# Patient Record
Sex: MALE | Race: WHITE | NOT HISPANIC OR LATINO | Employment: FULL TIME | URBAN - NONMETROPOLITAN AREA
[De-identification: names, ages, dates, MRNs, and addresses within clinical notes are randomized per-mention and may not be internally consistent; named-entity substitution may affect disease eponyms.]

---

## 2017-02-08 ENCOUNTER — APPOINTMENT (OUTPATIENT)
Dept: GENERAL RADIOLOGY | Facility: HOSPITAL | Age: 62
End: 2017-02-08

## 2017-02-08 ENCOUNTER — HOSPITAL ENCOUNTER (INPATIENT)
Facility: HOSPITAL | Age: 62
LOS: 3 days | Discharge: HOME | End: 2017-02-11
Attending: FAMILY MEDICINE | Admitting: FAMILY MEDICINE

## 2017-02-08 PROBLEM — J18.9 PNEUMONIA: Status: ACTIVE | Noted: 2017-02-08

## 2017-02-08 PROBLEM — R00.0 TACHYCARDIA: Status: ACTIVE | Noted: 2017-02-08

## 2017-02-08 PROBLEM — I10 ESSENTIAL HYPERTENSION: Chronic | Status: ACTIVE | Noted: 2017-02-08

## 2017-02-08 LAB
ALBUMIN SERPL-MCNC: 4.5 G/DL (ref 3.5–5)
ALBUMIN/GLOB SERPL: 1.6 G/DL (ref 1.1–2.5)
ALP SERPL-CCNC: 149 U/L (ref 24–120)
ALT SERPL W P-5'-P-CCNC: 48 U/L (ref 0–54)
ANION GAP SERPL CALCULATED.3IONS-SCNC: 13 MMOL/L (ref 4–13)
AST SERPL-CCNC: 36 U/L (ref 7–45)
BILIRUB SERPL-MCNC: 1.7 MG/DL (ref 0.1–1)
BUN BLD-MCNC: 15 MG/DL (ref 5–21)
BUN/CREAT SERPL: 18.3 (ref 7–25)
CALCIUM SPEC-SCNC: 9.3 MG/DL (ref 8.4–10.4)
CHLORIDE SERPL-SCNC: 102 MMOL/L (ref 98–110)
CK MB SERPL-CCNC: 2.34 NG/ML (ref 0–5)
CK SERPL-CCNC: 60 U/L (ref 0–203)
CO2 SERPL-SCNC: 24 MMOL/L (ref 24–31)
CREAT BLD-MCNC: 0.82 MG/DL (ref 0.5–1.4)
CRP SERPL-MCNC: 2.03 MG/DL (ref 0–0.99)
D DIMER PPP FEU-MCNC: <0.22 MG/L (FEU) (ref 0–0.5)
DEPRECATED RDW RBC AUTO: 44.3 FL (ref 40–54)
ERYTHROCYTE [DISTWIDTH] IN BLOOD BY AUTOMATED COUNT: 19.7 % (ref 12–15)
GFR SERPL CREATININE-BSD FRML MDRD: 96 ML/MIN/1.73
GLOBULIN UR ELPH-MCNC: 2.9 GM/DL
GLUCOSE BLD-MCNC: 132 MG/DL (ref 70–100)
GLUCOSE BLDC GLUCOMTR-MCNC: 149 MG/DL (ref 70–130)
HBA1C MFR BLD: 8.7 %
HCT VFR BLD AUTO: 43.3 % (ref 40–52)
HGB BLD-MCNC: 12.1 G/DL (ref 14–18)
INR PPP: 0.96 (ref 0.91–1.09)
MCH RBC QN AUTO: 18.4 PG (ref 28–32)
MCHC RBC AUTO-ENTMCNC: 27.9 G/DL (ref 33–36)
MCV RBC AUTO: 66 FL (ref 82–95)
NT-PROBNP SERPL-MCNC: 307 PG/ML (ref 0–900)
PLATELET # BLD AUTO: 391 10*3/MM3 (ref 130–400)
POTASSIUM BLD-SCNC: 4 MMOL/L (ref 3.5–5.3)
PROT SERPL-MCNC: 7.4 G/DL (ref 6.3–8.7)
PROTHROMBIN TIME: 13.1 SECONDS (ref 11.9–14.6)
RBC # BLD AUTO: 6.56 10*6/MM3 (ref 4.8–5.9)
SODIUM BLD-SCNC: 139 MMOL/L (ref 135–145)
TROPONIN I SERPL-MCNC: <0.012 NG/ML (ref 0–0.03)
WBC NRBC COR # BLD: 21.89 10*3/MM3 (ref 4.8–10.8)

## 2017-02-08 PROCEDURE — 84484 ASSAY OF TROPONIN QUANT: CPT | Performed by: NURSE PRACTITIONER

## 2017-02-08 PROCEDURE — 83036 HEMOGLOBIN GLYCOSYLATED A1C: CPT | Performed by: NURSE PRACTITIONER

## 2017-02-08 PROCEDURE — 94760 N-INVAS EAR/PLS OXIMETRY 1: CPT

## 2017-02-08 PROCEDURE — 82550 ASSAY OF CK (CPK): CPT | Performed by: NURSE PRACTITIONER

## 2017-02-08 PROCEDURE — 94799 UNLISTED PULMONARY SVC/PX: CPT

## 2017-02-08 PROCEDURE — 71020 HC CHEST PA AND LATERAL: CPT

## 2017-02-08 PROCEDURE — 80053 COMPREHEN METABOLIC PANEL: CPT | Performed by: NURSE PRACTITIONER

## 2017-02-08 PROCEDURE — 85007 BL SMEAR W/DIFF WBC COUNT: CPT | Performed by: NURSE PRACTITIONER

## 2017-02-08 PROCEDURE — 86140 C-REACTIVE PROTEIN: CPT | Performed by: NURSE PRACTITIONER

## 2017-02-08 PROCEDURE — 85025 COMPLETE CBC W/AUTO DIFF WBC: CPT | Performed by: NURSE PRACTITIONER

## 2017-02-08 PROCEDURE — 85610 PROTHROMBIN TIME: CPT | Performed by: NURSE PRACTITIONER

## 2017-02-08 PROCEDURE — 94640 AIRWAY INHALATION TREATMENT: CPT

## 2017-02-08 PROCEDURE — 82553 CREATINE MB FRACTION: CPT | Performed by: NURSE PRACTITIONER

## 2017-02-08 PROCEDURE — 25010000002 ENOXAPARIN PER 10 MG: Performed by: NURSE PRACTITIONER

## 2017-02-08 PROCEDURE — 83880 ASSAY OF NATRIURETIC PEPTIDE: CPT | Performed by: NURSE PRACTITIONER

## 2017-02-08 PROCEDURE — 93005 ELECTROCARDIOGRAM TRACING: CPT | Performed by: NURSE PRACTITIONER

## 2017-02-08 PROCEDURE — 25010000002 AZITHROMYCIN: Performed by: NURSE PRACTITIONER

## 2017-02-08 PROCEDURE — 87040 BLOOD CULTURE FOR BACTERIA: CPT | Performed by: NURSE PRACTITIONER

## 2017-02-08 PROCEDURE — 85379 FIBRIN DEGRADATION QUANT: CPT | Performed by: NURSE PRACTITIONER

## 2017-02-08 PROCEDURE — 25010000002 CEFTRIAXONE: Performed by: NURSE PRACTITIONER

## 2017-02-08 PROCEDURE — 82962 GLUCOSE BLOOD TEST: CPT

## 2017-02-08 RX ORDER — SODIUM CHLORIDE 0.9 % (FLUSH) 0.9 %
1-10 SYRINGE (ML) INJECTION AS NEEDED
Status: DISCONTINUED | OUTPATIENT
Start: 2017-02-08 | End: 2017-02-11 | Stop reason: HOSPADM

## 2017-02-08 RX ORDER — CHLORTHALIDONE 25 MG/1
25 TABLET ORAL DAILY
Status: DISCONTINUED | OUTPATIENT
Start: 2017-02-08 | End: 2017-02-11 | Stop reason: HOSPADM

## 2017-02-08 RX ORDER — LORAZEPAM 0.5 MG/1
0.5 TABLET ORAL EVERY 6 HOURS PRN
Status: DISCONTINUED | OUTPATIENT
Start: 2017-02-08 | End: 2017-02-11 | Stop reason: HOSPADM

## 2017-02-08 RX ORDER — SODIUM CHLORIDE 9 MG/ML
100 INJECTION, SOLUTION INTRAVENOUS CONTINUOUS
Status: DISCONTINUED | OUTPATIENT
Start: 2017-02-08 | End: 2017-02-11 | Stop reason: HOSPADM

## 2017-02-08 RX ORDER — TRANDOLAPRIL TABLETS 4 MG/1
4 TABLET ORAL DAILY
COMMUNITY

## 2017-02-08 RX ORDER — IPRATROPIUM BROMIDE AND ALBUTEROL SULFATE 2.5; .5 MG/3ML; MG/3ML
3 SOLUTION RESPIRATORY (INHALATION)
Status: DISCONTINUED | OUTPATIENT
Start: 2017-02-08 | End: 2017-02-11 | Stop reason: HOSPADM

## 2017-02-08 RX ORDER — ONDANSETRON 2 MG/ML
4 INJECTION INTRAMUSCULAR; INTRAVENOUS EVERY 6 HOURS PRN
Status: DISCONTINUED | OUTPATIENT
Start: 2017-02-08 | End: 2017-02-11 | Stop reason: HOSPADM

## 2017-02-08 RX ORDER — LABETALOL 300 MG/1
300 TABLET, FILM COATED ORAL 2 TIMES DAILY
COMMUNITY

## 2017-02-08 RX ORDER — TRANDOLAPRIL TABLETS 2 MG/1
4 TABLET ORAL DAILY
Status: DISCONTINUED | OUTPATIENT
Start: 2017-02-08 | End: 2017-02-08 | Stop reason: CLARIF

## 2017-02-08 RX ORDER — LABETALOL 300 MG/1
300 TABLET, FILM COATED ORAL 2 TIMES DAILY
Status: DISCONTINUED | OUTPATIENT
Start: 2017-02-08 | End: 2017-02-11 | Stop reason: HOSPADM

## 2017-02-08 RX ORDER — LISINOPRIL 20 MG/1
40 TABLET ORAL
Status: DISCONTINUED | OUTPATIENT
Start: 2017-02-08 | End: 2017-02-11 | Stop reason: HOSPADM

## 2017-02-08 RX ORDER — PANTOPRAZOLE SODIUM 40 MG/10ML
40 INJECTION, POWDER, LYOPHILIZED, FOR SOLUTION INTRAVENOUS
Status: DISCONTINUED | OUTPATIENT
Start: 2017-02-09 | End: 2017-02-09 | Stop reason: CLARIF

## 2017-02-08 RX ORDER — HYDROCODONE BITARTRATE AND ACETAMINOPHEN 5; 325 MG/1; MG/1
1 TABLET ORAL EVERY 4 HOURS PRN
Status: DISCONTINUED | OUTPATIENT
Start: 2017-02-08 | End: 2017-02-11 | Stop reason: HOSPADM

## 2017-02-08 RX ORDER — CHLORTHALIDONE 25 MG/1
25 TABLET ORAL DAILY
COMMUNITY

## 2017-02-08 RX ADMIN — SODIUM CHLORIDE 1000 ML: 9 INJECTION, SOLUTION INTRAVENOUS at 21:26

## 2017-02-08 RX ADMIN — SODIUM CHLORIDE 100 ML/HR: 9 INJECTION, SOLUTION INTRAVENOUS at 21:26

## 2017-02-08 RX ADMIN — ENOXAPARIN SODIUM 40 MG: 40 INJECTION SUBCUTANEOUS at 21:26

## 2017-02-08 RX ADMIN — SODIUM CHLORIDE 100 ML/HR: 9 INJECTION, SOLUTION INTRAVENOUS at 18:22

## 2017-02-08 RX ADMIN — METFORMIN HYDROCHLORIDE 1000 MG: 500 TABLET ORAL at 19:02

## 2017-02-08 RX ADMIN — IPRATROPIUM BROMIDE AND ALBUTEROL SULFATE 3 ML: .5; 3 SOLUTION RESPIRATORY (INHALATION) at 16:58

## 2017-02-08 RX ADMIN — AZITHROMYCIN 500 MG: 500 INJECTION, POWDER, LYOPHILIZED, FOR SOLUTION INTRAVENOUS at 21:26

## 2017-02-08 RX ADMIN — CEFTRIAXONE 1 G: 1 INJECTION, POWDER, FOR SOLUTION INTRAMUSCULAR; INTRAVENOUS at 19:02

## 2017-02-08 RX ADMIN — IPRATROPIUM BROMIDE AND ALBUTEROL SULFATE 3 ML: .5; 3 SOLUTION RESPIRATORY (INHALATION) at 19:27

## 2017-02-08 RX ADMIN — IPRATROPIUM BROMIDE AND ALBUTEROL SULFATE 3 ML: .5; 3 SOLUTION RESPIRATORY (INHALATION) at 23:17

## 2017-02-08 NOTE — H&P
No care team member to display    Chief complaint URI    Subjective     HPI Comments: Patient was working as a mate on a tow boat. He began having upper respiratory symptoms 2-3 days ago. He reports his symptoms worsening significantly last night and he began having sputum production and SOB, he has a history of pneumonia.     Breathing Problem   He complains of cough, difficulty breathing, shortness of breath, sputum production and wheezing. This is a new problem. The current episode started in the past 7 days. The problem occurs constantly. The problem has been gradually worsening. The cough is productive of purulent sputum and hacking. Associated symptoms include dyspnea on exertion, ear pain, a fever, headaches, malaise/fatigue, myalgias and a sore throat. Pertinent negatives include no chest pain or trouble swallowing. His symptoms are aggravated by any activity. His symptoms are alleviated by nothing. He reports no improvement on treatment. His past medical history is significant for pneumonia.       Review of Systems   Constitutional: Positive for fever and malaise/fatigue.   HENT: Positive for congestion, ear pain and sore throat. Negative for trouble swallowing.    Respiratory: Positive for cough, sputum production, shortness of breath and wheezing. Negative for chest tightness and stridor.    Cardiovascular: Positive for dyspnea on exertion. Negative for chest pain, palpitations and leg swelling.   Gastrointestinal: Negative for abdominal distention, abdominal pain, diarrhea, nausea and vomiting.   Genitourinary: Negative for dysuria and flank pain.   Musculoskeletal: Positive for myalgias.   Skin: Positive for pallor. Negative for rash.   Neurological: Positive for dizziness and headaches. Negative for syncope and speech difficulty.   Psychiatric/Behavioral: Negative for agitation and confusion.        No past medical history on file.  No past surgical history on file.  No family history on  file.  Social History   Substance Use Topics   • Smoking status: Not on file   • Smokeless tobacco: Not on file   • Alcohol use Not on file     No prescriptions prior to admission.     Allergies:  Review of patient's allergies indicates not on file.    Objective      Vital Signs  BP: ()/()   Arterial Line BP: ()/()     Physical Exam   Constitutional: He is oriented to person, place, and time. He appears well-developed and well-nourished.   HENT:   Head: Normocephalic and atraumatic.   Eyes: Conjunctivae and EOM are normal. Pupils are equal, round, and reactive to light.   Neck: Normal range of motion. Neck supple.   Cardiovascular: Regular rhythm and normal heart sounds.  Tachycardia present.    Pulmonary/Chest: Tachypnea noted. He has decreased breath sounds in the right lower field and the left lower field. He has wheezes. He has rhonchi in the right middle field and the left middle field.   Abdominal: Soft. Bowel sounds are normal. He exhibits no distension. There is no tenderness.   Musculoskeletal: Normal range of motion.   Neurological: He is alert and oriented to person, place, and time.   Skin: Skin is warm and dry. He is not diaphoretic. There is pallor.   Psychiatric: He has a normal mood and affect.       Results Review:   Discussed with Dr. Sanchez      Assessment/Plan     Principal Problem:    Pneumonia  Active Problems:    Tachycardia    Essential hypertension      Assessment:  (Pneumonia (not confirmed)  HTN  Tachycardia  ).     Plan:   Start/continue incentive spirometry.  Chest x-ray.  Start antibiotics and give fluids.   (Will admit for evaluation of probable pneumonia, tachycardia and HTN, r/o sepsis, will place on portable telemetry, add IV fluids/antibiotics. Will monitor clinical course closely.).       I discussed the patients findings and my recommendations with patient    Rosie Sharma, KEMAR  02/08/17  3:43 PM    Time: Admit and evaluate   Chest x-ray reviewed shows right lower lobe  airspace opacities with questionable atelectasis and acute infiltrate/pneumonia considered.  We will admit and place her on broad-spectrum IV antibiotics for community-acquired pneumonia and aggressive pulmonary toilet.  I have discussed the care of Nikita Bhardwaj, including pertinent history and exam findings with the ARNP/PA.  I have seen and examined the patient and the key elements of all parts of the encounter have been performed by me. I agree with the assessment and plan as outlined by the ARNP/PA. Please refer to my separate note for complete documentation.     Electronically signed by Nikita Quick MD on 2/9/2017 at 7:14 AM

## 2017-02-08 NOTE — PROGRESS NOTES
"Pharmacy Anticoagulation Note - Vivienne Bhardwaj is a 61 y.o. male on Enoxaparin 40 mg SQ Q24H for indication of VTE prophylaxis.    [Ht: 68\" (172.7 cm); Wt: 189 lb (85.7 kg);  BMI: Body mass index is 28.74 kg/(m^2).]  Estimated Creatinine Clearance: 102 mL/min (by C-G formula based on Cr of 0.82). No results found for: INR, PROTIME No results found for: HGB No results found for: PLT  Assessment/Plan:  Pharmacy was consulted for dosing of enoxaparin (indication of VTE prophylaxis).  Based on renal function, will initiate enoxaparin 40 mg Q24H.  Pharmacy will continue to monitor renal function and hemodynamic status and adjust dose accordingly.    Joanne Cohen, [facility]  02/08/17 5:11 PM   "

## 2017-02-09 PROBLEM — E11.9 TYPE 2 DIABETES MELLITUS (HCC): Status: ACTIVE | Noted: 2017-02-09

## 2017-02-09 LAB
BASOPHILS # BLD MANUAL: 0.44 10*3/MM3 (ref 0–0.2)
BASOPHILS NFR BLD AUTO: 2 % (ref 0–2)
BILIRUB UR QL STRIP: NEGATIVE
CLARITY UR: CLEAR
COLOR UR: YELLOW
ELLIPTOCYTES BLD QL SMEAR: ABNORMAL
EOSINOPHIL # BLD MANUAL: 1.09 10*3/MM3 (ref 0–0.7)
EOSINOPHIL NFR BLD MANUAL: 5 % (ref 0–4)
FLUAV AG NPH QL: NEGATIVE
FLUBV AG NPH QL IA: NEGATIVE
GLUCOSE BLDC GLUCOMTR-MCNC: 139 MG/DL (ref 70–130)
GLUCOSE BLDC GLUCOMTR-MCNC: 221 MG/DL (ref 70–130)
GLUCOSE BLDC GLUCOMTR-MCNC: 312 MG/DL (ref 70–130)
GLUCOSE BLDC GLUCOMTR-MCNC: 328 MG/DL (ref 70–130)
GLUCOSE UR STRIP-MCNC: NEGATIVE MG/DL
HGB UR QL STRIP.AUTO: NEGATIVE
KETONES UR QL STRIP: NEGATIVE
LARGE PLATELETS: ABNORMAL
LEUKOCYTE ESTERASE UR QL STRIP.AUTO: NEGATIVE
LYMPHOCYTES # BLD MANUAL: 1.09 10*3/MM3 (ref 0.72–4.86)
LYMPHOCYTES NFR BLD MANUAL: 3 % (ref 4–12)
LYMPHOCYTES NFR BLD MANUAL: 5 % (ref 15–45)
MICROCYTES BLD QL: ABNORMAL
MONOCYTES # BLD AUTO: 0.66 10*3/MM3 (ref 0.19–1.3)
NEUTROPHILS # BLD AUTO: 18.61 10*3/MM3 (ref 1.87–8.4)
NEUTROPHILS NFR BLD MANUAL: 85 % (ref 39–78)
NITRITE UR QL STRIP: NEGATIVE
NRBC SPEC MANUAL: 1 /100 WBC (ref 0–0)
PH UR STRIP.AUTO: <=5 [PH] (ref 5–8)
PLAT MORPH BLD: NORMAL
POLYCHROMASIA BLD QL SMEAR: ABNORMAL
PROT UR QL STRIP: NEGATIVE
SCHISTOCYTES BLD QL SMEAR: ABNORMAL
SMALL PLATELETS BLD QL SMEAR: ADEQUATE
SP GR UR STRIP: 1.01 (ref 1–1.03)
UROBILINOGEN UR QL STRIP: NORMAL
WBC MORPH BLD: NORMAL

## 2017-02-09 PROCEDURE — 94760 N-INVAS EAR/PLS OXIMETRY 1: CPT

## 2017-02-09 PROCEDURE — 81003 URINALYSIS AUTO W/O SCOPE: CPT | Performed by: NURSE PRACTITIONER

## 2017-02-09 PROCEDURE — 94640 AIRWAY INHALATION TREATMENT: CPT

## 2017-02-09 PROCEDURE — 87804 INFLUENZA ASSAY W/OPTIC: CPT | Performed by: FAMILY MEDICINE

## 2017-02-09 PROCEDURE — 94799 UNLISTED PULMONARY SVC/PX: CPT

## 2017-02-09 PROCEDURE — 25010000002 METHYLPREDNISOLONE PER 40 MG: Performed by: PHYSICIAN ASSISTANT

## 2017-02-09 PROCEDURE — 82962 GLUCOSE BLOOD TEST: CPT

## 2017-02-09 PROCEDURE — 25010000002 AZITHROMYCIN: Performed by: NURSE PRACTITIONER

## 2017-02-09 PROCEDURE — 25010000002 CEFTRIAXONE: Performed by: NURSE PRACTITIONER

## 2017-02-09 PROCEDURE — 93010 ELECTROCARDIOGRAM REPORT: CPT | Performed by: INTERNAL MEDICINE

## 2017-02-09 PROCEDURE — 63710000001 INSULIN LISPRO (HUMAN) PER 5 UNITS: Performed by: PHYSICIAN ASSISTANT

## 2017-02-09 PROCEDURE — 25010000002 ENOXAPARIN PER 10 MG: Performed by: NURSE PRACTITIONER

## 2017-02-09 RX ORDER — NICOTINE POLACRILEX 4 MG
15 LOZENGE BUCCAL
Status: DISCONTINUED | OUTPATIENT
Start: 2017-02-09 | End: 2017-02-10 | Stop reason: SDUPTHER

## 2017-02-09 RX ORDER — PANTOPRAZOLE SODIUM 40 MG/1
40 TABLET, DELAYED RELEASE ORAL
Status: DISCONTINUED | OUTPATIENT
Start: 2017-02-10 | End: 2017-02-11 | Stop reason: HOSPADM

## 2017-02-09 RX ORDER — METHYLPREDNISOLONE SODIUM SUCCINATE 40 MG/ML
40 INJECTION, POWDER, LYOPHILIZED, FOR SOLUTION INTRAMUSCULAR; INTRAVENOUS EVERY 8 HOURS
Status: DISCONTINUED | OUTPATIENT
Start: 2017-02-09 | End: 2017-02-10

## 2017-02-09 RX ORDER — DEXTROSE MONOHYDRATE 25 G/50ML
25 INJECTION, SOLUTION INTRAVENOUS
Status: DISCONTINUED | OUTPATIENT
Start: 2017-02-09 | End: 2017-02-10 | Stop reason: SDUPTHER

## 2017-02-09 RX ADMIN — METHYLPREDNISOLONE SODIUM SUCCINATE 40 MG: 40 INJECTION, POWDER, FOR SOLUTION INTRAMUSCULAR; INTRAVENOUS at 22:46

## 2017-02-09 RX ADMIN — IPRATROPIUM BROMIDE AND ALBUTEROL SULFATE 3 ML: .5; 3 SOLUTION RESPIRATORY (INHALATION) at 06:46

## 2017-02-09 RX ADMIN — METHYLPREDNISOLONE SODIUM SUCCINATE 40 MG: 40 INJECTION, POWDER, FOR SOLUTION INTRAMUSCULAR; INTRAVENOUS at 15:53

## 2017-02-09 RX ADMIN — AZITHROMYCIN 500 MG: 500 INJECTION, POWDER, LYOPHILIZED, FOR SOLUTION INTRAVENOUS at 17:26

## 2017-02-09 RX ADMIN — METFORMIN HYDROCHLORIDE 1000 MG: 500 TABLET ORAL at 17:27

## 2017-02-09 RX ADMIN — INSULIN LISPRO 5 UNITS: 100 INJECTION, SOLUTION INTRAVENOUS; SUBCUTANEOUS at 17:29

## 2017-02-09 RX ADMIN — CHLORTHALIDONE 25 MG: 25 TABLET ORAL at 08:26

## 2017-02-09 RX ADMIN — CEFTRIAXONE 1 G: 1 INJECTION, POWDER, FOR SOLUTION INTRAMUSCULAR; INTRAVENOUS at 16:01

## 2017-02-09 RX ADMIN — METFORMIN HYDROCHLORIDE 1000 MG: 500 TABLET ORAL at 08:26

## 2017-02-09 RX ADMIN — IPRATROPIUM BROMIDE AND ALBUTEROL SULFATE 3 ML: .5; 3 SOLUTION RESPIRATORY (INHALATION) at 02:55

## 2017-02-09 RX ADMIN — SODIUM CHLORIDE 100 ML/HR: 9 INJECTION, SOLUTION INTRAVENOUS at 09:54

## 2017-02-09 RX ADMIN — PANTOPRAZOLE SODIUM 40 MG: 40 INJECTION, POWDER, FOR SOLUTION INTRAVENOUS at 05:53

## 2017-02-09 RX ADMIN — IPRATROPIUM BROMIDE AND ALBUTEROL SULFATE 3 ML: .5; 3 SOLUTION RESPIRATORY (INHALATION) at 23:15

## 2017-02-09 RX ADMIN — ENOXAPARIN SODIUM 40 MG: 40 INJECTION SUBCUTANEOUS at 17:27

## 2017-02-09 RX ADMIN — LABETALOL HYDROCHLORIDE 300 MG: 300 TABLET, FILM COATED ORAL at 08:26

## 2017-02-09 RX ADMIN — METHYLPREDNISOLONE SODIUM SUCCINATE 40 MG: 40 INJECTION, POWDER, FOR SOLUTION INTRAMUSCULAR; INTRAVENOUS at 08:45

## 2017-02-09 RX ADMIN — IPRATROPIUM BROMIDE AND ALBUTEROL SULFATE 3 ML: .5; 3 SOLUTION RESPIRATORY (INHALATION) at 11:11

## 2017-02-09 RX ADMIN — IPRATROPIUM BROMIDE AND ALBUTEROL SULFATE 3 ML: .5; 3 SOLUTION RESPIRATORY (INHALATION) at 19:25

## 2017-02-09 RX ADMIN — INSULIN LISPRO 3 UNITS: 100 INJECTION, SOLUTION INTRAVENOUS; SUBCUTANEOUS at 12:20

## 2017-02-09 RX ADMIN — LABETALOL HYDROCHLORIDE 300 MG: 300 TABLET, FILM COATED ORAL at 17:27

## 2017-02-09 RX ADMIN — INSULIN LISPRO 5 UNITS: 100 INJECTION, SOLUTION INTRAVENOUS; SUBCUTANEOUS at 20:54

## 2017-02-09 RX ADMIN — LISINOPRIL 40 MG: 20 TABLET ORAL at 08:26

## 2017-02-09 RX ADMIN — IPRATROPIUM BROMIDE AND ALBUTEROL SULFATE 3 ML: .5; 3 SOLUTION RESPIRATORY (INHALATION) at 14:54

## 2017-02-09 NOTE — PROGRESS NOTES
Family Medicine Progress Note    Patient:  Nikita Bhardwaj  YOB: 1955    MRN: [id number]     Acct: [id number]     Admit date: 2/8/2017  LOS: 1     Patient Seen, Chart, Consults notes, Labs, Radiology studies reviewed.    Subjective:  Doing well with no new complaints    Past Medical History:  Past Medical History   Diagnosis Date   • Diabetes mellitus    • Hypertension        Past Surgical History:  History reviewed. No pertinent past surgical history.      Allergies:  Review of patient's allergies indicates no known allergies.    Medications:  Scheduled Meds:    ceftriaxone 1 g Intravenous Q24H   And      azithromycin 500 mg Intravenous Q24H   chlorthalidone 25 mg Oral Daily   enoxaparin 40 mg Subcutaneous Q24H   ipratropium-albuterol 3 mL Nebulization Q4H - RT   labetalol 300 mg Oral BID   lisinopril 40 mg Oral Q24H   metFORMIN 1,000 mg Oral BID With Meals   pantoprazole 40 mg Intravenous Q AM     Continuous Infusions:    Pharmacy to Dose enoxaparin (LOVENOX)     sodium chloride 100 mL/hr Last Rate: 100 mL/hr (02/08/17 2126)     PRN Meds:HYDROcodone-acetaminophen  •  LORazepam  •  ondansetron  •  Pharmacy to Dose enoxaparin (LOVENOX)  •  sodium chloride    Social History:   Social History     Social History   • Marital status: Single     Spouse name: N/A   • Number of children: N/A   • Years of education: N/A     Occupational History   • Not on file.     Social History Main Topics   • Smoking status: Never Smoker   • Smokeless tobacco: Not on file   • Alcohol use No   • Drug use: Not on file   • Sexual activity: Yes     Partners: Male     Other Topics Concern   • Not on file     Social History Narrative   • No narrative on file       Family History:   History reviewed. No pertinent family history.      ROS:  10 point ROS obtained:   Gen: No fevers  HEENT: No migraine or visual change  Lung: No cough, hemopotysis or wheezing  Cv: No chest pain or pressure  Abd: No nausea or vomit, no change in bowel fct,  no gi bleeding  Ext: No inc pain or swelling  Neuro: No seizure or syncope  Skin: No new rashes  Endo: No polyuria, polyphagia or poilydypsia  Psyc: No inc anxiety or depression  MS: No increase in joint pain or swelling reported      Objective:    Lab Results (last 72 hours)     Procedure Component Value Units Date/Time    POC Glucose Fingerstick [[id number]]  (Abnormal) Collected:  02/08/17 1610    Specimen:  Blood Updated:  02/08/17 1640     Glucose 149 (H) mg/dL       : [id number] Chandu Moy ID: [id number]       C-reactive Protein [[id number]]  (Abnormal) Collected:  02/08/17 1613    Specimen:  Blood Updated:  02/08/17 1652     C-Reactive Protein 2.03 (H) mg/dL     Comprehensive Metabolic Panel [[id number]]  (Abnormal) Collected:  02/08/17 1613    Specimen:  Blood Updated:  02/08/17 1653     Glucose 132 (H) mg/dL      BUN 15 mg/dL      Creatinine 0.82 mg/dL      Sodium 139 mmol/L      Potassium 4.0 mmol/L      Chloride 102 mmol/L      CO2 24.0 mmol/L      Calcium 9.3 mg/dL      Total Protein 7.4 g/dL      Albumin 4.50 g/dL      ALT (SGPT) 48 U/L      AST (SGOT) 36 U/L      Alkaline Phosphatase 149 (H) U/L      Total Bilirubin 1.7 (H) mg/dL      eGFR Non African Amer 96 mL/min/1.73      Globulin 2.9 gm/dL      A/G Ratio 1.6 g/dL      BUN/Creatinine Ratio 18.3      Anion Gap 13.0 mmol/L     CK Total & CKMB [[id number]]  (Normal) Collected:  02/08/17 1613    Specimen:  Blood Updated:  02/08/17 1706     CKMB 2.34 ng/mL      Creatine Kinase 60 U/L     Narrative:       CKMB Index not indicated    Manual Differential [[id number]] Collected:  02/08/17 1613    Specimen:  Blood Updated:  02/08/17 1710    Slide Review, Hematology [[id number]] Collected:  02/08/17 1613    Specimen:  Blood Updated:  02/08/17 1720    CBC Auto Differential [[id number]]  (Abnormal) Collected:  02/08/17 1613    Specimen:  Blood Updated:  02/08/17 1722     WBC 21.89 (H) 10*3/mm3      RBC 6.56 (H) 10*6/mm3      Hemoglobin 12.1 (L) g/dL       Hematocrit 43.3 %      MCV 66.0 (L) fL      MCH 18.4 (L) pg      MCHC 27.9 (L) g/dL      RDW 19.7 (H) %      RDW-SD 44.3 fl      Platelets 391 10*3/mm3     Troponin [[id number]]  (Normal) Collected:  02/08/17 1613    Specimen:  Blood Updated:  02/08/17 1732     Troponin I <0.012 ng/mL     BNP [[id number]]  (Normal) Collected:  02/08/17 1613    Specimen:  Blood Updated:  02/08/17 1732     proBNP 307.0 pg/mL     Hemoglobin A1c [[id number]] Collected:  02/08/17 1613    Specimen:  Blood Updated:  02/08/17 1753     Hemoglobin A1C 8.7 %     Narrative:       Less than 6.0           Non-Diabetic Range  6.0-7.0                 ADA Therapeutic Target  Greater than 7.0        Action Suggested    Protime-INR [[id number]]  (Normal) Collected:  02/08/17 1613    Specimen:  Blood Updated:  02/08/17 1826     Protime 13.1 Seconds      INR 0.96     D-dimer, Quantitative [[id number]]  (Normal) Collected:  02/08/17 1613    Specimen:  Blood Updated:  02/08/17 1847     D-Dimer, Quantitative <0.22 mg/L (FEU)     Narrative:       Reference Range is 0-0.50 mg/L FEU. However, results <0.50 mg/L FEU tends to rule out DVT or PE. Results >0.50 mg/L FEU are not useful in predicting absence or presence of DVT or PE.    CBC & Differential [[id number]] Collected:  02/08/17 1613    Specimen:  Blood Updated:  02/08/17 1902    Narrative:       The following orders were created for panel order CBC & Differential.  Procedure                               Abnormality         Status                     ---------                               -----------         ------                     Manual Differential[[id number]]                               In process                 Scan Slide[[id number]]                                                                   CBC Auto Differential[[id number]]         Abnormal            Final result               Slide Review, Hematology[[id number]]                          In process                   Please view results for these  "tests on the individual orders.    Troponin [[id number]]  (Normal) Collected:  02/08/17 1814    Specimen:  Blood Updated:  02/08/17 1945     Troponin I <0.012 ng/mL     Troponin [[id number]]  (Normal) Collected:  02/08/17 2137    Specimen:  Blood Updated:  02/08/17 2218     Troponin I <0.012 ng/mL     Blood Culture [[id number]]  (Normal) Collected:  02/08/17 1610    Specimen:  Blood from Arm, Right Updated:  02/09/17 0501     Blood Culture No growth at less than 24 hours     Blood Culture [[id number]]  (Normal) Collected:  02/08/17 1613    Specimen:  Blood from Arm, Left Updated:  02/09/17 0501     Blood Culture No growth at less than 24 hours            Imaging Results (last 72 hours)     Procedure Component Value Units Date/Time    XR Chest 2 View [[id number]] Collected:  02/08/17 1806     Updated:  02/08/17 2051    Narrative:       TWO-VIEW CHEST:      HISTORY: Pneumonia.      Frontal and lateral projection chest radiograph obtained.     COMPARISON: 08/24/2013      FINDINGS:  There is right hemidiaphragm elevation with airspace opacities in the  right lung base that may represent atelectasis. Acute  infiltrate/pneumonia also considered. Right perihilar interstitial  prominence noted particularly in the lower lobe.     The left lung is grossly clear.     The heart is normal in size without heart failure.     The bony structures are intact.                                                                                                                      Impression:       Right hemidiaphragm elevation with right lower lobe airspace  opacities, question atelectasis. Acute infiltrates/pneumonia also  considered.        This report was finalized on 02/08/2017 20:49 by Dr. Winston Sharma MD.           Physical Exam:    Vitals:   Visit Vitals   • /81 (BP Location: Left arm, Patient Position: Lying)   • Pulse 100   • Temp 98.6 °F (37 °C) (Temporal Artery )   • Resp 18   • Ht 68\" (172.7 cm)   • Wt 189 lb (85.7 kg)   • SpO2 " 97%   • BMI 28.74 kg/m2     24 hour intake/output:    Intake/Output Summary (Last 24 hours) at 02/09/17 0609  Last data filed at 02/08/17 1915   Gross per 24 hour   Intake    240 ml   Output      0 ml   Net    240 ml     Last 3 weights:  Wt Readings from Last 3 Encounters:   02/08/17 189 lb (85.7 kg)           General Appearance:    Alert, cooperative, in no acute distress   Head:    Normocephalic, without obvious abnormality, atraumatic   Eyes:            Lids and lashes normal, conjunctivae and sclerae normal, no   icterus, no pallor, corneas clear, PERRLA   Ears:    Ears appear intact with no abnormalities noted   Throat:   No oral lesions, no thrush, oral mucosa moist   Neck:   No adenopathy, supple, trachea midline, no thyromegaly, no   carotid bruit, no JVD   Back:     No kyphosis present, no scoliosis present, no skin lesions,      erythema or scars, no tenderness to percussion or                   palpation,   range of motion normal   Lungs:     Clear to auscultation,respirations regular, even and                  unlabored    Heart:    Regular rhythm and normal rate, normal S1 and S2, no            murmur, no gallop, no rub, no click   Chest Wall:    No abnormalities observed   Abdomen:     Normal bowel sounds, no masses, no organomegaly, soft        non-tender, non-distended, no guarding, no rebound                tenderness   Rectal:     Deferred   Extremities:   Moves all extremities well, no edema, no cyanosis, no             redness   Pulses:   Pulses palpable and equal bilaterally   Skin:   No bleeding, bruising or rash   Lymph nodes:   No palpable adenopathy   Neurologic:   Cranial nerves 2 - 12 grossly intact, sensation intact, DTR       present and equal bilaterally           Assessment:    Principal Problem:    Pneumonia  Active Problems:    Tachycardia    Essential hypertension    Type 2 diabetes mellitus  Leukocytosis        Plan:  PNEUMONIA:  1. Continue IV antibiotics  2. Solumedrol  IV  3. Check flu swab  4. Pulmonary toilet (nebulized treatments, Incentive Spirometry, P&D)  5. Early Mobilization  6. Collect sputum culture and blood cultures x2  7. Supplement 02 as needed to maintain oxygen saturation >92%  8. DVT prophylaxis with Lovenox and/or SCD/Teds        Electronically signed by BIMAL Pendleton on 2/9/2017 at 6:09 AM     Better today , nebulizer seemed to help.  Reviewed her chest x-ray today.  Continue with IV antibiotics aggressive pulmonary toilet and hopefully be able to discharge home tomorrow if she continues to clinically improve.  I have discussed the care of Nikita Bhardwaj, including pertinent history and exam findings with the ARNP/PA.  I have seen and examined the patient and the key elements of all parts of the encounter have been performed by me. I agree with the assessment and plan as outlined by the ARNP/PA. Please refer to my separate note for complete documentation.     Electronically signed by Nikita Quick MD on 2/9/2017 at 7:15 AM

## 2017-02-09 NOTE — PLAN OF CARE
Problem: Patient Care Overview (Adult)  Goal: Plan of Care Review  Outcome: Ongoing (interventions implemented as appropriate)    02/08/17 4598   Coping/Psychosocial Response Interventions   Plan Of Care Reviewed With patient   Patient Care Overview   Progress unable to show any progress toward functional goals   Outcome Evaluation   Outcome Summary/Follow up Plan C/O SOA, AND PRODUCTIVE COUGH. CURRENTLY ON ROOM AIR. NO C/O PAIN         Problem: Pneumonia (Adult)  Goal: Signs and Symptoms of Listed Potential Problems Will be Absent or Manageable (Pneumonia)  Outcome: Ongoing (interventions implemented as appropriate)

## 2017-02-09 NOTE — PLAN OF CARE
Problem: Pneumonia (Adult)  Goal: Signs and Symptoms of Listed Potential Problems Will be Absent or Manageable (Pneumonia)    02/09/17 1534   Pneumonia   Problems Assessed (Pneumonia) all   Problems Present (Pneumonia) respiratory compromise

## 2017-02-09 NOTE — PROGRESS NOTES
Automatic IV to PO Conversion Pharmacy Note - General    Nikita Bhardwaj is a 61 y.o. male who meets the following criteria for IV to PO therapy conversion :     · Tolerating oral fluids or 40ml/hour of enteral nutrition and oral route not otherwise compromised  · Receiving other oral medications on a scheduled basis    Assessment/Plan  Based on this criteria,   Protonix 40 mg IV Qam has been changed to Protonix 40 mg PO Qam   per the directives and guidelines established by [facility] Pharmacy and Therapeutics Committee and [facility] Medical Executive Committee .       KATTY Suh, Pharm.D.    02/09/1710:12 AM

## 2017-02-09 NOTE — PLAN OF CARE
Problem: Patient Care Overview (Adult)  Goal: Plan of Care Review  Outcome: Ongoing (interventions implemented as appropriate)    02/09/17 0528   Coping/Psychosocial Response Interventions   Plan Of Care Reviewed With patient   Patient Care Overview   Progress improving   Outcome Evaluation   Outcome Summary/Follow up Plan no c/o soa this shift         Problem: Pneumonia (Adult)  Goal: Signs and Symptoms of Listed Potential Problems Will be Absent or Manageable (Pneumonia)  Outcome: Ongoing (interventions implemented as appropriate)    02/09/17 0528   Pneumonia   Problems Assessed (Pneumonia) all   Problems Present (Pneumonia) respiratory compromise

## 2017-02-09 NOTE — PAYOR COMM NOTE
"[facility]  SANJUANITA ABREU RN [phone]  FAX [phone]  David Walker (61 y.o. Male) [phone]    Date of Birth Social Security Number Address Home Phone MRN    1955  [address] [place] [id number] [phone] [id number]    [employer]ian Marital Status          Oriental orthodox Single       Admission Date Admission Type Admitting Provider Attending Provider Department, Room/Bed    2/8/17 Urgent David Sanchez MD [place], James Kyle, MD [facility] 4C, 477/1    Discharge Date Discharge Disposition Discharge Destination                      Attending Provider: David Sanchez MD     Allergies:  No Known Allergies    Isolation:  None   Infection:  None   Code Status:  FULL    Ht:  68\" (172.7 cm)   Wt:  189 lb (85.7 kg)    Admission Cmt:  None   Principal Problem:  Pneumonia [J18.9]                 Active Insurance as of 2/8/2017     Primary Coverage     Payor Plan Insurance Group Employer/Plan Group    [employer] [facility] [employer] [facility] PPO [id number]     Payor Plan Address Payor Plan Phone Number Effective From Effective To    [address] [phone] 1/1/2015     [place] [address]       Subscriber Name Subscriber Birth Date Member ID       DAVID WALKER 1955 [id number]                 Emergency Contacts      (Rel.) Home Phone Work Phone Mobile Phone    Ev Walker (Daughter) [phone] -- --               History & Physical      David Quick MD at 2/8/2017  3:43 PM                No care team member to display    Chief complaint URI    Subjective     HPI Comments: Patient was working as a mate on a tow boat. He began having upper respiratory symptoms 2-3 days ago. He reports his symptoms worsening significantly last night and he began having sputum production and SOB, he has a history of pneumonia.     Breathing Problem   He complains of cough, difficulty breathing, shortness of breath, sputum production and wheezing. This is a new " problem. The current episode started in the past 7 days. The problem occurs constantly. The problem has been gradually worsening. The cough is productive of purulent sputum and hacking. Associated symptoms include dyspnea on exertion, ear pain, a fever, headaches, malaise/fatigue, myalgias and a sore throat. Pertinent negatives include no chest pain or trouble swallowing. His symptoms are aggravated by any activity. His symptoms are alleviated by nothing. He reports no improvement on treatment. His past medical history is significant for pneumonia.       Review of Systems   Constitutional: Positive for fever and malaise/fatigue.   HENT: Positive for congestion, ear pain and sore throat. Negative for trouble swallowing.    Respiratory: Positive for cough, sputum production, shortness of breath and wheezing. Negative for chest tightness and stridor.    Cardiovascular: Positive for dyspnea on exertion. Negative for chest pain, palpitations and leg swelling.   Gastrointestinal: Negative for abdominal distention, abdominal pain, diarrhea, nausea and vomiting.   Genitourinary: Negative for dysuria and flank pain.   Musculoskeletal: Positive for myalgias.   Skin: Positive for pallor. Negative for rash.   Neurological: Positive for dizziness and headaches. Negative for syncope and speech difficulty.   Psychiatric/Behavioral: Negative for agitation and confusion.        No past medical history on file.  No past surgical history on file.  No family history on file.  Social History   Substance Use Topics   • Smoking status: Not on file   • Smokeless tobacco: Not on file   • Alcohol use Not on file     No prescriptions prior to admission.     Allergies:  Review of patient's allergies indicates not on file.    Objective      Vital Signs  BP: ()/()   Arterial Line BP: ()/()     Physical Exam   Constitutional: He is oriented to person, place, and time. He appears well-developed and well-nourished.   HENT:   Head: Normocephalic and  atraumatic.   Eyes: Conjunctivae and EOM are normal. Pupils are equal, round, and reactive to light.   Neck: Normal range of motion. Neck supple.   Cardiovascular: Regular rhythm and normal heart sounds.  Tachycardia present.    Pulmonary/Chest: Tachypnea noted. He has decreased breath sounds in the right lower field and the left lower field. He has wheezes. He has rhonchi in the right middle field and the left middle field.   Abdominal: Soft. Bowel sounds are normal. He exhibits no distension. There is no tenderness.   Musculoskeletal: Normal range of motion.   Neurological: He is alert and oriented to person, place, and time.   Skin: Skin is warm and dry. He is not diaphoretic. There is pallor.   Psychiatric: He has a normal mood and affect.       Results Review:   Discussed with Dr. Sanchez      Assessment/Plan     Principal Problem:    Pneumonia  Active Problems:    Tachycardia    Essential hypertension      Assessment:  (Pneumonia (not confirmed)  HTN  Tachycardia  ).     Plan:   Start/continue incentive spirometry.  Chest x-ray.  Start antibiotics and give fluids.   (Will admit for evaluation of probable pneumonia, tachycardia and HTN, r/o sepsis, will place on portable telemetry, add IV fluids/antibiotics. Will monitor clinical course closely.).       I discussed the patients findings and my recommendations with patient    Rosie Sharma, APRN  02/08/17  3:43 PM    Time: Admit and evaluate   Chest x-ray reviewed shows right lower lobe airspace opacities with questionable atelectasis and acute infiltrate/pneumonia considered.  We will admit and place her on broad-spectrum IV antibiotics for community-acquired pneumonia and aggressive pulmonary toilet.  I have discussed the care of Nikita Bhardwaj, including pertinent history and exam findings with the ARNP/PA.  I have seen and examined the patient and the key elements of all parts of the encounter have been performed by me. I agree with the assessment and plan  as outlined by the ARNP/PA. Please refer to my separate note for complete documentation.     Electronically signed by Nikita Quick MD on 2/9/2017 at 7:14 AM         Electronically signed by Nikita Quick MD at 2/9/2017  7:14 AM        Emergency Department Notes     No notes of this type exist for this encounter.        Intake & Output (last day)       02/08 0701 - 02/09 0700 02/09 0701 - 02/10 0700    P.O. 240     Total Intake(mL/kg) 240 (2.8)     Net +240            Unmeasured Urine Occurrence 6 x           Lab Results (last 24 hours)     Procedure Component Value Units Date/Time    POC Glucose Fingerstick [[id number]]  (Abnormal) Collected:  02/08/17 1610    Specimen:  Blood Updated:  02/08/17 1640     Glucose 149 (H) mg/dL       : [id number] Chandu HuntleyaMeter ID: [id number]       C-reactive Protein [[id number]]  (Abnormal) Collected:  02/08/17 1613    Specimen:  Blood Updated:  02/08/17 1652     C-Reactive Protein 2.03 (H) mg/dL     Comprehensive Metabolic Panel [[id number]]  (Abnormal) Collected:  02/08/17 1613    Specimen:  Blood Updated:  02/08/17 1653     Glucose 132 (H) mg/dL      BUN 15 mg/dL      Creatinine 0.82 mg/dL      Sodium 139 mmol/L      Potassium 4.0 mmol/L      Chloride 102 mmol/L      CO2 24.0 mmol/L      Calcium 9.3 mg/dL      Total Protein 7.4 g/dL      Albumin 4.50 g/dL      ALT (SGPT) 48 U/L      AST (SGOT) 36 U/L      Alkaline Phosphatase 149 (H) U/L      Total Bilirubin 1.7 (H) mg/dL      eGFR Non African Amer 96 mL/min/1.73      Globulin 2.9 gm/dL      A/G Ratio 1.6 g/dL      BUN/Creatinine Ratio 18.3      Anion Gap 13.0 mmol/L     CK Total & CKMB [[id number]]  (Normal) Collected:  02/08/17 1613    Specimen:  Blood Updated:  02/08/17 1706     CKMB 2.34 ng/mL      Creatine Kinase 60 U/L     Narrative:       CKMB Index not indicated    CBC Auto Differential [[id number]]  (Abnormal) Collected:  02/08/17 1613    Specimen:  Blood Updated:  02/08/17 1722     WBC 21.89 (H)  10*3/mm3      RBC 6.56 (H) 10*6/mm3      Hemoglobin 12.1 (L) g/dL      Hematocrit 43.3 %      MCV 66.0 (L) fL      MCH 18.4 (L) pg      MCHC 27.9 (L) g/dL      RDW 19.7 (H) %      RDW-SD 44.3 fl      Platelets 391 10*3/mm3     Troponin [[id number]]  (Normal) Collected:  02/08/17 1613    Specimen:  Blood Updated:  02/08/17 1732     Troponin I <0.012 ng/mL     BNP [[id number]]  (Normal) Collected:  02/08/17 1613    Specimen:  Blood Updated:  02/08/17 1732     proBNP 307.0 pg/mL     Hemoglobin A1c [[id number]] Collected:  02/08/17 1613    Specimen:  Blood Updated:  02/08/17 1753     Hemoglobin A1C 8.7 %     Narrative:       Less than 6.0           Non-Diabetic Range  6.0-7.0                 ADA Therapeutic Target  Greater than 7.0        Action Suggested    Protime-INR [[id number]]  (Normal) Collected:  02/08/17 1613    Specimen:  Blood Updated:  02/08/17 1826     Protime 13.1 Seconds      INR 0.96     D-dimer, Quantitative [[id number]]  (Normal) Collected:  02/08/17 1613    Specimen:  Blood Updated:  02/08/17 1847     D-Dimer, Quantitative <0.22 mg/L (FEU)     Narrative:       Reference Range is 0-0.50 mg/L FEU. However, results <0.50 mg/L FEU tends to rule out DVT or PE. Results >0.50 mg/L FEU are not useful in predicting absence or presence of DVT or PE.    Troponin [[id number]]  (Normal) Collected:  02/08/17 1814    Specimen:  Blood Updated:  02/08/17 1945     Troponin I <0.012 ng/mL     Troponin [[id number]]  (Normal) Collected:  02/08/17 2137    Specimen:  Blood Updated:  02/08/17 2218     Troponin I <0.012 ng/mL     Blood Culture [[id number]]  (Normal) Collected:  02/08/17 1610    Specimen:  Blood from Arm, Right Updated:  02/09/17 0501     Blood Culture No growth at less than 24 hours     Blood Culture [[id number]]  (Normal) Collected:  02/08/17 1613    Specimen:  Blood from Arm, Left Updated:  02/09/17 0501     Blood Culture No growth at less than 24 hours     CBC & Differential [[id number]] Collected:  02/08/17 1613    Specimen:   Blood Updated:  02/09/17 0735    Narrative:       The following orders were created for panel order CBC & Differential.  Procedure                               Abnormality         Status                     ---------                               -----------         ------                     Manual Differential[[id number]]           Abnormal            Final result               Scan Slide[[id number]]                                                                   CBC Auto Differential[[id number]]         Abnormal            Final result               Slide Review, Hematology[[id number]]                                                       Please view results for these tests on the individual orders.    Manual Differential [[id number]]  (Abnormal) Collected:  02/08/17 1613    Specimen:  Blood Updated:  02/09/17 0735     Neutrophil % 85.0 (H) %      Lymphocyte % 5.0 (L) %      Monocyte % 3.0 (L) %      Eosinophil % 5.0 (H) %      Basophil % 2.0 %      Neutrophils Absolute 18.61 (H) 10*3/mm3      Lymphocytes Absolute 1.09 10*3/mm3      Monocytes Absolute 0.66 10*3/mm3      Eosinophils Absolute 1.09 (H) 10*3/mm3      Basophils Absolute 0.44 (H) 10*3/mm3      nRBC 1.0 (H) /100 WBC      Elliptocytes Slight/1+      Microcytes Mod/2+      Polychromasia Slight/1+      Schistocytes Slight/1+      WBC Morphology Normal      Platelet Morphology Normal      Platelet Estimate Adequate      Large Platelets Slight/1+         Imaging Results (last 24 hours)     Procedure Component Value Units Date/Time    XR Chest 2 View [[id number]] Collected:  02/08/17 1806     Updated:  02/08/17 2051    Narrative:       TWO-VIEW CHEST:      HISTORY: Pneumonia.      Frontal and lateral projection chest radiograph obtained.     COMPARISON: 08/24/2013      FINDINGS:  There is right hemidiaphragm elevation with airspace opacities in the  right lung base that may represent atelectasis. Acute  infiltrate/pneumonia also considered. Right perihilar  interstitial  prominence noted particularly in the lower lobe.     The left lung is grossly clear.     The heart is normal in size without heart failure.     The bony structures are intact.                                                                                                                      Impression:       Right hemidiaphragm elevation with right lower lobe airspace  opacities, question atelectasis. Acute infiltrates/pneumonia also  considered.        This report was finalized on 02/08/2017 20:49 by Dr. Winston Sharma MD.          Orders (last 24 hrs)     Start     Ordered    02/10/17 0600  CBC & Differential  Morning Draw      02/09/17 0601    02/10/17 0600  Basic Metabolic Panel  Morning Draw      02/09/17 0601    02/09/17 1200  POC Glucose Fingerstick  Every 6 Hours      02/09/17 0615    02/09/17 0731  Influenza Antigen  Once      02/09/17 0731    02/09/17 0730  insulin lispro (humaLOG) injection 2-7 Units  4 Times Daily Before Meals & Nightly      02/09/17 0615    02/09/17 0645  methylPREDNISolone sodium succinate (SOLU-Medrol) injection 40 mg  Every 8 Hours      02/09/17 0609    02/09/17 0620  Influenza A & B, RT-PCR Rfx H1N1  Once      02/09/17 0619    02/09/17 0615  Do NOT Hold Basal Insulin When Patient is NPO, Hold Bolus Dose if NPO  Continuous      02/09/17 0615    02/09/17 0615  Follow [facility] Hypoglycemia Protocol For Blood Glucose Less Than 70 mg/dL  Until Discontinued      02/09/17 0615    02/09/17 0615  Hypoglycemia Treatment - Alert Patient That is Not NPO and Can Safely Swallow  Until Discontinued     Comments:  Administer 4 oz Fruit Juice OR 4 oz Regular Soda OR 8 oz Milk OR 15-30 grams (1 tube) of Glucose Gel  Recheck Blood Glucose 15 Minutes After Ingestion, Repeat Treatment & Continue to Recheck Blood Sugar Every 15 Minutes Until Blood Glucose is 70 or Higher  Once Blood Glucose is 70 or Higher, Give Snack (Peanut Butter & Crackers) if Next Meal Will Be Given in More Than 60 Minutes,  Provide Meal Tray As Soon As Possible    02/09/17 0615    02/09/17 0615  Hypoglycemia Treatment - Patient Has IV Access - Unresponsive, NPO or Unable To Safely Swallow  Until Discontinued     Comments:  Administer 25g D50W IV Push (1 Whole Vial)  Recheck Blood Glucose 15 Minutes After Administration, if Blood Glucose Remains Less Than 70, Repeat Treatment   Recheck Blood Glucose 15 Minutes After 2nd Administration, if Blood Glucose Remains Less Than 70 After 2nd Dose of D50W Contact Provider for Further Treatment Orders & Consider Adding IVF With D5 for Maintenance    02/09/17 0615    02/09/17 0615  Hypoglycemia Treatment - Patient Without IV Access - Unresponsive, NPO or Unable To Safely Swallow  Until Discontinued     Comments:  Administer 1mg Glucagon SQ & Establish IV Access  Turn Patient on Side - Nausea / Vomiting May Occur  Recheck Blood Glucose 15 Minutes After Administration  If IV Access Has Not Been Established & Blood Glucose Remains Less Than 70, Repeat Treatment With Glucagon  If IV Access Established, Administer 25g D50W IV Push (1 Whole Vial)  Recheck Blood Glucose 15 Minutes After Administration of 2nd Medication, if Blood Glucose Remains Less Than 70, Contact Provider for Further Treatment Orders & Consider Adding IVF With D5 for Maintenance    02/09/17 0615    02/09/17 0614  dextrose (GLUTOSE) oral gel 15 g  Every 15 Minutes PRN      02/09/17 0615    02/09/17 0614  dextrose (D50W) solution 25 g  Every 15 Minutes PRN      02/09/17 0615    02/09/17 0614  glucagon (human recombinant) (GLUCAGEN DIAGNOSTIC) injection 1 mg  Every 15 Minutes PRN      02/09/17 0615    02/09/17 0612  Inpatient Admission  Once      02/09/17 0612    02/09/17 0606  ECG 12 Lead  Once,   Status:  Canceled      02/09/17 0605    02/09/17 0602  Troponin  STAT,   Status:  Canceled      02/09/17 0601    02/09/17 0600  pantoprazole (PROTONIX) injection 40 mg  Every Early Morning      02/08/17 1559    02/08/17 1800  Incentive  Spirometry  Every 4 Hours While Awake      02/08/17 1559    02/08/17 1800  AZITHROMYCIN 500 MG/250 ML 0.9% NS IVPB (vial-mate)  Every 24 Hours      02/08/17 1559    02/08/17 1800  metFORMIN (GLUCOPHAGE) tablet 1,000 mg  2 Times Daily With Meals      02/08/17 1652    02/08/17 1800  labetalol (NORMODYNE) tablet 300 mg  2 Times Daily      02/08/17 1652    02/08/17 1800  chlorthalidone (HYGROTON) tablet 25 mg  Daily      02/08/17 1652    02/08/17 1800  enoxaparin (LOVENOX) syringe 40 mg  Every 24 Hours      02/08/17 1705    02/08/17 1800  lisinopril (PRINIVIL,ZESTRIL) tablet 40 mg  Every 24 Hours Scheduled      02/08/17 1707    02/08/17 1730  trandolapril (MAVIK) tablet 4 mg  Daily,   Status:  Discontinued      02/08/17 1652    02/08/17 1721  Slide Review, Hematology  Once,   Status:  Canceled      02/08/17 1720    02/08/17 1711  Manual Differential  Once      02/08/17 1710    02/08/17 1700  cefTRIAXone (ROCEPHIN) 1 g/100 mL 0.9% NS (MBP)  Every 24 Hours      02/08/17 1559    02/08/17 1700  POC Glucose Fingerstick  4 Times Daily Before Meals & at Bedtime      02/08/17 1600    02/08/17 1653  LORazepam (ATIVAN) tablet 0.5 mg  Every 6 Hours PRN      02/08/17 1653    02/08/17 1641  POC Glucose Fingerstick  Once      02/08/17 1640    02/08/17 1633  Scan Slide  Once,   Status:  Canceled      02/08/17 1632    02/08/17 1630  ipratropium-albuterol (DUO-NEB) nebulizer solution 3 mL  Every 4 Hours - RT      02/08/17 1559    02/08/17 1630  sodium chloride 0.9 % bolus 1,000 mL  Once      02/08/17 1559    02/08/17 1630  sodium chloride 0.9 % infusion  Continuous      02/08/17 1559    02/08/17 1627  Hemoglobin A1c  STAT      02/08/17 1559    02/08/17 1618  D-dimer, Quantitative  Once      02/08/17 1617    02/08/17 1600  Vital Signs  Every 4 Hours      02/08/17 1559    02/08/17 1600  Strict Intake and Output  Every Hour      02/08/17 1559    02/08/17 1559  Troponin  Now Then Every 3 Hours      02/08/17 1559    02/08/17 1559  BNP  Once       02/08/17 1559    02/08/17 1559  CK Total & CKMB  Once      02/08/17 1559    02/08/17 1559  C-reactive Protein  Once      02/08/17 1559    02/08/17 1558  ondansetron (ZOFRAN) injection 4 mg  Every 6 Hours PRN      02/08/17 1559    02/08/17 1558  ECG 12 Lead  Once      02/08/17 1559    02/08/17 1557  HYDROcodone-acetaminophen (NORCO) 5-325 MG per tablet 1 tablet  Every 4 Hours PRN      02/08/17 1559    02/08/17 1557  Full Code  Continuous      02/08/17 1559    02/08/17 1557  VTE Risk Assessment - Moderate Risk  Once      02/08/17 1559    02/08/17 1557  Place Sequential Compression Device  Once      02/08/17 1559    02/08/17 1557  Maintain Sequential Compression Device  Continuous      02/08/17 1559    02/08/17 1557  Pulse Oximetry,  Spot  Once      02/08/17 1559    02/08/17 1557  Notify Physician (with Parameters)  Until Discontinued      02/08/17 1559    02/08/17 1557  Diet Regular; Cardiac  Diet Effective Now      02/08/17 1559    02/08/17 1557  Comprehensive Metabolic Panel  STAT      02/08/17 1559    02/08/17 1557  Protime-INR  STAT      02/08/17 1559    02/08/17 1557  Urinalysis With / Microscopic If Indicated  STAT      02/08/17 1559    02/08/17 1556  Pharmacy to Dose enoxaparin (LOVENOX)  Continuous PRN      02/08/17 1559    02/08/17 1556  Weigh patient  Once      02/08/17 1559    02/08/17 1556  Insert Peripheral IV  Once      02/08/17 1559    02/08/17 1556  Saline Lock & Maintain IV Access  Continuous      02/08/17 1559    02/08/17 1556  Inpatient Admission  Once      02/08/17 1559    02/08/17 1555  sodium chloride 0.9 % flush 1-10 mL  As Needed      02/08/17 1559    02/08/17 1554  CBC & Differential  Once      02/08/17 1559    02/08/17 1554  XR Chest 2 View  1 Time Imaging      02/08/17 1559    02/08/17 1554  CBC Auto Differential  PROCEDURE ONCE      02/08/17 1559    02/08/17 1553  Pulse Oximetry on Admit  Once      02/08/17 1559    02/08/17 1553  Tobacco Cessation Education  Once     Comments:  As  needed    02/08/17 1559    02/08/17 1553  Pneumonia Education  Once     Comments:  As needed    02/08/17 1559    02/08/17 1553  Notify Physician  Until Discontinued      02/08/17 1559    02/08/17 1553  Respiratory Culture  Once      02/08/17 1559    02/08/17 1553  Blood Culture  Once      02/08/17 1559    02/08/17 1553  Blood Culture  Once     Comments:  Minimum 30 minutes after 1st, or from different site    02/08/17 1559    02/08/17 1553  Oxygen Therapy-  Continuous      02/08/17 1559    Unscheduled  Blood Gas, Arterial  As Needed     Comments:  Respiratory Distress    02/08/17 1559    --  labetalol (NORMODYNE) 300 MG tablet  2 Times Daily      02/08/17 1644    --  chlorthalidone (HYGROTON) 25 MG tablet  Daily      02/08/17 1644    --  trandolapril (MAVIK) 4 MG tablet  Daily      02/08/17 1644    --  metFORMIN (GLUCOPHAGE) 1000 MG tablet  2 Times Daily With Meals      02/08/17 1644             Physician Progress Notes (last 24 hours) (Notes from 2/8/2017  7:53 AM through 2/9/2017  7:53 AM)      Nikita Quick MD at 2/9/2017  5:59 AM  Version 3 of 3          Family Medicine Progress Note    Patient:  Nikita Bhardwaj  YOB: 1955    MRN: [id number]     Acct: [id number]     Admit date: 2/8/2017  LOS: 1     Patient Seen, Chart, Consults notes, Labs, Radiology studies reviewed.    Subjective:  Doing well with no new complaints    Past Medical History:  Past Medical History   Diagnosis Date   • Diabetes mellitus    • Hypertension        Past Surgical History:  History reviewed. No pertinent past surgical history.      Allergies:  Review of patient's allergies indicates no known allergies.    Medications:  Scheduled Meds:    ceftriaxone 1 g Intravenous Q24H   And      azithromycin 500 mg Intravenous Q24H   chlorthalidone 25 mg Oral Daily   enoxaparin 40 mg Subcutaneous Q24H   ipratropium-albuterol 3 mL Nebulization Q4H - RT   labetalol 300 mg Oral BID   lisinopril 40 mg Oral Q24H   metFORMIN 1,000 mg Oral  BID With Meals   pantoprazole 40 mg Intravenous Q AM     Continuous Infusions:    Pharmacy to Dose enoxaparin (LOVENOX)     sodium chloride 100 mL/hr Last Rate: 100 mL/hr (02/08/17 2126)     PRN Meds:HYDROcodone-acetaminophen  •  LORazepam  •  ondansetron  •  Pharmacy to Dose enoxaparin (LOVENOX)  •  sodium chloride    Social History:   Social History     Social History   • Marital status: Single     Spouse name: N/A   • Number of children: N/A   • Years of education: N/A     Occupational History   • Not on file.     Social History Main Topics   • Smoking status: Never Smoker   • Smokeless tobacco: Not on file   • Alcohol use No   • Drug use: Not on file   • Sexual activity: Yes     Partners: Male     Other Topics Concern   • Not on file     Social History Narrative   • No narrative on file       Family History:   History reviewed. No pertinent family history.      ROS:  10 point ROS obtained:   Gen: No fevers  HEENT: No migraine or visual change  Lung: No cough, hemopotysis or wheezing  Cv: No chest pain or pressure  Abd: No nausea or vomit, no change in bowel fct, no gi bleeding  Ext: No inc pain or swelling  Neuro: No seizure or syncope  Skin: No new rashes  Endo: No polyuria, polyphagia or poilydypsia  Psyc: No inc anxiety or depression  MS: No increase in joint pain or swelling reported      Objective:    Lab Results (last 72 hours)     Procedure Component Value Units Date/Time    POC Glucose Fingerstick [[id number]]  (Abnormal) Collected:  02/08/17 1610    Specimen:  Blood Updated:  02/08/17 1640     Glucose 149 (H) mg/dL       : [id number] Chandu Moy ID: [id number]       C-reactive Protein [[id number]]  (Abnormal) Collected:  02/08/17 1613    Specimen:  Blood Updated:  02/08/17 1652     C-Reactive Protein 2.03 (H) mg/dL     Comprehensive Metabolic Panel [[id number]]  (Abnormal) Collected:  02/08/17 1613    Specimen:  Blood Updated:  02/08/17 1653     Glucose 132 (H) mg/dL      BUN 15 mg/dL       Creatinine 0.82 mg/dL      Sodium 139 mmol/L      Potassium 4.0 mmol/L      Chloride 102 mmol/L      CO2 24.0 mmol/L      Calcium 9.3 mg/dL      Total Protein 7.4 g/dL      Albumin 4.50 g/dL      ALT (SGPT) 48 U/L      AST (SGOT) 36 U/L      Alkaline Phosphatase 149 (H) U/L      Total Bilirubin 1.7 (H) mg/dL      eGFR Non African Amer 96 mL/min/1.73      Globulin 2.9 gm/dL      A/G Ratio 1.6 g/dL      BUN/Creatinine Ratio 18.3      Anion Gap 13.0 mmol/L     CK Total & CKMB [[id number]]  (Normal) Collected:  02/08/17 1613    Specimen:  Blood Updated:  02/08/17 1706     CKMB 2.34 ng/mL      Creatine Kinase 60 U/L     Narrative:       CKMB Index not indicated    Manual Differential [[id number]] Collected:  02/08/17 1613    Specimen:  Blood Updated:  02/08/17 1710    Slide Review, Hematology [[id number]] Collected:  02/08/17 1613    Specimen:  Blood Updated:  02/08/17 1720    CBC Auto Differential [[id number]]  (Abnormal) Collected:  02/08/17 1613    Specimen:  Blood Updated:  02/08/17 1722     WBC 21.89 (H) 10*3/mm3      RBC 6.56 (H) 10*6/mm3      Hemoglobin 12.1 (L) g/dL      Hematocrit 43.3 %      MCV 66.0 (L) fL      MCH 18.4 (L) pg      MCHC 27.9 (L) g/dL      RDW 19.7 (H) %      RDW-SD 44.3 fl      Platelets 391 10*3/mm3     Troponin [[id number]]  (Normal) Collected:  02/08/17 1613    Specimen:  Blood Updated:  02/08/17 1732     Troponin I <0.012 ng/mL     BNP [[id number]]  (Normal) Collected:  02/08/17 1613    Specimen:  Blood Updated:  02/08/17 1732     proBNP 307.0 pg/mL     Hemoglobin A1c [[id number]] Collected:  02/08/17 1613    Specimen:  Blood Updated:  02/08/17 1753     Hemoglobin A1C 8.7 %     Narrative:       Less than 6.0           Non-Diabetic Range  6.0-7.0                 ADA Therapeutic Target  Greater than 7.0        Action Suggested    Protime-INR [[id number]]  (Normal) Collected:  02/08/17 1613    Specimen:  Blood Updated:  02/08/17 1826     Protime 13.1 Seconds      INR 0.96     D-dimer, Quantitative  [[id number]]  (Normal) Collected:  02/08/17 1613    Specimen:  Blood Updated:  02/08/17 1847     D-Dimer, Quantitative <0.22 mg/L (FEU)     Narrative:       Reference Range is 0-0.50 mg/L FEU. However, results <0.50 mg/L FEU tends to rule out DVT or PE. Results >0.50 mg/L FEU are not useful in predicting absence or presence of DVT or PE.    CBC & Differential [[id number]] Collected:  02/08/17 1613    Specimen:  Blood Updated:  02/08/17 1902    Narrative:       The following orders were created for panel order CBC & Differential.  Procedure                               Abnormality         Status                     ---------                               -----------         ------                     Manual Differential[[id number]]                               In process                 Scan Slide[[id number]]                                                                   CBC Auto Differential[[id number]]         Abnormal            Final result               Slide Review, Hematology[[id number]]                          In process                   Please view results for these tests on the individual orders.    Troponin [[id number]]  (Normal) Collected:  02/08/17 1814    Specimen:  Blood Updated:  02/08/17 1945     Troponin I <0.012 ng/mL     Troponin [[id number]]  (Normal) Collected:  02/08/17 2137    Specimen:  Blood Updated:  02/08/17 2218     Troponin I <0.012 ng/mL     Blood Culture [[id number]]  (Normal) Collected:  02/08/17 1610    Specimen:  Blood from Arm, Right Updated:  02/09/17 0501     Blood Culture No growth at less than 24 hours     Blood Culture [[id number]]  (Normal) Collected:  02/08/17 1613    Specimen:  Blood from Arm, Left Updated:  02/09/17 0501     Blood Culture No growth at less than 24 hours            Imaging Results (last 72 hours)     Procedure Component Value Units Date/Time    XR Chest 2 View [[id number]] Collected:  02/08/17 1806     Updated:  02/08/17 2051    Narrative:       TWO-VIEW CHEST:     "  HISTORY: Pneumonia.      Frontal and lateral projection chest radiograph obtained.     COMPARISON: 08/24/2013      FINDINGS:  There is right hemidiaphragm elevation with airspace opacities in the  right lung base that may represent atelectasis. Acute  infiltrate/pneumonia also considered. Right perihilar interstitial  prominence noted particularly in the lower lobe.     The left lung is grossly clear.     The heart is normal in size without heart failure.     The bony structures are intact.                                                                                                                      Impression:       Right hemidiaphragm elevation with right lower lobe airspace  opacities, question atelectasis. Acute infiltrates/pneumonia also  considered.        This report was finalized on 02/08/2017 20:49 by Dr. Winston Sharma MD.           Physical Exam:    Vitals:   Visit Vitals   • /81 (BP Location: Left arm, Patient Position: Lying)   • Pulse 100   • Temp 98.6 °F (37 °C) (Temporal Artery )   • Resp 18   • Ht 68\" (172.7 cm)   • Wt 189 lb (85.7 kg)   • SpO2 97%   • BMI 28.74 kg/m2     24 hour intake/output:    Intake/Output Summary (Last 24 hours) at 02/09/17 0609  Last data filed at 02/08/17 1915   Gross per 24 hour   Intake    240 ml   Output      0 ml   Net    240 ml     Last 3 weights:  Wt Readings from Last 3 Encounters:   02/08/17 189 lb (85.7 kg)           General Appearance:    Alert, cooperative, in no acute distress   Head:    Normocephalic, without obvious abnormality, atraumatic   Eyes:            Lids and lashes normal, conjunctivae and sclerae normal, no   icterus, no pallor, corneas clear, PERRLA   Ears:    Ears appear intact with no abnormalities noted   Throat:   No oral lesions, no thrush, oral mucosa moist   Neck:   No adenopathy, supple, trachea midline, no thyromegaly, no   carotid bruit, no JVD   Back:     No kyphosis present, no scoliosis present, no skin lesions,      " erythema or scars, no tenderness to percussion or                   palpation,   range of motion normal   Lungs:     Clear to auscultation,respirations regular, even and                  unlabored    Heart:    Regular rhythm and normal rate, normal S1 and S2, no            murmur, no gallop, no rub, no click   Chest Wall:    No abnormalities observed   Abdomen:     Normal bowel sounds, no masses, no organomegaly, soft        non-tender, non-distended, no guarding, no rebound                tenderness   Rectal:     Deferred   Extremities:   Moves all extremities well, no edema, no cyanosis, no             redness   Pulses:   Pulses palpable and equal bilaterally   Skin:   No bleeding, bruising or rash   Lymph nodes:   No palpable adenopathy   Neurologic:   Cranial nerves 2 - 12 grossly intact, sensation intact, DTR       present and equal bilaterally           Assessment:    Principal Problem:    Pneumonia  Active Problems:    Tachycardia    Essential hypertension    Type 2 diabetes mellitus  Leukocytosis        Plan:  PNEUMONIA:  1. Continue IV antibiotics  2. Solumedrol IV  3. Check flu swab  4. Pulmonary toilet (nebulized treatments, Incentive Spirometry, P&D)  5. Early Mobilization  6. Collect sputum culture and blood cultures x2  7. Supplement 02 as needed to maintain oxygen saturation >92%  8. DVT prophylaxis with Lovenox and/or SCD/Teds        Electronically signed by BIMAL Pendleton on 2/9/2017 at 6:09 AM     Better today , nebulizer seemed to help.  Reviewed her chest x-ray today.  Continue with IV antibiotics aggressive pulmonary toilet and hopefully be able to discharge home tomorrow if she continues to clinically improve.  I have discussed the care of Nikita Bhardwaj, including pertinent history and exam findings with the ARNP/PA.  I have seen and examined the patient and the key elements of all parts of the encounter have been performed by me. I agree with the assessment and plan as outlined by the  ALAN/PA. Please refer to my separate note for complete documentation.     Electronically signed by Nikita Quick MD on 2/9/2017 at 7:15 AM               Electronically signed by Nikita Quick MD at 2/9/2017  7:15 AM      BIMAL Pendleton at 2/9/2017  5:59 AM  Version 2 of 3          Family Medicine Progress Note    Patient:  Nikita Bhardwaj  YOB: 1955    MRN: [id number]     Acct: [id number]     Admit date: 2/8/2017  LOS: 1     Patient Seen, Chart, Consults notes, Labs, Radiology studies reviewed.    Subjective:  Doing well with no new complaints    Past Medical History:  Past Medical History   Diagnosis Date   • Diabetes mellitus    • Hypertension        Past Surgical History:  History reviewed. No pertinent past surgical history.      Allergies:  Review of patient's allergies indicates no known allergies.    Medications:  Scheduled Meds:    ceftriaxone 1 g Intravenous Q24H   And      azithromycin 500 mg Intravenous Q24H   chlorthalidone 25 mg Oral Daily   enoxaparin 40 mg Subcutaneous Q24H   ipratropium-albuterol 3 mL Nebulization Q4H - RT   labetalol 300 mg Oral BID   lisinopril 40 mg Oral Q24H   metFORMIN 1,000 mg Oral BID With Meals   pantoprazole 40 mg Intravenous Q AM     Continuous Infusions:    Pharmacy to Dose enoxaparin (LOVENOX)     sodium chloride 100 mL/hr Last Rate: 100 mL/hr (02/08/17 2126)     PRN Meds:HYDROcodone-acetaminophen  •  LORazepam  •  ondansetron  •  Pharmacy to Dose enoxaparin (LOVENOX)  •  sodium chloride    Social History:   Social History     Social History   • Marital status: Single     Spouse name: N/A   • Number of children: N/A   • Years of education: N/A     Occupational History   • Not on file.     Social History Main Topics   • Smoking status: Never Smoker   • Smokeless tobacco: Not on file   • Alcohol use No   • Drug use: Not on file   • Sexual activity: Yes     Partners: Male     Other Topics Concern   • Not on file     Social History Narrative   •  No narrative on file       Family History:   History reviewed. No pertinent family history.      ROS:  10 point ROS obtained:   Gen: No fevers  HEENT: No migraine or visual change  Lung: No cough, hemopotysis or wheezing  Cv: No chest pain or pressure  Abd: No nausea or vomit, no change in bowel fct, no gi bleeding  Ext: No inc pain or swelling  Neuro: No seizure or syncope  Skin: No new rashes  Endo: No polyuria, polyphagia or poilydypsia  Psyc: No inc anxiety or depression  MS: No increase in joint pain or swelling reported      Objective:    Lab Results (last 72 hours)     Procedure Component Value Units Date/Time    POC Glucose Fingerstick [[id number]]  (Abnormal) Collected:  02/08/17 1610    Specimen:  Blood Updated:  02/08/17 1640     Glucose 149 (H) mg/dL       : [id number] Moy ID: [id number]       C-reactive Protein [[id number]]  (Abnormal) Collected:  02/08/17 1613    Specimen:  Blood Updated:  02/08/17 1652     C-Reactive Protein 2.03 (H) mg/dL     Comprehensive Metabolic Panel [[id number]]  (Abnormal) Collected:  02/08/17 1613    Specimen:  Blood Updated:  02/08/17 1653     Glucose 132 (H) mg/dL      BUN 15 mg/dL      Creatinine 0.82 mg/dL      Sodium 139 mmol/L      Potassium 4.0 mmol/L      Chloride 102 mmol/L      CO2 24.0 mmol/L      Calcium 9.3 mg/dL      Total Protein 7.4 g/dL      Albumin 4.50 g/dL      ALT (SGPT) 48 U/L      AST (SGOT) 36 U/L      Alkaline Phosphatase 149 (H) U/L      Total Bilirubin 1.7 (H) mg/dL      eGFR Non African Amer 96 mL/min/1.73      Globulin 2.9 gm/dL      A/G Ratio 1.6 g/dL      BUN/Creatinine Ratio 18.3      Anion Gap 13.0 mmol/L     CK Total & CKMB [[id number]]  (Normal) Collected:  02/08/17 1613    Specimen:  Blood Updated:  02/08/17 1706     CKMB 2.34 ng/mL      Creatine Kinase 60 U/L     Narrative:       CKMB Index not indicated    Manual Differential [[id number]] Collected:  02/08/17 1613    Specimen:  Blood Updated:  02/08/17 1710    Slide  Review, Hematology [[id number]] Collected:  02/08/17 1613    Specimen:  Blood Updated:  02/08/17 1720    CBC Auto Differential [[id number]]  (Abnormal) Collected:  02/08/17 1613    Specimen:  Blood Updated:  02/08/17 1722     WBC 21.89 (H) 10*3/mm3      RBC 6.56 (H) 10*6/mm3      Hemoglobin 12.1 (L) g/dL      Hematocrit 43.3 %      MCV 66.0 (L) fL      MCH 18.4 (L) pg      MCHC 27.9 (L) g/dL      RDW 19.7 (H) %      RDW-SD 44.3 fl      Platelets 391 10*3/mm3     Troponin [[id number]]  (Normal) Collected:  02/08/17 1613    Specimen:  Blood Updated:  02/08/17 1732     Troponin I <0.012 ng/mL     BNP [[id number]]  (Normal) Collected:  02/08/17 1613    Specimen:  Blood Updated:  02/08/17 1732     proBNP 307.0 pg/mL     Hemoglobin A1c [[id number]] Collected:  02/08/17 1613    Specimen:  Blood Updated:  02/08/17 1753     Hemoglobin A1C 8.7 %     Narrative:       Less than 6.0           Non-Diabetic Range  6.0-7.0                 ADA Therapeutic Target  Greater than 7.0        Action Suggested    Protime-INR [[id number]]  (Normal) Collected:  02/08/17 1613    Specimen:  Blood Updated:  02/08/17 1826     Protime 13.1 Seconds      INR 0.96     D-dimer, Quantitative [[id number]]  (Normal) Collected:  02/08/17 1613    Specimen:  Blood Updated:  02/08/17 1847     D-Dimer, Quantitative <0.22 mg/L (FEU)     Narrative:       Reference Range is 0-0.50 mg/L FEU. However, results <0.50 mg/L FEU tends to rule out DVT or PE. Results >0.50 mg/L FEU are not useful in predicting absence or presence of DVT or PE.    CBC & Differential [[id number]] Collected:  02/08/17 1613    Specimen:  Blood Updated:  02/08/17 1902    Narrative:       The following orders were created for panel order CBC & Differential.  Procedure                               Abnormality         Status                     ---------                               -----------         ------                     Manual Differential[[id number]]                               In process                  Scan Slide[[id number]]                                                                   CBC Auto Differential[[id number]]         Abnormal            Final result               Slide Review, Hematology[[id number]]                          In process                   Please view results for these tests on the individual orders.    Troponin [[id number]]  (Normal) Collected:  02/08/17 1814    Specimen:  Blood Updated:  02/08/17 1945     Troponin I <0.012 ng/mL     Troponin [[id number]]  (Normal) Collected:  02/08/17 2137    Specimen:  Blood Updated:  02/08/17 2218     Troponin I <0.012 ng/mL     Blood Culture [[id number]]  (Normal) Collected:  02/08/17 1610    Specimen:  Blood from Arm, Right Updated:  02/09/17 0501     Blood Culture No growth at less than 24 hours     Blood Culture [[id number]]  (Normal) Collected:  02/08/17 1613    Specimen:  Blood from Arm, Left Updated:  02/09/17 0501     Blood Culture No growth at less than 24 hours            Imaging Results (last 72 hours)     Procedure Component Value Units Date/Time    XR Chest 2 View [[id number]] Collected:  02/08/17 1806     Updated:  02/08/17 2051    Narrative:       TWO-VIEW CHEST:      HISTORY: Pneumonia.      Frontal and lateral projection chest radiograph obtained.     COMPARISON: 08/24/2013      FINDINGS:  There is right hemidiaphragm elevation with airspace opacities in the  right lung base that may represent atelectasis. Acute  infiltrate/pneumonia also considered. Right perihilar interstitial  prominence noted particularly in the lower lobe.     The left lung is grossly clear.     The heart is normal in size without heart failure.     The bony structures are intact.                                                                                                                      Impression:       Right hemidiaphragm elevation with right lower lobe airspace  opacities, question atelectasis. Acute infiltrates/pneumonia also  considered.        This report  "was finalized on 02/08/2017 20:49 by Dr. Winston Sharma MD.           Physical Exam:    Vitals:   Visit Vitals   • /81 (BP Location: Left arm, Patient Position: Lying)   • Pulse 100   • Temp 98.6 °F (37 °C) (Temporal Artery )   • Resp 18   • Ht 68\" (172.7 cm)   • Wt 189 lb (85.7 kg)   • SpO2 97%   • BMI 28.74 kg/m2     24 hour intake/output:    Intake/Output Summary (Last 24 hours) at 02/09/17 0609  Last data filed at 02/08/17 1915   Gross per 24 hour   Intake    240 ml   Output      0 ml   Net    240 ml     Last 3 weights:  Wt Readings from Last 3 Encounters:   02/08/17 189 lb (85.7 kg)           General Appearance:    Alert, cooperative, in no acute distress   Head:    Normocephalic, without obvious abnormality, atraumatic   Eyes:            Lids and lashes normal, conjunctivae and sclerae normal, no   icterus, no pallor, corneas clear, PERRLA   Ears:    Ears appear intact with no abnormalities noted   Throat:   No oral lesions, no thrush, oral mucosa moist   Neck:   No adenopathy, supple, trachea midline, no thyromegaly, no   carotid bruit, no JVD   Back:     No kyphosis present, no scoliosis present, no skin lesions,      erythema or scars, no tenderness to percussion or                   palpation,   range of motion normal   Lungs:     Clear to auscultation,respirations regular, even and                  unlabored    Heart:    Regular rhythm and normal rate, normal S1 and S2, no            murmur, no gallop, no rub, no click   Chest Wall:    No abnormalities observed   Abdomen:     Normal bowel sounds, no masses, no organomegaly, soft        non-tender, non-distended, no guarding, no rebound                tenderness   Rectal:     Deferred   Extremities:   Moves all extremities well, no edema, no cyanosis, no             redness   Pulses:   Pulses palpable and equal bilaterally   Skin:   No bleeding, bruising or rash   Lymph nodes:   No palpable adenopathy   Neurologic:   Cranial nerves 2 - 12 grossly " intact, sensation intact, DTR       present and equal bilaterally           Assessment:    Principal Problem:    Pneumonia  Active Problems:    Tachycardia    Essential hypertension    Type 2 diabetes mellitus  Leukocytosis        Plan:  PNEUMONIA:  9. Continue IV antibiotics  10. Solumedrol IV  11. Check flu swab  12. Pulmonary toilet (nebulized treatments, Incentive Spirometry, P&D)  13. Early Mobilization  14. Collect sputum culture and blood cultures x2  15. Supplement 02 as needed to maintain oxygen saturation >92%  16. DVT prophylaxis with Lovenox and/or SCD/Teds        Electronically signed by BIMAL Pendleton on 2/9/2017 at 6:09 AM                 Electronically signed by BIMAL Pendleton at 2/9/2017  6:20 AM      BIMAL Pendleton at 2/9/2017  5:59 AM  Version 1 of 3          Family Medicine Progress Note    Patient:  Nikita Bhardwaj  YOB: 1955    MRN: [id number]     Acct: [id number]     Admit date: 2/8/2017  LOS: 1     Patient Seen, Chart, Consults notes, Labs, Radiology studies reviewed.    Subjective:  Doing well with no new complaints    Past Medical History:  Past Medical History   Diagnosis Date   • Diabetes mellitus    • Hypertension        Past Surgical History:  History reviewed. No pertinent past surgical history.      Allergies:  Review of patient's allergies indicates no known allergies.    Medications:  Scheduled Meds:    ceftriaxone 1 g Intravenous Q24H   And      azithromycin 500 mg Intravenous Q24H   chlorthalidone 25 mg Oral Daily   enoxaparin 40 mg Subcutaneous Q24H   ipratropium-albuterol 3 mL Nebulization Q4H - RT   labetalol 300 mg Oral BID   lisinopril 40 mg Oral Q24H   metFORMIN 1,000 mg Oral BID With Meals   pantoprazole 40 mg Intravenous Q AM     Continuous Infusions:    Pharmacy to Dose enoxaparin (LOVENOX)     sodium chloride 100 mL/hr Last Rate: 100 mL/hr (02/08/17 2126)     PRN Meds:HYDROcodone-acetaminophen  •  LORazepam  •  ondansetron  •  Pharmacy to  Dose enoxaparin (LOVENOX)  •  sodium chloride    Social History:   Social History     Social History   • Marital status: Single     Spouse name: N/A   • Number of children: N/A   • Years of education: N/A     Occupational History   • Not on file.     Social History Main Topics   • Smoking status: Never Smoker   • Smokeless tobacco: Not on file   • Alcohol use No   • Drug use: Not on file   • Sexual activity: Yes     Partners: Male     Other Topics Concern   • Not on file     Social History Narrative   • No narrative on file       Family History:   History reviewed. No pertinent family history.      ROS:  10 point ROS obtained:   Gen: No fevers  HEENT: No migraine or visual change  Lung: No cough, hemopotysis or wheezing  Cv: No chest pain or pressure  Abd: No nausea or vomit, no change in bowel fct, no gi bleeding  Ext: No inc pain or swelling  Neuro: No seizure or syncope  Skin: No new rashes  Endo: No polyuria, polyphagia or poilydypsia  Psyc: No inc anxiety or depression  MS: No increase in joint pain or swelling reported      Objective:    Lab Results (last 72 hours)     Procedure Component Value Units Date/Time    POC Glucose Fingerstick [[id number]]  (Abnormal) Collected:  02/08/17 1610    Specimen:  Blood Updated:  02/08/17 1640     Glucose 149 (H) mg/dL       : [id number] Chandu Moy ID: [id number]       C-reactive Protein [[id number]]  (Abnormal) Collected:  02/08/17 1613    Specimen:  Blood Updated:  02/08/17 1652     C-Reactive Protein 2.03 (H) mg/dL     Comprehensive Metabolic Panel [[id number]]  (Abnormal) Collected:  02/08/17 1613    Specimen:  Blood Updated:  02/08/17 1653     Glucose 132 (H) mg/dL      BUN 15 mg/dL      Creatinine 0.82 mg/dL      Sodium 139 mmol/L      Potassium 4.0 mmol/L      Chloride 102 mmol/L      CO2 24.0 mmol/L      Calcium 9.3 mg/dL      Total Protein 7.4 g/dL      Albumin 4.50 g/dL      ALT (SGPT) 48 U/L      AST (SGOT) 36 U/L      Alkaline Phosphatase 149  (H) U/L      Total Bilirubin 1.7 (H) mg/dL      eGFR Non African Amer 96 mL/min/1.73      Globulin 2.9 gm/dL      A/G Ratio 1.6 g/dL      BUN/Creatinine Ratio 18.3      Anion Gap 13.0 mmol/L     CK Total & CKMB [[id number]]  (Normal) Collected:  02/08/17 1613    Specimen:  Blood Updated:  02/08/17 1706     CKMB 2.34 ng/mL      Creatine Kinase 60 U/L     Narrative:       CKMB Index not indicated    Manual Differential [[id number]] Collected:  02/08/17 1613    Specimen:  Blood Updated:  02/08/17 1710    Slide Review, Hematology [[id number]] Collected:  02/08/17 1613    Specimen:  Blood Updated:  02/08/17 1720    CBC Auto Differential [[id number]]  (Abnormal) Collected:  02/08/17 1613    Specimen:  Blood Updated:  02/08/17 1722     WBC 21.89 (H) 10*3/mm3      RBC 6.56 (H) 10*6/mm3      Hemoglobin 12.1 (L) g/dL      Hematocrit 43.3 %      MCV 66.0 (L) fL      MCH 18.4 (L) pg      MCHC 27.9 (L) g/dL      RDW 19.7 (H) %      RDW-SD 44.3 fl      Platelets 391 10*3/mm3     Troponin [[id number]]  (Normal) Collected:  02/08/17 1613    Specimen:  Blood Updated:  02/08/17 1732     Troponin I <0.012 ng/mL     BNP [[id number]]  (Normal) Collected:  02/08/17 1613    Specimen:  Blood Updated:  02/08/17 1732     proBNP 307.0 pg/mL     Hemoglobin A1c [[id number]] Collected:  02/08/17 1613    Specimen:  Blood Updated:  02/08/17 1753     Hemoglobin A1C 8.7 %     Narrative:       Less than 6.0           Non-Diabetic Range  6.0-7.0                 ADA Therapeutic Target  Greater than 7.0        Action Suggested    Protime-INR [[id number]]  (Normal) Collected:  02/08/17 1613    Specimen:  Blood Updated:  02/08/17 1826     Protime 13.1 Seconds      INR 0.96     D-dimer, Quantitative [[id number]]  (Normal) Collected:  02/08/17 1613    Specimen:  Blood Updated:  02/08/17 1847     D-Dimer, Quantitative <0.22 mg/L (FEU)     Narrative:       Reference Range is 0-0.50 mg/L FEU. However, results <0.50 mg/L FEU tends to rule out DVT or PE. Results >0.50 mg/L FEU  are not useful in predicting absence or presence of DVT or PE.    CBC & Differential [[id number]] Collected:  02/08/17 1613    Specimen:  Blood Updated:  02/08/17 1902    Narrative:       The following orders were created for panel order CBC & Differential.  Procedure                               Abnormality         Status                     ---------                               -----------         ------                     Manual Differential[[id number]]                               In process                 Scan Slide[[id number]]                                                                   CBC Auto Differential[[id number]]         Abnormal            Final result               Slide Review, Hematology[[id number]]                          In process                   Please view results for these tests on the individual orders.    Troponin [[id number]]  (Normal) Collected:  02/08/17 1814    Specimen:  Blood Updated:  02/08/17 1945     Troponin I <0.012 ng/mL     Troponin [[id number]]  (Normal) Collected:  02/08/17 2137    Specimen:  Blood Updated:  02/08/17 2218     Troponin I <0.012 ng/mL     Blood Culture [[id number]]  (Normal) Collected:  02/08/17 1610    Specimen:  Blood from Arm, Right Updated:  02/09/17 0501     Blood Culture No growth at less than 24 hours     Blood Culture [[id number]]  (Normal) Collected:  02/08/17 1613    Specimen:  Blood from Arm, Left Updated:  02/09/17 0501     Blood Culture No growth at less than 24 hours            Imaging Results (last 72 hours)     Procedure Component Value Units Date/Time    XR Chest 2 View [[id number]] Collected:  02/08/17 1806     Updated:  02/08/17 2051    Narrative:       TWO-VIEW CHEST:      HISTORY: Pneumonia.      Frontal and lateral projection chest radiograph obtained.     COMPARISON: 08/24/2013      FINDINGS:  There is right hemidiaphragm elevation with airspace opacities in the  right lung base that may represent atelectasis. Acute  infiltrate/pneumonia also  "considered. Right perihilar interstitial  prominence noted particularly in the lower lobe.     The left lung is grossly clear.     The heart is normal in size without heart failure.     The bony structures are intact.                                                                                                                      Impression:       Right hemidiaphragm elevation with right lower lobe airspace  opacities, question atelectasis. Acute infiltrates/pneumonia also  considered.        This report was finalized on 02/08/2017 20:49 by Dr. Winston Sharma MD.           Physical Exam:    Vitals:   Visit Vitals   • /81 (BP Location: Left arm, Patient Position: Lying)   • Pulse 100   • Temp 98.6 °F (37 °C) (Temporal Artery )   • Resp 18   • Ht 68\" (172.7 cm)   • Wt 189 lb (85.7 kg)   • SpO2 97%   • BMI 28.74 kg/m2     24 hour intake/output:    Intake/Output Summary (Last 24 hours) at 02/09/17 0609  Last data filed at 02/08/17 1915   Gross per 24 hour   Intake    240 ml   Output      0 ml   Net    240 ml     Last 3 weights:  Wt Readings from Last 3 Encounters:   02/08/17 189 lb (85.7 kg)           General Appearance:    Alert, cooperative, in no acute distress   Head:    Normocephalic, without obvious abnormality, atraumatic   Eyes:            Lids and lashes normal, conjunctivae and sclerae normal, no   icterus, no pallor, corneas clear, PERRLA   Ears:    Ears appear intact with no abnormalities noted   Throat:   No oral lesions, no thrush, oral mucosa moist   Neck:   No adenopathy, supple, trachea midline, no thyromegaly, no   carotid bruit, no JVD   Back:     No kyphosis present, no scoliosis present, no skin lesions,      erythema or scars, no tenderness to percussion or                   palpation,   range of motion normal   Lungs:     Clear to auscultation,respirations regular, even and                  unlabored    Heart:    Regular rhythm and normal rate, normal S1 and S2, no            murmur, no " gallop, no rub, no click   Chest Wall:    No abnormalities observed   Abdomen:     Normal bowel sounds, no masses, no organomegaly, soft        non-tender, non-distended, no guarding, no rebound                tenderness   Rectal:     Deferred   Extremities:   Moves all extremities well, no edema, no cyanosis, no             redness   Pulses:   Pulses palpable and equal bilaterally   Skin:   No bleeding, bruising or rash   Lymph nodes:   No palpable adenopathy   Neurologic:   Cranial nerves 2 - 12 grossly intact, sensation intact, DTR       present and equal bilaterally           Assessment:    Principal Problem:    Pneumonia  Active Problems:    Tachycardia    Essential hypertension    Type 2 diabetes mellitus  Leukocytosis        Plan:  PNEUMONIA:  17. Continue IV antibiotics  18. Solumedrol IV  19. Pulmonary toilet (nebulized treatments, Incentive Spirometry, P&D)  20. Early Mobilization  21. Collect sputum culture and blood cultures x2  22. Supplement 02 as needed to maintain oxygen saturation >92%  23. DVT prophylaxis with Lovenox and/or SCD/Teds        Electronically signed by BIMAL Pendleton on 2/9/2017 at 6:09 AM                 Electronically signed by BIMAL Pendleton at 2/9/2017  6:10 AM

## 2017-02-10 ENCOUNTER — APPOINTMENT (OUTPATIENT)
Dept: CT IMAGING | Facility: HOSPITAL | Age: 62
End: 2017-02-10

## 2017-02-10 ENCOUNTER — APPOINTMENT (OUTPATIENT)
Dept: CARDIOLOGY | Facility: HOSPITAL | Age: 62
End: 2017-02-10

## 2017-02-10 LAB
ANION GAP SERPL CALCULATED.3IONS-SCNC: 11 MMOL/L (ref 4–13)
ANISOCYTOSIS BLD QL: NORMAL
BASOPHILS # BLD AUTO: 0.15 10*3/MM3 (ref 0–0.2)
BASOPHILS NFR BLD AUTO: 0.5 % (ref 0–2)
BH CV ECHO MEAS - AO MAX PG (FULL): 7.2 MMHG
BH CV ECHO MEAS - AO MAX PG: 14.4 MMHG
BH CV ECHO MEAS - AO MEAN PG (FULL): 5 MMHG
BH CV ECHO MEAS - AO MEAN PG: 8 MMHG
BH CV ECHO MEAS - AO ROOT AREA (BSA CORRECTED): 1.5
BH CV ECHO MEAS - AO ROOT AREA: 6.6 CM^2
BH CV ECHO MEAS - AO ROOT DIAM: 2.9 CM
BH CV ECHO MEAS - AO V2 MAX: 190 CM/SEC
BH CV ECHO MEAS - AO V2 MEAN: 128 CM/SEC
BH CV ECHO MEAS - AO V2 VTI: 41.1 CM
BH CV ECHO MEAS - AVA(I,A): 2.4 CM^2
BH CV ECHO MEAS - AVA(I,D): 2.4 CM^2
BH CV ECHO MEAS - AVA(V,A): 2.7 CM^2
BH CV ECHO MEAS - AVA(V,D): 2.7 CM^2
BH CV ECHO MEAS - BSA(HAYCOCK): 2 M^2
BH CV ECHO MEAS - BSA: 2 M^2
BH CV ECHO MEAS - BZI_BMI: 28.7 KILOGRAMS/M^2
BH CV ECHO MEAS - BZI_METRIC_HEIGHT: 172.7 CM
BH CV ECHO MEAS - BZI_METRIC_WEIGHT: 85.7 KG
BH CV ECHO MEAS - CONTRAST EF 4CH: 54.7 ML/M^2
BH CV ECHO MEAS - EDV(CUBED): 109.9 ML
BH CV ECHO MEAS - EDV(MOD-SP4): 123 ML
BH CV ECHO MEAS - EDV(TEICH): 107 ML
BH CV ECHO MEAS - EF(CUBED): 82.1 %
BH CV ECHO MEAS - EF(MOD-SP4): 54.7 %
BH CV ECHO MEAS - EF(TEICH): 74.8 %
BH CV ECHO MEAS - ESV(CUBED): 19.7 ML
BH CV ECHO MEAS - ESV(MOD-SP4): 55.7 ML
BH CV ECHO MEAS - ESV(TEICH): 27 ML
BH CV ECHO MEAS - FS: 43.6 %
BH CV ECHO MEAS - IVS/LVPW: 0.98
BH CV ECHO MEAS - IVSD: 0.97 CM
BH CV ECHO MEAS - LA DIMENSION: 3.7 CM
BH CV ECHO MEAS - LA/AO: 1.3
BH CV ECHO MEAS - LAT PEAK E' VEL: 12.5 CM/SEC
BH CV ECHO MEAS - LV DIASTOLIC VOL/BSA (35-75): 61.6 ML/M^2
BH CV ECHO MEAS - LV MASS(C)D: 164.2 GRAMS
BH CV ECHO MEAS - LV MASS(C)DI: 82.3 GRAMS/M^2
BH CV ECHO MEAS - LV MAX PG: 7.3 MMHG
BH CV ECHO MEAS - LV MEAN PG: 3 MMHG
BH CV ECHO MEAS - LV SYSTOLIC VOL/BSA (12-30): 27.9 ML/M^2
BH CV ECHO MEAS - LV V1 MAX: 135 CM/SEC
BH CV ECHO MEAS - LV V1 MEAN: 80.2 CM/SEC
BH CV ECHO MEAS - LV V1 VTI: 25.7 CM
BH CV ECHO MEAS - LVIDD: 4.8 CM
BH CV ECHO MEAS - LVIDS: 2.7 CM
BH CV ECHO MEAS - LVLD AP4: 8.2 CM
BH CV ECHO MEAS - LVLS AP4: 7.1 CM
BH CV ECHO MEAS - LVOT AREA (M): 3.8 CM^2
BH CV ECHO MEAS - LVOT AREA: 3.8 CM^2
BH CV ECHO MEAS - LVOT DIAM: 2.2 CM
BH CV ECHO MEAS - LVPWD: 0.99 CM
BH CV ECHO MEAS - MED PEAK E' VEL: 10.7 CM/SEC
BH CV ECHO MEAS - MV A MAX VEL: 103 CM/SEC
BH CV ECHO MEAS - MV DEC TIME: 0.2 SEC
BH CV ECHO MEAS - MV E MAX VEL: 106 CM/SEC
BH CV ECHO MEAS - MV E/A: 1
BH CV ECHO MEAS - RAP SYSTOLE: 5 MMHG
BH CV ECHO MEAS - RVSP: 38.2 MMHG
BH CV ECHO MEAS - SI(AO): 136.1 ML/M^2
BH CV ECHO MEAS - SI(CUBED): 45.2 ML/M^2
BH CV ECHO MEAS - SI(LVOT): 49 ML/M^2
BH CV ECHO MEAS - SI(MOD-SP4): 33.7 ML/M^2
BH CV ECHO MEAS - SI(TEICH): 40.1 ML/M^2
BH CV ECHO MEAS - SV(AO): 271.5 ML
BH CV ECHO MEAS - SV(CUBED): 90.2 ML
BH CV ECHO MEAS - SV(LVOT): 97.7 ML
BH CV ECHO MEAS - SV(MOD-SP4): 67.3 ML
BH CV ECHO MEAS - SV(TEICH): 80 ML
BH CV ECHO MEAS - TR MAX VEL: 288 CM/SEC
BUN BLD-MCNC: 19 MG/DL (ref 5–21)
BUN/CREAT SERPL: 24.1 (ref 7–25)
C3 FRG RBC-MCNC: NORMAL
CALCIUM SPEC-SCNC: 8.7 MG/DL (ref 8.4–10.4)
CHLORIDE SERPL-SCNC: 103 MMOL/L (ref 98–110)
CO2 SERPL-SCNC: 24 MMOL/L (ref 24–31)
CREAT BLD-MCNC: 0.79 MG/DL (ref 0.5–1.4)
DEPRECATED RDW RBC AUTO: 44.3 FL (ref 40–54)
E/E' RATIO: 9.9
EOSINOPHIL # BLD AUTO: 0.02 10*3/MM3 (ref 0–0.7)
EOSINOPHIL NFR BLD AUTO: 0.1 % (ref 0–4)
ERYTHROCYTE [DISTWIDTH] IN BLOOD BY AUTOMATED COUNT: 19.3 % (ref 12–15)
GFR SERPL CREATININE-BSD FRML MDRD: 100 ML/MIN/1.73
GLUCOSE BLD-MCNC: 244 MG/DL (ref 70–100)
GLUCOSE BLDC GLUCOMTR-MCNC: 263 MG/DL (ref 70–130)
GLUCOSE BLDC GLUCOMTR-MCNC: 304 MG/DL (ref 70–130)
GLUCOSE BLDC GLUCOMTR-MCNC: 331 MG/DL (ref 70–130)
GLUCOSE BLDC GLUCOMTR-MCNC: 358 MG/DL (ref 70–130)
HCT VFR BLD AUTO: 36.8 % (ref 40–52)
HGB BLD-MCNC: 10.6 G/DL (ref 14–18)
IMM GRANULOCYTES # BLD: 0.38 10*3/MM3 (ref 0–0.03)
IMM GRANULOCYTES NFR BLD: 1.2 % (ref 0–5)
LEFT ATRIUM VOLUME INDEX: 41.3 ML/M2
LEFT ATRIUM VOLUME: 82.6 CM3
LV EF 2D ECHO EST: 55 %
LYMPHOCYTES # BLD AUTO: 0.77 10*3/MM3 (ref 0.72–4.86)
LYMPHOCYTES NFR BLD AUTO: 2.4 % (ref 15–45)
MCH RBC QN AUTO: 18.9 PG (ref 28–32)
MCHC RBC AUTO-ENTMCNC: 28.8 G/DL (ref 33–36)
MCV RBC AUTO: 65.7 FL (ref 82–95)
MICROCYTES BLD QL: NORMAL
MONOCYTES # BLD AUTO: 0.41 10*3/MM3 (ref 0.19–1.3)
MONOCYTES NFR BLD AUTO: 1.3 % (ref 4–12)
NEUTROPHILS # BLD AUTO: 30.46 10*3/MM3 (ref 1.87–8.4)
NEUTROPHILS NFR BLD AUTO: 94.5 % (ref 39–78)
NRBC BLD MANUAL-RTO: 0 /100 WBC (ref 0–0)
OVALOCYTES BLD QL SMEAR: NORMAL
PLAT MORPH BLD: NORMAL
PLATELET # BLD AUTO: 403 10*3/MM3 (ref 130–400)
POIKILOCYTOSIS BLD QL SMEAR: NORMAL
POTASSIUM BLD-SCNC: 4.1 MMOL/L (ref 3.5–5.3)
RBC # BLD AUTO: 5.6 10*6/MM3 (ref 4.8–5.9)
SODIUM BLD-SCNC: 138 MMOL/L (ref 135–145)
WBC MORPH BLD: NORMAL
WBC NRBC COR # BLD: 32.19 10*3/MM3 (ref 4.8–10.8)

## 2017-02-10 PROCEDURE — 63710000001 PREDNISONE PER 5 MG: Performed by: FAMILY MEDICINE

## 2017-02-10 PROCEDURE — 0 IOPAMIDOL PER 1 ML: Performed by: FAMILY MEDICINE

## 2017-02-10 PROCEDURE — 25010000002 METHYLPREDNISOLONE PER 40 MG: Performed by: PHYSICIAN ASSISTANT

## 2017-02-10 PROCEDURE — 94640 AIRWAY INHALATION TREATMENT: CPT

## 2017-02-10 PROCEDURE — 71260 CT THORAX DX C+: CPT

## 2017-02-10 PROCEDURE — 63710000001 INSULIN LISPRO (HUMAN) PER 5 UNITS: Performed by: FAMILY MEDICINE

## 2017-02-10 PROCEDURE — 93306 TTE W/DOPPLER COMPLETE: CPT

## 2017-02-10 PROCEDURE — 85025 COMPLETE CBC W/AUTO DIFF WBC: CPT | Performed by: PHYSICIAN ASSISTANT

## 2017-02-10 PROCEDURE — 82962 GLUCOSE BLOOD TEST: CPT

## 2017-02-10 PROCEDURE — 25010000002 AZITHROMYCIN: Performed by: NURSE PRACTITIONER

## 2017-02-10 PROCEDURE — 63710000001 INSULIN DETEMIR PER 5 UNITS: Performed by: NURSE PRACTITIONER

## 2017-02-10 PROCEDURE — 80048 BASIC METABOLIC PNL TOTAL CA: CPT | Performed by: PHYSICIAN ASSISTANT

## 2017-02-10 PROCEDURE — 93306 TTE W/DOPPLER COMPLETE: CPT | Performed by: INTERNAL MEDICINE

## 2017-02-10 PROCEDURE — 25010000002 ENOXAPARIN PER 10 MG: Performed by: NURSE PRACTITIONER

## 2017-02-10 PROCEDURE — 85007 BL SMEAR W/DIFF WBC COUNT: CPT | Performed by: PHYSICIAN ASSISTANT

## 2017-02-10 PROCEDURE — 25010000002 CEFTRIAXONE: Performed by: NURSE PRACTITIONER

## 2017-02-10 PROCEDURE — 63710000001 INSULIN LISPRO (HUMAN) PER 5 UNITS: Performed by: PHYSICIAN ASSISTANT

## 2017-02-10 RX ORDER — PREDNISONE 10 MG/1
10 TABLET ORAL 2 TIMES DAILY WITH MEALS
Status: DISCONTINUED | OUTPATIENT
Start: 2017-02-10 | End: 2017-02-11 | Stop reason: HOSPADM

## 2017-02-10 RX ORDER — NICOTINE POLACRILEX 4 MG
15 LOZENGE BUCCAL
Status: DISCONTINUED | OUTPATIENT
Start: 2017-02-10 | End: 2017-02-11 | Stop reason: HOSPADM

## 2017-02-10 RX ORDER — DEXTROSE MONOHYDRATE 25 G/50ML
25 INJECTION, SOLUTION INTRAVENOUS
Status: DISCONTINUED | OUTPATIENT
Start: 2017-02-10 | End: 2017-02-11 | Stop reason: HOSPADM

## 2017-02-10 RX ORDER — METHYLPREDNISOLONE SODIUM SUCCINATE 40 MG/ML
20 INJECTION, POWDER, LYOPHILIZED, FOR SOLUTION INTRAMUSCULAR; INTRAVENOUS EVERY 12 HOURS
Status: DISCONTINUED | OUTPATIENT
Start: 2017-02-10 | End: 2017-02-10

## 2017-02-10 RX ADMIN — CEFTRIAXONE 1 G: 1 INJECTION, POWDER, FOR SOLUTION INTRAMUSCULAR; INTRAVENOUS at 17:21

## 2017-02-10 RX ADMIN — IOPAMIDOL 100 ML: 755 INJECTION, SOLUTION INTRAVENOUS at 08:45

## 2017-02-10 RX ADMIN — IPRATROPIUM BROMIDE AND ALBUTEROL SULFATE 3 ML: .5; 3 SOLUTION RESPIRATORY (INHALATION) at 07:07

## 2017-02-10 RX ADMIN — INSULIN LISPRO 7 UNITS: 100 INJECTION, SOLUTION INTRAVENOUS; SUBCUTANEOUS at 12:12

## 2017-02-10 RX ADMIN — INSULIN LISPRO 6 UNITS: 100 INJECTION, SOLUTION INTRAVENOUS; SUBCUTANEOUS at 08:28

## 2017-02-10 RX ADMIN — INSULIN LISPRO 12 UNITS: 100 INJECTION, SOLUTION INTRAVENOUS; SUBCUTANEOUS at 21:40

## 2017-02-10 RX ADMIN — LABETALOL HYDROCHLORIDE 300 MG: 300 TABLET, FILM COATED ORAL at 17:47

## 2017-02-10 RX ADMIN — CHLORTHALIDONE 25 MG: 25 TABLET ORAL at 08:33

## 2017-02-10 RX ADMIN — LISINOPRIL 40 MG: 20 TABLET ORAL at 10:08

## 2017-02-10 RX ADMIN — METHYLPREDNISOLONE SODIUM SUCCINATE 40 MG: 40 INJECTION, POWDER, FOR SOLUTION INTRAMUSCULAR; INTRAVENOUS at 05:54

## 2017-02-10 RX ADMIN — SODIUM CHLORIDE 100 ML/HR: 9 INJECTION, SOLUTION INTRAVENOUS at 10:46

## 2017-02-10 RX ADMIN — INSULIN LISPRO 10 UNITS: 100 INJECTION, SOLUTION INTRAVENOUS; SUBCUTANEOUS at 17:49

## 2017-02-10 RX ADMIN — PREDNISONE 10 MG: 10 TABLET ORAL at 17:46

## 2017-02-10 RX ADMIN — AZITHROMYCIN 500 MG: 500 INJECTION, POWDER, LYOPHILIZED, FOR SOLUTION INTRAVENOUS at 18:52

## 2017-02-10 RX ADMIN — IPRATROPIUM BROMIDE AND ALBUTEROL SULFATE 3 ML: .5; 3 SOLUTION RESPIRATORY (INHALATION) at 03:14

## 2017-02-10 RX ADMIN — SODIUM CHLORIDE 100 ML/HR: 9 INJECTION, SOLUTION INTRAVENOUS at 23:48

## 2017-02-10 RX ADMIN — ENOXAPARIN SODIUM 40 MG: 40 INJECTION SUBCUTANEOUS at 17:46

## 2017-02-10 RX ADMIN — IPRATROPIUM BROMIDE AND ALBUTEROL SULFATE 3 ML: .5; 3 SOLUTION RESPIRATORY (INHALATION) at 14:38

## 2017-02-10 RX ADMIN — IPRATROPIUM BROMIDE AND ALBUTEROL SULFATE 3 ML: .5; 3 SOLUTION RESPIRATORY (INHALATION) at 18:57

## 2017-02-10 RX ADMIN — IPRATROPIUM BROMIDE AND ALBUTEROL SULFATE 3 ML: .5; 3 SOLUTION RESPIRATORY (INHALATION) at 10:29

## 2017-02-10 RX ADMIN — INSULIN DETEMIR 20 UNITS: 100 INJECTION, SOLUTION SUBCUTANEOUS at 21:41

## 2017-02-10 RX ADMIN — PANTOPRAZOLE SODIUM 40 MG: 40 TABLET, DELAYED RELEASE ORAL at 05:54

## 2017-02-10 RX ADMIN — PREDNISONE 10 MG: 10 TABLET ORAL at 08:42

## 2017-02-10 RX ADMIN — LABETALOL HYDROCHLORIDE 300 MG: 300 TABLET, FILM COATED ORAL at 08:34

## 2017-02-10 RX ADMIN — IPRATROPIUM BROMIDE AND ALBUTEROL SULFATE 3 ML: .5; 3 SOLUTION RESPIRATORY (INHALATION) at 22:58

## 2017-02-10 NOTE — PLAN OF CARE
Problem: Patient Care Overview (Adult)  Goal: Plan of Care Review  Outcome: Ongoing (interventions implemented as appropriate)    02/10/17 1542   Coping/Psychosocial Response Interventions   Plan Of Care Reviewed With patient   Patient Care Overview   Progress no change   Outcome Evaluation   Outcome Summary/Follow up Plan no c/o pain, no c/o soa. WBC elevated (32); vs: elevated BP and HR. Overall, patient states he feels better. Pt. activity has increased.          Problem: Pneumonia (Adult)  Goal: Signs and Symptoms of Listed Potential Problems Will be Absent or Manageable (Pneumonia)  Outcome: Ongoing (interventions implemented as appropriate)

## 2017-02-10 NOTE — PLAN OF CARE
Problem: Patient Care Overview (Adult)  Goal: Plan of Care Review  Outcome: Ongoing (interventions implemented as appropriate)    02/09/17 1534 02/10/17 0428   Coping/Psychosocial Response Interventions   Plan Of Care Reviewed With patient --    Patient Care Overview   Progress improving --    Outcome Evaluation   Outcome Summary/Follow up Plan --  vss. no c/o pain. pt resting comfortably.         Problem: Pneumonia (Adult)  Goal: Signs and Symptoms of Listed Potential Problems Will be Absent or Manageable (Pneumonia)  Outcome: Ongoing (interventions implemented as appropriate)

## 2017-02-10 NOTE — CONSULTS
HEMATOLOGY CONSULTATION    Pt Name: Nikita Bhardwaj  MRN: [id number]  [id number]  YOB: 1955  Date of evaluation: 2/10/2017    Reason for Consultation:  Continuity of care - leukocytosis  Requesting Physician:  Dr. Sanchez    History Obtained From:  PATIENT and CHART    HISTORY OF PRESENT ILLNESS:    Nikita is a very pleasant 62 y/o  male with LENI 2 positive P vera. He is on Hydrea 1 g on Monday, Wednesday, and Friday with 500 mg all other days. He was admitted to the hospital with right-sided pneumonia.  Clinically he has been improving.  CBC on admission on 2/8/2017 revealed a WBC of 21.89, Hgb 12.1, PLT of 391,000.  However, his CBC today revealed that his WBC has increased to 32.19.  Consultation was requested.    HEMATOLOGICAL HISTORY: LENI-2 positive polycythemia vera diagnosed 07/2008  Mr. Bhardwaj was seen by Dr. Shelton on 07/25/08 where he was found to have an elevated hemoglobin and hematocrit of 20.2 and 58.5 respectively. I did have a conference with Dr. Shelton prior to Mr. Bhardwaj’s initial visit. Mr. Bhardwaj had had two phlebotomies prior to his initial visit on 08/26/08. Workup included an erythropoietin level, which was normal a LENI-2 which was positive consistent with a myeloproliferative disorder and a relatively unremarkable chemistry profile. A bone marrow biopsy and aspirate was done on 10/07/08 that showed increased megakaryocytes in the bone marrow but with otherwise normal flow cytometric studies and absent stainable iron. This absent iron is most likely a consequence of the repeated phlebotomies for his erythrocytosis associated with his P-vera. He also has leukocytosis, a component of his myeloproliferative disorder. Periodic phlebotomies were done from July 2008 until March 2012. At that time transition was made to Hydrea.   He still required periodic phlebotomies despite the Hydrea.  CT of the abdomen and pelvis with contrast performed at [facility] on 2/23/2016 did reveal splenomegaly at 16  jesus.    TREATMENT SUMMARY:  1. Phlebotomies only initiated July 2008 until March 2012.   2. Hydrea initiated 03/27/12.  3. Periodic phlebotomies as indicated despite Hydrea.    Past Medical History:    Past Medical History   Diagnosis Date   • Diabetes mellitus    • Hypertension        Past Surgical History:    History reviewed. No pertinent past surgical history.    Immunizations:      There is no immunization history on file for this patient.    Current Hospital Medications:      Current Facility-Administered Medications:   •  cefTRIAXone (ROCEPHIN) 1 g/100 mL 0.9% NS (MBP), 1 g, Intravenous, Q24H, 1 g at 02/09/17 1601 **AND** AZITHROMYCIN 500 MG/250 ML 0.9% NS IVPB (vial-mate), 500 mg, Intravenous, Q24H, Rosie Sharma APRN, 500 mg at 02/09/17 1726  •  chlorthalidone (HYGROTON) tablet 25 mg, 25 mg, Oral, Daily, KEMAR Murillo, 25 mg at 02/10/17 0833  •  dextrose (D50W) solution 25 g, 25 g, Intravenous, Q15 Min PRN, BIMAL Pendleton  •  dextrose (GLUTOSE) oral gel 15 g, 15 g, Oral, Q15 Min PRN, BIMAL Pendleton  •  enoxaparin (LOVENOX) syringe 40 mg, 40 mg, Subcutaneous, Q24H, Rosie Sharma APRN, 40 mg at 02/09/17 1727  •  glucagon (human recombinant) (GLUCAGEN DIAGNOSTIC) injection 1 mg, 1 mg, Subcutaneous, Q15 Min PRN, BIMAL Pendleton  •  HYDROcodone-acetaminophen (NORCO) 5-325 MG per tablet 1 tablet, 1 tablet, Oral, Q4H PRN, Rosie Sharma APRN  •  insulin lispro (humaLOG) injection 2-9 Units, 2-9 Units, Subcutaneous, 4x Daily AC & at Bedtime, BIMAL Pendleton, 6 Units at 02/10/17 0828  •  ipratropium-albuterol (DUO-NEB) nebulizer solution 3 mL, 3 mL, Nebulization, Q4H - RT, KEMAR Murillo, 3 mL at 02/10/17 0707  •  labetalol (NORMODYNE) tablet 300 mg, 300 mg, Oral, BID, KEMAR Murillo, 300 mg at 02/10/17 0834  •  lisinopril (PRINIVIL,ZESTRIL) tablet 40 mg, 40 mg, Oral, Q24H, KEMAR Murillo, 40 mg at 02/09/17 0826  •  LORazepam (ATIVAN)  tablet 0.5 mg, 0.5 mg, Oral, Q6H PRN, KEMAR Murillo  •  metFORMIN (GLUCOPHAGE) tablet 1,000 mg, 1,000 mg, Oral, BID With Meals, KEMAR Murillo, 1,000 mg at 02/09/17 1727  •  ondansetron (ZOFRAN) injection 4 mg, 4 mg, Intravenous, Q6H PRN, KEMAR Murillo  •  pantoprazole (PROTONIX) EC tablet 40 mg, 40 mg, Oral, Q AM, Nikita Sanchez MD, 40 mg at 02/10/17 0554  •  Pharmacy to Dose enoxaparin (LOVENOX), , Does not apply, Continuous PRN, KEMAR Murillo  •  predniSONE (DELTASONE) tablet 10 mg, 10 mg, Oral, BID With Meals, Nikita Quick MD, 10 mg at 02/10/17 0842  •  sodium chloride 0.9 % flush 1-10 mL, 1-10 mL, Intravenous, PRN, KEMAR Murillo  •  sodium chloride 0.9 % infusion, 100 mL/hr, Intravenous, Continuous, KEMAR Murillo, Last Rate: 100 mL/hr at 02/09/17 0954, 100 mL/hr at 02/09/17 0954    Allergies: No Known Allergies    Social History:    Social History     Social History   • Marital status: Single     Spouse name: N/A   • Number of children: N/A   • Years of education: N/A     Occupational History   • Not on file.     Social History Main Topics   • Smoking status: Never Smoker   • Smokeless tobacco: Not on file   • Alcohol use No   • Drug use: Not on file   • Sexual activity: Yes     Partners: Male     Other Topics Concern   • Not on file     Social History Narrative   • No narrative on file       Family History:   History reviewed. No pertinent family history.    REVIEW OF SYSTEMS:    Constitutional: positive for fatigue  HEENT: no blurring of vision, no double vision     Lungs: positive for right lower lobe pneumonia  CVS: no palpitation, no chest pain  GI: no abdominal pain, no nausea , no vomiting, no constipation  LUCAS: no dysuria, frequency and urgency, no hematuria  Musculoskeletal: no joint pain, swelling , stiffness  Endocrine: no polyuria, polydypsia, no cold or heat intolerence  Hematology: positive for P vera  Dermatology: no  "skin rash, no eczema, no pruritis  Psychiatry: no depression, no anxiety,no panic attacks, no suicide ideation  Neurology: no seizures    Vitals:    Visit Vitals   • /82 (BP Location: Right arm, Patient Position: Sitting)  Comment: Blanca MELTON notified    • Pulse 102   • Temp 97.3 °F (36.3 °C) (Temporal Artery )   • Resp 20   • Ht 68\" (172.7 cm)   • Wt 189 lb (85.7 kg)   • SpO2 96%   • BMI 28.74 kg/m2       PHYSICAL EXAM:    CONSTITUTIONAL: Alert, appropriate, no acute distress  EYES: Non icteric, EOM intact, pupils equal round   ENT: Mucus membranes moist, no oral pharyngeal lesions   NECK: Supple, no masses   CHEST/LUNGS: crackles right lower lobe otherwise lungs are clear  CARDIOVASCULAR: RRR, no murmurs  ABDOMEN: soft non-tender, active bowel sounds, no HSM  EXTREMITIES: warm, moves all fours  SKIN: warm, dry with no rashes or lesions  LYMPH: No cervical, clavicular, axillary, or inguinal lymphadenopathy  NEUROLOGIC: follows commands, non focal   PSYCH: mood and affect appropriate    CBC    Results from last 7 days  Lab Units 02/10/17  0508 02/08/17  1613   WBC 10*3/mm3 32.19* 21.89*   HEMOGLOBIN g/dL 10.6* 12.1*   HEMATOCRIT % 36.8* 43.3   PLATELETS 10*3/mm3 403* 391         Lab Results   Component Value Date     02/10/2017    K 4.1 02/10/2017     02/10/2017    CO2 24.0 02/10/2017    BUN 19 02/10/2017    CREATININE 0.79 02/10/2017    GLUCOSE 244 (H) 02/10/2017    CALCIUM 8.7 02/10/2017    BILITOT 1.7 (H) 02/08/2017    ALKPHOS 149 (H) 02/08/2017    AST 36 02/08/2017    ALT 48 02/08/2017    GLOB 2.9 02/08/2017       Lab Results   Component Value Date    INR 0.96 02/08/2017    PROTIME 13.1 02/08/2017       Cultures:    Lab Results   Component Value Date    BLOODCX No growth at 24 hours 02/08/2017     No components found for: URINCX    ASSESSMENT/PLAN:  Nikita is a very pleasant 60 y/o  male with LENI 2 positive P vera. He is on Hydrea 1 g on Monday, Wednesday, and Friday with 500 mg all " other days. He was admitted to the hospital with right-sided pneumonia.  Clinically he has been improving.  CBC on admission on 2/8/2017 revealed a WBC of 21.89, Hgb 12.1, PLT of 391,000.  However, his CBC today revealed that his WBC has increased to 32.19.  Consultation was requested.    1.  Polycythemia vera.  His WBC is noted.  Hgb has dropped slightly to 10.6 and PLT is 403,000.  His counts are stable for him and we will see him in follow-up as arranged on 3/10 at 9:30.  It is okay from our standpoint for him to be discharged.    2.  RLL pneumonia.  He has improved clinically on Rocephin and azithromycin.      BIMAL Nolan    02/10/17  8:53 AM    Nikita was seen and examined.  He is well known to me since his presentation in July 2008.  He is symptomatically improved with a treatment rendered thus far.  Expectation with an acute inflammatory process is that his WBCs will increase in somewhat of an exaggerated and reactive way more than an average person.  As outlined above, when he has received up to [facility] [place], it is okay with me as well for him to be discharged.  Outpatient follow-up is re: been made.    Thank you very much Dr. Sanchez for alerting us to Mr. Bhardwaj's hospitalization.    I personally saw and examined this patient and agree with the assessment and plan outlined in this note.   The treatment plan was discussed and developed with Devon Andre PA-C.    Questions were encouraged, asked and answered to their understanding and satisfaction.    Samson Queen MD  2/10/2017 11:51 AM

## 2017-02-10 NOTE — PROGRESS NOTES
Family Medicine Progress Note    Patient:  Nikita Bhardwaj  YOB: 1955    MRN: [id number]     Acct: [id number]     Admit date: 2/8/2017  LOS: 2     Patient Seen, Chart, Consults notes, Labs, Radiology studies reviewed.    Subjective:  Doing well with no new complaints.  Clinically he looks & feels better. WBC significantly increased from last date.    Past Medical History:  Past Medical History   Diagnosis Date   • Diabetes mellitus    • Hypertension        Past Surgical History:  History reviewed. No pertinent past surgical history.      Allergies:  Review of patient's allergies indicates no known allergies.    Medications:  Scheduled Meds:    ceftriaxone 1 g Intravenous Q24H   And      azithromycin 500 mg Intravenous Q24H   chlorthalidone 25 mg Oral Daily   enoxaparin 40 mg Subcutaneous Q24H   insulin lispro 2-7 Units Subcutaneous 4x Daily AC & at Bedtime   ipratropium-albuterol 3 mL Nebulization Q4H - RT   labetalol 300 mg Oral BID   lisinopril 40 mg Oral Q24H   metFORMIN 1,000 mg Oral BID With Meals   methylPREDNISolone sodium succinate 40 mg Intravenous Q8H   pantoprazole 40 mg Oral Q AM     Continuous Infusions:    Pharmacy to Dose enoxaparin (LOVENOX)     sodium chloride 100 mL/hr Last Rate: 100 mL/hr (02/09/17 0954)     PRN Meds:dextrose  •  dextrose  •  glucagon (human recombinant)  •  HYDROcodone-acetaminophen  •  LORazepam  •  ondansetron  •  Pharmacy to Dose enoxaparin (LOVENOX)  •  sodium chloride    Social History:   Social History     Social History   • Marital status: Single     Spouse name: N/A   • Number of children: N/A   • Years of education: N/A     Occupational History   • Not on file.     Social History Main Topics   • Smoking status: Never Smoker   • Smokeless tobacco: Not on file   • Alcohol use No   • Drug use: Not on file   • Sexual activity: Yes     Partners: Male     Other Topics Concern   • Not on file     Social History Narrative   • No narrative on file       Family History:    History reviewed. No pertinent family history.      ROS:  10 point ROS obtained:   Gen: No fevers  HEENT: No migraine or visual change  Lung: No cough, hemopotysis or wheezing  Cv: No chest pain or pressure  Abd: No nausea or vomit, no change in bowel fct, no gi bleeding  Ext: No inc pain or swelling  Neuro: No seizure or syncope  Skin: No new rashes  Endo: No polyuria, polyphagia or poilydypsia  Psyc: No inc anxiety or depression  MS: No increase in joint pain or swelling reported      Objective:    Lab Results (last 72 hours)     Procedure Component Value Units Date/Time    POC Glucose Fingerstick [[id number]]  (Abnormal) Collected:  02/08/17 1610    Specimen:  Blood Updated:  02/08/17 1640     Glucose 149 (H) mg/dL       : [id number] Moy ID: [id number]       C-reactive Protein [[id number]]  (Abnormal) Collected:  02/08/17 1613    Specimen:  Blood Updated:  02/08/17 1652     C-Reactive Protein 2.03 (H) mg/dL     Comprehensive Metabolic Panel [[id number]]  (Abnormal) Collected:  02/08/17 1613    Specimen:  Blood Updated:  02/08/17 1653     Glucose 132 (H) mg/dL      BUN 15 mg/dL      Creatinine 0.82 mg/dL      Sodium 139 mmol/L      Potassium 4.0 mmol/L      Chloride 102 mmol/L      CO2 24.0 mmol/L      Calcium 9.3 mg/dL      Total Protein 7.4 g/dL      Albumin 4.50 g/dL      ALT (SGPT) 48 U/L      AST (SGOT) 36 U/L      Alkaline Phosphatase 149 (H) U/L      Total Bilirubin 1.7 (H) mg/dL      eGFR Non African Amer 96 mL/min/1.73      Globulin 2.9 gm/dL      A/G Ratio 1.6 g/dL      BUN/Creatinine Ratio 18.3      Anion Gap 13.0 mmol/L     CK Total & CKMB [[id number]]  (Normal) Collected:  02/08/17 1613    Specimen:  Blood Updated:  02/08/17 1706     CKMB 2.34 ng/mL      Creatine Kinase 60 U/L     Narrative:       CKMB Index not indicated    Manual Differential [[id number]] Collected:  02/08/17 1613    Specimen:  Blood Updated:  02/08/17 1710    Slide Review, Hematology [[id number]] Collected:   02/08/17 1613    Specimen:  Blood Updated:  02/08/17 1720    CBC Auto Differential [[id number]]  (Abnormal) Collected:  02/08/17 1613    Specimen:  Blood Updated:  02/08/17 1722     WBC 21.89 (H) 10*3/mm3      RBC 6.56 (H) 10*6/mm3      Hemoglobin 12.1 (L) g/dL      Hematocrit 43.3 %      MCV 66.0 (L) fL      MCH 18.4 (L) pg      MCHC 27.9 (L) g/dL      RDW 19.7 (H) %      RDW-SD 44.3 fl      Platelets 391 10*3/mm3     Troponin [[id number]]  (Normal) Collected:  02/08/17 1613    Specimen:  Blood Updated:  02/08/17 1732     Troponin I <0.012 ng/mL     BNP [[id number]]  (Normal) Collected:  02/08/17 1613    Specimen:  Blood Updated:  02/08/17 1732     proBNP 307.0 pg/mL     Hemoglobin A1c [[id number]] Collected:  02/08/17 1613    Specimen:  Blood Updated:  02/08/17 1753     Hemoglobin A1C 8.7 %     Narrative:       Less than 6.0           Non-Diabetic Range  6.0-7.0                 ADA Therapeutic Target  Greater than 7.0        Action Suggested    Protime-INR [[id number]]  (Normal) Collected:  02/08/17 1613    Specimen:  Blood Updated:  02/08/17 1826     Protime 13.1 Seconds      INR 0.96     D-dimer, Quantitative [[id number]]  (Normal) Collected:  02/08/17 1613    Specimen:  Blood Updated:  02/08/17 1847     D-Dimer, Quantitative <0.22 mg/L (FEU)     Narrative:       Reference Range is 0-0.50 mg/L FEU. However, results <0.50 mg/L FEU tends to rule out DVT or PE. Results >0.50 mg/L FEU are not useful in predicting absence or presence of DVT or PE.    CBC & Differential [[id number]] Collected:  02/08/17 1613    Specimen:  Blood Updated:  02/08/17 1902    Narrative:       The following orders were created for panel order CBC & Differential.  Procedure                               Abnormality         Status                     ---------                               -----------         ------                     Manual Differential[[id number]]                               In process                 Scan Slide[[id number]]                                                                    CBC Auto Differential[[id number]]         Abnormal            Final result               Slide Review, Hematology[[id number]]                          In process                   Please view results for these tests on the individual orders.    Troponin [[id number]]  (Normal) Collected:  02/08/17 1814    Specimen:  Blood Updated:  02/08/17 1945     Troponin I <0.012 ng/mL     Troponin [[id number]]  (Normal) Collected:  02/08/17 2137    Specimen:  Blood Updated:  02/08/17 2218     Troponin I <0.012 ng/mL     Blood Culture [[id number]]  (Normal) Collected:  02/08/17 1610    Specimen:  Blood from Arm, Right Updated:  02/09/17 0501     Blood Culture No growth at less than 24 hours     Blood Culture [[id number]]  (Normal) Collected:  02/08/17 1613    Specimen:  Blood from Arm, Left Updated:  02/09/17 0501     Blood Culture No growth at less than 24 hours            Imaging Results (last 72 hours)     Procedure Component Value Units Date/Time    XR Chest 2 View [[id number]] Collected:  02/08/17 1806     Updated:  02/08/17 2051    Narrative:       TWO-VIEW CHEST:      HISTORY: Pneumonia.      Frontal and lateral projection chest radiograph obtained.     COMPARISON: 08/24/2013      FINDINGS:  There is right hemidiaphragm elevation with airspace opacities in the  right lung base that may represent atelectasis. Acute  infiltrate/pneumonia also considered. Right perihilar interstitial  prominence noted particularly in the lower lobe.     The left lung is grossly clear.     The heart is normal in size without heart failure.     The bony structures are intact.                                                                                                                      Impression:       Right hemidiaphragm elevation with right lower lobe airspace  opacities, question atelectasis. Acute infiltrates/pneumonia also  considered.        This report was finalized on 02/08/2017 20:49 by   "Winston Sharma MD.           Physical Exam:    Vitals:   Visit Vitals   • /74 (BP Location: Right arm, Patient Position: Lying)   • Pulse 100   • Temp 97.5 °F (36.4 °C) (Temporal Artery )   • Resp 20   • Ht 68\" (172.7 cm)   • Wt 189 lb (85.7 kg)   • SpO2 94%   • BMI 28.74 kg/m2     24 hour intake/output:    Intake/Output Summary (Last 24 hours) at 02/10/17 0645  Last data filed at 02/09/17 1726   Gross per 24 hour   Intake   3505 ml   Output    300 ml   Net   3205 ml     Last 3 weights:  Wt Readings from Last 3 Encounters:   02/08/17 189 lb (85.7 kg)           General Appearance:    Alert, cooperative, in no acute distress   Head:    Normocephalic, without obvious abnormality, atraumatic   Eyes:            Lids and lashes normal, conjunctivae and sclerae normal, no   icterus, no pallor, corneas clear, PERRLA   Ears:    Ears appear intact with no abnormalities noted   Throat:   No oral lesions, no thrush, oral mucosa moist   Neck:   No adenopathy, supple, trachea midline, no thyromegaly, no   carotid bruit, no JVD   Back:     No kyphosis present, no scoliosis present, no skin lesions,      erythema or scars, no tenderness to percussion or                   palpation,   range of motion normal   Lungs:     Clear to auscultation,respirations regular, even and                  unlabored    Heart:    Regular rhythm and normal rate, normal S1 and S2, 2/6           murmur, no gallop, no rub, no click   Chest Wall:    No abnormalities observed   Abdomen:     Normal bowel sounds, no masses, no organomegaly, soft        non-tender, non-distended, no guarding, no rebound                tenderness   Rectal:     Deferred   Extremities:   Moves all extremities well, no edema, no cyanosis, no             redness   Pulses:   Pulses palpable and equal bilaterally   Skin:   No bleeding, bruising or rash   Lymph nodes:   No palpable adenopathy   Neurologic:   Cranial nerves 2 - 12 grossly intact, sensation intact, DTR       " present and equal bilaterally           Assessment:    Principal Problem:    Pneumonia  Active Problems:    Tachycardia    Essential hypertension    Type 2 diabetes mellitus  Leukocytosis         Plan:  PNEUMONIA:  1. Continue IV antibiotics  2. Solumedrol IV--> SSI( go to high dose coverage) For Gluc, will decrease dose & freq today  3. Pulmonary toilet (nebulized treatments, Incentive Spirometry, P&D)  4. Early Mobilization  5. Collect sputum culture and blood cultures x2  6. Supplement 02 as needed to maintain oxygen saturation >92%  7. DVT prophylaxis with Lovenox and/or SCD/Teds  8. Check 2 D echo for SOA/Murmur  9. Will CT Chest for further definition of pneumonia due to significant WBC elevation  10. Ask Hematology (He already see's Dr. Queen for polycythemia)  to see for significant WBC elevation-clinically he feels better.         Electronically signed by BIMAL Pendleton on 2/10/2017 at 6:45 AM    Change iv to po steriods  ?elevated wbc all related to infxn and steriods?   Get hem/onc opinion  Overall looks better  Check ct of chest  I have discussed the care of Nikita Bhardwaj, including pertinent history and exam findings with the ARNP/PA.  I have seen and examined the patient and the key elements of all parts of the encounter have been performed by me. I agree with the assessment and plan as outlined by the ARNP/PA. Please refer to my separate note for complete documentation.     Electronically signed by Nikita Quick MD on 2/10/2017 at 7:22 AM

## 2017-02-11 VITALS
DIASTOLIC BLOOD PRESSURE: 96 MMHG | BODY MASS INDEX: 29.55 KG/M2 | HEART RATE: 95 BPM | HEIGHT: 68 IN | SYSTOLIC BLOOD PRESSURE: 189 MMHG | RESPIRATION RATE: 18 BRPM | OXYGEN SATURATION: 96 % | WEIGHT: 195 LBS | TEMPERATURE: 97.7 F

## 2017-02-11 LAB — GLUCOSE BLDC GLUCOMTR-MCNC: 237 MG/DL (ref 70–130)

## 2017-02-11 PROCEDURE — 94640 AIRWAY INHALATION TREATMENT: CPT

## 2017-02-11 PROCEDURE — 63710000001 PREDNISONE PER 5 MG: Performed by: FAMILY MEDICINE

## 2017-02-11 PROCEDURE — 82962 GLUCOSE BLOOD TEST: CPT

## 2017-02-11 RX ORDER — CEFDINIR 300 MG/1
300 CAPSULE ORAL EVERY 12 HOURS SCHEDULED
Status: DISCONTINUED | OUTPATIENT
Start: 2017-02-11 | End: 2017-02-11 | Stop reason: HOSPADM

## 2017-02-11 RX ORDER — PREDNISONE 10 MG/1
10 TABLET ORAL DAILY
Qty: 3 TABLET | Refills: 0 | Status: SHIPPED | OUTPATIENT
Start: 2017-02-11 | End: 2017-02-14

## 2017-02-11 RX ORDER — CEFDINIR 300 MG/1
300 CAPSULE ORAL 2 TIMES DAILY
Qty: 14 CAPSULE | Refills: 0 | Status: SHIPPED | OUTPATIENT
Start: 2017-02-11 | End: 2017-02-18

## 2017-02-11 RX ADMIN — PREDNISONE 10 MG: 10 TABLET ORAL at 08:02

## 2017-02-11 RX ADMIN — PANTOPRAZOLE SODIUM 40 MG: 40 TABLET, DELAYED RELEASE ORAL at 05:51

## 2017-02-11 RX ADMIN — IPRATROPIUM BROMIDE AND ALBUTEROL SULFATE 3 ML: .5; 3 SOLUTION RESPIRATORY (INHALATION) at 07:10

## 2017-02-11 RX ADMIN — CHLORTHALIDONE 25 MG: 25 TABLET ORAL at 08:02

## 2017-02-11 RX ADMIN — LISINOPRIL 40 MG: 20 TABLET ORAL at 08:02

## 2017-02-11 RX ADMIN — LABETALOL HYDROCHLORIDE 300 MG: 300 TABLET, FILM COATED ORAL at 08:02

## 2017-02-11 NOTE — DISCHARGE SUMMARY
[id number]  Patient admitted from [facility].  Discharge home today  Follow-up next week with Dr. Sanchez for return to work  F/U with Dr. Queen at his next scheduled appointment  Follow up with his primary care provider Dr. Shelton already scheduled

## 2017-02-11 NOTE — PLAN OF CARE
Problem: Patient Care Overview (Adult)  Goal: Plan of Care Review  Outcome: Ongoing (interventions implemented as appropriate)    02/11/17 0515   Coping/Psychosocial Response Interventions   Plan Of Care Reviewed With patient   Patient Care Overview   Progress no change   Outcome Evaluation   Outcome Summary/Follow up Plan no c/o pain and no c/o of soa this shift.          Problem: Pneumonia (Adult)  Goal: Signs and Symptoms of Listed Potential Problems Will be Absent or Manageable (Pneumonia)  Outcome: Ongoing (interventions implemented as appropriate)    02/11/17 0515   Pneumonia   Problems Assessed (Pneumonia) fluid/electrolyte imbalance   Problems Present (Pneumonia) progression of infection

## 2017-02-11 NOTE — DISCHARGE SUMMARY
DATE OF ADMISSION: 02/08/2017  DATE OF DISCHARGE: 02/11/2017    PRIMARY CARE PHYSICIAN: Anibal Shelton MD     DISCHARGE DIAGNOSES:  1. Community-acquired pneumonia.   2. Leukocytosis.   2. 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    3. Chronic obstructive pulmonary disease/emphysema.   4. Type 2 diabetes.   5. Gastroesophageal reflux.   6. Hypertension.     HISTORY OF PRESENT ILLNESS: The patient is a pleasant 61-year-old gentleman who was working on a towboat. He developed cough, cold, congestion. He presented to [facility] where he was diagnosed with pneumonia. He was admitted for IV antibiotics, nebulizer treatment and aggressive pulmonary toilet. There were no other precipitating or relieving factors.     HOSPITAL COURSE: The patient was admitted. He was started on appropriate antimicrobials of Rocephin and Zithromax. He was given some IV steroids. His initial white count was 21,000; it jumped to 32,000 with a considerable left shift. Clinically he was improved, but with the elevated white count and his history of the polycythemia vera, Dr. Samson Queen was consulted to ensure that there was nothing else going on with his hematology issue. We went ahead and got a CT scan of his chest which did show that the pneumonia was clearing as well. Dr. Queen's consult was reviewed, and he thought this was just an acute phase reactant from the infection going on and would follow it up on an outpatient basis. The following morning, his IV was switched to oral. He was subsequently discharged home.     FOLLOWUP:   1. 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   2. He will follow up with Dr. Queen at his previously decided appointment.   3. His primary care doctor is Dr. Shelton who he will return to see as well.     DISCHARGE MEDICATIONS: Per pharmacy sheets.     CONSULTATION: Samson Queen MD, Hematology/Oncology     COMPLICATIONS: None.      PROCEDURES: None.     cc:  JOLIE ANAYA M.D.  [id number]  D:  02/11/2017 09:01:14(Eastern Time)  T:  02/11/2017 09:11:08(Eastern Time)  Voice ID:  [id number]/Document ID:  [id number]

## 2017-02-13 LAB
BACTERIA SPEC AEROBE CULT: NORMAL
BACTERIA SPEC AEROBE CULT: NORMAL

## 2017-02-13 NOTE — PAYOR COMM NOTE
"DC HOME 2-11-17  [id number]    David Bhardwaj (61 y.o. Male)     Date of Birth Social Security Number Address Home Phone MRN    1955  [address] [place] [id number] [phone] [id number]    Gnosticism Marital Status          Nondenominational Single       Admission Date Admission Type Admitting Provider Attending Provider Department, Room/Bed    2/8/17 Urgent TurnDavid mcdowell MD  [facility] 4C, 477/1    Discharge Date Discharge Disposition Discharge Destination        2/11/2017 Home or Self Care Home            Attending Provider: (none)    Allergies:  No Known Allergies    Isolation:  None   Infection:  None   Code Status:  Prior    Ht:  68\" (172.7 cm)   Wt:  195 lb (88.5 kg)    Admission Cmt:  None   Principal Problem:  Pneumonia [J18.9]                 Active Insurance as of 2/8/2017     Primary Coverage     Payor Plan Insurance Group Employer/Plan Group    [employer] PPO [id number]     Payor Plan Address Payor Plan Phone Number Effective From Effective To    [id number] [phone] 1/1/2015     [place] [address]       Subscriber Name Subscriber Birth Date Member ID       DAVID BHARDWAJ 1955 [id number]                 Emergency Contacts      (Rel.) Home Phone Work Phone Mobile Phone    Ev Bhardwaj (Daughter) [phone] -- --            Vital Signs (last 72 hrs)       02/10 0700  -  02/11 0659 02/11 0700  -  02/12 0659 02/12 0700  -  02/13 0659 02/13 0700  -  02/13 0936   Most Recent    Temp (°F) 97 -  97.6      97.7         97.7 (36.5)    Heart Rate 93 -  107      95         95    Resp 18 -  20      18         18    /69 -  163/74      (!) 189/96         (!) 189/96  Comment: reported to [facility]    SpO2 (%) 96 -  97      96         96          Intake & Output (last 3 days)       02/10 0701 - 02/11 0700 02/11 0701 - 02/12 0700 02/12 0701 - 02/13 0700 02/13 0701 - 02/14 0700    P.O. 1560       I.V. (mL/kg) 3368 (38.1)       IV " "Piggyback 350       Total Intake(mL/kg) 5278 (59.7)       Urine (mL/kg/hr)        Stool        Total Output            Net +5278                  Unmeasured Urine Occurrence 3 x           Hospital Medications (all)       Dose Frequency Start End    insulin lispro (humaLOG) injection 3-14 Units 3-14 Units Once 2/10/2017 2/10/2017    Sig - Route: Inject 3-14 Units under the skin 1 (One) Time. - Subcutaneous    iopamidol (ISOVUE-370) 76 % injection 100 mL 100 mL Once in Imaging 2/10/2017 2/10/2017    Sig - Route: Infuse 100 mL into a venous catheter Once. - Intravenous    Cosign for Ordering: Required by Nikita Sanchez MD    sodium chloride 0.9 % bolus 1,000 mL 1,000 mL Once 2/8/2017 2/8/2017    Sig - Route: Infuse 1,000 mL into a venous catheter 1 (One) Time. - Intravenous    AZITHROMYCIN 500 MG/250 ML 0.9% NS IVPB (vial-mate) (Discontinued) 500 mg Every 24 Hours 2/8/2017 2/11/2017    Sig - Route: Infuse 500 mg into a venous catheter Daily. - Intravenous    Linked Group 1:  \"And\" Linked Group Details        cefdinir (OMNICEF) capsule 300 mg (Discontinued) 300 mg Every 12 Hours Scheduled 2/11/2017 2/11/2017    Sig - Route: Take 1 capsule by mouth Every 12 (Twelve) Hours. - Oral    Reason for Discontinue: Patient Discharge    cefTRIAXone (ROCEPHIN) 1 g/100 mL 0.9% NS (MBP) (Discontinued) 1 g Every 24 Hours 2/8/2017 2/11/2017    Sig - Route: Infuse 100 mL into a venous catheter Daily. - Intravenous    Linked Group 1:  \"And\" Linked Group Details        chlorthalidone (HYGROTON) tablet 25 mg (Discontinued) 25 mg Daily 2/8/2017 2/11/2017    Sig - Route: Take 1 tablet by mouth Daily. - Oral    Reason for Discontinue: Patient Discharge    dextrose (D50W) solution 25 g (Discontinued) 25 g Every 15 Minutes PRN 2/9/2017 2/10/2017    Sig - Route: Infuse 50 mL into a venous catheter Every 15 (Fifteen) Minutes As Needed for low blood sugar (Blood Sugar Less Than 70, Patient Has IV Access - Unresponsive, NPO or Unable To Safely " Swallow). - Intravenous    Reason for Discontinue: Duplicate order    Cosign for Ordering: Accepted by Nikita Quick MD on 2/9/2017  7:12 AM    dextrose (D50W) solution 25 g (Discontinued) 25 g Every 15 Minutes PRN 2/10/2017 2/11/2017    Sig - Route: Infuse 50 mL into a venous catheter Every 15 (Fifteen) Minutes As Needed for low blood sugar (Blood Sugar Less Than 70, Patient Has IV Access - Unresponsive, NPO or Unable To Safely Swallow). - Intravenous    Reason for Discontinue: Patient Discharge    Cosign for Ordering: Accepted by Nikita Quick MD on 2/10/2017  1:15 PM    dextrose (GLUTOSE) oral gel 15 g (Discontinued) 15 g Every 15 Minutes PRN 2/9/2017 2/10/2017    Sig - Route: Take 15 g by mouth Every 15 (Fifteen) Minutes As Needed for low blood sugar (Blood Sugar Less Than 70, Patient Alert, Is Not NPO & Can Safely Swallow). - Oral    Reason for Discontinue: Duplicate order    Cosign for Ordering: Accepted by Nikita Quick MD on 2/9/2017  7:12 AM    dextrose (GLUTOSE) oral gel 15 g (Discontinued) 15 g Every 15 Minutes PRN 2/10/2017 2/11/2017    Sig - Route: Take 15 g by mouth Every 15 (Fifteen) Minutes As Needed for low blood sugar (Blood Sugar Less Than 70, Patient Alert, Is Not NPO & Can Safely Swallow). - Oral    Reason for Discontinue: Patient Discharge    Cosign for Ordering: Accepted by Nikita Quick MD on 2/10/2017  1:15 PM    enoxaparin (LOVENOX) syringe 40 mg (Discontinued) 40 mg Every 24 Hours 2/8/2017 2/11/2017    Sig - Route: Inject 0.4 mL under the skin Daily. - Subcutaneous    Reason for Discontinue: Patient Discharge    glucagon (human recombinant) (GLUCAGEN DIAGNOSTIC) injection 1 mg (Discontinued) 1 mg Every 15 Minutes PRN 2/9/2017 2/10/2017    Sig - Route: Inject 1 mg under the skin Every 15 (Fifteen) Minutes As Needed (Blood Glucose Less Than 70 - Patient Without IV Access - Unresponsive, NPO or Unable To Safely Swallow). - Subcutaneous    Reason for Discontinue:  Duplicate order    Cosign for Ordering: Accepted by Nikita Quick MD on 2/9/2017  7:12 AM    glucagon (human recombinant) (GLUCAGEN DIAGNOSTIC) injection 1 mg (Discontinued) 1 mg Every 15 Minutes PRN 2/10/2017 2/11/2017    Sig - Route: Inject 1 mg under the skin Every 15 (Fifteen) Minutes As Needed (Blood Glucose Less Than 70 - Patient Without IV Access - Unresponsive, NPO or Unable To Safely Swallow). - Subcutaneous    Reason for Discontinue: Patient Discharge    Cosign for Ordering: Accepted by Nikita Quick MD on 2/10/2017  1:15 PM    HYDROcodone-acetaminophen (NORCO) 5-325 MG per tablet 1 tablet (Discontinued) 1 tablet Every 4 Hours PRN 2/8/2017 2/11/2017    Sig - Route: Take 1 tablet by mouth Every 4 (Four) Hours As Needed for moderate pain (4-6). - Oral    Reason for Discontinue: Patient Discharge    insulin detemir (LEVEMIR) injection 20 Units (Discontinued) 20 Units Nightly 2/10/2017 2/11/2017    Sig - Route: Inject 20 Units under the skin Every Night. - Subcutaneous    insulin lispro (humaLOG) injection 12-15 Units (Discontinued) 12-15 Units 4 Times Daily Before Meals & Nightly 2/10/2017 2/10/2017    Sig - Route: Inject 12-15 Units under the skin 4 (Four) Times a Day Before Meals & at Bedtime. - Subcutaneous    Reason for Discontinue: Duplicate order    insulin lispro (humaLOG) injection 2-7 Units (Discontinued) 2-7 Units 4 Times Daily Before Meals & Nightly 2/9/2017 2/10/2017    Sig - Route: Inject 2-7 Units under the skin 4 (Four) Times a Day Before Meals & at Bedtime. - Subcutaneous    Reason for Discontinue: Duplicate order    Cosign for Ordering: Accepted by Nikita Quick MD on 2/9/2017  7:12 AM    insulin lispro (humaLOG) injection 2-9 Units (Discontinued) 2-9 Units 4 Times Daily Before Meals & Nightly 2/10/2017 2/10/2017    Sig - Route: Inject 2-9 Units under the skin 4 (Four) Times a Day Before Meals & at Bedtime. - Subcutaneous    Cosign for Ordering: Accepted by Nikita Peralta  MD Ynes on 2/10/2017  1:15 PM    insulin lispro (humaLOG) injection 3-14 Units (Discontinued) 3-14 Units 4 Times Daily Before Meals & Nightly 2/10/2017 2/11/2017    Sig - Route: Inject 3-14 Units under the skin 4 (Four) Times a Day Before Meals & at Bedtime. - Subcutaneous    ipratropium-albuterol (DUO-NEB) nebulizer solution 3 mL (Discontinued) 3 mL Every 4 Hours - RT 2/8/2017 2/11/2017    Sig - Route: Take 3 mL by nebulization Every 4 (Four) Hours. - Nebulization    Reason for Discontinue: Patient Discharge    labetalol (NORMODYNE) tablet 300 mg (Discontinued) 300 mg 2 Times Daily 2/8/2017 2/11/2017    Sig - Route: Take 1 tablet by mouth 2 (Two) Times a Day. - Oral    Reason for Discontinue: Patient Discharge    lisinopril (PRINIVIL,ZESTRIL) tablet 40 mg (Discontinued) 40 mg Every 24 Hours Scheduled 2/8/2017 2/11/2017    Sig - Route: Take 2 tablets by mouth Daily. - Oral    Reason for Discontinue: Patient Discharge    LORazepam (ATIVAN) tablet 0.5 mg (Discontinued) 0.5 mg Every 6 Hours PRN 2/8/2017 2/11/2017    Sig - Route: Take 1 tablet by mouth Every 6 (Six) Hours As Needed for anxiety. - Oral    Reason for Discontinue: Patient Discharge    metFORMIN (GLUCOPHAGE) tablet 1,000 mg (Discontinued) 1,000 mg 2 Times Daily With Meals 2/8/2017 2/10/2017    Sig - Route: Take 2 tablets by mouth 2 (Two) Times a Day With Meals. - Oral    Reason for Discontinue: Reorder    metFORMIN (GLUCOPHAGE) tablet 1,000 mg (Discontinued) 1,000 mg 2 Times Daily With Meals 2/12/2017 2/11/2017    Sig - Route: Take 2 tablets by mouth 2 (Two) Times a Day With Meals. - Oral    Reason for Discontinue: Patient Discharge    methylPREDNISolone sodium succinate (SOLU-Medrol) injection 20 mg (Discontinued) 20 mg Every 12 Hours 2/10/2017 2/10/2017    Sig - Route: Infuse 0.5 mL into a venous catheter Every 12 (Twelve) Hours. - Intravenous    Cosign for Ordering: Accepted by Nikita Quick MD on 2/10/2017  1:15 PM    methylPREDNISolone  sodium succinate (SOLU-Medrol) injection 40 mg (Discontinued) 40 mg Every 8 Hours 2/9/2017 2/10/2017    Sig - Route: Infuse 1 mL into a venous catheter Every 8 (Eight) Hours. - Intravenous    Cosign for Ordering: Accepted by Nikita Quick MD on 2/9/2017  7:12 AM    ondansetron (ZOFRAN) injection 4 mg (Discontinued) 4 mg Every 6 Hours PRN 2/8/2017 2/11/2017    Sig - Route: Infuse 2 mL into a venous catheter Every 6 (Six) Hours As Needed for nausea or vomiting. - Intravenous    Reason for Discontinue: Patient Discharge    pantoprazole (PROTONIX) EC tablet 40 mg (Discontinued) 40 mg Every Early Morning 2/10/2017 2/11/2017    Sig - Route: Take 1 tablet by mouth Every Morning. - Oral    Reason for Discontinue: Patient Discharge    pantoprazole (PROTONIX) injection 40 mg (Discontinued) 40 mg Every Early Morning 2/9/2017 2/9/2017    Sig - Route: Infuse 10 mL into a venous catheter Every Morning. - Intravenous    Reason for Discontinue: Formulary change    Pharmacy to Dose enoxaparin (LOVENOX) (Discontinued)  Continuous PRN 2/8/2017 2/11/2017    Sig - Route: Continuous As Needed for consult. - Does not apply    Reason for Discontinue: Patient Discharge    predniSONE (DELTASONE) tablet 10 mg (Discontinued) 10 mg 2 Times Daily With Meals 2/10/2017 2/11/2017    Sig - Route: Take 1 tablet by mouth 2 (Two) Times a Day With Meals. - Oral    Reason for Discontinue: Patient Discharge    sodium chloride 0.9 % flush 1-10 mL (Discontinued) 1-10 mL As Needed 2/8/2017 2/11/2017    Sig - Route: Infuse 1-10 mL into a venous catheter As Needed for line care. - Intravenous    Reason for Discontinue: Patient Discharge    sodium chloride 0.9 % infusion (Discontinued) 100 mL/hr Continuous 2/8/2017 2/11/2017    Sig - Route: Infuse 100 mL/hr into a venous catheter Continuous. - Intravenous    Reason for Discontinue: Patient Discharge    trandolapril (MAVIK) tablet 4 mg (Discontinued) 4 mg Daily 2/8/2017 2/8/2017    Sig - Route: Take 2  tablets by mouth Daily. - Oral    Reason for Discontinue: Formulary change          Lab Results (last 72 hours)     Procedure Component Value Units Date/Time    POC Glucose Fingerstick [[id number]]  (Abnormal) Collected:  02/08/17 1610    Specimen:  Blood Updated:  02/08/17 1640     Glucose 149 (H) mg/dL       : [id number] Chandu Crockettter ID: [id number]       C-reactive Protein [[id number]]  (Abnormal) Collected:  02/08/17 1613    Specimen:  Blood Updated:  02/08/17 1652     C-Reactive Protein 2.03 (H) mg/dL     Comprehensive Metabolic Panel [[id number]]  (Abnormal) Collected:  02/08/17 1613    Specimen:  Blood Updated:  02/08/17 1653     Glucose 132 (H) mg/dL      BUN 15 mg/dL      Creatinine 0.82 mg/dL      Sodium 139 mmol/L      Potassium 4.0 mmol/L      Chloride 102 mmol/L      CO2 24.0 mmol/L      Calcium 9.3 mg/dL      Total Protein 7.4 g/dL      Albumin 4.50 g/dL      ALT (SGPT) 48 U/L      AST (SGOT) 36 U/L      Alkaline Phosphatase 149 (H) U/L      Total Bilirubin 1.7 (H) mg/dL      eGFR Non African Amer 96 mL/min/1.73      Globulin 2.9 gm/dL      A/G Ratio 1.6 g/dL      BUN/Creatinine Ratio 18.3      Anion Gap 13.0 mmol/L     CK Total & CKMB [[id number]]  (Normal) Collected:  02/08/17 1613    Specimen:  Blood Updated:  02/08/17 1706     CKMB 2.34 ng/mL      Creatine Kinase 60 U/L     Narrative:       CKMB Index not indicated    CBC Auto Differential [[id number]]  (Abnormal) Collected:  02/08/17 1613    Specimen:  Blood Updated:  02/08/17 1722     WBC 21.89 (H) 10*3/mm3      RBC 6.56 (H) 10*6/mm3      Hemoglobin 12.1 (L) g/dL      Hematocrit 43.3 %      MCV 66.0 (L) fL      MCH 18.4 (L) pg      MCHC 27.9 (L) g/dL      RDW 19.7 (H) %      RDW-SD 44.3 fl      Platelets 391 10*3/mm3     Troponin [[id number]]  (Normal) Collected:  02/08/17 1613    Specimen:  Blood Updated:  02/08/17 1732     Troponin I <0.012 ng/mL     BNP [[id number]]  (Normal) Collected:  02/08/17 1613    Specimen:  Blood Updated:   02/08/17 1732     proBNP 307.0 pg/mL     Hemoglobin A1c [[id number]] Collected:  02/08/17 1613    Specimen:  Blood Updated:  02/08/17 1753     Hemoglobin A1C 8.7 %     Narrative:       Less than 6.0           Non-Diabetic Range  6.0-7.0                 ADA Therapeutic Target  Greater than 7.0        Action Suggested    Protime-INR [[id number]]  (Normal) Collected:  02/08/17 1613    Specimen:  Blood Updated:  02/08/17 1826     Protime 13.1 Seconds      INR 0.96     D-dimer, Quantitative [[id number]]  (Normal) Collected:  02/08/17 1613    Specimen:  Blood Updated:  02/08/17 1847     D-Dimer, Quantitative <0.22 mg/L (FEU)     Narrative:       Reference Range is 0-0.50 mg/L FEU. However, results <0.50 mg/L FEU tends to rule out DVT or PE. Results >0.50 mg/L FEU are not useful in predicting absence or presence of DVT or PE.    Troponin [[id number]]  (Normal) Collected:  02/08/17 1814    Specimen:  Blood Updated:  02/08/17 1945     Troponin I <0.012 ng/mL     Troponin [[id number]]  (Normal) Collected:  02/08/17 2137    Specimen:  Blood Updated:  02/08/17 2218     Troponin I <0.012 ng/mL     CBC & Differential [[id number]] Collected:  02/08/17 1613    Specimen:  Blood Updated:  02/09/17 0772    Narrative:       The following orders were created for panel order CBC & Differential.  Procedure                               Abnormality         Status                     ---------                               -----------         ------                     Manual Differential[[id number]]           Abnormal            Final result               Scan Slide[[id number]]                                                                   CBC Auto Differential[[id number]]         Abnormal            Final result               Slide Review, Hematology[[id number]]                                                       Please view results for these tests on the individual orders.    Manual Differential [[id number]]  (Abnormal) Collected:  02/08/17 1613     Specimen:  Blood Updated:  02/09/17 0735     Neutrophil % 85.0 (H) %      Lymphocyte % 5.0 (L) %      Monocyte % 3.0 (L) %      Eosinophil % 5.0 (H) %      Basophil % 2.0 %      Neutrophils Absolute 18.61 (H) 10*3/mm3      Lymphocytes Absolute 1.09 10*3/mm3      Monocytes Absolute 0.66 10*3/mm3      Eosinophils Absolute 1.09 (H) 10*3/mm3      Basophils Absolute 0.44 (H) 10*3/mm3      nRBC 1.0 (H) /100 WBC      Elliptocytes Slight/1+      Microcytes Mod/2+      Polychromasia Slight/1+      Schistocytes Slight/1+      WBC Morphology Normal      Platelet Morphology Normal      Platelet Estimate Adequate      Large Platelets Slight/1+     POC Glucose Fingerstick [[id number]]  (Abnormal) Collected:  02/09/17 0758    Specimen:  Blood Updated:  02/09/17 0810     Glucose 139 (H) mg/dL       : [id number] Robert [employer] ID: [id number]       Urinalysis With / Microscopic If Indicated [[id number]]  (Normal) Collected:  02/09/17 0839    Specimen:  Urine from Urine, Clean Catch Updated:  02/09/17 0850     Color, UA Yellow      Appearance, UA Clear      pH, UA <=5.0      Specific Gravity, UA 1.013      Glucose, UA Negative      Ketones, UA Negative      Bilirubin, UA Negative      Blood, UA Negative      Protein, UA Negative      Leuk Esterase, UA Negative      Nitrite, UA Negative      Urobilinogen, UA 0.2 E.U./dL     Narrative:       Urine microscopic not indicated.    Influenza Antigen [[id number]]  (Normal) Collected:  02/09/17 0831    Specimen:  Swab from Nasopharynx Updated:  02/09/17 0908     Influenza A Ag, EIA Negative      Influenza B Ag, EIA Negative     Narrative:         Recommend confirmation of negative results by viral culture or molecular assay.    POC Glucose Fingerstick [[id number]]  (Abnormal) Collected:  02/09/17 1146    Specimen:  Blood Updated:  02/09/17 1157     Glucose 221 (H) mg/dL       : [id number] Robert Pluckblaire ID: [id number]       POC Glucose Fingerstick [[id number]]  (Abnormal) Collected:   02/09/17 1605    Specimen:  Blood Updated:  02/09/17 1617     Glucose 312 (H) mg/dL       : [id number] Robert Winchester ID: [id number]       POC Glucose Fingerstick [[id number]]  (Abnormal) Collected:  02/09/17 2053    Specimen:  Blood Updated:  02/09/17 2114     Glucose 328 (H) mg/dL       : [id number] Vel GonsalesMeter ID: [id number]       Basic Metabolic Panel [[id number]]  (Abnormal) Collected:  02/10/17 0508    Specimen:  Blood Updated:  02/10/17 0604     Glucose 244 (H) mg/dL      BUN 19 mg/dL      Creatinine 0.79 mg/dL      Sodium 138 mmol/L      Potassium 4.1 mmol/L      Chloride 103 mmol/L      CO2 24.0 mmol/L      Calcium 8.7 mg/dL      eGFR Non African Amer 100 mL/min/1.73      BUN/Creatinine Ratio 24.1      Anion Gap 11.0 mmol/L     Narrative:       GFR Normal >60  Chronic Kidney Disease <60  Kidney Failure <15    CBC & Differential [[id number]] Collected:  02/10/17 0508    Specimen:  Blood Updated:  02/10/17 0626    Narrative:       The following orders were created for panel order CBC & Differential.  Procedure                               Abnormality         Status                     ---------                               -----------         ------                     Scan Slide[[id number]]                                        Final result               CBC Auto Differential[[id number]]         Abnormal            Final result                 Please view results for these tests on the individual orders.    CBC Auto Differential [[id number]]  (Abnormal) Collected:  02/10/17 0508    Specimen:  Blood Updated:  02/10/17 0626     WBC 32.19 (C) 10*3/mm3      RBC 5.60 10*6/mm3      Hemoglobin 10.6 (L) g/dL      Hematocrit 36.8 (L) %      MCV 65.7 (L) fL      MCH 18.9 (L) pg      MCHC 28.8 (L) g/dL      RDW 19.3 (H) %      RDW-SD 44.3 fl      Platelets 403 (H) 10*3/mm3      Neutrophil % 94.5 (H) %      Lymphocyte % 2.4 (L) %      Monocyte % 1.3 (L) %      Eosinophil % 0.1 %      Basophil % 0.5 %       Immature Grans % 1.2 %      Neutrophils, Absolute 30.46 (H) 10*3/mm3      Lymphocytes, Absolute 0.77 10*3/mm3      Monocytes, Absolute 0.41 10*3/mm3      Eosinophils, Absolute 0.02 10*3/mm3      Basophils, Absolute 0.15 10*3/mm3      Immature Grans, Absolute 0.38 (H) 10*3/mm3      nRBC 0.0 /100 WBC     Narrative:       Appended report.  These results have been appended to a previously verified report.    Scan Slide [[id number]] Collected:  02/10/17 0508    Specimen:  Blood Updated:  02/10/17 0626     Anisocytosis Mod/2+      Microcytes Mod/2+      Ovalocytes Slight/1+      Poikilocytes Slight/1+      RBC Fragments Slight/1+      WBC Morphology Normal      Platelet Morphology Normal     POC Glucose Fingerstick [[id number]]  (Abnormal) Collected:  02/10/17 0659    Specimen:  Blood Updated:  02/10/17 0713     Glucose 263 (H) mg/dL       : [id number] Vel GonsalesMeter ID: [id number]       POC Glucose Fingerstick [[id number]]  (Abnormal) Collected:  02/10/17 1123    Specimen:  Blood Updated:  02/10/17 1135     Glucose 331 (H) mg/dL       : [id number] Robert Winchester ID: [id number]       POC Glucose Fingerstick [[id number]]  (Abnormal) Collected:  02/10/17 1631    Specimen:  Blood Updated:  02/10/17 1644     Glucose 304 (H) mg/dL       : [id number] Robert OchoayleeMeter ID: [id number]       POC Glucose Fingerstick [[id number]]  (Abnormal) Collected:  02/10/17 2139    Specimen:  Blood Updated:  02/10/17 2150     Glucose 358 (H) mg/dL       : [id number] Damien VacaaMeblaire ID: [id number]       POC Glucose Fingerstick [[id number]]  (Abnormal) Collected:  02/11/17 0732    Specimen:  Blood Updated:  02/11/17 0813     Glucose 237 (H) mg/dL       : [id number] Nic PradoelaMeter ID: [id number]             Imaging Results (last 72 hours)     Procedure Component Value Units Date/Time    XR Chest 2 View [[id number]] Collected:  02/08/17 1806     Updated:  02/08/17 2051    Narrative:       TWO-VIEW CHEST:      HISTORY:  Pneumonia.      Frontal and lateral projection chest radiograph obtained.     COMPARISON: 08/24/2013      FINDINGS:  There is right hemidiaphragm elevation with airspace opacities in the  right lung base that may represent atelectasis. Acute  infiltrate/pneumonia also considered. Right perihilar interstitial  prominence noted particularly in the lower lobe.     The left lung is grossly clear.     The heart is normal in size without heart failure.     The bony structures are intact.                                                                                                                      Impression:       Right hemidiaphragm elevation with right lower lobe airspace  opacities, question atelectasis. Acute infiltrates/pneumonia also  considered.        This report was finalized on 02/08/2017 20:49 by Dr. Winston Sharma MD.    CT Chest With Contrast [[id number]] Collected:  02/10/17 0826     Updated:  02/10/17 0832    Narrative:       CT CHEST W CONTRAST- 2/10/2017 8:11 CST     HISTORY: SOA R/O PE R/O Worsening Pneumonia      COMPARISON: None      DLP: 446 mGy cm. Automated exposure control was utilized to diminish  patient radiation dose.     TECHNIQUE: Serial helical tomographic images of the chest were acquired  following the infusion of Isovue contrast intravenously. Bone and soft  tissue algorithms were provided.       FINDINGS:   Neck base: The imaged portion of the neck and thyroid gland is  unremarkable.      Lungs: There is elevation of the right hemidiaphragm with right basilar  compressive atelectasis. Lungs are otherwise clear.. No pleural effusion  is present. The trachea and bronchial tree are patent.      Heart: Normal in size and appearance. There is no pericardial effusion.      Vasculature: Aorta is normal in caliber and appearance without  significant atherosclerosis, stenosis, or aneurysm. No coronary  arteriosclerosis identified. The great vessels are normal in appearance.  The pulmonary  arteries are normal in appearance.     Lymph nodes: No enlarged axillary, hilar, or mediastinal lymph nodes.      Bones and soft tissues: No acute osseous or soft tissue abnormality is  seen. There are no worrisome osseous lesions.      Upper abdomen: There is diffuse steatosis of the liver. Marked  splenomegaly is present but not imaged in its entirety..        Impression:       1. Marked splenomegaly. The spleen is not imaged in its entirety. There  is diffuse steatosis of the liver.  2. Elevation right hemidiaphragm with right basilar compressive  atelectasis. Lungs are otherwise clear..        This report was finalized on 02/10/2017 08:30 by Dr. Cceilio Rueda MD.          Orders (last 72 hrs)     Start     Ordered    02/12/17 1800  metFORMIN (GLUCOPHAGE) tablet 1,000 mg  2 Times Daily With Meals,   Status:  Discontinued      02/10/17 1535    02/11/17 0900  cefdinir (OMNICEF) capsule 300 mg  Every 12 Hours Scheduled,   Status:  Discontinued      02/11/17 0752    02/11/17 0814  POC Glucose Fingerstick  Once      02/11/17 0813    02/11/17 0755  Discharge patient  Once      02/11/17 0755    02/11/17 0000  cefdinir (OMNICEF) 300 MG capsule  2 Times Daily      02/11/17 0755    02/11/17 0000  predniSONE (DELTASONE) 10 MG tablet  Daily      02/11/17 0755    02/10/17 2151  POC Glucose Fingerstick  Once      02/10/17 2150    02/10/17 2100  insulin detemir (LEVEMIR) injection 20 Units  Nightly,   Status:  Discontinued      02/10/17 1343    02/10/17 2100  insulin lispro (humaLOG) injection 3-14 Units  4 Times Daily Before Meals & Nightly,   Status:  Discontinued      02/10/17 1736    02/10/17 1830  insulin lispro (humaLOG) injection 3-14 Units  Once      02/10/17 1748    02/10/17 1754  methylPREDNISolone sodium succinate (SOLU-Medrol) injection 20 mg  Every 12 Hours,   Status:  Discontinued      02/10/17 0652    02/10/17 1730  insulin lispro (humaLOG) injection 12-15 Units  4 Times Daily Before Meals & Nightly,    Status:  Discontinued      02/10/17 1345    02/10/17 1645  POC Glucose Fingerstick  Once      02/10/17 1644    02/10/17 1135  POC Glucose Fingerstick  Once      02/10/17 1135    02/10/17 0800  predniSONE (DELTASONE) tablet 10 mg  2 Times Daily With Meals,   Status:  Discontinued      02/10/17 0721    02/10/17 0730  insulin lispro (humaLOG) injection 2-9 Units  4 Times Daily Before Meals & Nightly,   Status:  Discontinued      02/10/17 0656    02/10/17 0700  POC Glucose Fingerstick  4 Times Daily Before Meals & at Bedtime,   Status:  Canceled      02/10/17 0656    02/10/17 0655  dextrose (GLUTOSE) oral gel 15 g  Every 15 Minutes PRN,   Status:  Discontinued      02/10/17 0656    02/10/17 0655  dextrose (D50W) solution 25 g  Every 15 Minutes PRN,   Status:  Discontinued      02/10/17 0656    02/10/17 0655  glucagon (human recombinant) (GLUCAGEN DIAGNOSTIC) injection 1 mg  Every 15 Minutes PRN,   Status:  Discontinued      02/10/17 0656    02/10/17 0600  pantoprazole (PROTONIX) EC tablet 40 mg  Every Early Morning,   Status:  Discontinued      02/09/17 1011    02/09/17 1200  POC Glucose Fingerstick  Every 6 Hours,   Status:  Canceled      02/09/17 0615    02/08/17 1800  AZITHROMYCIN 500 MG/250 ML 0.9% NS IVPB (vial-mate)  Every 24 Hours,   Status:  Discontinued      02/08/17 1559    02/08/17 1800  metFORMIN (GLUCOPHAGE) tablet 1,000 mg  2 Times Daily With Meals,   Status:  Discontinued      02/08/17 1652    02/08/17 1800  labetalol (NORMODYNE) tablet 300 mg  2 Times Daily,   Status:  Discontinued      02/08/17 1652    02/08/17 1800  chlorthalidone (HYGROTON) tablet 25 mg  Daily,   Status:  Discontinued      02/08/17 1652    02/08/17 1800  enoxaparin (LOVENOX) syringe 40 mg  Every 24 Hours,   Status:  Discontinued      02/08/17 1705    02/08/17 1800  lisinopril (PRINIVIL,ZESTRIL) tablet 40 mg  Every 24 Hours Scheduled,   Status:  Discontinued      02/08/17 1707    02/08/17 1700  cefTRIAXone (ROCEPHIN) 1 g/100 mL 0.9% NS  (MBP)  Every 24 Hours,   Status:  Discontinued      02/08/17 1559    02/08/17 1700  POC Glucose Fingerstick  4 Times Daily Before Meals & at Bedtime,   Status:  Canceled      02/08/17 1600    02/08/17 1653  LORazepam (ATIVAN) tablet 0.5 mg  Every 6 Hours PRN,   Status:  Discontinued      02/08/17 1653    02/08/17 1630  ipratropium-albuterol (DUO-NEB) nebulizer solution 3 mL  Every 4 Hours - RT,   Status:  Discontinued      02/08/17 1559    02/08/17 1630  sodium chloride 0.9 % infusion  Continuous,   Status:  Discontinued      02/08/17 1559    02/08/17 1558  ondansetron (ZOFRAN) injection 4 mg  Every 6 Hours PRN,   Status:  Discontinued      02/08/17 1559    02/08/17 1557  HYDROcodone-acetaminophen (NORCO) 5-325 MG per tablet 1 tablet  Every 4 Hours PRN,   Status:  Discontinued      02/08/17 1559    02/08/17 1556  Pharmacy to Dose enoxaparin (LOVENOX)  Continuous PRN,   Status:  Discontinued      02/08/17 1559    02/08/17 1555  sodium chloride 0.9 % flush 1-10 mL  As Needed,   Status:  Discontinued      02/08/17 1559    02/08/17 1552  Blood Gas, Arterial  As Needed,   Status:  Canceled     Comments:  Respiratory Distress    02/08/17 1559    --  labetalol (NORMODYNE) 300 MG tablet  2 Times Daily      02/08/17 1644    --  chlorthalidone (HYGROTON) 25 MG tablet  Daily      02/08/17 1644    --  trandolapril (MAVIK) 4 MG tablet  Daily      02/08/17 1644    --  metFORMIN (GLUCOPHAGE) 1000 MG tablet  2 Times Daily With Meals      02/08/17 1644    --  SCANNED - TELEMETRY        02/08/17 0000    --  SCANNED - TELEMETRY        02/08/17 0000    --  SCANNED - TELEMETRY        02/08/17 0000    --  SCANNED EKG      02/08/17 0000          Physician Progress Notes (last 72 hours) (Notes from 2/10/2017  9:36 AM through 2/13/2017  9:36 AM)     No notes of this type exist for this encounter.        Consult Notes (last 72 hours) (Notes from 2/10/2017  9:36 AM through 2/13/2017  9:36 AM)     No notes of this type exist for this  encounter.           Discharge Summary      Discharge Summaries signed by Nikita Quick MD at 2/12/2017  6:24 AM            DATE OF ADMISSION: 02/08/2017  DATE OF DISCHARGE: 02/11/2017    PRIMARY CARE PHYSICIAN: Anibal Shelton MD     DISCHARGE DIAGNOSES:  1. Community-acquired pneumonia.   2. Leukocytosis.   2. 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    3. Chronic obstructive pulmonary disease/emphysema.   4. Type 2 diabetes.   5. Gastroesophageal reflux.   6. Hypertension.     HISTORY OF PRESENT ILLNESS: The patient is a pleasant 61-year-old gentleman who was working on a towboat. He developed cough, cold, congestion. He presented to [facility] where he was diagnosed with pneumonia. He was admitted for IV antibiotics, nebulizer treatment and aggressive pulmonary toilet. There were no other precipitating or relieving factors.     HOSPITAL COURSE: The patient was admitted. He was started on appropriate antimicrobials of Rocephin and Zithromax. He was given some IV steroids. His initial white count was 21,000; it jumped to 32,000 with a considerable left shift. Clinically he was improved, but with the elevated white count and his history of the polycythemia vera, Dr. Samson Queen was consulted to ensure that there was nothing else going on with his hematology issue. We went ahead and got a CT scan of his chest which did show that the pneumonia was clearing as well. Dr. Queen's consult was reviewed, and he thought this was just an acute phase reactant from the infection going on and would follow it up on an outpatient basis. The following morning, his IV was switched to oral. He was subsequently discharged home.     FOLLOWUP:   1. 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   2. He will follow up with Dr. Queen at his previously decided appointment.   3. His primary care doctor is Dr. Shelton who he will return to see as well.      DISCHARGE MEDICATIONS: Per pharmacy sheets.     CONSULTATION: Samson Queen MD, Hematology/Oncology     COMPLICATIONS: None.     PROCEDURES: None.     cc:  LISSY HERNANDEZ M.D.    DAVID QUICK M.D.  TONIE/[id number]  D:  02/11/2017 09:01:14(Eastern Time)  T:  02/11/2017 09:11:08(Eastern Time)  Voice ID:  [id number]/Document ID:  [id number]     Electronically signed by David Quick MD at 2/12/2017  6:24 AM      David Quick MD at 2/11/2017  7:55 AM          [id number]  [facility]  Discharge home today  Follow-up next week with Dr. Sanchez for return to work  F/U with Dr. Queen at his next scheduled appointment  Follow up with his primary care provider Dr. Hernandez already scheduled       Electronically signed by David Quick MD at 2/11/2017  8:02 AM

## 2017-02-17 ENCOUNTER — HOSPITAL ENCOUNTER (OUTPATIENT)
Dept: GENERAL RADIOLOGY | Facility: HOSPITAL | Age: 62
Discharge: HOME | End: 2017-02-17
Admitting: PHYSICIAN ASSISTANT

## 2017-02-17 ENCOUNTER — DOCUMENTATION (OUTPATIENT)
Dept: INTERNAL MEDICINE | Facility: HOSPITAL | Age: 62
End: 2017-02-17

## 2017-02-17 ENCOUNTER — TELEPHONE (OUTPATIENT)
Dept: INTERNAL MEDICINE | Facility: HOSPITAL | Age: 62
End: 2017-02-17

## 2017-02-17 ENCOUNTER — LAB (OUTPATIENT)
Dept: LAB | Facility: HOSPITAL | Age: 62
End: 2017-02-17

## 2017-02-17 ENCOUNTER — TRANSCRIBE ORDERS (OUTPATIENT)
Dept: ADMINISTRATIVE | Facility: HOSPITAL | Age: 62
End: 2017-02-17

## 2017-02-17 DIAGNOSIS — J18.9 UNRESOLVED PNEUMONIA: ICD-10-CM

## 2017-02-17 DIAGNOSIS — J18.9 UNRESOLVED PNEUMONIA: Primary | ICD-10-CM

## 2017-02-17 LAB
ALBUMIN SERPL-MCNC: 4.2 G/DL (ref 3.5–5)
ALBUMIN/GLOB SERPL: 1.8 G/DL (ref 1.1–2.5)
ALP SERPL-CCNC: 132 U/L (ref 24–120)
ALT SERPL W P-5'-P-CCNC: 31 U/L (ref 0–54)
ANION GAP SERPL CALCULATED.3IONS-SCNC: 13 MMOL/L (ref 4–13)
AST SERPL-CCNC: 25 U/L (ref 7–45)
BASOPHILS # BLD AUTO: 0.35 10*3/MM3 (ref 0–0.2)
BASOPHILS NFR BLD AUTO: 1.6 % (ref 0–2)
BILIRUB SERPL-MCNC: 1.6 MG/DL (ref 0.1–1)
BUN BLD-MCNC: 25 MG/DL (ref 5–21)
BUN/CREAT SERPL: 22.9 (ref 7–25)
CALCIUM SPEC-SCNC: 9.2 MG/DL (ref 8.4–10.4)
CHLORIDE SERPL-SCNC: 93 MMOL/L (ref 98–110)
CO2 SERPL-SCNC: 26 MMOL/L (ref 24–31)
CREAT BLD-MCNC: 1.09 MG/DL (ref 0.5–1.4)
DEPRECATED RDW RBC AUTO: 42.5 FL (ref 40–54)
EOSINOPHIL # BLD AUTO: 0.87 10*3/MM3 (ref 0–0.7)
EOSINOPHIL NFR BLD AUTO: 4 % (ref 0–4)
ERYTHROCYTE [DISTWIDTH] IN BLOOD BY AUTOMATED COUNT: 19.6 % (ref 12–15)
GFR SERPL CREATININE-BSD FRML MDRD: 69 ML/MIN/1.73
GLOBULIN UR ELPH-MCNC: 2.3 GM/DL
GLUCOSE BLD-MCNC: 404 MG/DL (ref 70–100)
HCT VFR BLD AUTO: 45 % (ref 40–52)
HGB BLD-MCNC: 12.7 G/DL (ref 14–18)
HYPOCHROMIA BLD QL: NORMAL
IMM GRANULOCYTES # BLD: 0.16 10*3/MM3 (ref 0–0.03)
IMM GRANULOCYTES NFR BLD: 0.7 % (ref 0–5)
LYMPHOCYTES # BLD AUTO: 1.46 10*3/MM3 (ref 0.72–4.86)
LYMPHOCYTES NFR BLD AUTO: 6.7 % (ref 15–45)
MCH RBC QN AUTO: 18.2 PG (ref 28–32)
MCHC RBC AUTO-ENTMCNC: 28.2 G/DL (ref 33–36)
MCV RBC AUTO: 64.6 FL (ref 82–95)
MICROCYTES BLD QL: NORMAL
MONOCYTES # BLD AUTO: 0.57 10*3/MM3 (ref 0.19–1.3)
MONOCYTES NFR BLD AUTO: 2.6 % (ref 4–12)
NEUTROPHILS # BLD AUTO: 18.46 10*3/MM3 (ref 1.87–8.4)
NEUTROPHILS NFR BLD AUTO: 84.4 % (ref 39–78)
PLAT MORPH BLD: NORMAL
PLATELET # BLD AUTO: 510 10*3/MM3 (ref 130–400)
POIKILOCYTOSIS BLD QL SMEAR: NORMAL
POTASSIUM BLD-SCNC: 4.7 MMOL/L (ref 3.5–5.3)
PROT SERPL-MCNC: 6.5 G/DL (ref 6.3–8.7)
RBC # BLD AUTO: 6.97 10*6/MM3 (ref 4.8–5.9)
SODIUM BLD-SCNC: 132 MMOL/L (ref 135–145)
WBC MORPH BLD: NORMAL
WBC NRBC COR # BLD: 21.87 10*3/MM3 (ref 4.8–10.8)

## 2017-02-17 PROCEDURE — 36415 COLL VENOUS BLD VENIPUNCTURE: CPT

## 2017-02-17 PROCEDURE — 85025 COMPLETE CBC W/AUTO DIFF WBC: CPT | Performed by: PHYSICIAN ASSISTANT

## 2017-02-17 PROCEDURE — 71020 HC CHEST PA AND LATERAL: CPT

## 2017-02-17 PROCEDURE — 85007 BL SMEAR W/DIFF WBC COUNT: CPT | Performed by: PHYSICIAN ASSISTANT

## 2017-02-17 PROCEDURE — 80053 COMPREHEN METABOLIC PANEL: CPT | Performed by: PHYSICIAN ASSISTANT

## 2017-06-30 ENCOUNTER — TELEPHONE (OUTPATIENT)
Dept: NEUROSURGERY | Age: 62
End: 2017-06-30

## 2017-07-03 ENCOUNTER — OFFICE VISIT (OUTPATIENT)
Dept: NEUROSURGERY | Age: 62
End: 2017-07-03
Payer: COMMERCIAL

## 2017-07-03 ENCOUNTER — HOSPITAL ENCOUNTER (OUTPATIENT)
Dept: GENERAL RADIOLOGY | Age: 62
Discharge: HOME | End: 2017-07-03
Payer: COMMERCIAL

## 2017-07-03 VITALS
OXYGEN SATURATION: 97 % | WEIGHT: 200 LBS | HEIGHT: 70 IN | DIASTOLIC BLOOD PRESSURE: 84 MMHG | HEART RATE: 102 BPM | SYSTOLIC BLOOD PRESSURE: 136 MMHG | BODY MASS INDEX: 28.63 KG/M2

## 2017-07-03 DIAGNOSIS — R29.818 FINE MOTOR IMPAIRMENT: ICD-10-CM

## 2017-07-03 DIAGNOSIS — G95.9 CERVICAL MYELOPATHY WITH CERVICAL RADICULOPATHY (HCC): ICD-10-CM

## 2017-07-03 DIAGNOSIS — M54.12 CERVICAL MYELOPATHY WITH CERVICAL RADICULOPATHY (HCC): ICD-10-CM

## 2017-07-03 DIAGNOSIS — M79.601 RIGHT ARM PAIN: ICD-10-CM

## 2017-07-03 DIAGNOSIS — R29.898 FINE MOTOR IMPAIRMENT: ICD-10-CM

## 2017-07-03 DIAGNOSIS — G95.9 CERVICAL MYELOPATHY WITH CERVICAL RADICULOPATHY (HCC): Primary | ICD-10-CM

## 2017-07-03 DIAGNOSIS — M54.2 NECK PAIN: ICD-10-CM

## 2017-07-03 DIAGNOSIS — R20.0 NUMBNESS OF RIGHT HAND: ICD-10-CM

## 2017-07-03 DIAGNOSIS — M54.12 CERVICAL MYELOPATHY WITH CERVICAL RADICULOPATHY (HCC): Primary | ICD-10-CM

## 2017-07-03 PROCEDURE — 99204 OFFICE O/P NEW MOD 45 MIN: CPT | Performed by: NURSE PRACTITIONER

## 2017-07-03 PROCEDURE — 72040 X-RAY EXAM NECK SPINE 2-3 VW: CPT

## 2017-07-03 RX ORDER — ALLOPURINOL 300 MG/1
TABLET ORAL
Refills: 5 | COMMUNITY
Start: 2017-04-27

## 2017-07-03 RX ORDER — ASPIRIN 325 MG
325 TABLET ORAL DAILY
COMMUNITY
End: 2017-09-14

## 2017-07-03 RX ORDER — TRANDOLAPRIL TABLETS 4 MG/1
4 TABLET ORAL DAILY
COMMUNITY
Start: 2017-07-01 | End: 2019-08-20

## 2017-07-03 RX ORDER — HYDROCODONE BITARTRATE AND ACETAMINOPHEN 10; 325 MG/1; MG/1
TABLET ORAL
Refills: 0 | Status: ON HOLD | COMMUNITY
Start: 2017-06-09 | End: 2017-09-22

## 2017-07-03 RX ORDER — LABETALOL 200 MG/1
200 TABLET, FILM COATED ORAL DAILY
COMMUNITY
Start: 2017-07-01 | End: 2020-09-02 | Stop reason: ALTCHOICE

## 2017-07-03 RX ORDER — TRAMADOL HYDROCHLORIDE 50 MG/1
TABLET ORAL
COMMUNITY
Start: 2017-07-01 | End: 2017-09-14

## 2017-07-03 RX ORDER — CHLORTHALIDONE 25 MG/1
25 TABLET ORAL DAILY
COMMUNITY
Start: 2017-07-01 | End: 2020-09-02 | Stop reason: ALTCHOICE

## 2017-07-03 ASSESSMENT — ENCOUNTER SYMPTOMS
RESPIRATORY NEGATIVE: 1
SORE THROAT: 0
EYES NEGATIVE: 1
GASTROINTESTINAL NEGATIVE: 1
BACK PAIN: 1
STRIDOR: 0

## 2017-07-05 ENCOUNTER — TELEPHONE (OUTPATIENT)
Dept: NEUROLOGY | Age: 62
End: 2017-07-05

## 2017-07-10 ENCOUNTER — HOSPITAL ENCOUNTER (OUTPATIENT)
Dept: MRI IMAGING | Age: 62
Discharge: HOME | End: 2017-07-10
Payer: COMMERCIAL

## 2017-07-10 DIAGNOSIS — R29.818 FINE MOTOR IMPAIRMENT: ICD-10-CM

## 2017-07-10 DIAGNOSIS — M54.2 NECK PAIN: ICD-10-CM

## 2017-07-10 DIAGNOSIS — R20.0 NUMBNESS OF RIGHT HAND: ICD-10-CM

## 2017-07-10 DIAGNOSIS — M54.12 CERVICAL MYELOPATHY WITH CERVICAL RADICULOPATHY (HCC): ICD-10-CM

## 2017-07-10 DIAGNOSIS — G95.9 CERVICAL MYELOPATHY WITH CERVICAL RADICULOPATHY (HCC): ICD-10-CM

## 2017-07-10 DIAGNOSIS — R29.898 FINE MOTOR IMPAIRMENT: ICD-10-CM

## 2017-07-10 DIAGNOSIS — M79.601 RIGHT ARM PAIN: ICD-10-CM

## 2017-07-10 PROCEDURE — 72141 MRI NECK SPINE W/O DYE: CPT

## 2017-07-18 ENCOUNTER — OFFICE VISIT (OUTPATIENT)
Dept: NEUROSURGERY | Age: 62
End: 2017-07-18
Payer: COMMERCIAL

## 2017-07-18 VITALS
HEIGHT: 70 IN | WEIGHT: 195 LBS | OXYGEN SATURATION: 95 % | BODY MASS INDEX: 27.92 KG/M2 | HEART RATE: 95 BPM | DIASTOLIC BLOOD PRESSURE: 62 MMHG | SYSTOLIC BLOOD PRESSURE: 96 MMHG

## 2017-07-18 DIAGNOSIS — R29.898 WEAKNESS OF RIGHT UPPER EXTREMITY: ICD-10-CM

## 2017-07-18 DIAGNOSIS — M54.2 NECK PAIN: ICD-10-CM

## 2017-07-18 DIAGNOSIS — M48.02 FORAMINAL STENOSIS OF CERVICAL REGION: Primary | ICD-10-CM

## 2017-07-18 DIAGNOSIS — M48.02 CERVICAL STENOSIS OF SPINAL CANAL: ICD-10-CM

## 2017-07-18 PROCEDURE — 99214 OFFICE O/P EST MOD 30 MIN: CPT | Performed by: NEUROLOGICAL SURGERY

## 2017-08-07 ENCOUNTER — TELEPHONE (OUTPATIENT)
Dept: NEUROSURGERY | Age: 62
End: 2017-08-07

## 2017-08-15 ENCOUNTER — PREP FOR PROCEDURE (OUTPATIENT)
Dept: NEUROSURGERY | Age: 62
End: 2017-08-15

## 2017-08-15 ENCOUNTER — OFFICE VISIT (OUTPATIENT)
Dept: NEUROSURGERY | Age: 62
End: 2017-08-15
Payer: COMMERCIAL

## 2017-08-15 VITALS
HEART RATE: 110 BPM | BODY MASS INDEX: 27.63 KG/M2 | WEIGHT: 193 LBS | DIASTOLIC BLOOD PRESSURE: 92 MMHG | SYSTOLIC BLOOD PRESSURE: 151 MMHG | HEIGHT: 70 IN | OXYGEN SATURATION: 98 %

## 2017-08-15 DIAGNOSIS — R29.818 FINE MOTOR IMPAIRMENT: ICD-10-CM

## 2017-08-15 DIAGNOSIS — M54.12 CERVICAL MYELOPATHY WITH CERVICAL RADICULOPATHY (HCC): ICD-10-CM

## 2017-08-15 DIAGNOSIS — M54.2 NECK PAIN: ICD-10-CM

## 2017-08-15 DIAGNOSIS — R29.898 FINE MOTOR IMPAIRMENT: ICD-10-CM

## 2017-08-15 DIAGNOSIS — G95.9 CERVICAL MYELOPATHY WITH CERVICAL RADICULOPATHY (HCC): ICD-10-CM

## 2017-08-15 DIAGNOSIS — M48.02 CERVICAL STENOSIS OF SPINAL CANAL: ICD-10-CM

## 2017-08-15 DIAGNOSIS — M48.02 FORAMINAL STENOSIS OF CERVICAL REGION: Primary | ICD-10-CM

## 2017-08-15 DIAGNOSIS — R29.898 WEAKNESS OF RIGHT UPPER EXTREMITY: ICD-10-CM

## 2017-08-15 PROCEDURE — 99214 OFFICE O/P EST MOD 30 MIN: CPT | Performed by: NURSE PRACTITIONER

## 2017-08-15 RX ORDER — SODIUM CHLORIDE 0.9 % (FLUSH) 0.9 %
10 SYRINGE (ML) INJECTION PRN
Status: CANCELLED | OUTPATIENT
Start: 2017-08-15

## 2017-08-15 RX ORDER — ZOLPIDEM TARTRATE 10 MG/1
TABLET ORAL
Refills: 2 | COMMUNITY
Start: 2017-07-21 | End: 2019-08-20

## 2017-08-15 RX ORDER — SODIUM CHLORIDE 0.9 % (FLUSH) 0.9 %
10 SYRINGE (ML) INJECTION EVERY 12 HOURS SCHEDULED
Status: CANCELLED | OUTPATIENT
Start: 2017-08-15

## 2017-09-11 ENCOUNTER — TELEPHONE (OUTPATIENT)
Dept: NEUROSURGERY | Age: 62
End: 2017-09-11

## 2017-09-13 ENCOUNTER — TELEPHONE (OUTPATIENT)
Dept: NEUROSURGERY | Age: 62
End: 2017-09-13

## 2017-09-14 ENCOUNTER — TELEPHONE (OUTPATIENT)
Dept: NEUROSURGERY | Age: 62
End: 2017-09-14

## 2017-09-14 ENCOUNTER — HOSPITAL ENCOUNTER (OUTPATIENT)
Dept: GENERAL RADIOLOGY | Age: 62
Discharge: HOME | End: 2017-09-14
Payer: COMMERCIAL

## 2017-09-14 ENCOUNTER — HOSPITAL ENCOUNTER (OUTPATIENT)
Dept: PREADMISSION TESTING | Age: 62
Discharge: HOME | End: 2017-09-14
Payer: COMMERCIAL

## 2017-09-14 VITALS — HEIGHT: 70 IN | WEIGHT: 186 LBS | BODY MASS INDEX: 26.63 KG/M2

## 2017-09-14 DIAGNOSIS — M48.02 CERVICAL STENOSIS OF SPINAL CANAL: ICD-10-CM

## 2017-09-14 DIAGNOSIS — M54.12 CERVICAL MYELOPATHY WITH CERVICAL RADICULOPATHY (HCC): ICD-10-CM

## 2017-09-14 DIAGNOSIS — G95.9 CERVICAL MYELOPATHY WITH CERVICAL RADICULOPATHY (HCC): ICD-10-CM

## 2017-09-14 DIAGNOSIS — M48.02 FORAMINAL STENOSIS OF CERVICAL REGION: ICD-10-CM

## 2017-09-14 LAB
ALBUMIN SERPL-MCNC: 4.2 G/DL (ref 3.5–5.2)
ALP BLD-CCNC: 174 U/L (ref 40–130)
ALT SERPL-CCNC: 21 U/L (ref 5–41)
ANION GAP SERPL CALCULATED.3IONS-SCNC: 17 MMOL/L (ref 7–19)
APTT: 35.1 SEC (ref 26–36.2)
AST SERPL-CCNC: 16 U/L (ref 5–40)
BILIRUB SERPL-MCNC: 1.3 MG/DL (ref 0.2–1.2)
BILIRUBIN URINE: NEGATIVE
BLOOD, URINE: NEGATIVE
BUN BLDV-MCNC: 19 MG/DL (ref 8–23)
CALCIUM SERPL-MCNC: 9.2 MG/DL (ref 8.8–10.2)
CHLORIDE BLD-SCNC: 95 MMOL/L (ref 98–111)
CLARITY: ABNORMAL
CO2: 23 MMOL/L (ref 22–29)
COLOR: YELLOW
CREAT SERPL-MCNC: 0.8 MG/DL (ref 0.5–1.2)
EKG P AXIS: 68 DEGREES
EKG P-R INTERVAL: 124 MS
EKG Q-T INTERVAL: 364 MS
EKG QRS DURATION: 104 MS
EKG QTC CALCULATION (BAZETT): 413 MS
EKG T AXIS: 47 DEGREES
GFR NON-AFRICAN AMERICAN: >60
GLUCOSE BLD-MCNC: 348 MG/DL (ref 74–109)
GLUCOSE URINE: >=1000 MG/DL
HBA1C MFR BLD: 11.9 %
HCT VFR BLD CALC: 45 % (ref 42–52)
HEMOGLOBIN: 12.7 G/DL (ref 14–18)
INR BLD: 1.09 (ref 0.88–1.18)
KETONES, URINE: NEGATIVE MG/DL
LEUKOCYTE ESTERASE, URINE: NEGATIVE
MCH RBC QN AUTO: 19.3 PG (ref 27–31)
MCHC RBC AUTO-ENTMCNC: 28.2 G/DL (ref 33–37)
MCV RBC AUTO: 68.4 FL (ref 80–94)
NITRITE, URINE: NEGATIVE
PDW BLD-RTO: 20.5 % (ref 11.5–14.5)
PH UA: 6
PLATELET # BLD: 489 K/UL (ref 130–400)
PMV BLD AUTO: 11.3 FL (ref 9.4–12.4)
POTASSIUM SERPL-SCNC: 4.4 MMOL/L (ref 3.5–5)
PROTEIN UA: ABNORMAL MG/DL
PROTHROMBIN TIME: 14 SEC (ref 12–14.6)
RBC # BLD: 6.58 M/UL (ref 4.7–6.1)
SODIUM BLD-SCNC: 135 MMOL/L (ref 136–145)
SPECIFIC GRAVITY UA: 1.02
TOTAL PROTEIN: 6.7 G/DL (ref 6.6–8.7)
UROBILINOGEN, URINE: 0.2 E.U./DL
WBC # BLD: 25.2 K/UL (ref 4.8–10.8)

## 2017-09-14 PROCEDURE — 81003 URINALYSIS AUTO W/O SCOPE: CPT

## 2017-09-14 PROCEDURE — 85610 PROTHROMBIN TIME: CPT

## 2017-09-14 PROCEDURE — 80053 COMPREHEN METABOLIC PANEL: CPT

## 2017-09-14 PROCEDURE — 83036 HEMOGLOBIN GLYCOSYLATED A1C: CPT

## 2017-09-14 PROCEDURE — 85027 COMPLETE CBC AUTOMATED: CPT

## 2017-09-14 PROCEDURE — 93005 ELECTROCARDIOGRAM TRACING: CPT

## 2017-09-14 PROCEDURE — 71020 XR CHEST STANDARD TWO VW: CPT

## 2017-09-14 PROCEDURE — 85730 THROMBOPLASTIN TIME PARTIAL: CPT

## 2017-09-20 ENCOUNTER — ANESTHESIA EVENT (OUTPATIENT)
Dept: OPERATING ROOM | Age: 62
DRG: 473 | End: 2017-09-20
Payer: COMMERCIAL

## 2017-09-20 ENCOUNTER — HOSPITAL ENCOUNTER (INPATIENT)
Age: 62
LOS: 2 days | Discharge: HOME | DRG: 473 | End: 2017-09-22
Attending: NEUROLOGICAL SURGERY | Admitting: NEUROLOGICAL SURGERY
Payer: COMMERCIAL

## 2017-09-20 ENCOUNTER — APPOINTMENT (OUTPATIENT)
Dept: GENERAL RADIOLOGY | Age: 62
DRG: 473 | End: 2017-09-20
Attending: NEUROLOGICAL SURGERY
Payer: COMMERCIAL

## 2017-09-20 ENCOUNTER — ANESTHESIA (OUTPATIENT)
Dept: OPERATING ROOM | Age: 62
DRG: 473 | End: 2017-09-20
Payer: COMMERCIAL

## 2017-09-20 VITALS
RESPIRATION RATE: 2 BRPM | DIASTOLIC BLOOD PRESSURE: 126 MMHG | OXYGEN SATURATION: 97 % | TEMPERATURE: 94.3 F | SYSTOLIC BLOOD PRESSURE: 153 MMHG

## 2017-09-20 PROBLEM — N47.1 PHIMOSIS: Status: ACTIVE | Noted: 2017-09-20

## 2017-09-20 PROBLEM — I10 ESSENTIAL HYPERTENSION: Status: ACTIVE | Noted: 2017-09-20

## 2017-09-20 PROBLEM — N48.1 BALANITIS: Status: ACTIVE | Noted: 2017-09-20

## 2017-09-20 LAB
ABO/RH: NORMAL
ANION GAP SERPL CALCULATED.3IONS-SCNC: 17 MMOL/L (ref 7–19)
ANTIBODY SCREEN: NORMAL
BILIRUBIN URINE: NEGATIVE
BLOOD, URINE: NEGATIVE
BUN BLDV-MCNC: 21 MG/DL (ref 8–23)
CALCIUM SERPL-MCNC: 8.3 MG/DL (ref 8.8–10.2)
CHLORIDE BLD-SCNC: 99 MMOL/L (ref 98–111)
CLARITY: CLEAR
CO2: 20 MMOL/L (ref 22–29)
COLOR: YELLOW
CREAT SERPL-MCNC: 1 MG/DL (ref 0.5–1.2)
GFR NON-AFRICAN AMERICAN: >60
GLUCOSE BLD-MCNC: 177 MG/DL (ref 70–99)
GLUCOSE BLD-MCNC: 197 MG/DL (ref 70–99)
GLUCOSE BLD-MCNC: 204 MG/DL (ref 70–99)
GLUCOSE BLD-MCNC: 206 MG/DL (ref 70–99)
GLUCOSE BLD-MCNC: 220 MG/DL (ref 70–99)
GLUCOSE BLD-MCNC: 232 MG/DL (ref 70–99)
GLUCOSE BLD-MCNC: 247 MG/DL (ref 70–99)
GLUCOSE BLD-MCNC: 255 MG/DL (ref 70–99)
GLUCOSE BLD-MCNC: 268 MG/DL (ref 74–109)
GLUCOSE BLD-MCNC: 297 MG/DL (ref 70–99)
GLUCOSE BLD-MCNC: 299 MG/DL (ref 70–99)
GLUCOSE BLD-MCNC: 310 MG/DL (ref 70–99)
GLUCOSE BLD-MCNC: 321 MG/DL (ref 70–99)
GLUCOSE URINE: >=1000 MG/DL
HBA1C MFR BLD: 12.1 %
HCT VFR BLD CALC: 40.9 % (ref 42–52)
HEMATOLOGY PATH CONSULT: NO
HEMOGLOBIN: 11.4 G/DL (ref 14–18)
KETONES, URINE: NEGATIVE MG/DL
LEUKOCYTE ESTERASE, URINE: NEGATIVE
MCH RBC QN AUTO: 18.9 PG (ref 27–31)
MCHC RBC AUTO-ENTMCNC: 27.9 G/DL (ref 33–37)
MCV RBC AUTO: 67.9 FL (ref 80–94)
NITRITE, URINE: NEGATIVE
PDW BLD-RTO: 19.8 % (ref 11.5–14.5)
PERFORMED ON: ABNORMAL
PH UA: 5.5
PLATELET # BLD: 567 K/UL (ref 130–400)
PLATELET SLIDE REVIEW: ABNORMAL
PMV BLD AUTO: 10.8 FL (ref 9.4–12.4)
POTASSIUM SERPL-SCNC: 4 MMOL/L (ref 3.5–5)
PROTEIN UA: NEGATIVE MG/DL
RBC # BLD: 6.02 M/UL (ref 4.7–6.1)
SODIUM BLD-SCNC: 136 MMOL/L (ref 136–145)
SPECIFIC GRAVITY UA: 1.02
UROBILINOGEN, URINE: 0.2 E.U./DL
WBC # BLD: 31.2 K/UL (ref 4.8–10.8)

## 2017-09-20 PROCEDURE — C1713 ANCHOR/SCREW BN/BN,TIS/BN: HCPCS | Performed by: NEUROLOGICAL SURGERY

## 2017-09-20 PROCEDURE — 2700000000 HC OXYGEN THERAPY PER DAY

## 2017-09-20 PROCEDURE — 6370000000 HC RX 637 (ALT 250 FOR IP): Performed by: NURSE ANESTHETIST, CERTIFIED REGISTERED

## 2017-09-20 PROCEDURE — 0RG20A0 FUSION OF 2 OR MORE CERVICAL VERTEBRAL JOINTS WITH INTERBODY FUSION DEVICE, ANTERIOR APPROACH, ANTERIOR COLUMN, OPEN APPROACH: ICD-10-PCS | Performed by: NEUROLOGICAL SURGERY

## 2017-09-20 PROCEDURE — 2580000003 HC RX 258: Performed by: NEUROLOGICAL SURGERY

## 2017-09-20 PROCEDURE — 22551 ARTHRD ANT NTRBDY CERVICAL: CPT | Performed by: NEUROLOGICAL SURGERY

## 2017-09-20 PROCEDURE — 2720000001 HC MISC SURG SUPPLY STERILE $51-500: Performed by: NEUROLOGICAL SURGERY

## 2017-09-20 PROCEDURE — 22846 INSERT SPINE FIXATION DEVICE: CPT | Performed by: NEUROLOGICAL SURGERY

## 2017-09-20 PROCEDURE — 3700000000 HC ANESTHESIA ATTENDED CARE: Performed by: NEUROLOGICAL SURGERY

## 2017-09-20 PROCEDURE — 6370000000 HC RX 637 (ALT 250 FOR IP): Performed by: NEUROLOGICAL SURGERY

## 2017-09-20 PROCEDURE — 2720000010 HC SURG SUPPLY STERILE: Performed by: NEUROLOGICAL SURGERY

## 2017-09-20 PROCEDURE — 3700000001 HC ADD 15 MINUTES (ANESTHESIA): Performed by: NEUROLOGICAL SURGERY

## 2017-09-20 PROCEDURE — 86901 BLOOD TYPING SEROLOGIC RH(D): CPT

## 2017-09-20 PROCEDURE — 72040 X-RAY EXAM NECK SPINE 2-3 VW: CPT

## 2017-09-20 PROCEDURE — 99254 IP/OBS CNSLTJ NEW/EST MOD 60: CPT | Performed by: FAMILY MEDICINE

## 2017-09-20 PROCEDURE — C1729 CATH, DRAINAGE: HCPCS | Performed by: NEUROLOGICAL SURGERY

## 2017-09-20 PROCEDURE — 86850 RBC ANTIBODY SCREEN: CPT

## 2017-09-20 PROCEDURE — 01N10ZZ RELEASE CERVICAL NERVE, OPEN APPROACH: ICD-10-PCS | Performed by: NEUROLOGICAL SURGERY

## 2017-09-20 PROCEDURE — 86900 BLOOD TYPING SEROLOGIC ABO: CPT

## 2017-09-20 PROCEDURE — 6370000000 HC RX 637 (ALT 250 FOR IP): Performed by: ANESTHESIOLOGY

## 2017-09-20 PROCEDURE — 82948 REAGENT STRIP/BLOOD GLUCOSE: CPT

## 2017-09-20 PROCEDURE — 22552 ARTHRD ANT NTRBD CERVICAL EA: CPT | Performed by: NEUROLOGICAL SURGERY

## 2017-09-20 PROCEDURE — 6360000002 HC RX W HCPCS: Performed by: NURSE ANESTHETIST, CERTIFIED REGISTERED

## 2017-09-20 PROCEDURE — 2000000000 HC ICU R&B

## 2017-09-20 PROCEDURE — 51702 INSERT TEMP BLADDER CATH: CPT | Performed by: UROLOGY

## 2017-09-20 PROCEDURE — 83036 HEMOGLOBIN GLYCOSYLATED A1C: CPT

## 2017-09-20 PROCEDURE — 94664 DEMO&/EVAL PT USE INHALER: CPT

## 2017-09-20 PROCEDURE — 2580000003 HC RX 258: Performed by: NURSE ANESTHETIST, CERTIFIED REGISTERED

## 2017-09-20 PROCEDURE — 6370000000 HC RX 637 (ALT 250 FOR IP): Performed by: FAMILY MEDICINE

## 2017-09-20 PROCEDURE — 2500000003 HC RX 250 WO HCPCS: Performed by: NURSE ANESTHETIST, CERTIFIED REGISTERED

## 2017-09-20 PROCEDURE — 0T9B70Z DRAINAGE OF BLADDER WITH DRAINAGE DEVICE, VIA NATURAL OR ARTIFICIAL OPENING: ICD-10-PCS | Performed by: UROLOGY

## 2017-09-20 PROCEDURE — 99253 IP/OBS CNSLTJ NEW/EST LOW 45: CPT | Performed by: UROLOGY

## 2017-09-20 PROCEDURE — 3600000005 HC SURGERY LEVEL 5 BASE: Performed by: NEUROLOGICAL SURGERY

## 2017-09-20 PROCEDURE — 6360000002 HC RX W HCPCS: Performed by: NURSE PRACTITIONER

## 2017-09-20 PROCEDURE — 2580000003 HC RX 258: Performed by: FAMILY MEDICINE

## 2017-09-20 PROCEDURE — 3600000015 HC SURGERY LEVEL 5 ADDTL 15MIN: Performed by: NEUROLOGICAL SURGERY

## 2017-09-20 PROCEDURE — 2500000003 HC RX 250 WO HCPCS: Performed by: NEUROLOGICAL SURGERY

## 2017-09-20 PROCEDURE — 81003 URINALYSIS AUTO W/O SCOPE: CPT

## 2017-09-20 PROCEDURE — 6360000002 HC RX W HCPCS: Performed by: NEUROLOGICAL SURGERY

## 2017-09-20 PROCEDURE — 3209999900 FLUORO FOR SURGICAL PROCEDURES

## 2017-09-20 PROCEDURE — 85027 COMPLETE CBC AUTOMATED: CPT

## 2017-09-20 PROCEDURE — 0RB30ZZ EXCISION OF CERVICAL VERTEBRAL DISC, OPEN APPROACH: ICD-10-PCS | Performed by: NEUROLOGICAL SURGERY

## 2017-09-20 PROCEDURE — 36415 COLL VENOUS BLD VENIPUNCTURE: CPT

## 2017-09-20 PROCEDURE — C1776 JOINT DEVICE (IMPLANTABLE): HCPCS | Performed by: NEUROLOGICAL SURGERY

## 2017-09-20 PROCEDURE — 22853 INSJ BIOMECHANICAL DEVICE: CPT | Performed by: NEUROLOGICAL SURGERY

## 2017-09-20 PROCEDURE — 6370000000 HC RX 637 (ALT 250 FOR IP): Performed by: UROLOGY

## 2017-09-20 PROCEDURE — 80048 BASIC METABOLIC PNL TOTAL CA: CPT

## 2017-09-20 PROCEDURE — P9045 ALBUMIN (HUMAN), 5%, 250 ML: HCPCS | Performed by: NURSE ANESTHETIST, CERTIFIED REGISTERED

## 2017-09-20 DEVICE — SCREW 3120517 4.0 X 17 SELF DRILL VAR
Type: IMPLANTABLE DEVICE | Site: SPINE CERVICAL | Status: FUNCTIONAL
Brand: ATLANTIS® ANTERIOR CERVICAL PLATE SYSTEM

## 2017-09-20 DEVICE — BONE GRAFT KIT 7510050 INFUSE XX SMALL
Type: IMPLANTABLE DEVICE | Site: SPINE CERVICAL | Status: FUNCTIONAL
Brand: INFUSE® BONE GRAFT

## 2017-09-20 DEVICE — CAGE 5030664 ANATOMIC PTC 16X14X6MM
Type: IMPLANTABLE DEVICE | Site: SPINE CERVICAL | Status: FUNCTIONAL
Brand: ANATOMIC PEEK PTC CERVICAL FUSION SYSTEM

## 2017-09-20 RX ORDER — KETAMINE HYDROCHLORIDE 100 MG/ML
INJECTION, SOLUTION INTRAMUSCULAR; INTRAVENOUS PRN
Status: DISCONTINUED | OUTPATIENT
Start: 2017-09-20 | End: 2017-09-20 | Stop reason: SDUPTHER

## 2017-09-20 RX ORDER — LABETALOL HYDROCHLORIDE 5 MG/ML
5 INJECTION, SOLUTION INTRAVENOUS EVERY 10 MIN PRN
Status: DISCONTINUED | OUTPATIENT
Start: 2017-09-20 | End: 2017-09-20

## 2017-09-20 RX ORDER — NICOTINE POLACRILEX 4 MG
15 LOZENGE BUCCAL PRN
Status: DISCONTINUED | OUTPATIENT
Start: 2017-09-20 | End: 2017-09-20

## 2017-09-20 RX ORDER — MORPHINE SULFATE 4 MG/ML
2 INJECTION, SOLUTION INTRAMUSCULAR; INTRAVENOUS
Status: DISCONTINUED | OUTPATIENT
Start: 2017-09-20 | End: 2017-09-20 | Stop reason: SDUPTHER

## 2017-09-20 RX ORDER — PROMETHAZINE HYDROCHLORIDE 25 MG/ML
6.25 INJECTION, SOLUTION INTRAMUSCULAR; INTRAVENOUS
Status: DISCONTINUED | OUTPATIENT
Start: 2017-09-20 | End: 2017-09-20

## 2017-09-20 RX ORDER — MORPHINE SULFATE 4 MG/ML
2 INJECTION, SOLUTION INTRAMUSCULAR; INTRAVENOUS
Status: DISCONTINUED | OUTPATIENT
Start: 2017-09-20 | End: 2017-09-22 | Stop reason: HOSPADM

## 2017-09-20 RX ORDER — LIDOCAINE HYDROCHLORIDE 10 MG/ML
INJECTION, SOLUTION INFILTRATION; PERINEURAL PRN
Status: DISCONTINUED | OUTPATIENT
Start: 2017-09-20 | End: 2017-09-20

## 2017-09-20 RX ORDER — MEPERIDINE HYDROCHLORIDE 50 MG/ML
12.5 INJECTION INTRAMUSCULAR; INTRAVENOUS; SUBCUTANEOUS EVERY 5 MIN PRN
Status: DISCONTINUED | OUTPATIENT
Start: 2017-09-20 | End: 2017-09-20

## 2017-09-20 RX ORDER — METOCLOPRAMIDE HYDROCHLORIDE 5 MG/ML
10 INJECTION INTRAMUSCULAR; INTRAVENOUS
Status: DISCONTINUED | OUTPATIENT
Start: 2017-09-20 | End: 2017-09-20

## 2017-09-20 RX ORDER — INSULIN GLARGINE 100 [IU]/ML
20 INJECTION, SOLUTION SUBCUTANEOUS 2 TIMES DAILY
Status: DISCONTINUED | OUTPATIENT
Start: 2017-09-20 | End: 2017-09-20

## 2017-09-20 RX ORDER — SODIUM CHLORIDE 0.9 % (FLUSH) 0.9 %
10 SYRINGE (ML) INJECTION EVERY 12 HOURS SCHEDULED
Status: DISCONTINUED | OUTPATIENT
Start: 2017-09-20 | End: 2017-09-22 | Stop reason: HOSPADM

## 2017-09-20 RX ORDER — DEXTROSE MONOHYDRATE 25 G/50ML
12.5 INJECTION, SOLUTION INTRAVENOUS PRN
Status: DISCONTINUED | OUTPATIENT
Start: 2017-09-20 | End: 2017-09-22 | Stop reason: HOSPADM

## 2017-09-20 RX ORDER — LABETALOL 200 MG/1
200 TABLET, FILM COATED ORAL DAILY
Status: DISCONTINUED | OUTPATIENT
Start: 2017-09-20 | End: 2017-09-22 | Stop reason: HOSPADM

## 2017-09-20 RX ORDER — NICOTINE POLACRILEX 4 MG
15 LOZENGE BUCCAL PRN
Status: DISCONTINUED | OUTPATIENT
Start: 2017-09-20 | End: 2017-09-20 | Stop reason: SDUPTHER

## 2017-09-20 RX ORDER — SUFENTANIL CITRATE 50 UG/ML
INJECTION EPIDURAL; INTRAVENOUS PRN
Status: DISCONTINUED | OUTPATIENT
Start: 2017-09-20 | End: 2017-09-20 | Stop reason: SDUPTHER

## 2017-09-20 RX ORDER — SODIUM CHLORIDE 0.9 % (FLUSH) 0.9 %
10 SYRINGE (ML) INJECTION PRN
Status: DISCONTINUED | OUTPATIENT
Start: 2017-09-20 | End: 2017-09-20

## 2017-09-20 RX ORDER — INSULIN GLARGINE 100 [IU]/ML
20 INJECTION, SOLUTION SUBCUTANEOUS NIGHTLY
Status: DISCONTINUED | OUTPATIENT
Start: 2017-09-20 | End: 2017-09-20

## 2017-09-20 RX ORDER — ZOLPIDEM TARTRATE 5 MG/1
5 TABLET ORAL NIGHTLY PRN
Status: DISCONTINUED | OUTPATIENT
Start: 2017-09-20 | End: 2017-09-22 | Stop reason: HOSPADM

## 2017-09-20 RX ORDER — DEXTROSE MONOHYDRATE 50 MG/ML
100 INJECTION, SOLUTION INTRAVENOUS PRN
Status: DISCONTINUED | OUTPATIENT
Start: 2017-09-20 | End: 2017-09-22 | Stop reason: HOSPADM

## 2017-09-20 RX ORDER — LIDOCAINE HYDROCHLORIDE 10 MG/ML
1 INJECTION, SOLUTION EPIDURAL; INFILTRATION; INTRACAUDAL; PERINEURAL
Status: DISCONTINUED | OUTPATIENT
Start: 2017-09-20 | End: 2017-09-20

## 2017-09-20 RX ORDER — PROPOFOL 10 MG/ML
INJECTION, EMULSION INTRAVENOUS PRN
Status: DISCONTINUED | OUTPATIENT
Start: 2017-09-20 | End: 2017-09-20 | Stop reason: SDUPTHER

## 2017-09-20 RX ORDER — ALBUMIN, HUMAN INJ 5% 5 %
SOLUTION INTRAVENOUS PRN
Status: DISCONTINUED | OUTPATIENT
Start: 2017-09-20 | End: 2017-09-20 | Stop reason: SDUPTHER

## 2017-09-20 RX ORDER — SODIUM CHLORIDE 0.9 % (FLUSH) 0.9 %
10 SYRINGE (ML) INJECTION EVERY 12 HOURS SCHEDULED
Status: DISCONTINUED | OUTPATIENT
Start: 2017-09-20 | End: 2017-09-20

## 2017-09-20 RX ORDER — MIDAZOLAM HYDROCHLORIDE 1 MG/ML
2 INJECTION INTRAMUSCULAR; INTRAVENOUS
Status: DISCONTINUED | OUTPATIENT
Start: 2017-09-20 | End: 2017-09-20

## 2017-09-20 RX ORDER — LISINOPRIL 20 MG/1
20 TABLET ORAL DAILY
Status: DISCONTINUED | OUTPATIENT
Start: 2017-09-20 | End: 2017-09-22 | Stop reason: HOSPADM

## 2017-09-20 RX ORDER — EPHEDRINE SULFATE 50 MG/ML
INJECTION, SOLUTION INTRAVENOUS PRN
Status: DISCONTINUED | OUTPATIENT
Start: 2017-09-20 | End: 2017-09-20 | Stop reason: SDUPTHER

## 2017-09-20 RX ORDER — DIPHENHYDRAMINE HYDROCHLORIDE 50 MG/ML
12.5 INJECTION INTRAMUSCULAR; INTRAVENOUS
Status: DISCONTINUED | OUTPATIENT
Start: 2017-09-20 | End: 2017-09-20

## 2017-09-20 RX ORDER — DEXTROSE MONOHYDRATE 25 G/50ML
12.5 INJECTION, SOLUTION INTRAVENOUS PRN
Status: DISCONTINUED | OUTPATIENT
Start: 2017-09-20 | End: 2017-09-20

## 2017-09-20 RX ORDER — FENTANYL CITRATE 50 UG/ML
25 INJECTION, SOLUTION INTRAMUSCULAR; INTRAVENOUS ONCE
Status: DISCONTINUED | OUTPATIENT
Start: 2017-09-20 | End: 2017-09-20

## 2017-09-20 RX ORDER — NICOTINE POLACRILEX 4 MG
15 LOZENGE BUCCAL PRN
Status: DISCONTINUED | OUTPATIENT
Start: 2017-09-20 | End: 2017-09-22 | Stop reason: HOSPADM

## 2017-09-20 RX ORDER — ONDANSETRON 2 MG/ML
4 INJECTION INTRAMUSCULAR; INTRAVENOUS EVERY 6 HOURS PRN
Status: DISCONTINUED | OUTPATIENT
Start: 2017-09-20 | End: 2017-09-22 | Stop reason: HOSPADM

## 2017-09-20 RX ORDER — ENALAPRILAT 2.5 MG/2ML
1.25 INJECTION INTRAVENOUS
Status: DISCONTINUED | OUTPATIENT
Start: 2017-09-20 | End: 2017-09-20

## 2017-09-20 RX ORDER — SODIUM CHLORIDE, SODIUM LACTATE, POTASSIUM CHLORIDE, CALCIUM CHLORIDE 600; 310; 30; 20 MG/100ML; MG/100ML; MG/100ML; MG/100ML
INJECTION, SOLUTION INTRAVENOUS CONTINUOUS
Status: DISCONTINUED | OUTPATIENT
Start: 2017-09-20 | End: 2017-09-20

## 2017-09-20 RX ORDER — INSULIN GLARGINE 100 [IU]/ML
30 INJECTION, SOLUTION SUBCUTANEOUS 2 TIMES DAILY
Status: DISCONTINUED | OUTPATIENT
Start: 2017-09-20 | End: 2017-09-22 | Stop reason: HOSPADM

## 2017-09-20 RX ORDER — NYSTATIN AND TRIAMCINOLONE ACETONIDE 100000; 1 [USP'U]/G; MG/G
OINTMENT TOPICAL 3 TIMES DAILY
Status: DISCONTINUED | OUTPATIENT
Start: 2017-09-20 | End: 2017-09-22 | Stop reason: HOSPADM

## 2017-09-20 RX ORDER — SODIUM CHLORIDE 0.9 % (FLUSH) 0.9 %
10 SYRINGE (ML) INJECTION PRN
Status: DISCONTINUED | OUTPATIENT
Start: 2017-09-20 | End: 2017-09-22 | Stop reason: HOSPADM

## 2017-09-20 RX ORDER — FENTANYL CITRATE 50 UG/ML
50 INJECTION, SOLUTION INTRAMUSCULAR; INTRAVENOUS ONCE
Status: DISCONTINUED | OUTPATIENT
Start: 2017-09-20 | End: 2017-09-20

## 2017-09-20 RX ORDER — DEXAMETHASONE SODIUM PHOSPHATE 10 MG/ML
INJECTION INTRAMUSCULAR; INTRAVENOUS PRN
Status: DISCONTINUED | OUTPATIENT
Start: 2017-09-20 | End: 2017-09-20 | Stop reason: SDUPTHER

## 2017-09-20 RX ORDER — DEXTROSE MONOHYDRATE 25 G/50ML
12.5 INJECTION, SOLUTION INTRAVENOUS PRN
Status: DISCONTINUED | OUTPATIENT
Start: 2017-09-20 | End: 2017-09-20 | Stop reason: SDUPTHER

## 2017-09-20 RX ORDER — DOCUSATE SODIUM 100 MG/1
100 CAPSULE, LIQUID FILLED ORAL 2 TIMES DAILY
Status: DISCONTINUED | OUTPATIENT
Start: 2017-09-20 | End: 2017-09-22 | Stop reason: HOSPADM

## 2017-09-20 RX ORDER — ROCURONIUM BROMIDE 10 MG/ML
INJECTION, SOLUTION INTRAVENOUS PRN
Status: DISCONTINUED | OUTPATIENT
Start: 2017-09-20 | End: 2017-09-20 | Stop reason: SDUPTHER

## 2017-09-20 RX ORDER — DEXTROSE MONOHYDRATE 50 MG/ML
100 INJECTION, SOLUTION INTRAVENOUS PRN
Status: DISCONTINUED | OUTPATIENT
Start: 2017-09-20 | End: 2017-09-20

## 2017-09-20 RX ORDER — CHLORTHALIDONE 25 MG/1
25 TABLET ORAL DAILY
Status: DISCONTINUED | OUTPATIENT
Start: 2017-09-20 | End: 2017-09-22 | Stop reason: HOSPADM

## 2017-09-20 RX ORDER — PROPOFOL 10 MG/ML
INJECTION, EMULSION INTRAVENOUS CONTINUOUS PRN
Status: DISCONTINUED | OUTPATIENT
Start: 2017-09-20 | End: 2017-09-20 | Stop reason: SDUPTHER

## 2017-09-20 RX ORDER — SODIUM CHLORIDE 9 MG/ML
INJECTION, SOLUTION INTRAVENOUS CONTINUOUS
Status: DISCONTINUED | OUTPATIENT
Start: 2017-09-20 | End: 2017-09-21

## 2017-09-20 RX ORDER — ALLOPURINOL 300 MG/1
300 TABLET ORAL DAILY
Status: DISCONTINUED | OUTPATIENT
Start: 2017-09-20 | End: 2017-09-22 | Stop reason: HOSPADM

## 2017-09-20 RX ORDER — LIDOCAINE HYDROCHLORIDE 10 MG/ML
INJECTION, SOLUTION INFILTRATION; PERINEURAL PRN
Status: DISCONTINUED | OUTPATIENT
Start: 2017-09-20 | End: 2017-09-20 | Stop reason: SDUPTHER

## 2017-09-20 RX ORDER — 0.9 % SODIUM CHLORIDE 0.9 %
1000 INTRAVENOUS SOLUTION INTRAVENOUS ONCE
Status: COMPLETED | OUTPATIENT
Start: 2017-09-20 | End: 2017-09-20

## 2017-09-20 RX ORDER — ACETAMINOPHEN 325 MG/1
650 TABLET ORAL EVERY 4 HOURS PRN
Status: DISCONTINUED | OUTPATIENT
Start: 2017-09-20 | End: 2017-09-22 | Stop reason: HOSPADM

## 2017-09-20 RX ORDER — SUCCINYLCHOLINE CHLORIDE 20 MG/ML
INJECTION INTRAMUSCULAR; INTRAVENOUS PRN
Status: DISCONTINUED | OUTPATIENT
Start: 2017-09-20 | End: 2017-09-20 | Stop reason: SDUPTHER

## 2017-09-20 RX ORDER — HYDRALAZINE HYDROCHLORIDE 20 MG/ML
5 INJECTION INTRAMUSCULAR; INTRAVENOUS EVERY 10 MIN PRN
Status: DISCONTINUED | OUTPATIENT
Start: 2017-09-20 | End: 2017-09-20

## 2017-09-20 RX ORDER — MORPHINE SULFATE 4 MG/ML
4 INJECTION, SOLUTION INTRAMUSCULAR; INTRAVENOUS
Status: DISCONTINUED | OUTPATIENT
Start: 2017-09-20 | End: 2017-09-20 | Stop reason: SDUPTHER

## 2017-09-20 RX ORDER — DEXTROSE MONOHYDRATE 50 MG/ML
100 INJECTION, SOLUTION INTRAVENOUS PRN
Status: DISCONTINUED | OUTPATIENT
Start: 2017-09-20 | End: 2017-09-20 | Stop reason: SDUPTHER

## 2017-09-20 RX ORDER — MORPHINE SULFATE 4 MG/ML
4 INJECTION, SOLUTION INTRAMUSCULAR; INTRAVENOUS
Status: DISCONTINUED | OUTPATIENT
Start: 2017-09-20 | End: 2017-09-22 | Stop reason: HOSPADM

## 2017-09-20 RX ORDER — ONDANSETRON 2 MG/ML
INJECTION INTRAMUSCULAR; INTRAVENOUS PRN
Status: DISCONTINUED | OUTPATIENT
Start: 2017-09-20 | End: 2017-09-20 | Stop reason: SDUPTHER

## 2017-09-20 RX ORDER — SODIUM CHLORIDE 9 MG/ML
INJECTION, SOLUTION INTRAVENOUS CONTINUOUS
Status: DISCONTINUED | OUTPATIENT
Start: 2017-09-20 | End: 2017-09-20

## 2017-09-20 RX ORDER — HYDROCODONE BITARTRATE AND ACETAMINOPHEN 10; 325 MG/1; MG/1
2 TABLET ORAL EVERY 6 HOURS PRN
Status: DISCONTINUED | OUTPATIENT
Start: 2017-09-20 | End: 2017-09-22 | Stop reason: HOSPADM

## 2017-09-20 RX ADMIN — EPHEDRINE SULFATE 20 MG: 50 INJECTION, SOLUTION INTRAMUSCULAR; INTRAVENOUS; SUBCUTANEOUS at 08:13

## 2017-09-20 RX ADMIN — NYSTATIN AND TRIAMCINOLONE ACETONIDE: 100000; 1 OINTMENT TOPICAL at 20:07

## 2017-09-20 RX ADMIN — SODIUM CHLORIDE, SODIUM LACTATE, POTASSIUM CHLORIDE, AND CALCIUM CHLORIDE: 600; 310; 30; 20 INJECTION, SOLUTION INTRAVENOUS at 09:35

## 2017-09-20 RX ADMIN — Medication 1 G: at 11:35

## 2017-09-20 RX ADMIN — MORPHINE SULFATE 4 MG: 4 INJECTION, SOLUTION INTRAMUSCULAR; INTRAVENOUS at 23:30

## 2017-09-20 RX ADMIN — SUCCINYLCHOLINE CHLORIDE 120 MG: 20 INJECTION, SOLUTION INTRAMUSCULAR; INTRAVENOUS; PARENTERAL at 07:36

## 2017-09-20 RX ADMIN — INSULIN LISPRO 10 UNITS: 100 INJECTION, SOLUTION INTRAVENOUS; SUBCUTANEOUS at 20:56

## 2017-09-20 RX ADMIN — EPHEDRINE SULFATE 10 MG: 50 INJECTION, SOLUTION INTRAMUSCULAR; INTRAVENOUS; SUBCUTANEOUS at 08:05

## 2017-09-20 RX ADMIN — SODIUM CHLORIDE, SODIUM LACTATE, POTASSIUM CHLORIDE, AND CALCIUM CHLORIDE: 600; 310; 30; 20 INJECTION, SOLUTION INTRAVENOUS at 09:05

## 2017-09-20 RX ADMIN — PHENYLEPHRINE HYDROCHLORIDE 200 MCG: 10 INJECTION INTRAVENOUS at 08:02

## 2017-09-20 RX ADMIN — DOCUSATE SODIUM 100 MG: 100 CAPSULE, LIQUID FILLED ORAL at 20:08

## 2017-09-20 RX ADMIN — SODIUM CHLORIDE: 9 INJECTION, SOLUTION INTRAVENOUS at 14:57

## 2017-09-20 RX ADMIN — Medication 10 ML: at 19:19

## 2017-09-20 RX ADMIN — SODIUM CHLORIDE 7.1 UNITS/HR: 9 INJECTION, SOLUTION INTRAVENOUS at 13:53

## 2017-09-20 RX ADMIN — INSULIN LISPRO 6 UNITS: 100 INJECTION, SOLUTION INTRAVENOUS; SUBCUTANEOUS at 23:34

## 2017-09-20 RX ADMIN — PROPOFOL 180 MCG/KG/MIN: 10 INJECTION, EMULSION INTRAVENOUS at 07:39

## 2017-09-20 RX ADMIN — INSULIN LISPRO 2 UNITS: 100 INJECTION, SOLUTION INTRAVENOUS; SUBCUTANEOUS at 20:57

## 2017-09-20 RX ADMIN — DEXAMETHASONE SODIUM PHOSPHATE 10 MG: 10 INJECTION INTRAMUSCULAR; INTRAVENOUS at 07:39

## 2017-09-20 RX ADMIN — HYDROMORPHONE HYDROCHLORIDE 0.2 MG: 1 INJECTION, SOLUTION INTRAMUSCULAR; INTRAVENOUS; SUBCUTANEOUS at 11:55

## 2017-09-20 RX ADMIN — ONDANSETRON HYDROCHLORIDE 4 MG: 2 INJECTION, SOLUTION INTRAVENOUS at 07:31

## 2017-09-20 RX ADMIN — ALBUMIN (HUMAN) 250 ML: 2.5 SOLUTION INTRAVENOUS at 11:40

## 2017-09-20 RX ADMIN — SUFENTANIL CITRATE 20 MCG: 50 INJECTION EPIDURAL; INTRAVENOUS at 07:31

## 2017-09-20 RX ADMIN — ALBUMIN (HUMAN) 250 ML: 2.5 SOLUTION INTRAVENOUS at 08:55

## 2017-09-20 RX ADMIN — SODIUM CHLORIDE, SODIUM LACTATE, POTASSIUM CHLORIDE, AND CALCIUM CHLORIDE: 600; 310; 30; 20 INJECTION, SOLUTION INTRAVENOUS at 06:06

## 2017-09-20 RX ADMIN — SODIUM CHLORIDE 5 UNITS/HR: 9 INJECTION, SOLUTION INTRAVENOUS at 12:27

## 2017-09-20 RX ADMIN — PHENYLEPHRINE HYDROCHLORIDE 200 MCG: 10 INJECTION INTRAVENOUS at 07:35

## 2017-09-20 RX ADMIN — Medication 30 MG: at 07:42

## 2017-09-20 RX ADMIN — LABETALOL HCL 200 MG: 200 TABLET, FILM COATED ORAL at 14:58

## 2017-09-20 RX ADMIN — INSULIN HUMAN 10 UNITS: 100 INJECTION, SOLUTION PARENTERAL at 07:12

## 2017-09-20 RX ADMIN — Medication 20 MG: at 07:30

## 2017-09-20 RX ADMIN — INSULIN HUMAN 10 UNITS: 100 INJECTION, SOLUTION PARENTERAL at 11:35

## 2017-09-20 RX ADMIN — PHENYLEPHRINE HYDROCHLORIDE 200 MCG: 10 INJECTION INTRAVENOUS at 07:54

## 2017-09-20 RX ADMIN — PROPOFOL 300 MG: 10 INJECTION, EMULSION INTRAVENOUS at 08:51

## 2017-09-20 RX ADMIN — SODIUM CHLORIDE, SODIUM LACTATE, POTASSIUM CHLORIDE, AND CALCIUM CHLORIDE: 600; 310; 30; 20 INJECTION, SOLUTION INTRAVENOUS at 07:27

## 2017-09-20 RX ADMIN — LISINOPRIL 20 MG: 20 TABLET ORAL at 14:58

## 2017-09-20 RX ADMIN — SODIUM CHLORIDE, SODIUM LACTATE, POTASSIUM CHLORIDE, AND CALCIUM CHLORIDE: 600; 310; 30; 20 INJECTION, SOLUTION INTRAVENOUS at 09:25

## 2017-09-20 RX ADMIN — ALLOPURINOL 300 MG: 300 TABLET ORAL at 14:45

## 2017-09-20 RX ADMIN — DOCUSATE SODIUM 100 MG: 100 CAPSULE, LIQUID FILLED ORAL at 14:45

## 2017-09-20 RX ADMIN — EPHEDRINE SULFATE 20 MG: 50 INJECTION, SOLUTION INTRAMUSCULAR; INTRAVENOUS; SUBCUTANEOUS at 08:17

## 2017-09-20 RX ADMIN — PHENYLEPHRINE HYDROCHLORIDE 200 MCG: 10 INJECTION INTRAVENOUS at 07:51

## 2017-09-20 RX ADMIN — INSULIN HUMAN 10 UNITS: 100 INJECTION, SOLUTION PARENTERAL at 07:11

## 2017-09-20 RX ADMIN — PHENYLEPHRINE HYDROCHLORIDE 200 MCG: 10 INJECTION INTRAVENOUS at 07:59

## 2017-09-20 RX ADMIN — CHLORTHALIDONE 25 MG: 25 TABLET ORAL at 14:57

## 2017-09-20 RX ADMIN — HYDROMORPHONE HYDROCHLORIDE 0.2 MG: 1 INJECTION, SOLUTION INTRAMUSCULAR; INTRAVENOUS; SUBCUTANEOUS at 11:43

## 2017-09-20 RX ADMIN — PHENYLEPHRINE HYDROCHLORIDE 200 MCG: 10 INJECTION INTRAVENOUS at 07:57

## 2017-09-20 RX ADMIN — PHENYLEPHRINE HYDROCHLORIDE 100 MCG/MIN: 10 INJECTION INTRAVENOUS at 08:25

## 2017-09-20 RX ADMIN — INSULIN GLARGINE 30 UNITS: 100 INJECTION, SOLUTION SUBCUTANEOUS at 20:55

## 2017-09-20 RX ADMIN — SUFENTANIL CITRATE 20 MCG: 50 INJECTION EPIDURAL; INTRAVENOUS at 08:50

## 2017-09-20 RX ADMIN — ROCURONIUM BROMIDE 5 MG: 10 INJECTION INTRAVENOUS at 07:35

## 2017-09-20 RX ADMIN — NYSTATIN AND TRIAMCINOLONE ACETONIDE: 100000; 1 OINTMENT TOPICAL at 14:20

## 2017-09-20 RX ADMIN — PROPOFOL 150 MG: 10 INJECTION, EMULSION INTRAVENOUS at 07:35

## 2017-09-20 RX ADMIN — MORPHINE SULFATE 4 MG: 4 INJECTION, SOLUTION INTRAMUSCULAR; INTRAVENOUS at 19:18

## 2017-09-20 RX ADMIN — Medication 10 ML: at 23:31

## 2017-09-20 RX ADMIN — EPHEDRINE SULFATE 10 MG: 50 INJECTION, SOLUTION INTRAMUSCULAR; INTRAVENOUS; SUBCUTANEOUS at 08:09

## 2017-09-20 RX ADMIN — MORPHINE SULFATE 4 MG: 4 INJECTION, SOLUTION INTRAMUSCULAR; INTRAVENOUS at 14:45

## 2017-09-20 RX ADMIN — HYDROMORPHONE HYDROCHLORIDE 0.4 MG: 1 INJECTION, SOLUTION INTRAMUSCULAR; INTRAVENOUS; SUBCUTANEOUS at 11:07

## 2017-09-20 RX ADMIN — Medication 2 G: at 07:27

## 2017-09-20 RX ADMIN — HYDROCODONE BITARTRATE AND ACETAMINOPHEN 2 TABLET: 10; 325 TABLET ORAL at 16:23

## 2017-09-20 RX ADMIN — SUFENTANIL CITRATE 10 MCG: 50 INJECTION EPIDURAL; INTRAVENOUS at 08:54

## 2017-09-20 RX ADMIN — HYDROMORPHONE HYDROCHLORIDE 0.2 MG: 1 INJECTION, SOLUTION INTRAMUSCULAR; INTRAVENOUS; SUBCUTANEOUS at 12:25

## 2017-09-20 RX ADMIN — SODIUM CHLORIDE 1000 ML: 9 INJECTION, SOLUTION INTRAVENOUS at 20:24

## 2017-09-20 RX ADMIN — LIDOCAINE HYDROCHLORIDE 50 MG: 10 INJECTION, SOLUTION INFILTRATION; PERINEURAL at 07:33

## 2017-09-20 ASSESSMENT — PAIN SCALES - GENERAL
PAINLEVEL_OUTOF10: 10
PAINLEVEL_OUTOF10: 8
PAINLEVEL_OUTOF10: 10
PAINLEVEL_OUTOF10: 8
PAINLEVEL_OUTOF10: 0
PAINLEVEL_OUTOF10: 4
PAINLEVEL_OUTOF10: 0

## 2017-09-21 PROBLEM — M47.22 CERVICAL SPONDYLOSIS WITH MYELOPATHY AND RADICULOPATHY: Status: ACTIVE | Noted: 2017-09-21

## 2017-09-21 PROBLEM — M47.12 CERVICAL SPONDYLOSIS WITH MYELOPATHY AND RADICULOPATHY: Status: ACTIVE | Noted: 2017-09-21

## 2017-09-21 PROBLEM — D72.829 LEUKOCYTOSIS: Status: ACTIVE | Noted: 2017-09-20

## 2017-09-21 LAB
ALBUMIN SERPL-MCNC: 3.6 G/DL (ref 3.5–5.2)
ALP BLD-CCNC: 118 U/L (ref 40–130)
ALT SERPL-CCNC: 15 U/L (ref 5–41)
ANION GAP SERPL CALCULATED.3IONS-SCNC: 16 MMOL/L (ref 7–19)
AST SERPL-CCNC: 14 U/L (ref 5–40)
BILIRUB SERPL-MCNC: 1.3 MG/DL (ref 0.2–1.2)
BUN BLDV-MCNC: 18 MG/DL (ref 8–23)
CALCIUM SERPL-MCNC: 8.4 MG/DL (ref 8.8–10.2)
CHLORIDE BLD-SCNC: 103 MMOL/L (ref 98–111)
CO2: 21 MMOL/L (ref 22–29)
CREAT SERPL-MCNC: 0.8 MG/DL (ref 0.5–1.2)
GFR NON-AFRICAN AMERICAN: >60
GLUCOSE BLD-MCNC: 124 MG/DL (ref 70–99)
GLUCOSE BLD-MCNC: 147 MG/DL (ref 70–99)
GLUCOSE BLD-MCNC: 166 MG/DL (ref 70–99)
GLUCOSE BLD-MCNC: 172 MG/DL (ref 74–109)
GLUCOSE BLD-MCNC: 174 MG/DL (ref 70–99)
HCT VFR BLD CALC: 37.6 % (ref 42–52)
HEMOGLOBIN: 10.6 G/DL (ref 14–18)
MCH RBC QN AUTO: 19.3 PG (ref 27–31)
MCHC RBC AUTO-ENTMCNC: 28.2 G/DL (ref 33–37)
MCV RBC AUTO: 68.5 FL (ref 80–94)
PDW BLD-RTO: 19.6 % (ref 11.5–14.5)
PERFORMED ON: ABNORMAL
PLATELET # BLD: 548 K/UL (ref 130–400)
PMV BLD AUTO: 10.5 FL (ref 9.4–12.4)
POTASSIUM SERPL-SCNC: 4.2 MMOL/L (ref 3.5–5)
RBC # BLD: 5.49 M/UL (ref 4.7–6.1)
SODIUM BLD-SCNC: 140 MMOL/L (ref 136–145)
TOTAL PROTEIN: 5.6 G/DL (ref 6.6–8.7)
WBC # BLD: 34.4 K/UL (ref 4.8–10.8)

## 2017-09-21 PROCEDURE — 80053 COMPREHEN METABOLIC PANEL: CPT

## 2017-09-21 PROCEDURE — 2580000003 HC RX 258: Performed by: NEUROLOGICAL SURGERY

## 2017-09-21 PROCEDURE — G8979 MOBILITY GOAL STATUS: HCPCS

## 2017-09-21 PROCEDURE — 2700000000 HC OXYGEN THERAPY PER DAY

## 2017-09-21 PROCEDURE — 6360000002 HC RX W HCPCS: Performed by: NEUROLOGICAL SURGERY

## 2017-09-21 PROCEDURE — G8978 MOBILITY CURRENT STATUS: HCPCS

## 2017-09-21 PROCEDURE — 36415 COLL VENOUS BLD VENIPUNCTURE: CPT

## 2017-09-21 PROCEDURE — 94762 N-INVAS EAR/PLS OXIMTRY CONT: CPT

## 2017-09-21 PROCEDURE — 6370000000 HC RX 637 (ALT 250 FOR IP): Performed by: NEUROLOGICAL SURGERY

## 2017-09-21 PROCEDURE — 6370000000 HC RX 637 (ALT 250 FOR IP): Performed by: FAMILY MEDICINE

## 2017-09-21 PROCEDURE — 85027 COMPLETE CBC AUTOMATED: CPT

## 2017-09-21 PROCEDURE — 97161 PT EVAL LOW COMPLEX 20 MIN: CPT

## 2017-09-21 PROCEDURE — 99232 SBSQ HOSP IP/OBS MODERATE 35: CPT | Performed by: FAMILY MEDICINE

## 2017-09-21 PROCEDURE — 82948 REAGENT STRIP/BLOOD GLUCOSE: CPT

## 2017-09-21 PROCEDURE — 99024 POSTOP FOLLOW-UP VISIT: CPT | Performed by: NURSE PRACTITIONER

## 2017-09-21 PROCEDURE — 1210000000 HC MED SURG R&B

## 2017-09-21 RX ADMIN — INSULIN LISPRO 2 UNITS: 100 INJECTION, SOLUTION INTRAVENOUS; SUBCUTANEOUS at 22:37

## 2017-09-21 RX ADMIN — INSULIN LISPRO 3 UNITS: 100 INJECTION, SOLUTION INTRAVENOUS; SUBCUTANEOUS at 11:44

## 2017-09-21 RX ADMIN — LISINOPRIL 20 MG: 20 TABLET ORAL at 08:39

## 2017-09-21 RX ADMIN — NYSTATIN AND TRIAMCINOLONE ACETONIDE: 100000; 1 OINTMENT TOPICAL at 13:26

## 2017-09-21 RX ADMIN — NYSTATIN AND TRIAMCINOLONE ACETONIDE: 100000; 1 OINTMENT TOPICAL at 08:39

## 2017-09-21 RX ADMIN — LABETALOL HCL 200 MG: 200 TABLET, FILM COATED ORAL at 08:39

## 2017-09-21 RX ADMIN — INSULIN GLARGINE 30 UNITS: 100 INJECTION, SOLUTION SUBCUTANEOUS at 22:34

## 2017-09-21 RX ADMIN — CHLORTHALIDONE 25 MG: 25 TABLET ORAL at 08:39

## 2017-09-21 RX ADMIN — INSULIN GLARGINE 30 UNITS: 100 INJECTION, SOLUTION SUBCUTANEOUS at 08:42

## 2017-09-21 RX ADMIN — MORPHINE SULFATE 4 MG: 4 INJECTION, SOLUTION INTRAMUSCULAR; INTRAVENOUS at 22:42

## 2017-09-21 RX ADMIN — HYDROCODONE BITARTRATE AND ACETAMINOPHEN 2 TABLET: 10; 325 TABLET ORAL at 05:52

## 2017-09-21 RX ADMIN — Medication 10 ML: at 08:41

## 2017-09-21 RX ADMIN — DOCUSATE SODIUM 100 MG: 100 CAPSULE, LIQUID FILLED ORAL at 08:39

## 2017-09-21 RX ADMIN — HYDROCODONE BITARTRATE AND ACETAMINOPHEN 2 TABLET: 10; 325 TABLET ORAL at 11:44

## 2017-09-21 RX ADMIN — Medication 10 ML: at 22:35

## 2017-09-21 RX ADMIN — NYSTATIN AND TRIAMCINOLONE ACETONIDE: 100000; 1 OINTMENT TOPICAL at 22:34

## 2017-09-21 RX ADMIN — ALLOPURINOL 300 MG: 300 TABLET ORAL at 08:39

## 2017-09-21 ASSESSMENT — PAIN SCALES - GENERAL
PAINLEVEL_OUTOF10: 8
PAINLEVEL_OUTOF10: 0
PAINLEVEL_OUTOF10: 10
PAINLEVEL_OUTOF10: 7
PAINLEVEL_OUTOF10: 7
PAINLEVEL_OUTOF10: 0
PAINLEVEL_OUTOF10: 0
PAINLEVEL_OUTOF10: 2
PAINLEVEL_OUTOF10: 2

## 2017-09-21 ASSESSMENT — PAIN SCALES - WONG BAKER: WONGBAKER_NUMERICALRESPONSE: 0

## 2017-09-22 VITALS
SYSTOLIC BLOOD PRESSURE: 103 MMHG | TEMPERATURE: 97.3 F | BODY MASS INDEX: 26.63 KG/M2 | WEIGHT: 186 LBS | OXYGEN SATURATION: 94 % | RESPIRATION RATE: 16 BRPM | DIASTOLIC BLOOD PRESSURE: 63 MMHG | HEIGHT: 70 IN | HEART RATE: 84 BPM

## 2017-09-22 LAB
GLUCOSE BLD-MCNC: 107 MG/DL (ref 70–99)
PERFORMED ON: ABNORMAL

## 2017-09-22 PROCEDURE — 94762 N-INVAS EAR/PLS OXIMTRY CONT: CPT

## 2017-09-22 PROCEDURE — G8988 SELF CARE GOAL STATUS: HCPCS

## 2017-09-22 PROCEDURE — 99231 SBSQ HOSP IP/OBS SF/LOW 25: CPT | Performed by: UROLOGY

## 2017-09-22 PROCEDURE — 97535 SELF CARE MNGMENT TRAINING: CPT

## 2017-09-22 PROCEDURE — 97116 GAIT TRAINING THERAPY: CPT

## 2017-09-22 PROCEDURE — G8989 SELF CARE D/C STATUS: HCPCS

## 2017-09-22 PROCEDURE — 2580000003 HC RX 258: Performed by: NEUROLOGICAL SURGERY

## 2017-09-22 PROCEDURE — 82948 REAGENT STRIP/BLOOD GLUCOSE: CPT

## 2017-09-22 PROCEDURE — G8987 SELF CARE CURRENT STATUS: HCPCS

## 2017-09-22 PROCEDURE — 6370000000 HC RX 637 (ALT 250 FOR IP): Performed by: NEUROLOGICAL SURGERY

## 2017-09-22 RX ORDER — ONDANSETRON 4 MG/1
4 TABLET, FILM COATED ORAL EVERY 8 HOURS PRN
Qty: 30 TABLET | Refills: 1 | Status: SHIPPED | OUTPATIENT
Start: 2017-09-22 | End: 2019-08-20

## 2017-09-22 RX ORDER — HYDROCODONE BITARTRATE AND ACETAMINOPHEN 10; 325 MG/1; MG/1
TABLET ORAL
Qty: 90 TABLET | Refills: 0 | Status: SHIPPED | OUTPATIENT
Start: 2017-09-22 | End: 2019-08-20

## 2017-09-22 RX ORDER — PSEUDOEPHEDRINE HCL 30 MG
100 TABLET ORAL 2 TIMES DAILY
Qty: 60 CAPSULE | Refills: 1 | Status: SHIPPED | OUTPATIENT
Start: 2017-09-22 | End: 2019-08-20

## 2017-09-22 RX ADMIN — ALLOPURINOL 300 MG: 300 TABLET ORAL at 08:57

## 2017-09-22 RX ADMIN — NYSTATIN AND TRIAMCINOLONE ACETONIDE: 100000; 1 OINTMENT TOPICAL at 08:59

## 2017-09-22 RX ADMIN — DOCUSATE SODIUM 100 MG: 100 CAPSULE, LIQUID FILLED ORAL at 08:57

## 2017-09-22 RX ADMIN — HYDROCODONE BITARTRATE AND ACETAMINOPHEN 2 TABLET: 10; 325 TABLET ORAL at 06:29

## 2017-09-22 RX ADMIN — Medication 10 ML: at 08:59

## 2017-09-22 RX ADMIN — LABETALOL HCL 200 MG: 200 TABLET, FILM COATED ORAL at 08:57

## 2017-09-22 RX ADMIN — LISINOPRIL 20 MG: 20 TABLET ORAL at 08:57

## 2017-09-22 RX ADMIN — CHLORTHALIDONE 25 MG: 25 TABLET ORAL at 08:57

## 2017-09-22 ASSESSMENT — PAIN DESCRIPTION - LOCATION: LOCATION: NECK

## 2017-09-22 ASSESSMENT — PAIN SCALES - GENERAL
PAINLEVEL_OUTOF10: 3
PAINLEVEL_OUTOF10: 8

## 2017-09-22 ASSESSMENT — PAIN DESCRIPTION - PAIN TYPE: TYPE: SURGICAL PAIN

## 2017-09-27 ENCOUNTER — TELEPHONE (OUTPATIENT)
Dept: NEUROSURGERY | Age: 62
End: 2017-09-27

## 2017-09-27 ENCOUNTER — OFFICE VISIT (OUTPATIENT)
Dept: NEUROSURGERY | Age: 62
End: 2017-09-27

## 2017-09-27 ENCOUNTER — HOSPITAL ENCOUNTER (OUTPATIENT)
Dept: GENERAL RADIOLOGY | Age: 62
Discharge: HOME | End: 2017-09-27

## 2017-09-27 VITALS
WEIGHT: 174 LBS | SYSTOLIC BLOOD PRESSURE: 85 MMHG | OXYGEN SATURATION: 96 % | BODY MASS INDEX: 25.33 KG/M2 | DIASTOLIC BLOOD PRESSURE: 55 MMHG | HEART RATE: 88 BPM | TEMPERATURE: 98.1 F

## 2017-09-27 DIAGNOSIS — Z98.1 STATUS POST CERVICAL SPINAL FUSION: Primary | ICD-10-CM

## 2017-09-27 DIAGNOSIS — E86.1 HYPOVOLEMIA DEHYDRATION: ICD-10-CM

## 2017-09-27 DIAGNOSIS — R13.10 DYSPHAGIA, UNSPECIFIED TYPE: ICD-10-CM

## 2017-09-27 DIAGNOSIS — R13.10 PROBLEMS WITH SWALLOWING AND MASTICATION: ICD-10-CM

## 2017-09-27 DIAGNOSIS — R63.4 UNINTENDED WEIGHT LOSS: ICD-10-CM

## 2017-09-27 PROCEDURE — 74220 X-RAY XM ESOPHAGUS 1CNTRST: CPT

## 2017-09-27 PROCEDURE — 99024 POSTOP FOLLOW-UP VISIT: CPT | Performed by: NURSE PRACTITIONER

## 2017-09-27 RX ORDER — HYDROXYUREA 500 MG/1
CAPSULE ORAL
Refills: 0 | COMMUNITY
Start: 2017-09-14 | End: 2019-08-26 | Stop reason: SDUPTHER

## 2017-09-27 RX ORDER — DEXAMETHASONE 2 MG/1
TABLET ORAL
Qty: 30 TABLET | Refills: 0 | Status: SHIPPED | OUTPATIENT
Start: 2017-09-27 | End: 2019-08-20

## 2017-09-27 ASSESSMENT — ENCOUNTER SYMPTOMS
EYES NEGATIVE: 1
SORE THROAT: 1
CONSTIPATION: 1
COUGH: 1
SPUTUM PRODUCTION: 1

## 2017-09-29 ENCOUNTER — TELEPHONE (OUTPATIENT)
Dept: NEUROSURGERY | Age: 62
End: 2017-09-29

## 2017-10-04 ENCOUNTER — OFFICE VISIT (OUTPATIENT)
Dept: NEUROSURGERY | Age: 62
End: 2017-10-04

## 2017-10-04 VITALS
DIASTOLIC BLOOD PRESSURE: 65 MMHG | HEIGHT: 69 IN | BODY MASS INDEX: 25.77 KG/M2 | OXYGEN SATURATION: 96 % | SYSTOLIC BLOOD PRESSURE: 96 MMHG | HEART RATE: 91 BPM | WEIGHT: 174 LBS

## 2017-10-04 DIAGNOSIS — Z98.1 STATUS POST CERVICAL SPINAL FUSION: Primary | ICD-10-CM

## 2017-10-04 DIAGNOSIS — R13.10 DYSPHAGIA, UNSPECIFIED TYPE: ICD-10-CM

## 2017-10-04 PROCEDURE — 99024 POSTOP FOLLOW-UP VISIT: CPT | Performed by: NURSE PRACTITIONER

## 2017-10-04 NOTE — PROGRESS NOTES
[facility] Office Visit    Patient:   Quang Aguero  MR#:    [id number]      YOB: 1955  Date of Evaluation:  10/4/2017  Time of Note:                          4:13 PM  Primary/Referring Physician:  Gaston Motley MD   Note Author:   Tori Contreras CNP        Chief Complaint   Patient presents with    Post-Op Check     9/20/2017 ACDF C3-4, C4-5, C5-6, C6-7       HISTORY OF PRESENT ILLNESS:      Quang Aguero is a 65 y.o. male who underwent an ACDF of C3-4, C4-5, C5-6, C6-7 for cervical spondylosis with myeopathy and radiculopathy on 9/20/2017 and now he is 2 weeks out from his surgery. Prior to surgery he complained of right arm pain that radiated into the trapezius, tricep, skipped the elbow, and through the forearm. He complained of numbness in the right arm, thumb and first 2 digits. He could no longer reach into the refrigerator to  large items. Today he states that he really has no pain down into his arms and hands as he did prior to surgery. He also reports that his swallowing has improved. He is drinking and eating soft foods. He states that he feels much better.          Past Medical History:   Diagnosis Date    Gout     Hypertension     PV (polycythemia vera) ([facility] [place] [address]     Type 2 diabetes mellitus without complication ([facility] [place] [address]        Past Surgical History:   Procedure Laterality Date    CERVICAL FUSION N/A 9/20/2017    ACDF C3-4, C4-5, C5-6, C6-7  performed by Kory Ashley DO at [address] N/A 9/20/2017    URINARY CATHETER INSERTION performed by Daisy Nieves MD at [facility]          Medications    Current Outpatient Prescriptions:     hydroxyurea (HYDREA) 500 MG chemo capsule, TAKE 2 CAPSULES ON MONDAY AND THURSDAY, 1 CAPSULE ON TUES, WED,FRI,SAT AND SUN, Disp: , Rfl: 0    dexamethasone (DECADRON) 2 MG tablet, Take 2 tabs every 6 hours x 2 days Take 1 tab every 6 hours x 2 days Take 1 tab twice daily x 2 days Take 1 tab daily x 2 days then stop, Disp: 30 tablet, Rfl: 0    HYDROcodone-acetaminophen (NORCO)  MG per tablet, TAKE 1 TABLET BY MOUTH EVERY 4 HOURS AS NEEDED FOR PAIN., Disp: 90 tablet, Rfl: 0    docusate sodium (COLACE, DULCOLAX) 100 MG CAPS, Take 100 mg by mouth 2 times daily, Disp: 60 capsule, Rfl: 1    ondansetron (ZOFRAN) 4 MG tablet, Take 1 tablet by mouth every 8 hours as needed for Nausea or Vomiting, Disp: 30 tablet, Rfl: 1    zolpidem (AMBIEN) 10 MG tablet, TAKE 1 TABLET EVERY DAY AT BEDTIME FOR SLEEP, Disp: , Rfl: 2    trandolapril (MAVIK) 4 MG tablet, Take 4 mg by mouth daily , Disp: , Rfl:     chlorthalidone (HYGROTON) 25 MG tablet, Take 25 mg by mouth daily , Disp: , Rfl:     labetalol (NORMODYNE) 200 MG tablet, Take 200 mg by mouth daily , Disp: , Rfl:     allopurinol (ZYLOPRIM) 300 MG tablet, TAKE 1 TABLET BY MOUTH EVERY DAY, Disp: , Rfl: 5    metFORMIN (GLUCOPHAGE) 500 MG tablet, TAKE 2 TABLETS BY MOUTH TWICE A DAY, Disp: , Rfl: 5    insulin detemir (LEVEMIR FLEXPEN) 100 UNIT/ML injection pen, Inject 20 Units into the skin nightly, Disp: , Rfl:   Review of patient's allergies indicates no known allergies. Social History  History   Smoking Status    Never Smoker   Smokeless Tobacco    Not on file     History   Alcohol Use    Yes     Comment: rarely          Family History   Problem Relation Age of Onset    Heart Disease Mother     Diabetes Father        Review of Systems   Constitutional: Positive for weight loss. Sweats   HENT: Positive for sore throat. Trouble swallowing   Eyes: Negative. Respiratory: Positive for cough and sputum production. Cardiovascular: Negative. Gastrointestinal: Positive for constipation. Musculoskeletal: Positive for joint pain and neck pain. Neurological: Positive for dizziness. Psychiatric/Behavioral: Positive for depression.        PHYSICAL EXAM:  Vitals:    10/04/17 1308   BP: 96/65   Pulse: 91   SpO2: 96%

## 2017-10-18 ENCOUNTER — HOSPITAL ENCOUNTER (OUTPATIENT)
Dept: GENERAL RADIOLOGY | Age: 62
Discharge: HOME | End: 2017-10-18

## 2017-10-18 ENCOUNTER — OFFICE VISIT (OUTPATIENT)
Dept: NEUROSURGERY | Age: 62
End: 2017-10-18

## 2017-10-18 VITALS
BODY MASS INDEX: 26.66 KG/M2 | OXYGEN SATURATION: 97 % | DIASTOLIC BLOOD PRESSURE: 63 MMHG | HEART RATE: 95 BPM | WEIGHT: 180 LBS | HEIGHT: 69 IN | SYSTOLIC BLOOD PRESSURE: 99 MMHG

## 2017-10-18 DIAGNOSIS — Z98.1 STATUS POST CERVICAL SPINAL FUSION: Primary | ICD-10-CM

## 2017-10-18 DIAGNOSIS — R13.10 DYSPHAGIA, UNSPECIFIED TYPE: ICD-10-CM

## 2017-10-18 DIAGNOSIS — Z98.1 STATUS POST CERVICAL SPINAL FUSION: ICD-10-CM

## 2017-10-18 PROCEDURE — 72040 X-RAY EXAM NECK SPINE 2-3 VW: CPT

## 2017-10-18 PROCEDURE — 99024 POSTOP FOLLOW-UP VISIT: CPT | Performed by: NURSE PRACTITIONER

## 2017-10-18 NOTE — PROGRESS NOTES
[facility] Office Visit    Patient:   Trevor Aguero  MR#:    [id number]      YOB: 1955  Date of Evaluation:  10/18/2017  Time of Note:                          12:57 PM  Primary/Referring Physician:  Erma Armendariz MD   Note Author:   Jia Sanchez CNP        Chief Complaint   Patient presents with    1 Month Follow-Up     Review XRAY     Post-Op Check     9/20/2017 ACDF C3-4, C4-5, C5-6, C6-7       HISTORY OF PRESENT ILLNESS:      Trevor Aguero is a 65 y.o. male who underwent an ACDF of C3-4, C4-5, C5-6, C6-7 for cervical spondylosis with myeopathy and radiculopathy on 9/20/2017 and now he is 1 month out from his surgery. Prior to surgery he complained of right arm pain that radiated into the trapezius, tricep, skipped the elbow, and through the forearm. He complained of numbness in the right arm, thumb and first 2 digits. He could no longer reach into the refrigerator to  large items. Today he states that he really has no pain down into his arms and hands as he did prior to surgery. He also reports that his swallowing has improved and continues to get better daily. He states that he has more control over his computer typing and while using his mouse. He states he notices small things each day that he is improving on. He states that he is walking about 1 mile a day. He is glad he had surgery.          Past Medical History:   Diagnosis Date    Gout     Hypertension     PV (polycythemia vera) ([facility] [address]     Type 2 diabetes mellitus without complication ([facility] [address]        Past Surgical History:   Procedure Laterality Date    CERVICAL FUSION N/A 9/20/2017    ACDF C3-4, C4-5, C5-6, C6-7  performed by Julian Lawler DO at [address] N/A 9/20/2017    URINARY CATHETER INSERTION performed by Kary Call MD at [address] [place] [address]          Medications    Current Outpatient Prescriptions:     hydroxyurea (HYDREA) 500 MG chemo capsule, TAKE Musculoskeletal: Positive for joint pain and neck pain. Neurological: Positive for dizziness. Psychiatric/Behavioral: Positive for depression. PHYSICAL EXAM:  Vitals:    10/18/17 1037   BP: 99/63   Pulse: 95   SpO2: 97%     Constitutional: The patient appears well-developed and well-nourished. Eyes - conjunctiva normal.  Conjugate gaze  Ear, nose, throat -No scars, masses, or lesions over external nose or ears, no atrophy of tongue  Neck-symmetric, no masses noted, no jugular vein distension  Respiration- chest wall appears symmetric, good expansion, normal effort without use of accessory muscles  Musculoskeletal - no significant wasting of muscles noted, no bony deformities, gait no gross ataxia  Extremities-no clubbing, cyanosis or edema  Skin - warm, dry, and intact. No rash, erythema, or pallor. Psychiatric - mood, affect, and behavior appear normal.     Neurologic Examinaiton  Awake, Alert and oriented x 3  Normal speech pattern, following commands  ABRAHAM well  No deficits to light touch or pinprick sensation  Left Goss's     Normal Gait pattern      Wound: Healing well. DATA and IMAGING:    Lab Results   Component Value Date    WBC 34.4 (H) 09/21/2017    HGB 10.6 (L) 09/21/2017    HCT 37.6 (L) 09/21/2017    MCV 68.5 (L) 09/21/2017     (H) 09/21/2017     Lab Results   Component Value Date     09/21/2017    K 4.2 09/21/2017     09/21/2017    CO2 21 (L) 09/21/2017    BUN 18 09/21/2017    CREATININE 0.8 09/21/2017    GLUCOSE 172 (H) 09/21/2017    CALCIUM 8.4 (L) 09/21/2017    PROT 5.6 (L) 09/21/2017    LABALBU 3.6 09/21/2017    BILITOT 1.3 (H) 09/21/2017    ALKPHOS 118 09/21/2017    AST 14 09/21/2017    ALT 15 09/21/2017    LABGLOM >60 09/21/2017     Lab Results   Component Value Date    INR 1.09 09/14/2017    PROTIME 14.0 09/14/2017    No results found.     Narrative   History: Status post cervical fusion, dysphasia   Cervical spine: Frontal, lateral, and odontoid views of cervical spine   are obtained. There is anterior cervical fusion of C3-C7 with interposition grafts   in place. Alignment appears appropriate. Minimal prominence of the   prevertebral soft tissues at the C6 and 7 level may be postoperative. There is mild endplate spurring of the anterior inferior C2 vertebra. There is no radiographic evidence of hardware loosening. There is mild   facet osteoarthropathy.       Impression   1. Anterior cervical fusion of C3-C7 with interposition grafts in   place. Alignment appears appropriate. Minimal prominence of the   prevertebral soft tissues at the C6 and C7 level may be postoperative   (surgery performed 9/20/2017). Mild C2 endplate spurring. No   convincing acute radiographic abnormality. Signed by Dr Joni Hawk on 10/18/2017 10:28 AM     My Interpretation:  Hardware stable     ASSESSMENT:   Jina Aguero is a 65 y.o. male who underwent an ACDF of C3-4, C4-5, C5-6, C6-7 for cervical spondylosis with myeopathy and radiculopathy on 9/20/2017 and now he is 1 month out from his surgery. He is recovering well.       PLAN:  -Can now lift up to 15lbs  -Can drive  -Follow up in 2 months with XR of cervical spine           ICD-10-CM ICD-9-CM    1. Status post cervical spinal fusion Z98.1 V45.4         Shakira Copeland, CNP

## 2017-12-18 ENCOUNTER — OFFICE VISIT (OUTPATIENT)
Dept: NEUROSURGERY | Age: 62
End: 2017-12-18

## 2017-12-18 ENCOUNTER — HOSPITAL ENCOUNTER (OUTPATIENT)
Dept: GENERAL RADIOLOGY | Age: 62
Discharge: HOME | End: 2017-12-18

## 2017-12-18 VITALS
HEART RATE: 68 BPM | SYSTOLIC BLOOD PRESSURE: 142 MMHG | DIASTOLIC BLOOD PRESSURE: 88 MMHG | WEIGHT: 188 LBS | HEIGHT: 70 IN | OXYGEN SATURATION: 98 % | BODY MASS INDEX: 26.92 KG/M2

## 2017-12-18 DIAGNOSIS — Z98.1 STATUS POST CERVICAL SPINAL FUSION: Primary | ICD-10-CM

## 2017-12-18 DIAGNOSIS — Z98.1 STATUS POST CERVICAL SPINAL FUSION: ICD-10-CM

## 2017-12-18 PROCEDURE — 72040 X-RAY EXAM NECK SPINE 2-3 VW: CPT

## 2017-12-18 PROCEDURE — 99024 POSTOP FOLLOW-UP VISIT: CPT | Performed by: NURSE PRACTITIONER

## 2017-12-18 NOTE — PROGRESS NOTES
[facility] Office Visit    Patient:   Cuca Aguero  MR#:    [id number]      YOB: 1955  Date of Evaluation:  12/18/2017  Time of Note:                          11:36 AM  Primary/Referring Physician:  Zari Sanders MD   Note Author:   Sheldon Ken CNP        Chief Complaint   Patient presents with    Follow-up     9/20/17 - ACDF C3-4, C4-5, C5-6, C6-7    Arm Pain     occasional right arm pain as it was before surgery       HISTORY OF PRESENT ILLNESS:      Cuca Aguero is a 65 y.o. male who underwent an ACDF of C3-4, C4-5, C5-6, C6-7 for cervical spondylosis with myeopathy and radiculopathy on 9/20/2017 and now he is 3 months out from his surgery. Prior to surgery he complained of right arm pain that radiated into the trapezius, tricep, skipped the elbow, and through the forearm. He complained of numbness in the right arm, thumb and first 2 digits. He could no longer reach into the refrigerator to  large items. Today he states that all of his arm pain is gone. He does report that he had one episode of right arm pain that lasted for about 3 days and then went away. He states that he can now wrap his arms around his waist to put his belt on in which he could not do prior to surgery. He reports that he feels better and has been walking about 1 mile a day again. He is very thankful he had surgery.        Past Medical History:   Diagnosis Date    Gout     Hypertension     PV (polycythemia vera) ([facility] [address]     Type 2 diabetes mellitus without complication ([facility] [address]        Past Surgical History:   Procedure Laterality Date    CERVICAL FUSION N/A 9/20/2017    ACDF C3-4, C4-5, C5-6, C6-7  performed by Eduarda Avendano DO at [address] N/A 9/20/2017    URINARY CATHETER INSERTION performed by Edvin Parsons MD at [address] [place] [address]          Medications    Current Outpatient Prescriptions:     hydroxyurea (HYDREA) 500 MG chemo capsule, TAKE 2 Musculoskeletal: Positive for joint pain and neck pain. Neurological: Positive for dizziness. Psychiatric/Behavioral: Positive for depression. PHYSICAL EXAM:  Vitals:    12/18/17 1121   BP: (!) 142/88   Pulse: 68   SpO2:      Constitutional: The patient appears well-developed and well-nourished. Eyes - conjunctiva normal.  Conjugate gaze  Ear, nose, throat -No scars, masses, or lesions over external nose or ears, no atrophy of tongue  Neck-symmetric, no masses noted, no jugular vein distension  Respiration- chest wall appears symmetric, good expansion, normal effort without use of accessory muscles  Musculoskeletal - no significant wasting of muscles noted, no bony deformities, gait no gross ataxia  Extremities-no clubbing, cyanosis or edema  Skin - warm, dry, and intact. No rash, erythema, or pallor. Psychiatric - mood, affect, and behavior appear normal.     Neurologic Examinaiton  Awake, Alert and oriented x 3  Normal speech pattern, following commands  ABRAHAM well  No deficits to light touch or pinprick sensation  No holloway's present at today's visit     Normal Gait pattern      Wound: Healing well. DATA and IMAGING:    Lab Results   Component Value Date    WBC 34.4 (H) 09/21/2017    HGB 10.6 (L) 09/21/2017    HCT 37.6 (L) 09/21/2017    MCV 68.5 (L) 09/21/2017     (H) 09/21/2017     Lab Results   Component Value Date     09/21/2017    K 4.2 09/21/2017     09/21/2017    CO2 21 (L) 09/21/2017    BUN 18 09/21/2017    CREATININE 0.8 09/21/2017    GLUCOSE 172 (H) 09/21/2017    CALCIUM 8.4 (L) 09/21/2017    PROT 5.6 (L) 09/21/2017    LABALBU 3.6 09/21/2017    BILITOT 1.3 (H) 09/21/2017    ALKPHOS 118 09/21/2017    AST 14 09/21/2017    ALT 15 09/21/2017    LABGLOM >60 09/21/2017     Lab Results   Component Value Date    INR 1.09 09/14/2017    PROTIME 14.0 09/14/2017    No results found. Narrative   Examination. XR CERVICAL SPINE LIMITED   History: Cervical spinal fusion.    The frontal and lateral cervical spine are obtained. The comparison is   made with the previous study dated 10/18/2017. There is a mild reversal of cervical lordosis. The alignment is   normal. The vertebral body heights are normal.   There is evidence of anterior spinal fusion from C3 through C7 with   metallic plate and screws. No hardware complication. The disc spacers   are seen in place. Chronic degenerative changes are seen in the form of osteophytes and   narrowing of the disc space at C2-3 which is stable since the previous   study. The prevertebral soft tissues swelling has improved since the   previous study.       Impression   The hardware anterior spinal fusion. No hardware   complication. Moderate cervical spondylosis. A mild reversal of cervical lordosis. Signed by Dr Sarah Alvarenga on 12/18/2017 11:10 AM     My Interpretation:  Hardware stable     ASSESSMENT:   Nikolas Aguero is a 65 y.o. male who underwent an ACDF of C3-4, C4-5, C5-6, C6-7 for cervical spondylosis with myeopathy and radiculopathy on 9/20/2017 and now he is 3 months out from his surgery. He is recovering well. PLAN:  -Mr. Aguero is doing very well.    -Follow up in 3 months with repeat XR cervical spine            ICD-10-CM ICD-9-CM    1. Status post cervical spinal fusion Z98.1 V45.4         [employer], CNP

## 2018-03-16 ENCOUNTER — TELEPHONE (OUTPATIENT)
Dept: NEUROSURGERY | Age: 63
End: 2018-03-16

## 2019-07-22 VITALS
DIASTOLIC BLOOD PRESSURE: 72 MMHG | HEIGHT: 69 IN | WEIGHT: 181 LBS | BODY MASS INDEX: 26.81 KG/M2 | SYSTOLIC BLOOD PRESSURE: 110 MMHG | HEART RATE: 92 BPM | OXYGEN SATURATION: 97 %

## 2019-08-16 NOTE — PROGRESS NOTES
phlebotomies despite the Hydrea. CT of the abdomen and pelvis with contrast performed at [facility] on 2016 did reveal splenomegaly at 16 cm.       ECO        Past medical history:  Past Medical History:   Diagnosis Date    Gout     Hypertension     PV (polycythemia vera) ([address]     Type 2 diabetes mellitus without complication (HCC)         Past surgical history:  Past Surgical History:   Procedure Laterality Date    CERVICAL FUSION N/A 2017    ACDF C3-4, C4-5, C5-6, C6-7  performed by Kelby Ramos DO at [address] N/A 2017    URINARY CATHETER INSERTION performed by Daniel Robison MD at [address] LITHOTRIPSY      SPINE SURGERY      C Spine        Social history:  Social History     Socioeconomic History    Marital status:      Spouse name: Not on file    Number of children: Not on file    Years of education: Not on file    Highest education level: Not on file   Occupational History    Not on file   Social Needs    Financial resource strain: Not on file    Food insecurity:     Worry: Not on file     Inability: Not on file    Transportation needs:     Medical: Not on file     Non-medical: Not on file   Tobacco Use    Smoking status: Never Smoker    Smokeless tobacco: Never Used   Substance and Sexual Activity    Alcohol use: Yes     Comment: rarely     Drug use: No    Sexual activity: Not Currently     Partners: Female   Lifestyle    Physical activity:     Days per week: Not on file     Minutes per session: Not on file    Stress: Not on file   Relationships    Social connections:     Talks on phone: Not on file     Gets together: Not on file     Attends Buddhism service: Not on file     Active member of club or organization: Not on file     Attends meetings of clubs or organizations: Not on file     Relationship status: Not on file    Intimate partner violence:     Fear of current or ex partner: Not on file Emotionally abused: Not on file     Physically abused: Not on file     Forced sexual activity: Not on file   Other Topics Concern    Not on file   Social History Narrative    Not on file        Family history:   Family History   Problem Relation Age of Onset    Heart Disease Mother     Diabetes Father     High Blood Pressure Sister     Diabetes Brother     No Known Problems Child     No Known Problems Brother         Current Outpatient Medications   Medication Sig Dispense Refill    hydroxyurea (HYDREA) 500 MG chemo capsule TAKE 2 CAPSULES ON MONDAY AND THURSDAY, 1 CAPSULE ON TUES, WED,FRI,SAT AND SUN  0    chlorthalidone (HYGROTON) 25 MG tablet Take 25 mg by mouth daily       labetalol (NORMODYNE) 200 MG tablet Take 200 mg by mouth daily       allopurinol (ZYLOPRIM) 300 MG tablet TAKE 1 TABLET BY MOUTH EVERY DAY  5    metFORMIN (GLUCOPHAGE) 500 MG tablet TAKE 2 TABLETS BY MOUTH TWICE A DAY  5     No current facility-administered medications for this visit. REVIEW OF SYSTEMS:    Constitutional: no fever, no night sweats, fatigue;   HEENT: no blurring of vision, no double vision, no hearing difficulty, no tinnitus,no ulceration, no dental caries, no dysphagia  Lungs: no cough, no shortness of breath, no wheeze;   CVS: no palpitation, no chest pain, no shortness of breath;  GI: no abdominal pain, no nausea , no vomiting, no constipation;   VERONA: no dysuria, frequency and urgency, no hematuria, no kidney stones;   Musculoskeletal: no joint pain, swelling , stiffness; back pain  Endocrine: no polyuria, polydypsia, no cold or heat intolerence; Hematology/lymphatic: no easy brusing or bleeding, no hx of clotting disorder; no peripheral adenopathy. Dermatology: no skin rash, no eczema, no pruritis;   Psychiatry: no depression, no anxiety,no panic attacks, no suicide ideation;    Neurology: no syncope, no seizures, no numbness or tingling of hands, no numbness or tingling of feet, no paresis; appointment given for 2 months   4. Continue Aspirin 81 mg daily  I have seen, examined and reviewed this patient medication list, appropriate labs and imaging studies. I reviewed relevant medical records and others physicians notes. I discussed the plans of care with the patient. I answered all the questions to the patients satisfaction. Follow Up:     Return in about 2 months (around 10/20/2019) for an aldo with Dr. Kayce Feliz. PHLEBOTOMY ON FRI WITH IV FLUIDS AT [facility]      IoRsa am scribing for Kyler Aguilar MD. Electronically signed by Roas Almanzar on 8/20/2019 at 5:59 PM.     I, Dr Go Nurse, personally performed the services described in this documentation as scribed by Roas Almanzar MA in my presence and is both accurate and complete. Over 50% of the total visit time of 30 minutes was spent on counseling and/or coordination of care of his diagnosis of polycythemia vera/therapeutic recommendations.

## 2019-08-20 ENCOUNTER — OFFICE VISIT (OUTPATIENT)
Dept: HEMATOLOGY | Age: 64
End: 2019-08-20
Payer: MEDICARE

## 2019-08-20 VITALS
OXYGEN SATURATION: 97 % | DIASTOLIC BLOOD PRESSURE: 76 MMHG | BODY MASS INDEX: 26.66 KG/M2 | HEIGHT: 69 IN | HEART RATE: 88 BPM | SYSTOLIC BLOOD PRESSURE: 122 MMHG | WEIGHT: 180 LBS

## 2019-08-20 DIAGNOSIS — D45 POLYCYTHEMIA VERA (HCC): Primary | ICD-10-CM

## 2019-08-20 PROCEDURE — 99214 OFFICE O/P EST MOD 30 MIN: CPT | Performed by: INTERNAL MEDICINE

## 2019-08-26 RX ORDER — HYDROXYUREA 500 MG/1
CAPSULE ORAL
Qty: 108 CAPSULE | Refills: 2 | Status: SHIPPED | OUTPATIENT
Start: 2019-08-26 | End: 2020-01-17 | Stop reason: SDUPTHER

## 2019-10-21 VITALS
HEART RATE: 92 BPM | BODY MASS INDEX: 26.81 KG/M2 | DIASTOLIC BLOOD PRESSURE: 72 MMHG | HEIGHT: 69 IN | WEIGHT: 181 LBS | SYSTOLIC BLOOD PRESSURE: 110 MMHG

## 2019-10-22 ENCOUNTER — OFFICE VISIT (OUTPATIENT)
Dept: HEMATOLOGY | Age: 64
End: 2019-10-22
Payer: COMMERCIAL

## 2019-10-22 VITALS
HEART RATE: 94 BPM | RESPIRATION RATE: 16 BRPM | DIASTOLIC BLOOD PRESSURE: 70 MMHG | WEIGHT: 185 LBS | BODY MASS INDEX: 26.48 KG/M2 | SYSTOLIC BLOOD PRESSURE: 142 MMHG | HEIGHT: 70 IN

## 2019-10-22 DIAGNOSIS — D45 POLYCYTHEMIA VERA (HCC): Primary | ICD-10-CM

## 2019-10-22 DIAGNOSIS — D69.6 THROMBOCYTOPENIA (HCC): ICD-10-CM

## 2019-10-22 PROCEDURE — 99213 OFFICE O/P EST LOW 20 MIN: CPT | Performed by: NURSE PRACTITIONER

## 2019-11-15 ENCOUNTER — HOSPITAL ENCOUNTER (OUTPATIENT)
Dept: INFUSION THERAPY | Age: 64
Discharge: HOME | End: 2019-11-15
Payer: MEDICARE

## 2019-11-15 DIAGNOSIS — D45 POLYCYTHEMIA VERA (HCC): ICD-10-CM

## 2019-11-15 PROCEDURE — 85025 COMPLETE CBC W/AUTO DIFF WBC: CPT

## 2019-12-03 PROBLEM — D69.6 THROMBOCYTOPENIA (HCC): Status: ACTIVE | Noted: 2019-12-03

## 2019-12-03 ASSESSMENT — ENCOUNTER SYMPTOMS
DIARRHEA: 0
COUGH: 0
VOMITING: 0
GASTROINTESTINAL NEGATIVE: 1
EYE PAIN: 0
ABDOMINAL PAIN: 0
WHEEZING: 0
EYES NEGATIVE: 1
RESPIRATORY NEGATIVE: 1
SHORTNESS OF BREATH: 0
SORE THROAT: 0
EYE DISCHARGE: 0
BLOOD IN STOOL: 0
EYE REDNESS: 0
BACK PAIN: 0
CONSTIPATION: 0
NAUSEA: 0

## 2019-12-04 ENCOUNTER — OFFICE VISIT (OUTPATIENT)
Dept: HEMATOLOGY | Age: 64
End: 2019-12-04
Payer: COMMERCIAL

## 2019-12-04 ENCOUNTER — HOSPITAL ENCOUNTER (OUTPATIENT)
Dept: INFUSION THERAPY | Age: 64
Discharge: HOME | End: 2019-12-04
Payer: COMMERCIAL

## 2019-12-04 VITALS
HEIGHT: 70 IN | OXYGEN SATURATION: 97 % | HEART RATE: 90 BPM | SYSTOLIC BLOOD PRESSURE: 146 MMHG | DIASTOLIC BLOOD PRESSURE: 72 MMHG | WEIGHT: 187.6 LBS | BODY MASS INDEX: 26.86 KG/M2

## 2019-12-04 DIAGNOSIS — D69.6 THROMBOCYTOPENIA (HCC): ICD-10-CM

## 2019-12-04 DIAGNOSIS — D45 POLYCYTHEMIA VERA (HCC): Primary | ICD-10-CM

## 2019-12-04 PROCEDURE — 99213 OFFICE O/P EST LOW 20 MIN: CPT | Performed by: NURSE PRACTITIONER

## 2019-12-04 PROCEDURE — 85025 COMPLETE CBC W/AUTO DIFF WBC: CPT

## 2019-12-04 RX ORDER — 0.9 % SODIUM CHLORIDE 0.9 %
250 INTRAVENOUS SOLUTION INTRAVENOUS ONCE
Status: CANCELLED | OUTPATIENT
Start: 2019-12-04

## 2019-12-04 RX ORDER — ROPINIROLE 0.25 MG/1
TABLET, FILM COATED ORAL
Refills: 5 | COMMUNITY
Start: 2019-11-22 | End: 2020-09-02 | Stop reason: ALTCHOICE

## 2019-12-04 RX ORDER — 0.9 % SODIUM CHLORIDE 0.9 %
500 INTRAVENOUS SOLUTION INTRAVENOUS ONCE
Status: CANCELLED | OUTPATIENT
Start: 2019-12-04

## 2019-12-04 ASSESSMENT — ENCOUNTER SYMPTOMS
EYES NEGATIVE: 1
CONSTIPATION: 0
ABDOMINAL PAIN: 0
EYE PAIN: 0
COUGH: 0
EYE DISCHARGE: 0
DIARRHEA: 0
SHORTNESS OF BREATH: 0
NAUSEA: 0
GASTROINTESTINAL NEGATIVE: 1
RESPIRATORY NEGATIVE: 1
SORE THROAT: 0
EYE REDNESS: 0
VOMITING: 0
BACK PAIN: 0
BLOOD IN STOOL: 0
WHEEZING: 0

## 2019-12-04 NOTE — PROGRESS NOTES
Phlebotomy as ordered for Hct 42.4 per Destiny Quintero. 500ml removed. Patient and wife verbalize understanding of s/s to report. Understands to hydrate, good nourishment and avoid any strenuous activity today. Continue Hydrea and ASA as prescribed, will return x 6 weeks as ordered.

## 2020-01-15 ENCOUNTER — HOSPITAL ENCOUNTER (OUTPATIENT)
Dept: ULTRASOUND IMAGING | Age: 65
Discharge: HOME | End: 2020-01-15
Payer: MEDICARE

## 2020-01-15 ENCOUNTER — OFFICE VISIT (OUTPATIENT)
Dept: HEMATOLOGY | Age: 65
End: 2020-01-15
Payer: MEDICARE

## 2020-01-15 ENCOUNTER — HOSPITAL ENCOUNTER (OUTPATIENT)
Dept: INFUSION THERAPY | Age: 65
Discharge: HOME | End: 2020-01-15
Payer: MEDICARE

## 2020-01-15 VITALS
OXYGEN SATURATION: 97 % | HEIGHT: 70 IN | HEART RATE: 51 BPM | DIASTOLIC BLOOD PRESSURE: 74 MMHG | SYSTOLIC BLOOD PRESSURE: 120 MMHG | WEIGHT: 190.5 LBS | BODY MASS INDEX: 27.27 KG/M2

## 2020-01-15 DIAGNOSIS — D45 POLYCYTHEMIA VERA (HCC): ICD-10-CM

## 2020-01-15 PROCEDURE — 99211 OFF/OP EST MAY X REQ PHY/QHP: CPT

## 2020-01-15 PROCEDURE — 99213 OFFICE O/P EST LOW 20 MIN: CPT | Performed by: NURSE PRACTITIONER

## 2020-01-15 PROCEDURE — 85025 COMPLETE CBC W/AUTO DIFF WBC: CPT

## 2020-01-15 PROCEDURE — 76705 ECHO EXAM OF ABDOMEN: CPT

## 2020-01-15 ASSESSMENT — ENCOUNTER SYMPTOMS
VOMITING: 0
SORE THROAT: 0
DIARRHEA: 0
COUGH: 0
EYE PAIN: 0
SHORTNESS OF BREATH: 0
EYE DISCHARGE: 0
EYES NEGATIVE: 1
RESPIRATORY NEGATIVE: 1
GASTROINTESTINAL NEGATIVE: 1
CONSTIPATION: 0
EYE REDNESS: 0
NAUSEA: 0
BLOOD IN STOOL: 0
ABDOMINAL PAIN: 0
WHEEZING: 0
BACK PAIN: 0

## 2020-01-15 NOTE — PROGRESS NOTES
Progress Note      Pt Name: Creta Lefort: 1955  MRN: [id number]    Date of evaluation: 1/15/2020  History Obtained From:  patient, electronic medical record    CHIEF COMPLAINT:    Chief Complaint   Patient presents with    Other     Polycythemia vera ([address]    Treatment     HISTORY OF PRESENT ILLNESS:    Veronica Aguero is a 62 y.o.  male with significant PMH of polycythemia vera, JAK2 positive. Jean Claude Baeza is continues to be treated with Hydrea 500 mg twice a day and aspirin daily. He last required a phlebotomy on 12/4/2019 with the goal of maintaining a hematocrit of <42. Jean Claude Baeza returns today in scheduled 6-week follow-up and side effect monitoring of Hydrea. He presents today with no new complaints and indicates that overall he is doing well. CBC today reveals a WBC of 18.44, hemoglobin 12.5, hematocrit 42.6 and a platelet count of 115,631. Jean Claude Baeza will receive another phlebotomy today for his hematocrit of 42.6. His leukocytosis is most likely secondary to his polycythemia vera. He denies any B symptoms, febrile illness or other infectious sequelae. We will continue to monitor closely. He continues to have no palpable adenopathy. ONCOLOGIC HISTORY:     Diagnosis   · JESUS-2 polycythemia vera   · High risk   · Splenomegaly     TREATMENT SUMMARY:  · Phlebotomies only initiated July 2008 until March 2012. · Hydrea-Hydrea 1000 mg a.m/1000 mg p.m. · Periodic phlebotomies as indicated despite Hydrea. Last  06/18/2019 and 08/20/2019  · ASA 81mg daily    HEMATOLOGICAL HISTORY: JESUS-2 positive polycythemia vera diagnosed 07/2008  Mr. Aguero was seen by Dr. Jean Claude Baeza on 07/25/08 where he was found to have an elevated hemoglobin and hematocrit of 20.2 and 58.5 respectively. I did have a conference with Dr. Jean Claude Baeza prior to Won Fabienne Mojicamirtha initial visit. Mr. Aguero had had two phlebotomies prior to his initial visit on 08/26/08.  Workup included an erythropoietin level, which was normal a JESUS-2 which was Negative for polydipsia. Genitourinary: Negative for dysuria, flank pain, frequency, hematuria and urgency. Musculoskeletal: Negative. Negative for back pain, myalgias and neck pain. Skin: Negative. Negative for rash. Neurological: Negative. Negative for dizziness, tremors, seizures, weakness and headaches. Hematological: Does not bruise/bleed easily. Psychiatric/Behavioral: Negative. The patient is not nervous/anxious. Objective   PHYSICAL EXAM:  Physical Exam  Vitals signs reviewed. Constitutional:       General: He is not in acute distress. Appearance: He is well-developed. He is not diaphoretic. HENT:      Head: Normocephalic and atraumatic. Mouth/Throat:      Pharynx: Uvula midline. Tonsils: No tonsillar exudate. Eyes:      General: Lids are normal. No scleral icterus. Right eye: No discharge. Left eye: No discharge. Conjunctiva/sclera: Conjunctivae normal.      Pupils: Pupils are equal, round, and reactive to light. Neck:      Musculoskeletal: Normal range of motion and neck supple. Thyroid: No thyroid mass or thyromegaly. Vascular: No JVD. Trachea: Trachea normal. No tracheal deviation. Cardiovascular:      Rate and Rhythm: Normal rate and regular rhythm. Heart sounds: Normal heart sounds. No murmur. No friction rub. No gallop. Pulmonary:      Effort: Pulmonary effort is normal. No respiratory distress. Breath sounds: Normal breath sounds. No wheezing or rales. Chest:      Chest wall: No tenderness. Abdominal:      General: Bowel sounds are normal. There is no distension. Palpations: Abdomen is soft. There is no mass. Tenderness: There is no tenderness. There is no guarding or rebound. Hernia: No hernia is present. Musculoskeletal:         General: No tenderness or deformity. Comments: Range of motion within normal limits x4 extremities   Skin:     General: Skin is warm.       Coloration:

## 2020-01-15 NOTE — PROGRESS NOTES
THERAPEUTIC PHLEBOTOMY    Most recent Hemoglobin 14.2Gm/dl and Hematocrit 42.6%    Date obtained: 01/15/2020      Pre-phlebotomy Vital Signs: Refer to Doc flow sheet    Start time: 11:25    Tourniquet placed on patient's arm and arm palpated for venous access. Area of venous access cleansed with alcohol. Sheath removed from the needle and needle inserted into the right antecubital site. Blood flowing well down the tubing into collection bag. Adjustment/no adjustment of needle needed to maintain adequate blood flow. Scale monitoring amount of blood in bag. Stop Time: 11:15  Needle removed from patient's arm. Pressure applied to patient's arm until bleeding stopped. Dry sterile dressing applied over puncture site and secured with Coban/self-adherent wrap. Final amount of blood removed: 500ML  Post Vital Signs: Refer to Doc flow sheet    Patient tolerated procedure well. No redness, edema, or signs of active bleeding noted. Discharge Instructions provided: No heavy pushing or lifting for the next 24 hours. Keep dressing dry and in place for 4 to 6 hours. If a fever GREATER than 100.5 develops, contact office. Patient discharged ambulatory with belongings.

## 2020-01-18 RX ORDER — HYDROXYUREA 500 MG/1
1000 CAPSULE ORAL 2 TIMES DAILY
Qty: 120 CAPSULE | Refills: 2 | Status: SHIPPED | OUTPATIENT
Start: 2020-01-18 | End: 2020-08-17

## 2020-02-26 ENCOUNTER — HOSPITAL ENCOUNTER (OUTPATIENT)
Dept: INFUSION THERAPY | Age: 65
End: 2020-02-26

## 2020-03-05 ENCOUNTER — HOSPITAL ENCOUNTER (OUTPATIENT)
Dept: INFUSION THERAPY | Age: 65
Discharge: HOME | End: 2020-03-05
Payer: MEDICARE

## 2020-03-05 ENCOUNTER — OFFICE VISIT (OUTPATIENT)
Dept: HEMATOLOGY | Age: 65
End: 2020-03-05
Payer: MEDICARE

## 2020-03-05 VITALS
OXYGEN SATURATION: 98 % | DIASTOLIC BLOOD PRESSURE: 66 MMHG | SYSTOLIC BLOOD PRESSURE: 138 MMHG | HEART RATE: 97 BPM | WEIGHT: 182.4 LBS | HEIGHT: 70 IN | BODY MASS INDEX: 26.11 KG/M2

## 2020-03-05 DIAGNOSIS — D45 POLYCYTHEMIA VERA (HCC): ICD-10-CM

## 2020-03-05 PROCEDURE — 85025 COMPLETE CBC W/AUTO DIFF WBC: CPT

## 2020-03-05 PROCEDURE — 99211 OFF/OP EST MAY X REQ PHY/QHP: CPT

## 2020-03-05 PROCEDURE — 99213 OFFICE O/P EST LOW 20 MIN: CPT | Performed by: NURSE PRACTITIONER

## 2020-03-05 ASSESSMENT — ENCOUNTER SYMPTOMS
VOMITING: 0
ABDOMINAL PAIN: 0
EYE PAIN: 0
RESPIRATORY NEGATIVE: 1
EYE DISCHARGE: 0
CONSTIPATION: 0
SORE THROAT: 0
EYES NEGATIVE: 1
COUGH: 0
BLOOD IN STOOL: 0
NAUSEA: 0
EYE REDNESS: 0
DIARRHEA: 0
BACK PAIN: 0
SHORTNESS OF BREATH: 0
GASTROINTESTINAL NEGATIVE: 1
WHEEZING: 0
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
## 2020-04-02 ENCOUNTER — HOSPITAL ENCOUNTER (OUTPATIENT)
Dept: INFUSION THERAPY | Age: 65
End: 2020-04-02
Payer: COMMERCIAL

## 2020-06-17 ENCOUNTER — OFFICE VISIT (OUTPATIENT)
Dept: HEMATOLOGY | Age: 65
End: 2020-06-17
Payer: MEDICARE

## 2020-06-17 ENCOUNTER — HOSPITAL ENCOUNTER (OUTPATIENT)
Dept: INFUSION THERAPY | Age: 65
Discharge: HOME | End: 2020-06-17
Payer: MEDICARE

## 2020-06-17 VITALS
WEIGHT: 173.8 LBS | TEMPERATURE: 97.3 F | HEART RATE: 81 BPM | BODY MASS INDEX: 24.88 KG/M2 | DIASTOLIC BLOOD PRESSURE: 68 MMHG | HEIGHT: 70 IN | SYSTOLIC BLOOD PRESSURE: 130 MMHG | OXYGEN SATURATION: 97 %

## 2020-06-17 DIAGNOSIS — D45 POLYCYTHEMIA VERA (HCC): Primary | ICD-10-CM

## 2020-06-17 LAB
ALBUMIN SERPL-MCNC: 4.3 G/DL (ref 3.5–5.2)
ALP BLD-CCNC: 201 U/L (ref 40–130)
ALT SERPL-CCNC: 14 U/L (ref 21–72)
ANION GAP SERPL CALCULATED.3IONS-SCNC: 13 MMOL/L (ref 7–19)
AST SERPL-CCNC: 19 U/L (ref 17–59)
BASOPHILS ABSOLUTE: ABNORMAL K/UL (ref 0.01–0.08)
BASOPHILS RELATIVE PERCENT: ABNORMAL % (ref 0.1–1.2)
BILIRUB SERPL-MCNC: 1.6 MG/DL (ref 0.2–1.3)
BUN BLDV-MCNC: 25 MG/DL (ref 9–20)
CALCIUM SERPL-MCNC: 9 MG/DL (ref 8.4–10.2)
CHLORIDE BLD-SCNC: 96 MMOL/L (ref 98–111)
CO2: 23 MMOL/L (ref 22–29)
CREAT SERPL-MCNC: 1.1 MG/DL (ref 0.6–1.2)
EOSINOPHILS ABSOLUTE: 0.9 K/UL (ref 0.04–0.54)
EOSINOPHILS RELATIVE PERCENT: 2.6 % (ref 0.7–7)
GFR NON-AFRICAN AMERICAN: >60
GLOBULIN: 2.4 G/DL
GLUCOSE BLD-MCNC: 407 MG/DL (ref 74–106)
HCT VFR BLD CALC: 44 % (ref 40.1–51)
HEMOGLOBIN: 12.6 G/DL (ref 13.7–17.5)
LYMPHOCYTES ABSOLUTE: 2.57 K/UL (ref 1.18–3.74)
LYMPHOCYTES RELATIVE PERCENT: 7.5 % (ref 19.3–53.1)
MCH RBC QN AUTO: 19 PG (ref 25.7–32.2)
MCHC RBC AUTO-ENTMCNC: 28.6 G/DL (ref 32.3–36.5)
MCV RBC AUTO: 66.3 FL (ref 79–92.2)
MONOCYTES ABSOLUTE: 0.93 K/UL (ref 0.24–0.82)
MONOCYTES RELATIVE PERCENT: 2.7 % (ref 4.7–12.5)
NEUTROPHILS ABSOLUTE: ABNORMAL K/UL (ref 1.56–6.13)
NEUTROPHILS RELATIVE PERCENT: ABNORMAL % (ref 34–71.1)
PDW BLD-RTO: 21.9 % (ref 11.6–14.4)
PLATELET # BLD: 661 K/UL (ref 163–337)
PMV BLD AUTO: ABNORMAL FL (ref 7.4–10.4)
POTASSIUM SERPL-SCNC: 5.6 MMOL/L (ref 3.5–5.1)
RBC # BLD: 6.64 M/UL (ref 4.63–6.08)
SODIUM BLD-SCNC: 132 MMOL/L (ref 137–145)
TOTAL PROTEIN: 6.7 G/DL (ref 6.3–8.2)
WBC # BLD: 34.25 K/UL (ref 4.23–9.07)

## 2020-06-17 PROCEDURE — 85025 COMPLETE CBC W/AUTO DIFF WBC: CPT

## 2020-06-17 PROCEDURE — G8420 CALC BMI NORM PARAMETERS: HCPCS | Performed by: NURSE PRACTITIONER

## 2020-06-17 PROCEDURE — 99195 PHLEBOTOMY: CPT

## 2020-06-17 PROCEDURE — 80053 COMPREHEN METABOLIC PANEL: CPT

## 2020-06-17 PROCEDURE — G8427 DOCREV CUR MEDS BY ELIG CLIN: HCPCS | Performed by: NURSE PRACTITIONER

## 2020-06-17 PROCEDURE — 99214 OFFICE O/P EST MOD 30 MIN: CPT | Performed by: NURSE PRACTITIONER

## 2020-06-17 PROCEDURE — 3017F COLORECTAL CA SCREEN DOC REV: CPT | Performed by: NURSE PRACTITIONER

## 2020-06-17 PROCEDURE — 1036F TOBACCO NON-USER: CPT | Performed by: NURSE PRACTITIONER

## 2020-06-17 RX ORDER — 0.9 % SODIUM CHLORIDE 0.9 %
250 INTRAVENOUS SOLUTION INTRAVENOUS ONCE
Status: CANCELLED | OUTPATIENT
Start: 2020-06-17

## 2020-06-17 RX ORDER — 0.9 % SODIUM CHLORIDE 0.9 %
500 INTRAVENOUS SOLUTION INTRAVENOUS ONCE
Status: CANCELLED | OUTPATIENT
Start: 2020-06-17

## 2020-06-17 ASSESSMENT — ENCOUNTER SYMPTOMS
BLOOD IN STOOL: 0
WHEEZING: 0
COUGH: 0
SORE THROAT: 0
EYES NEGATIVE: 1
DIARRHEA: 0
RESPIRATORY NEGATIVE: 1
VOMITING: 0
EYE REDNESS: 0
NAUSEA: 0
EYE DISCHARGE: 0
GASTROINTESTINAL NEGATIVE: 1
ABDOMINAL PAIN: 0
CONSTIPATION: 0
BACK PAIN: 0
SHORTNESS OF BREATH: 0
EYE PAIN: 0
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
## 2020-06-18 ENCOUNTER — TELEPHONE (OUTPATIENT)
Dept: INFUSION THERAPY | Age: 65
End: 2020-06-18

## 2020-06-18 NOTE — TELEPHONE ENCOUNTER
Patient returned call to clinic. Patient says he is still taking his hyperglycemic medications. I told patient that his potassium, glucose, and liver enzymes were all elevated and attempted to have patient come into clinic today for a repeat CMP for reevaluation and recommendations. Patient says the clinic is too far for him to drive to today. I asked the patient if there was somewhere close to his home that I could send a CMP order to for today and he said he wants his lab drawn at our office and the earliest it can be done is Monday. Scheduled for Monday at 8:45 am. I will fax labs and Destiny Quintero's last office visit note to patients PCP.

## 2020-06-18 NOTE — TELEPHONE ENCOUNTER
Attempted to contact patient per MAURILIO Foster regarding high potassium, glucose, and liver enzymes. Needing to ask patient if he is still taking his metformin and schedule him to have a repeat CMP in clinic as soon as possible for reevaluation and recommendations.

## 2020-06-22 ENCOUNTER — HOSPITAL ENCOUNTER (OUTPATIENT)
Dept: INFUSION THERAPY | Age: 65
Discharge: HOME | End: 2020-06-22
Payer: MEDICARE

## 2020-06-22 DIAGNOSIS — D45 POLYCYTHEMIA VERA (HCC): ICD-10-CM

## 2020-06-22 DIAGNOSIS — E87.5 HYPERKALEMIA: ICD-10-CM

## 2020-06-22 LAB
ALBUMIN SERPL-MCNC: 4.4 G/DL (ref 3.5–5.2)
ALP BLD-CCNC: 152 U/L (ref 40–130)
ALT SERPL-CCNC: 42 U/L (ref 21–72)
ANION GAP SERPL CALCULATED.3IONS-SCNC: 9 MMOL/L (ref 7–19)
AST SERPL-CCNC: 38 U/L (ref 17–59)
BASOPHILS ABSOLUTE: 0.63 K/UL (ref 0.01–0.08)
BASOPHILS RELATIVE PERCENT: 2.8 % (ref 0.1–1.2)
BILIRUB SERPL-MCNC: 1.4 MG/DL (ref 0.2–1.3)
BUN BLDV-MCNC: 18 MG/DL (ref 9–20)
CALCIUM SERPL-MCNC: 8.6 MG/DL (ref 8.4–10.2)
CHLORIDE BLD-SCNC: 100 MMOL/L (ref 98–111)
CO2: 26 MMOL/L (ref 22–29)
CREAT SERPL-MCNC: 0.8 MG/DL (ref 0.6–1.2)
EOSINOPHILS ABSOLUTE: 0.71 K/UL (ref 0.04–0.54)
EOSINOPHILS RELATIVE PERCENT: 3.2 % (ref 0.7–7)
GFR NON-AFRICAN AMERICAN: >60
GLOBULIN: 2.4 G/DL
GLUCOSE BLD-MCNC: 255 MG/DL (ref 74–106)
HCT VFR BLD CALC: 37.1 % (ref 40.1–51)
HEMOGLOBIN: 11.1 G/DL (ref 13.7–17.5)
LYMPHOCYTES ABSOLUTE: 2.01 K/UL (ref 1.18–3.74)
LYMPHOCYTES RELATIVE PERCENT: 9 % (ref 19.3–53.1)
MCH RBC QN AUTO: 19.3 PG (ref 25.7–32.2)
MCHC RBC AUTO-ENTMCNC: 29.9 G/DL (ref 32.3–36.5)
MCV RBC AUTO: 64.4 FL (ref 79–92.2)
MONOCYTES ABSOLUTE: 0.53 K/UL (ref 0.24–0.82)
MONOCYTES RELATIVE PERCENT: 2.4 % (ref 4.7–12.5)
NEUTROPHILS ABSOLUTE: 18.36 K/UL (ref 1.56–6.13)
NEUTROPHILS RELATIVE PERCENT: 82.6 % (ref 34–71.1)
PDW BLD-RTO: 20.3 % (ref 11.6–14.4)
PLATELET # BLD: 637 K/UL (ref 163–337)
PMV BLD AUTO: 10.1 FL (ref 7.4–10.4)
POTASSIUM SERPL-SCNC: 4.3 MMOL/L (ref 3.5–5.1)
RBC # BLD: 5.76 M/UL (ref 4.63–6.08)
SODIUM BLD-SCNC: 135 MMOL/L (ref 137–145)
TOTAL PROTEIN: 6.7 G/DL (ref 6.3–8.2)
WBC # BLD: 22.24 K/UL (ref 4.23–9.07)

## 2020-06-22 PROCEDURE — 80053 COMPREHEN METABOLIC PANEL: CPT

## 2020-06-22 PROCEDURE — 85025 COMPLETE CBC W/AUTO DIFF WBC: CPT

## 2020-07-01 ENCOUNTER — CLINICAL DOCUMENTATION (OUTPATIENT)
Dept: HEMATOLOGY | Age: 65
End: 2020-07-01

## 2020-07-01 ENCOUNTER — HOSPITAL ENCOUNTER (OUTPATIENT)
Dept: INFUSION THERAPY | Age: 65
Discharge: HOME | End: 2020-07-01
Payer: MEDICARE

## 2020-07-01 DIAGNOSIS — D45 POLYCYTHEMIA VERA (HCC): ICD-10-CM

## 2020-07-01 PROCEDURE — 85025 COMPLETE CBC W/AUTO DIFF WBC: CPT

## 2020-07-14 ENCOUNTER — TELEPHONE (OUTPATIENT)
Dept: INFUSION THERAPY | Age: 65
End: 2020-07-14

## 2020-07-14 ENCOUNTER — TELEPHONE (OUTPATIENT)
Dept: HEMATOLOGY | Age: 65
End: 2020-07-14

## 2020-07-14 NOTE — TELEPHONE ENCOUNTER
MR. ÁLVAREZ CALLED BACK AND SAID THAT COMING IN AT NOON WOULD BE FINE. RESCHEDULED HIM FOR THAT TIME TOMORROW.

## 2020-07-15 ENCOUNTER — OFFICE VISIT (OUTPATIENT)
Dept: HEMATOLOGY | Age: 65
End: 2020-07-15
Payer: MEDICARE

## 2020-07-15 ENCOUNTER — HOSPITAL ENCOUNTER (OUTPATIENT)
Dept: INFUSION THERAPY | Age: 65
Discharge: HOME | End: 2020-07-15
Payer: MEDICARE

## 2020-07-15 VITALS
SYSTOLIC BLOOD PRESSURE: 136 MMHG | WEIGHT: 175.4 LBS | HEIGHT: 70 IN | HEART RATE: 119 BPM | TEMPERATURE: 97.5 F | DIASTOLIC BLOOD PRESSURE: 78 MMHG | OXYGEN SATURATION: 97 % | BODY MASS INDEX: 25.11 KG/M2

## 2020-07-15 DIAGNOSIS — D45 POLYCYTHEMIA VERA (HCC): ICD-10-CM

## 2020-07-15 LAB
HCT VFR BLD CALC: 41.8 % (ref 40.1–51)
HEMOGLOBIN: 12.2 G/DL (ref 13.7–17.5)
MCH RBC QN AUTO: 19.5 PG (ref 25.7–32.2)
MCHC RBC AUTO-ENTMCNC: 29.2 G/DL (ref 32.3–36.5)
MCV RBC AUTO: 66.8 FL (ref 79–92.2)
PDW BLD-RTO: 23.5 % (ref 11.6–14.4)
PLATELET # BLD: 773 K/UL (ref 163–337)
PMV BLD AUTO: ABNORMAL FL (ref 7.4–10.4)
RBC # BLD: 6.26 M/UL (ref 4.63–6.08)
WBC # BLD: 55.7 K/UL (ref 4.23–9.07)

## 2020-07-15 PROCEDURE — 99211 OFF/OP EST MAY X REQ PHY/QHP: CPT

## 2020-07-15 PROCEDURE — 85027 COMPLETE CBC AUTOMATED: CPT

## 2020-07-15 PROCEDURE — 99213 OFFICE O/P EST LOW 20 MIN: CPT | Performed by: NURSE PRACTITIONER

## 2020-07-15 ASSESSMENT — ENCOUNTER SYMPTOMS
BACK PAIN: 0
NAUSEA: 0
CONSTIPATION: 0
SORE THROAT: 0
EYE DISCHARGE: 0
GASTROINTESTINAL NEGATIVE: 1
EYES NEGATIVE: 1
VOMITING: 0
BLOOD IN STOOL: 0
RESPIRATORY NEGATIVE: 1
COUGH: 0
WHEEZING: 0
EYE REDNESS: 0
SHORTNESS OF BREATH: 0
ABDOMINAL PAIN: 0
EYE PAIN: 0
DIARRHEA: 0
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
## 2020-08-05 ENCOUNTER — OFFICE VISIT (OUTPATIENT)
Dept: HEMATOLOGY | Age: 65
End: 2020-08-05
Payer: MEDICARE

## 2020-08-05 ENCOUNTER — HOSPITAL ENCOUNTER (OUTPATIENT)
Dept: INFUSION THERAPY | Age: 65
Discharge: HOME | End: 2020-08-05
Payer: MEDICARE

## 2020-08-05 VITALS
BODY MASS INDEX: 25.7 KG/M2 | HEART RATE: 110 BPM | TEMPERATURE: 97.4 F | OXYGEN SATURATION: 98 % | WEIGHT: 179.5 LBS | HEIGHT: 70 IN | DIASTOLIC BLOOD PRESSURE: 74 MMHG | SYSTOLIC BLOOD PRESSURE: 142 MMHG

## 2020-08-05 DIAGNOSIS — D45 POLYCYTHEMIA VERA (HCC): ICD-10-CM

## 2020-08-05 LAB
HCT VFR BLD CALC: 42.3 % (ref 40.1–51)
HEMOGLOBIN: 12.2 G/DL (ref 13.7–17.5)
MCH RBC QN AUTO: 20 PG (ref 25.7–32.2)
MCHC RBC AUTO-ENTMCNC: 28.8 G/DL (ref 32.3–36.5)
MCV RBC AUTO: 69.3 FL (ref 79–92.2)
PDW BLD-RTO: 23.7 % (ref 11.6–14.4)
PLATELET # BLD: 82 K/UL (ref 163–337)
PMV BLD AUTO: ABNORMAL FL (ref 7.4–10.4)
RBC # BLD: 6.1 M/UL (ref 4.63–6.08)
WBC # BLD: 16.38 K/UL (ref 4.23–9.07)

## 2020-08-05 PROCEDURE — 85027 COMPLETE CBC AUTOMATED: CPT

## 2020-08-05 PROCEDURE — 99214 OFFICE O/P EST MOD 30 MIN: CPT | Performed by: NURSE PRACTITIONER

## 2020-08-05 PROCEDURE — 99211 OFF/OP EST MAY X REQ PHY/QHP: CPT

## 2020-08-05 ASSESSMENT — ENCOUNTER SYMPTOMS
EYES NEGATIVE: 1
DIARRHEA: 0
SORE THROAT: 0
SHORTNESS OF BREATH: 0
VOMITING: 0
CONSTIPATION: 0
WHEEZING: 0
ABDOMINAL PAIN: 0
EYE REDNESS: 0
COUGH: 0
EYE DISCHARGE: 0
RESPIRATORY NEGATIVE: 1
NAUSEA: 0
BLOOD IN STOOL: 0
GASTROINTESTINAL NEGATIVE: 1
EYE PAIN: 0
BACK PAIN: 0
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
## 2020-08-10 PROBLEM — R71.8 MICROCYTOSIS: Status: ACTIVE | Noted: 2020-08-10

## 2020-08-12 ENCOUNTER — HOSPITAL ENCOUNTER (OUTPATIENT)
Dept: INFUSION THERAPY | Age: 65
Discharge: HOME | End: 2020-08-12
Payer: MEDICARE

## 2020-08-12 DIAGNOSIS — D45 POLYCYTHEMIA VERA (HCC): ICD-10-CM

## 2020-08-12 LAB
HCT VFR BLD CALC: 39.1 % (ref 40.1–51)
HEMOGLOBIN: 10.8 G/DL (ref 13.7–17.5)
MCH RBC QN AUTO: 20.1 PG (ref 25.7–32.2)
MCHC RBC AUTO-ENTMCNC: 27.6 G/DL (ref 32.3–36.5)
MCV RBC AUTO: 72.7 FL (ref 79–92.2)
PDW BLD-RTO: 24.2 % (ref 11.6–14.4)
PLATELET # BLD: 308 K/UL (ref 163–337)
PMV BLD AUTO: ABNORMAL FL (ref 7.4–10.4)
RBC # BLD: 5.38 M/UL (ref 4.63–6.08)
WBC # BLD: 29.92 K/UL (ref 4.23–9.07)

## 2020-08-12 PROCEDURE — 85027 COMPLETE CBC AUTOMATED: CPT

## 2020-09-02 ENCOUNTER — OFFICE VISIT (OUTPATIENT)
Dept: HEMATOLOGY | Age: 65
End: 2020-09-02
Payer: MEDICARE

## 2020-09-02 ENCOUNTER — HOSPITAL ENCOUNTER (OUTPATIENT)
Dept: INFUSION THERAPY | Age: 65
Discharge: HOME | End: 2020-09-02
Payer: MEDICARE

## 2020-09-02 VITALS
DIASTOLIC BLOOD PRESSURE: 78 MMHG | TEMPERATURE: 97.1 F | SYSTOLIC BLOOD PRESSURE: 126 MMHG | HEIGHT: 70 IN | OXYGEN SATURATION: 97 % | BODY MASS INDEX: 25.76 KG/M2 | HEART RATE: 107 BPM

## 2020-09-02 DIAGNOSIS — D45 POLYCYTHEMIA VERA (HCC): ICD-10-CM

## 2020-09-02 LAB
HCT VFR BLD CALC: 41.7 % (ref 40.1–51)
HEMOGLOBIN: 11.8 G/DL (ref 13.7–17.5)
MCH RBC QN AUTO: 20 PG (ref 25.7–32.2)
MCHC RBC AUTO-ENTMCNC: 28.3 G/DL (ref 32.3–36.5)
MCV RBC AUTO: 70.8 FL (ref 79–92.2)
PDW BLD-RTO: 22.5 % (ref 11.6–14.4)
PLATELET # BLD: 376 K/UL (ref 163–337)
PMV BLD AUTO: ABNORMAL FL (ref 7.4–10.4)
RBC # BLD: 5.89 M/UL (ref 4.63–6.08)
WBC # BLD: 28.4 K/UL (ref 4.23–9.07)

## 2020-09-02 PROCEDURE — 99213 OFFICE O/P EST LOW 20 MIN: CPT | Performed by: NURSE PRACTITIONER

## 2020-09-02 PROCEDURE — 99211 OFF/OP EST MAY X REQ PHY/QHP: CPT

## 2020-09-02 PROCEDURE — 85027 COMPLETE CBC AUTOMATED: CPT

## 2020-09-02 ASSESSMENT — ENCOUNTER SYMPTOMS
SHORTNESS OF BREATH: 0
ABDOMINAL PAIN: 0
CONSTIPATION: 0
VOMITING: 0
WHEEZING: 0
EYE REDNESS: 0
GASTROINTESTINAL NEGATIVE: 1
RESPIRATORY NEGATIVE: 1
NAUSEA: 0
BLOOD IN STOOL: 0
EYE DISCHARGE: 0
COUGH: 0
DIARRHEA: 0
EYE PAIN: 0
EYES NEGATIVE: 1
BACK PAIN: 0
SORE THROAT: 0
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
## 2020-09-30 ENCOUNTER — HOSPITAL ENCOUNTER (OUTPATIENT)
Dept: INFUSION THERAPY | Age: 65
Discharge: HOME | End: 2020-09-30
Payer: MEDICARE

## 2020-09-30 DIAGNOSIS — D45 POLYCYTHEMIA VERA (HCC): ICD-10-CM

## 2020-09-30 LAB
HCT VFR BLD CALC: 43.7 % (ref 40.1–51)
HEMOGLOBIN: 12.4 G/DL (ref 13.7–17.5)
MCH RBC QN AUTO: 19.7 PG (ref 25.7–32.2)
MCHC RBC AUTO-ENTMCNC: 28.4 G/DL (ref 32.3–36.5)
MCV RBC AUTO: 69.6 FL (ref 79–92.2)
PDW BLD-RTO: 22.1 % (ref 11.6–14.4)
PLATELET # BLD: 482 K/UL (ref 163–337)
PMV BLD AUTO: ABNORMAL FL (ref 7.4–10.4)
RBC # BLD: 6.28 M/UL (ref 4.63–6.08)
WBC # BLD: 33.83 K/UL (ref 4.23–9.07)

## 2020-09-30 PROCEDURE — 85027 COMPLETE CBC AUTOMATED: CPT

## 2020-11-03 PROBLEM — I10 ESSENTIAL HYPERTENSION: Status: RESOLVED | Noted: 2017-09-20 | Resolved: 2020-11-03

## 2020-11-04 ENCOUNTER — HOSPITAL ENCOUNTER (OUTPATIENT)
Dept: INFUSION THERAPY | Age: 65
Discharge: HOME | End: 2020-11-04
Payer: MEDICARE

## 2020-11-04 ENCOUNTER — OFFICE VISIT (OUTPATIENT)
Dept: HEMATOLOGY | Age: 65
End: 2020-11-04
Payer: MEDICARE

## 2020-11-04 VITALS
BODY MASS INDEX: 25.67 KG/M2 | HEIGHT: 70 IN | TEMPERATURE: 97.7 F | SYSTOLIC BLOOD PRESSURE: 130 MMHG | WEIGHT: 179.3 LBS | DIASTOLIC BLOOD PRESSURE: 70 MMHG | OXYGEN SATURATION: 97 % | HEART RATE: 113 BPM

## 2020-11-04 DIAGNOSIS — D45 POLYCYTHEMIA VERA (HCC): ICD-10-CM

## 2020-11-04 LAB
HCT VFR BLD CALC: 42.5 % (ref 40.1–51)
HEMOGLOBIN: 11.9 G/DL (ref 13.7–17.5)
MCH RBC QN AUTO: 19 PG (ref 25.7–32.2)
MCHC RBC AUTO-ENTMCNC: 28 G/DL (ref 32.3–36.5)
MCV RBC AUTO: 68 FL (ref 79–92.2)
PDW BLD-RTO: 21.8 % (ref 11.6–14.4)
PLATELET # BLD: 530 K/UL (ref 163–337)
PMV BLD AUTO: ABNORMAL FL (ref 7.4–10.4)
RBC # BLD: 6.25 M/UL (ref 4.63–6.08)
WBC # BLD: 34.89 K/UL (ref 4.23–9.07)

## 2020-11-04 PROCEDURE — 99213 OFFICE O/P EST LOW 20 MIN: CPT | Performed by: NURSE PRACTITIONER

## 2020-11-04 PROCEDURE — 85027 COMPLETE CBC AUTOMATED: CPT

## 2020-11-04 PROCEDURE — 99211 OFF/OP EST MAY X REQ PHY/QHP: CPT

## 2020-11-04 ASSESSMENT — ENCOUNTER SYMPTOMS
EYE DISCHARGE: 0
WHEEZING: 0
SORE THROAT: 0
COUGH: 0
VOMITING: 0
ABDOMINAL PAIN: 0
EYE REDNESS: 0
EYE PAIN: 0
BACK PAIN: 0
SHORTNESS OF BREATH: 0
RESPIRATORY NEGATIVE: 1
EYES NEGATIVE: 1
DIARRHEA: 0
CONSTIPATION: 0
NAUSEA: 0
GASTROINTESTINAL NEGATIVE: 1
BLOOD IN STOOL: 0

## 2020-11-04 NOTE — PROGRESS NOTES
Progress Note      Pt Name: Gabbie Mend: 1955  MRN: [id number]    Date of evaluation: 11/4/2020  History Obtained From:  patient, electronic medical record    CHIEF COMPLAINT:    Chief Complaint   Patient presents with    Follow-up     Polycythemia vera      HISTORY OF PRESENT ILLNESS:    Garland Aguero is a 60 y.o.  male with significant PMH of polycythemia vera, JAK2 positive. He is currently receiving treatment with Hydrea, aspirin and phlebotomy for hematocrit >42. Feliciano Mc last required a phlebotomy on 6/17/2020 for a hematocrit of 44. He returns today in scheduled follow-up for evaluation, side effect monitoring, lab monitoring and further recommendations. Feliciano Mc is currently taking Hydrea 500 mg p.o. daily. He reports tolerating the Hydrea and aspirin without difficulty. He denies any headaches, vision changes or pruritus. Hematocrit today is 42.5 with a hemoglobin of 11.9, will not phlebotomize today, asymptomatic. Platelet count of 184,340 today, this is slightly increased from 482,000 on 9/30/2020. HEMATOLOGY HISTORY:     Diagnosis   · JESUS-2 polycythemia vera   · High risk   · Splenomegaly     TREATMENT SUMMARY:  · Phlebotomies only initiated July 2008 until March 2012. · Hydrea-Hydrea 1000 mg a.m/1000 mg p.m. · Periodic phlebotomies as indicated despite Hydrea. Last 6/17/2020  · ASA 81mg daily    HEMATOLOGICAL HISTORY: JESUS-2 positive polycythemia vera diagnosed 07/2008  Mr. Aguero was seen by Dr. Feliciano Mc on 07/25/08 where he was found to have an elevated hemoglobin and hematocrit of 20.2 and 58.5 respectively. I did have a conference with Dr. Feliciano Mc prior to Mr. Zhang Lee initial visit. Mr. Aguero had had two phlebotomies prior to his initial visit on 08/26/08. Workup included an erythropoietin level, which was normal a JESUS-2 which was positive consistent with a myeloproliferative disorder and a relatively unremarkable chemistry profile.  A bone marrow biopsy and aspirate TWICE A  capsule 3    allopurinol (ZYLOPRIM) 300 MG tablet TAKE 1 TABLET BY MOUTH EVERY DAY  5    metFORMIN (GLUCOPHAGE) 500 MG tablet TAKE 2 TABLETS BY MOUTH TWICE A DAY  5     No current facility-administered medications for this visit. Allergies: No Known Allergies    Social History:    Social History     Tobacco Use    Smoking status: Never Smoker    Smokeless tobacco: Never Used   Substance Use Topics    Alcohol use: Yes     Comment: rarely     Drug use: No       Family History:   Family History   Problem Relation Age of Onset    Heart Disease Mother     Diabetes Father     High Blood Pressure Sister     Diabetes Brother     No Known Problems Child     No Known Problems Brother        Vitals:  Vitals:    11/04/20 1059   BP: 130/70   Pulse: 113   Temp: 97.7 °F (36.5 °C)   SpO2: 97%   Weight: 179 lb 4.8 oz (81.3 kg)   Height: 5' 10\" (1.778 m)        Subjective   REVIEW OF SYSTEMS:   Review of Systems   Constitutional: Positive for fatigue. Negative for chills, diaphoresis and fever. HENT: Negative. Negative for congestion, ear pain, hearing loss, nosebleeds, sore throat and tinnitus. Eyes: Negative. Negative for pain, discharge and redness. Respiratory: Negative. Negative for cough, shortness of breath and wheezing. Cardiovascular: Negative. Negative for chest pain, palpitations and leg swelling. Gastrointestinal: Negative. Negative for abdominal pain, blood in stool, constipation, diarrhea, nausea and vomiting. Endocrine: Negative for polydipsia. Genitourinary: Negative for dysuria, flank pain, frequency, hematuria and urgency. Musculoskeletal: Negative. Negative for back pain, myalgias and neck pain. Skin: Negative. Negative for rash. Neurological: Negative. Negative for dizziness, tremors, seizures, weakness and headaches. Hematological: Does not bruise/bleed easily. Psychiatric/Behavioral: Negative. The patient is not nervous/anxious. 532,000    No results for input(s): WBC, HGB, PLT in the last 72 hours. CMP:   No results for input(s): GLUCOSE, BUN, CREATININE, BCR, CO2, CALCIUM, ALBUMIN, LABIL2, ALKPHOS, AST, ALT in the last 72 hours. Invalid input(s): EGFRIFNONA, EGFRIFAFRI, SODIUM, POTASSIUM, CHLORIDE, PROTENTOTREF, GLOBULIN, BILIRUBIN  Hepatic:   No results for input(s): AST, ALT, ALB, BILITOT, ALKPHOS in the last 72 hours. Troponin: No results for input(s): TROPONINI in the last 72 hours. BNP: No results for input(s): BNP in the last 72 hours. Lipids: No results for input(s): CHOL, HDL in the last 72 hours. Invalid input(s): LDL  INR: No results for input(s): INR in the last 72 hours. ABG: No results for input(s): PHART, [id number], PO2ART, BXE1TTL, E0YOGILR, BEART in the last 72 hours. 30 Day lookback of cultures:    Blood Culture Recent: No results for input(s): BC in the last 720 hours. Gram Stain Recent: No results for input(s): LABGRAM in the last 720 hours. Resp Culture Recent: No results for input(s): CULTRESP in the last 720 hours. Body Fluid Recent : No results for input(s): BFCX in the last 720 hours. MRSA Recent : No results for input(s): [address] [facility] [address] in the last 720 hours. Urine Culture Recent : No results for input(s): LABURIN in the last 720 hours. Organism Recent : No results for input(s): ORG in the last 720 hours. ASSESSMENT/PLAN:      1. Polycythemia vera (HCC),JAK2 positive, high risk. Currently taking Hydrea 500 mg and aspirin 81 mg daily. Platelet count has slowly been increasing. Hemoglobin stable at 11.9 with a hematocrit of 42.5. Asymptomatic, will defer phlebotomy today.   Had been experiencing episodes of hypotension, Dr. Alexandre De Leon discontinued his BP meds and he has a blood pressure today of 130/70.    -Continue aspirin 81 mg daily  -Take Hydrea 500 mg p.o. twice daily, today, Thursday and Friday  Then starting next week, 11/29/2020 take Hydrea to 500 mg p.o. twice daily on Monday, Wednesday and Friday and 500 mg the rest of the week. -CBC today and in 4 weeks, CBC to be reviewed with a need for Hydrea dosing    In the future if Francisco Hernandez fails Hydrea can consider Arlin Bosworth is currently in a phase 3 clinical trial for treatment of polycythemia vera. 2. Leukocytosis, suspect secondary to polycythemia vera. WBC 34.89 with lymphocytes 5.2%. Afebrile denies any B symptoms. No intervention warranted, will monitor. 3.  Splenomegaly  -Ultrasound of the spleen on 1/15/2020, spleen measures 6 x 13.9 x 16.2 cm. 4.  Microcytosis with mild anemia, hemoglobin stable at 11.9, hematocrit 42.5 with an MCV of 68. Iron replacement is contraindicated in PVD patients.    -No intervention warranted will monitor    FOLLOW UP:  1.  CBC in 4 weeks  2. Follow-up appointment given for 8 weeks  3. Continue to follow with other medical providers as recommended    Over 50% of the total visit time of 15 minutes in face to face encounter with the patient, out of which more than 50% of the time was spent in counseling patient  and coordination of care. Counseling included but was not limited to time spent reviewing labs, imaging studies/ treatment plan and answering questions. This does not include charting. Discussed precautions related to [address] and being at increased risk. Discussed proper handwashing to be done frequently, limit exposure to other individuals and maintain social distancing of 6 feet. Recommend contacting primary care provider if having respiratory symptoms for further recommendations and consideration for testing.     (Please note that portions of this note were completed with a voice recognition program. Efforts were made to edit the dictations but occasionally words are mis-transcribed.)      Electronically signed by MAURILIO Crain on 11/9/2020 at 5:12 PM

## 2020-12-02 ENCOUNTER — HOSPITAL ENCOUNTER (OUTPATIENT)
Dept: INFUSION THERAPY | Age: 65
Discharge: HOME | End: 2020-12-02
Payer: MEDICARE

## 2020-12-02 DIAGNOSIS — D45 POLYCYTHEMIA VERA (HCC): ICD-10-CM

## 2020-12-02 LAB
HCT VFR BLD CALC: 45.8 % (ref 40.1–51)
HEMOGLOBIN: 13.2 G/DL (ref 13.7–17.5)
MCH RBC QN AUTO: 19.1 PG (ref 25.7–32.2)
MCHC RBC AUTO-ENTMCNC: 28.8 G/DL (ref 32.3–36.5)
MCV RBC AUTO: 66.4 FL (ref 79–92.2)
PDW BLD-RTO: 22.4 % (ref 11.6–14.4)
PLATELET # BLD: 382 K/UL (ref 163–337)
PMV BLD AUTO: ABNORMAL FL (ref 7.4–10.4)
RBC # BLD: 6.9 M/UL (ref 4.63–6.08)
WBC # BLD: 41.59 K/UL (ref 4.23–9.07)

## 2020-12-02 PROCEDURE — 85027 COMPLETE CBC AUTOMATED: CPT

## 2020-12-30 ENCOUNTER — OFFICE VISIT (OUTPATIENT)
Dept: HEMATOLOGY | Age: 65
End: 2020-12-30
Payer: MEDICARE

## 2020-12-30 ENCOUNTER — HOSPITAL ENCOUNTER (OUTPATIENT)
Dept: INFUSION THERAPY | Age: 65
Discharge: HOME | End: 2020-12-30
Payer: MEDICARE

## 2020-12-30 VITALS
HEART RATE: 119 BPM | OXYGEN SATURATION: 96 % | BODY MASS INDEX: 25.38 KG/M2 | HEIGHT: 70 IN | DIASTOLIC BLOOD PRESSURE: 82 MMHG | TEMPERATURE: 97.5 F | WEIGHT: 177.3 LBS | SYSTOLIC BLOOD PRESSURE: 128 MMHG

## 2020-12-30 DIAGNOSIS — D45 POLYCYTHEMIA VERA (HCC): ICD-10-CM

## 2020-12-30 DIAGNOSIS — D45 POLYCYTHEMIA VERA (HCC): Primary | ICD-10-CM

## 2020-12-30 DIAGNOSIS — R71.8 MICROCYTOSIS: ICD-10-CM

## 2020-12-30 DIAGNOSIS — R16.1 SPLENOMEGALY: ICD-10-CM

## 2020-12-30 DIAGNOSIS — D72.829 LEUKOCYTOSIS, UNSPECIFIED TYPE: ICD-10-CM

## 2020-12-30 LAB
BASOPHILS ABSOLUTE: 0.56 K/UL (ref 0.01–0.08)
BASOPHILS RELATIVE PERCENT: 1.5 % (ref 0.1–1.2)
EOSINOPHILS ABSOLUTE: 0.71 K/UL (ref 0.04–0.54)
EOSINOPHILS RELATIVE PERCENT: 1.9 % (ref 0.7–7)
HCT VFR BLD CALC: 42.4 % (ref 40.1–51)
HEMOGLOBIN: 12.6 G/DL (ref 13.7–17.5)
LYMPHOCYTES ABSOLUTE: 1.94 K/UL (ref 1.18–3.74)
LYMPHOCYTES RELATIVE PERCENT: 5.3 % (ref 19.3–53.1)
MCH RBC QN AUTO: 19.8 PG (ref 25.7–32.2)
MCHC RBC AUTO-ENTMCNC: 29.7 G/DL (ref 32.3–36.5)
MCV RBC AUTO: 66.7 FL (ref 79–92.2)
MONOCYTES ABSOLUTE: 0.58 K/UL (ref 0.24–0.82)
MONOCYTES RELATIVE PERCENT: 1.6 % (ref 4.7–12.5)
NEUTROPHILS ABSOLUTE: 32.99 K/UL (ref 1.56–6.13)
NEUTROPHILS RELATIVE PERCENT: 89.7 % (ref 34–71.1)
PDW BLD-RTO: 22.6 % (ref 11.6–14.4)
PLATELET # BLD: 353 K/UL (ref 163–337)
PMV BLD AUTO: ABNORMAL FL (ref 7.4–10.4)
RBC # BLD: 6.36 M/UL (ref 4.63–6.08)
WBC # BLD: 36.78 K/UL (ref 4.23–9.07)

## 2020-12-30 PROCEDURE — 85025 COMPLETE CBC W/AUTO DIFF WBC: CPT

## 2020-12-30 PROCEDURE — 99211 OFF/OP EST MAY X REQ PHY/QHP: CPT

## 2020-12-30 PROCEDURE — 99214 OFFICE O/P EST MOD 30 MIN: CPT | Performed by: NURSE PRACTITIONER

## 2020-12-30 ASSESSMENT — ENCOUNTER SYMPTOMS
EYE REDNESS: 0
ABDOMINAL PAIN: 0
RESPIRATORY NEGATIVE: 1
EYE DISCHARGE: 0
NAUSEA: 0
SHORTNESS OF BREATH: 0
WHEEZING: 0
EYE PAIN: 0
DIARRHEA: 0
COUGH: 0
GASTROINTESTINAL NEGATIVE: 1
SORE THROAT: 0
EYES NEGATIVE: 1
VOMITING: 0
BACK PAIN: 0
BLOOD IN STOOL: 0
CONSTIPATION: 0
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
## 2021-01-01 NOTE — PROGRESS NOTES
Reviewed CBC that revealed a platelet count of 547,006, significant provement over the past 2 weeks (platelet count 548,123 on 6/17/2020). Currently taking Hydrea 500 mg twice daily. Requested to rotate between Hydrea 500 mg x1 daily and 500 mg twice a day, (take total dosing of 1000 mg every other day). Keep scheduled appointment on 7/15/2020. At risk for hypoglycemia in pediatric patient

## 2021-01-27 ENCOUNTER — HOSPITAL ENCOUNTER (OUTPATIENT)
Dept: INFUSION THERAPY | Age: 66
Discharge: HOME | End: 2021-01-27
Payer: MEDICARE

## 2021-01-27 DIAGNOSIS — D45 POLYCYTHEMIA VERA (HCC): ICD-10-CM

## 2021-01-27 LAB
HCT VFR BLD CALC: 41.5 % (ref 40.1–51)
HEMOGLOBIN: 11.9 G/DL (ref 13.7–17.5)
MCH RBC QN AUTO: 20.3 PG (ref 25.7–32.2)
MCHC RBC AUTO-ENTMCNC: 28.7 G/DL (ref 32.3–36.5)
MCV RBC AUTO: 70.7 FL (ref 79–92.2)
PDW BLD-RTO: 22 % (ref 11.6–14.4)
PLATELET # BLD: 260 K/UL (ref 163–337)
PMV BLD AUTO: ABNORMAL FL (ref 7.4–10.4)
RBC # BLD: 5.87 M/UL (ref 4.63–6.08)
WBC # BLD: 36.14 K/UL (ref 4.23–9.07)

## 2021-01-27 PROCEDURE — 85027 COMPLETE CBC AUTOMATED: CPT

## 2021-02-17 DIAGNOSIS — D45 POLYCYTHEMIA VERA (HCC): Primary | ICD-10-CM

## 2021-02-23 ASSESSMENT — ENCOUNTER SYMPTOMS
GASTROINTESTINAL NEGATIVE: 1
COUGH: 0
VOMITING: 0
CONSTIPATION: 0
EYES NEGATIVE: 1
EYE PAIN: 0
EYE REDNESS: 0
BACK PAIN: 0
EYE DISCHARGE: 0
WHEEZING: 0
DIARRHEA: 0
BLOOD IN STOOL: 0
NAUSEA: 0
ABDOMINAL PAIN: 0
SORE THROAT: 0
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
## 2021-02-24 ENCOUNTER — OFFICE VISIT (OUTPATIENT)
Dept: HEMATOLOGY | Age: 66
End: 2021-02-24
Payer: MEDICARE

## 2021-02-24 ENCOUNTER — HOSPITAL ENCOUNTER (OUTPATIENT)
Dept: INFUSION THERAPY | Age: 66
Discharge: HOME | End: 2021-02-24
Payer: MEDICARE

## 2021-02-24 VITALS
BODY MASS INDEX: 25.09 KG/M2 | DIASTOLIC BLOOD PRESSURE: 84 MMHG | HEIGHT: 70 IN | WEIGHT: 175.3 LBS | OXYGEN SATURATION: 95 % | TEMPERATURE: 97.3 F | SYSTOLIC BLOOD PRESSURE: 144 MMHG | HEART RATE: 111 BPM

## 2021-02-24 DIAGNOSIS — D72.829 LEUKOCYTOSIS, UNSPECIFIED TYPE: ICD-10-CM

## 2021-02-24 DIAGNOSIS — R16.1 SPLENOMEGALY: Primary | ICD-10-CM

## 2021-02-24 DIAGNOSIS — D45 POLYCYTHEMIA VERA (HCC): ICD-10-CM

## 2021-02-24 DIAGNOSIS — R71.8 MICROCYTOSIS: ICD-10-CM

## 2021-02-24 DIAGNOSIS — R16.1 SPLENOMEGALY: ICD-10-CM

## 2021-02-24 LAB
ALBUMIN SERPL-MCNC: 4.2 G/DL (ref 3.5–5.2)
ALP BLD-CCNC: 165 U/L (ref 40–130)
ALT SERPL-CCNC: 16 U/L (ref 21–72)
ANION GAP SERPL CALCULATED.3IONS-SCNC: 13 MMOL/L (ref 7–19)
AST SERPL-CCNC: 20 U/L (ref 17–59)
BILIRUB SERPL-MCNC: 1.7 MG/DL (ref 0.2–1.3)
BUN BLDV-MCNC: 18 MG/DL (ref 9–20)
CALCIUM SERPL-MCNC: 8.9 MG/DL (ref 8.4–10.2)
CHLORIDE BLD-SCNC: 98 MMOL/L (ref 98–111)
CO2: 26 MMOL/L (ref 22–29)
CREAT SERPL-MCNC: 0.9 MG/DL (ref 0.6–1.2)
GFR NON-AFRICAN AMERICAN: >60
GLOBULIN: 2.2 G/DL
GLUCOSE BLD-MCNC: 256 MG/DL (ref 74–106)
HCT VFR BLD CALC: 40.7 % (ref 40.1–51)
HEMOGLOBIN: 12.5 G/DL (ref 13.7–17.5)
MCH RBC QN AUTO: 20.8 PG (ref 25.7–32.2)
MCHC RBC AUTO-ENTMCNC: 30.7 G/DL (ref 32.3–36.5)
MCV RBC AUTO: 67.8 FL (ref 79–92.2)
PDW BLD-RTO: 21.3 % (ref 11.6–14.4)
PLATELET # BLD: 660 K/UL (ref 163–337)
PMV BLD AUTO: 10.2 FL (ref 7.4–10.4)
POTASSIUM SERPL-SCNC: 5 MMOL/L (ref 3.5–5.1)
RBC # BLD: 6 M/UL (ref 4.63–6.08)
SODIUM BLD-SCNC: 137 MMOL/L (ref 137–145)
TOTAL PROTEIN: 6.5 G/DL (ref 6.3–8.2)
WBC # BLD: 36.33 K/UL (ref 4.23–9.07)

## 2021-02-24 PROCEDURE — 85027 COMPLETE CBC AUTOMATED: CPT

## 2021-02-24 PROCEDURE — 80053 COMPREHEN METABOLIC PANEL: CPT

## 2021-02-24 PROCEDURE — 99212 OFFICE O/P EST SF 10 MIN: CPT

## 2021-02-24 PROCEDURE — 99214 OFFICE O/P EST MOD 30 MIN: CPT | Performed by: NURSE PRACTITIONER

## 2021-02-24 ASSESSMENT — ENCOUNTER SYMPTOMS: SHORTNESS OF BREATH: 1

## 2021-03-15 ENCOUNTER — HOSPITAL ENCOUNTER (OUTPATIENT)
Dept: ULTRASOUND IMAGING | Age: 66
Discharge: HOME | End: 2021-03-15
Payer: MEDICARE

## 2021-03-15 DIAGNOSIS — R16.1 SPLENOMEGALY: ICD-10-CM

## 2021-03-15 PROCEDURE — 76705 ECHO EXAM OF ABDOMEN: CPT

## 2021-03-17 ENCOUNTER — TELEPHONE (OUTPATIENT)
Dept: HEMATOLOGY | Age: 66
End: 2021-03-17

## 2021-03-17 NOTE — TELEPHONE ENCOUNTER
Spoke with Mr. Aguero related to increasing size of spleen and continuing to have difficulty managing platelet count with Hydrea. With these findings there is concern for myelofibrosis. Last had a bone marrow aspiration biopsy in 2008. Recommendation is to repeat bone marrow aspiration biopsy, Mr. Aguero agreeded to current recommendation and a bone marrow aspiration biopsy will be completed in clinic on 3/22/2021.

## 2021-03-22 ENCOUNTER — OFFICE VISIT (OUTPATIENT)
Dept: HEMATOLOGY | Age: 66
End: 2021-03-22
Payer: MEDICARE

## 2021-03-22 ENCOUNTER — HOSPITAL ENCOUNTER (OUTPATIENT)
Dept: INFUSION THERAPY | Age: 66
Discharge: HOME | End: 2021-03-22
Payer: MEDICARE

## 2021-03-22 VITALS
DIASTOLIC BLOOD PRESSURE: 80 MMHG | OXYGEN SATURATION: 95 % | WEIGHT: 179.3 LBS | SYSTOLIC BLOOD PRESSURE: 128 MMHG | TEMPERATURE: 97.3 F | HEIGHT: 70 IN | BODY MASS INDEX: 25.67 KG/M2 | HEART RATE: 118 BPM

## 2021-03-22 DIAGNOSIS — D45 POLYCYTHEMIA VERA (HCC): ICD-10-CM

## 2021-03-22 DIAGNOSIS — R71.8 MICROCYTOSIS: ICD-10-CM

## 2021-03-22 DIAGNOSIS — D45 POLYCYTHEMIA VERA (HCC): Primary | ICD-10-CM

## 2021-03-22 DIAGNOSIS — D69.6 THROMBOCYTOPENIA (HCC): ICD-10-CM

## 2021-03-22 DIAGNOSIS — R16.1 SPLENOMEGALY: ICD-10-CM

## 2021-03-22 DIAGNOSIS — D72.829 LEUKOCYTOSIS, UNSPECIFIED TYPE: ICD-10-CM

## 2021-03-22 LAB
HCT VFR BLD CALC: 41.1 % (ref 40.1–51)
HEMOGLOBIN: 12 G/DL (ref 13.7–17.5)
MCH RBC QN AUTO: 20.9 PG (ref 25.7–32.2)
MCHC RBC AUTO-ENTMCNC: 29.2 G/DL (ref 32.3–36.5)
MCV RBC AUTO: 71.7 FL (ref 79–92.2)
PDW BLD-RTO: 22 % (ref 11.6–14.4)
PLATELET # BLD: 192 K/UL (ref 163–337)
PMV BLD AUTO: ABNORMAL FL (ref 7.4–10.4)
RBC # BLD: 5.73 M/UL (ref 4.63–6.08)
WBC # BLD: 24.97 K/UL (ref 4.23–9.07)

## 2021-03-22 PROCEDURE — 38222 DX BONE MARROW BX & ASPIR: CPT | Performed by: NURSE PRACTITIONER

## 2021-03-22 PROCEDURE — 99214 OFFICE O/P EST MOD 30 MIN: CPT | Performed by: NURSE PRACTITIONER

## 2021-03-22 PROCEDURE — 85027 COMPLETE CBC AUTOMATED: CPT

## 2021-03-22 RX ORDER — LIDOCAINE HYDROCHLORIDE 20 MG/ML
5 INJECTION, SOLUTION INFILTRATION; PERINEURAL ONCE
Status: DISCONTINUED | OUTPATIENT
Start: 2021-03-22 | End: 2021-03-24 | Stop reason: HOSPADM

## 2021-03-22 ASSESSMENT — ENCOUNTER SYMPTOMS
BACK PAIN: 0
CONSTIPATION: 0
COUGH: 0
DIARRHEA: 0
VOMITING: 0
RESPIRATORY NEGATIVE: 1
SORE THROAT: 0
EYE REDNESS: 0
BLOOD IN STOOL: 0
SHORTNESS OF BREATH: 0
EYE PAIN: 0
ABDOMINAL PAIN: 0
EYE DISCHARGE: 0
WHEEZING: 0
GASTROINTESTINAL NEGATIVE: 1
NAUSEA: 0
EYES NEGATIVE: 1

## 2021-03-22 NOTE — PROGRESS NOTES
Patient name: Liane Aguero  Patient : 1955  [id number]  3/22/2021    Procedure Note: Bone Marrow Aspirate and Biopsy    Indication: Polycythemia vera JAK2 positive, concern for myelofibrosis    SITE: Right Iliac crest   After informed consent was obtained the patient was placed in the Left lateral ducubitus position. A time out was performed indicating the procedure and the patient, and all was comfirmed by the nurse. The Right posterior iliac crest was located and prepped and draped in a sterile fashion Lidocaine 1% was injected for local anesthesia. An Jamshidi needle was placed and a bone marrow aspirate was obtained. The Jamshidi needle was placed in a different location and a core biopsy was obtained. Pressure was held until homeostasis was obtained. A sterile dressing was applied. The patient tolerated the procedure well with no apparent complications. Estimated blood loss was minimal. The specimen was sent to pathology for flow cytometry, cytogenetics, histology and FISH if indicated.      Assisted by NADEGE Muhammad APRN
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
## 2021-04-07 ENCOUNTER — OFFICE VISIT (OUTPATIENT)
Dept: HEMATOLOGY | Age: 66
End: 2021-04-07
Payer: MEDICARE

## 2021-04-07 ENCOUNTER — HOSPITAL ENCOUNTER (OUTPATIENT)
Dept: INFUSION THERAPY | Age: 66
Discharge: HOME | End: 2021-04-07
Payer: MEDICARE

## 2021-04-07 VITALS
DIASTOLIC BLOOD PRESSURE: 84 MMHG | OXYGEN SATURATION: 97 % | BODY MASS INDEX: 25.34 KG/M2 | HEART RATE: 115 BPM | HEIGHT: 70 IN | SYSTOLIC BLOOD PRESSURE: 130 MMHG | TEMPERATURE: 97.1 F | WEIGHT: 177 LBS

## 2021-04-07 DIAGNOSIS — D45 POLYCYTHEMIA VERA (HCC): ICD-10-CM

## 2021-04-07 DIAGNOSIS — R16.1 SPLENOMEGALY: ICD-10-CM

## 2021-04-07 DIAGNOSIS — D69.6 THROMBOCYTOPENIA (HCC): ICD-10-CM

## 2021-04-07 DIAGNOSIS — D45 POLYCYTHEMIA VERA (HCC): Primary | ICD-10-CM

## 2021-04-07 DIAGNOSIS — D72.829 LEUKOCYTOSIS, UNSPECIFIED TYPE: ICD-10-CM

## 2021-04-07 DIAGNOSIS — R71.8 MICROCYTOSIS: ICD-10-CM

## 2021-04-07 LAB
HCT VFR BLD CALC: 43.7 % (ref 40.1–51)
HEMOGLOBIN: 13 G/DL (ref 13.7–17.5)
MCH RBC QN AUTO: 21.6 PG (ref 25.7–32.2)
MCHC RBC AUTO-ENTMCNC: 29.7 G/DL (ref 32.3–36.5)
MCV RBC AUTO: 72.5 FL (ref 79–92.2)
PDW BLD-RTO: 22.4 % (ref 11.6–14.4)
PLATELET # BLD: 262 K/UL (ref 163–337)
PMV BLD AUTO: ABNORMAL FL (ref 7.4–10.4)
RBC # BLD: 6.03 M/UL (ref 4.63–6.08)
WBC # BLD: 34.48 K/UL (ref 4.23–9.07)

## 2021-04-07 PROCEDURE — 99211 OFF/OP EST MAY X REQ PHY/QHP: CPT

## 2021-04-07 PROCEDURE — 85027 COMPLETE CBC AUTOMATED: CPT

## 2021-04-07 PROCEDURE — 99214 OFFICE O/P EST MOD 30 MIN: CPT | Performed by: NURSE PRACTITIONER

## 2021-04-07 ASSESSMENT — ENCOUNTER SYMPTOMS
CONSTIPATION: 0
SORE THROAT: 0
BLOOD IN STOOL: 0
DIARRHEA: 0
EYE DISCHARGE: 0
GASTROINTESTINAL NEGATIVE: 1
RESPIRATORY NEGATIVE: 1
EYE REDNESS: 0
ABDOMINAL PAIN: 0
WHEEZING: 0
NAUSEA: 0
EYE PAIN: 0
EYES NEGATIVE: 1
VOMITING: 0
BACK PAIN: 0
COUGH: 0
SHORTNESS OF BREATH: 0

## 2021-05-19 ENCOUNTER — HOSPITAL ENCOUNTER (OUTPATIENT)
Dept: INFUSION THERAPY | Age: 66
Discharge: HOME | End: 2021-05-19
Payer: MEDICARE

## 2021-05-19 DIAGNOSIS — D45 POLYCYTHEMIA VERA (HCC): ICD-10-CM

## 2021-05-19 LAB
HCT VFR BLD CALC: 46.5 % (ref 40.1–51)
HEMOGLOBIN: 13.1 G/DL (ref 13.7–17.5)
MCH RBC QN AUTO: 21.4 PG (ref 25.7–32.2)
MCHC RBC AUTO-ENTMCNC: 28.2 G/DL (ref 32.3–36.5)
MCV RBC AUTO: 76.1 FL (ref 79–92.2)
PDW BLD-RTO: 21.3 % (ref 11.6–14.4)
PLATELET # BLD: 187 K/UL (ref 163–337)
PMV BLD AUTO: ABNORMAL FL (ref 7.4–10.4)
RBC # BLD: 6.11 M/UL (ref 4.63–6.08)
WBC # BLD: 24.29 K/UL (ref 4.23–9.07)

## 2021-05-19 PROCEDURE — 85027 COMPLETE CBC AUTOMATED: CPT

## 2021-07-07 ENCOUNTER — HOSPITAL ENCOUNTER (OUTPATIENT)
Dept: INFUSION THERAPY | Age: 66
Discharge: HOME | End: 2021-07-07
Payer: MEDICARE

## 2021-07-07 ENCOUNTER — OFFICE VISIT (OUTPATIENT)
Dept: HEMATOLOGY | Age: 66
End: 2021-07-07
Payer: MEDICARE

## 2021-07-07 VITALS
DIASTOLIC BLOOD PRESSURE: 68 MMHG | SYSTOLIC BLOOD PRESSURE: 144 MMHG | HEART RATE: 110 BPM | BODY MASS INDEX: 25.54 KG/M2 | HEIGHT: 70 IN | OXYGEN SATURATION: 96 % | WEIGHT: 178.4 LBS

## 2021-07-07 DIAGNOSIS — R16.1 SPLENOMEGALY: ICD-10-CM

## 2021-07-07 DIAGNOSIS — D45 POLYCYTHEMIA VERA (HCC): Primary | ICD-10-CM

## 2021-07-07 DIAGNOSIS — R71.8 MICROCYTOSIS: ICD-10-CM

## 2021-07-07 DIAGNOSIS — D72.829 LEUKOCYTOSIS, UNSPECIFIED TYPE: ICD-10-CM

## 2021-07-07 DIAGNOSIS — D45 POLYCYTHEMIA VERA (HCC): ICD-10-CM

## 2021-07-07 LAB
ALBUMIN SERPL-MCNC: 4.2 G/DL (ref 3.5–5.2)
ALP BLD-CCNC: 182 U/L (ref 40–130)
ALT SERPL-CCNC: 15 U/L (ref 21–72)
ANION GAP SERPL CALCULATED.3IONS-SCNC: 10 MMOL/L (ref 7–19)
AST SERPL-CCNC: 23 U/L (ref 17–59)
BILIRUB SERPL-MCNC: 1.8 MG/DL (ref 0.2–1.3)
BUN BLDV-MCNC: 17 MG/DL (ref 9–20)
CALCIUM SERPL-MCNC: 9.1 MG/DL (ref 8.4–10.2)
CHLORIDE BLD-SCNC: 100 MMOL/L (ref 98–111)
CO2: 25 MMOL/L (ref 22–29)
CREAT SERPL-MCNC: 0.8 MG/DL (ref 0.6–1.2)
GFR NON-AFRICAN AMERICAN: >60
GLOBULIN: 2.4 G/DL
GLUCOSE BLD-MCNC: 222 MG/DL (ref 74–106)
HCT VFR BLD CALC: 47.7 % (ref 40.1–51)
HEMOGLOBIN: 13.9 G/DL (ref 13.7–17.5)
MCH RBC QN AUTO: 22.6 PG (ref 25.7–32.2)
MCHC RBC AUTO-ENTMCNC: 29.1 G/DL (ref 32.3–36.5)
MCV RBC AUTO: 77.6 FL (ref 79–92.2)
PDW BLD-RTO: 19.1 % (ref 11.6–14.4)
PLATELET # BLD: 274 K/UL (ref 163–337)
PMV BLD AUTO: ABNORMAL FL (ref 7.4–10.4)
POTASSIUM SERPL-SCNC: 4.6 MMOL/L (ref 3.5–5.1)
RBC # BLD: 6.15 M/UL (ref 4.63–6.08)
SODIUM BLD-SCNC: 135 MMOL/L (ref 137–145)
TOTAL PROTEIN: 6.6 G/DL (ref 6.3–8.2)
WBC # BLD: 31.37 K/UL (ref 4.23–9.07)

## 2021-07-07 PROCEDURE — 85027 COMPLETE CBC AUTOMATED: CPT

## 2021-07-07 PROCEDURE — 99212 OFFICE O/P EST SF 10 MIN: CPT

## 2021-07-07 PROCEDURE — 36415 COLL VENOUS BLD VENIPUNCTURE: CPT

## 2021-07-07 PROCEDURE — 99214 OFFICE O/P EST MOD 30 MIN: CPT | Performed by: NURSE PRACTITIONER

## 2021-07-07 PROCEDURE — 80053 COMPREHEN METABOLIC PANEL: CPT

## 2021-07-07 PROCEDURE — 99195 PHLEBOTOMY: CPT

## 2021-07-07 ASSESSMENT — ENCOUNTER SYMPTOMS
NAUSEA: 0
RESPIRATORY NEGATIVE: 1
COUGH: 0
WHEEZING: 0
BLOOD IN STOOL: 0
ABDOMINAL PAIN: 0
CONSTIPATION: 0
DIARRHEA: 0
EYE PAIN: 0
SORE THROAT: 0
SHORTNESS OF BREATH: 0
EYES NEGATIVE: 1
EYE DISCHARGE: 0
GASTROINTESTINAL NEGATIVE: 1
VOMITING: 0
BACK PAIN: 0
EYE REDNESS: 0

## 2021-07-07 NOTE — PROGRESS NOTES
THERAPEUTIC PHLEBOTOMY    Most recent Hemoglobin 13.9 Gm/dl and Hematocrit 47.7%    Date obtained: 7 /7 /2021      Pre-phlebotomy Vital Signs: Refer to Doc flow sheet    Start time: 1:20    Tourniquet placed on patient's arm and arm palpated for venous access. Area of venous access cleansed with alcohol. Sheath removed from the needle and needle inserted into the left antecubital site. Blood flowing well down the tubing into collection bag. Adjustment/no adjustment of needle needed to maintain adequate blood flow. Scale monitoring amount of blood in bag. Stop Time: 1:35   Needle removed from patient's arm. Pressure applied to patient's arm until bleeding stopped. Dry sterile dressing applied over puncture site and secured with Coban/self-adherent wrap. Final amount of blood removed: 500cc  Post Vital Signs: Refer to Doc flow sheet      Patient tolerated procedure well. No redness, edema, or signs of active bleeding noted. Discharge Instructions provided: No heavy pushing or lifting for the next 24 hours. Keep dressing dry and in place for 4 to 6 hours. If a fever GREATER than 100.5 develops, contact office. Patient discharged ambulatory with belongings.
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
## 2021-08-18 ENCOUNTER — HOSPITAL ENCOUNTER (OUTPATIENT)
Dept: INFUSION THERAPY | Age: 66
Discharge: HOME | End: 2021-08-18
Payer: MEDICARE

## 2021-08-18 VITALS
BODY MASS INDEX: 25.48 KG/M2 | DIASTOLIC BLOOD PRESSURE: 74 MMHG | OXYGEN SATURATION: 96 % | SYSTOLIC BLOOD PRESSURE: 136 MMHG | HEART RATE: 118 BPM | WEIGHT: 178 LBS | HEIGHT: 70 IN

## 2021-08-18 DIAGNOSIS — D45 POLYCYTHEMIA VERA (HCC): Primary | ICD-10-CM

## 2021-08-18 LAB
HCT VFR BLD CALC: 47.8 % (ref 40.1–51)
HEMOGLOBIN: 13.8 G/DL (ref 13.7–17.5)
MCH RBC QN AUTO: 22.6 PG (ref 25.7–32.2)
MCHC RBC AUTO-ENTMCNC: 28.9 G/DL (ref 32.3–36.5)
MCV RBC AUTO: 78.2 FL (ref 79–92.2)
PDW BLD-RTO: 18.5 % (ref 11.6–14.4)
PLATELET # BLD: 214 K/UL (ref 163–337)
PMV BLD AUTO: ABNORMAL FL (ref 7.4–10.4)
RBC # BLD: 6.11 M/UL (ref 4.63–6.08)
WBC # BLD: 32.17 K/UL (ref 4.23–9.07)

## 2021-08-18 PROCEDURE — 99195 PHLEBOTOMY: CPT

## 2021-08-18 PROCEDURE — 85027 COMPLETE CBC AUTOMATED: CPT

## 2021-08-18 RX ORDER — 0.9 % SODIUM CHLORIDE 0.9 %
500 INTRAVENOUS SOLUTION INTRAVENOUS ONCE
Status: CANCELLED | OUTPATIENT
Start: 2021-08-18 | End: 2021-08-18

## 2021-08-18 RX ORDER — 0.9 % SODIUM CHLORIDE 0.9 %
250 INTRAVENOUS SOLUTION INTRAVENOUS ONCE
Status: CANCELLED | OUTPATIENT
Start: 2021-08-18 | End: 2021-08-18

## 2021-08-18 NOTE — PROGRESS NOTES
THERAPEUTIC PHLEBOTOMY    Most recent Hemoglobin 13.8 Gm/dl and Hematocrit 47.8%    Date obtained: 08/ /18 /2021  Most recent Ferritin level N/A (If applicable)  Date obtained:      Pre-phlebotomy Vital Signs: Refer to Doc flow sheet    Start time: 1115    Tourniquet placed on patient's arm and arm palpated for venous access. Area of venous access cleansed with alcohol. Sheath removed from the needle and needle inserted into the right antecubital site. Blood flowing well down the tubing into collection bag. Adjustment/no adjustment of needle needed to maintain adequate blood flow. Scale monitoring amount of blood in bag. Stop Time: 5049  Needle removed from patient's arm. Pressure applied to patient's arm until bleeding stopped. Dry sterile dressing applied over puncture site and secured with Coban/self-adherent wrap. Final amount of blood removed: 500 ml  Post Vital Signs: Refer to Doc flow sheet      Patient tolerated procedure well. No redness, edema, or signs of active bleeding noted. Discharge Instructions provided: No heavy pushing or lifting for the next 24 hours. Keep dressing dry and in place for 4 to 6 hours. If a fever GREATER than 100.5 develops, contact office. Patient discharged ambulatory with belongings.

## 2021-10-06 ENCOUNTER — HOSPITAL ENCOUNTER (OUTPATIENT)
Dept: INFUSION THERAPY | Age: 66
Discharge: HOME | End: 2021-10-06
Payer: MEDICARE

## 2021-10-06 ENCOUNTER — OFFICE VISIT (OUTPATIENT)
Dept: HEMATOLOGY | Age: 66
End: 2021-10-06
Payer: MEDICARE

## 2021-10-06 VITALS
DIASTOLIC BLOOD PRESSURE: 80 MMHG | WEIGHT: 175.1 LBS | BODY MASS INDEX: 25.07 KG/M2 | OXYGEN SATURATION: 97 % | HEART RATE: 117 BPM | HEIGHT: 70 IN | SYSTOLIC BLOOD PRESSURE: 150 MMHG

## 2021-10-06 DIAGNOSIS — D45 POLYCYTHEMIA VERA (HCC): Primary | ICD-10-CM

## 2021-10-06 DIAGNOSIS — D72.829 LEUKOCYTOSIS, UNSPECIFIED TYPE: ICD-10-CM

## 2021-10-06 DIAGNOSIS — R16.1 SPLENOMEGALY: ICD-10-CM

## 2021-10-06 DIAGNOSIS — R71.8 MICROCYTOSIS: ICD-10-CM

## 2021-10-06 PROCEDURE — 99213 OFFICE O/P EST LOW 20 MIN: CPT | Performed by: NURSE PRACTITIONER

## 2021-10-06 PROCEDURE — 99212 OFFICE O/P EST SF 10 MIN: CPT

## 2021-10-06 PROCEDURE — 85025 COMPLETE CBC W/AUTO DIFF WBC: CPT

## 2021-10-06 PROCEDURE — 99195 PHLEBOTOMY: CPT

## 2021-10-06 PROCEDURE — 85027 COMPLETE CBC AUTOMATED: CPT

## 2021-10-06 RX ORDER — ASPIRIN 81 MG/1
81 TABLET ORAL DAILY
COMMUNITY

## 2021-10-06 RX ORDER — MULTIVITAMIN
TABLET ORAL
COMMUNITY

## 2021-10-06 RX ORDER — 0.9 % SODIUM CHLORIDE 0.9 %
500 INTRAVENOUS SOLUTION INTRAVENOUS ONCE
Status: CANCELLED | OUTPATIENT
Start: 2021-10-06 | End: 2021-10-06

## 2021-10-06 RX ORDER — 0.9 % SODIUM CHLORIDE 0.9 %
250 INTRAVENOUS SOLUTION INTRAVENOUS ONCE
Status: CANCELLED | OUTPATIENT
Start: 2021-10-06 | End: 2021-10-06

## 2021-10-06 ASSESSMENT — ENCOUNTER SYMPTOMS
BACK PAIN: 0
EYES NEGATIVE: 1
SHORTNESS OF BREATH: 0
EYE REDNESS: 0
DIARRHEA: 0
SORE THROAT: 0
GASTROINTESTINAL NEGATIVE: 1
BLOOD IN STOOL: 0
NAUSEA: 0
COUGH: 0
ABDOMINAL PAIN: 0
CONSTIPATION: 0
EYE PAIN: 0
EYE DISCHARGE: 0
RESPIRATORY NEGATIVE: 1
VOMITING: 0
WHEEZING: 0
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
## 2021-10-14 LAB
BASOPHILS ABSOLUTE: 0.36 K/UL (ref 0.01–0.08)
BASOPHILS RELATIVE PERCENT: 1 % (ref 0.1–1.2)
EOSINOPHILS ABSOLUTE: 0.4 K/UL (ref 0.04–0.54)
EOSINOPHILS RELATIVE PERCENT: 1.1 % (ref 0.7–7)
HCT VFR BLD CALC: 43.7 % (ref 40.1–51)
HEMOGLOBIN: 13.1 G/DL (ref 13.7–17.5)
LYMPHOCYTES ABSOLUTE: 1.34 K/UL (ref 1.18–3.74)
LYMPHOCYTES RELATIVE PERCENT: 3.7 % (ref 19.3–53.1)
MCH RBC QN AUTO: 22.3 PG (ref 25.7–32.2)
MCHC RBC AUTO-ENTMCNC: 30 G/DL (ref 32.3–36.5)
MCV RBC AUTO: 74.4 FL (ref 79–92.2)
MONOCYTES ABSOLUTE: 0.59 K/UL (ref 0.24–0.82)
MONOCYTES RELATIVE PERCENT: 1.6 % (ref 4.7–12.5)
NEUTROPHILS ABSOLUTE: 33.13 K/UL (ref 1.56–6.13)
NEUTROPHILS RELATIVE PERCENT: 92.6 % (ref 34–71.1)
PDW BLD-RTO: 18.4 % (ref 11.6–14.4)
PLATELET # BLD: 329 K/UL (ref 163–337)
PMV BLD AUTO: 10.4 FL (ref 7.4–10.4)
RBC # BLD: 5.87 M/UL (ref 4.63–6.08)
WBC # BLD: 35.82 K/UL (ref 4.23–9.07)

## 2021-11-16 RX ORDER — HYDROXYUREA 500 MG/1
500 CAPSULE ORAL 2 TIMES DAILY
Qty: 180 CAPSULE | Refills: 1 | Status: SHIPPED | OUTPATIENT
Start: 2021-11-16 | End: 2022-08-10

## 2021-11-16 RX ORDER — HYDROXYUREA 500 MG/1
CAPSULE ORAL
Qty: 360 CAPSULE | Refills: 3 | Status: SHIPPED | OUTPATIENT
Start: 2021-11-16 | End: 2022-08-10 | Stop reason: SDUPTHER

## 2021-11-17 ENCOUNTER — HOSPITAL ENCOUNTER (OUTPATIENT)
Dept: INFUSION THERAPY | Age: 66
Discharge: HOME | End: 2021-11-17
Payer: MEDICARE

## 2021-11-17 VITALS
HEART RATE: 117 BPM | WEIGHT: 174.7 LBS | BODY MASS INDEX: 25.01 KG/M2 | HEIGHT: 70 IN | DIASTOLIC BLOOD PRESSURE: 84 MMHG | SYSTOLIC BLOOD PRESSURE: 136 MMHG | OXYGEN SATURATION: 96 %

## 2021-11-17 DIAGNOSIS — D45 POLYCYTHEMIA VERA (HCC): ICD-10-CM

## 2021-11-17 LAB
HCT VFR BLD CALC: 41.4 % (ref 40.1–51)
HEMOGLOBIN: 12 G/DL (ref 13.7–17.5)
MCH RBC QN AUTO: 22.2 PG (ref 25.7–32.2)
MCHC RBC AUTO-ENTMCNC: 29 G/DL (ref 32.3–36.5)
MCV RBC AUTO: 76.5 FL (ref 79–92.2)
PDW BLD-RTO: 18.4 % (ref 11.6–14.4)
PLATELET # BLD: 199 K/UL (ref 163–337)
PMV BLD AUTO: ABNORMAL FL (ref 7.4–10.4)
RBC # BLD: 5.41 M/UL (ref 4.63–6.08)
WBC # BLD: 32.52 K/UL (ref 4.23–9.07)

## 2021-11-17 PROCEDURE — 85027 COMPLETE CBC AUTOMATED: CPT
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
## 2022-01-05 ENCOUNTER — OFFICE VISIT (OUTPATIENT)
Dept: HEMATOLOGY | Age: 67
End: 2022-01-05
Payer: MEDICARE

## 2022-01-05 ENCOUNTER — HOSPITAL ENCOUNTER (OUTPATIENT)
Dept: INFUSION THERAPY | Age: 67
Discharge: HOME | End: 2022-01-05
Payer: MEDICARE

## 2022-01-05 VITALS
HEIGHT: 70 IN | BODY MASS INDEX: 24.71 KG/M2 | HEART RATE: 104 BPM | TEMPERATURE: 98.4 F | RESPIRATION RATE: 18 BRPM | WEIGHT: 172.6 LBS | SYSTOLIC BLOOD PRESSURE: 124 MMHG | OXYGEN SATURATION: 99 % | DIASTOLIC BLOOD PRESSURE: 76 MMHG

## 2022-01-05 DIAGNOSIS — R16.1 SPLENOMEGALY: ICD-10-CM

## 2022-01-05 DIAGNOSIS — D72.829 LEUKOCYTOSIS, UNSPECIFIED TYPE: ICD-10-CM

## 2022-01-05 DIAGNOSIS — D45 POLYCYTHEMIA VERA (HCC): Primary | ICD-10-CM

## 2022-01-05 DIAGNOSIS — D69.6 THROMBOCYTOPENIA (HCC): ICD-10-CM

## 2022-01-05 DIAGNOSIS — D45 POLYCYTHEMIA VERA (HCC): ICD-10-CM

## 2022-01-05 DIAGNOSIS — R71.8 MICROCYTOSIS: ICD-10-CM

## 2022-01-05 LAB
ALBUMIN SERPL-MCNC: 4.3 G/DL (ref 3.5–5.2)
ALP BLD-CCNC: 190 U/L (ref 40–130)
ALT SERPL-CCNC: 15 U/L (ref 21–72)
ANION GAP SERPL CALCULATED.3IONS-SCNC: 12 MMOL/L (ref 7–19)
AST SERPL-CCNC: 21 U/L (ref 17–59)
BILIRUB SERPL-MCNC: 2.2 MG/DL (ref 0.2–1.3)
BUN BLDV-MCNC: 23 MG/DL (ref 9–20)
CALCIUM SERPL-MCNC: 9 MG/DL (ref 8.4–10.2)
CHLORIDE BLD-SCNC: 97 MMOL/L (ref 98–111)
CO2: 23 MMOL/L (ref 22–29)
CREAT SERPL-MCNC: 0.8 MG/DL (ref 0.6–1.2)
GFR NON-AFRICAN AMERICAN: >60
GLOBULIN: 2.3 G/DL
GLUCOSE BLD-MCNC: 348 MG/DL (ref 74–106)
HCT VFR BLD CALC: 42.8 % (ref 40.1–51)
HEMOGLOBIN: 12.9 G/DL (ref 13.7–17.5)
MCH RBC QN AUTO: 22 PG (ref 25.7–32.2)
MCHC RBC AUTO-ENTMCNC: 30.1 G/DL (ref 32.3–36.5)
MCV RBC AUTO: 73 FL (ref 79–92.2)
PDW BLD-RTO: 18.7 % (ref 11.6–14.4)
PLATELET # BLD: 175 K/UL (ref 163–337)
PMV BLD AUTO: ABNORMAL FL (ref 7.4–10.4)
POTASSIUM SERPL-SCNC: 4.7 MMOL/L (ref 3.5–5.1)
RBC # BLD: 5.86 M/UL (ref 4.63–6.08)
SODIUM BLD-SCNC: 132 MMOL/L (ref 137–145)
TOTAL PROTEIN: 6.6 G/DL (ref 6.3–8.2)
WBC # BLD: 24.21 K/UL (ref 4.23–9.07)

## 2022-01-05 PROCEDURE — 36415 COLL VENOUS BLD VENIPUNCTURE: CPT

## 2022-01-05 PROCEDURE — 80053 COMPREHEN METABOLIC PANEL: CPT

## 2022-01-05 PROCEDURE — 99212 OFFICE O/P EST SF 10 MIN: CPT

## 2022-01-05 PROCEDURE — 85027 COMPLETE CBC AUTOMATED: CPT

## 2022-01-05 PROCEDURE — 99214 OFFICE O/P EST MOD 30 MIN: CPT | Performed by: NURSE PRACTITIONER

## 2022-01-17 ASSESSMENT — ENCOUNTER SYMPTOMS
RESPIRATORY NEGATIVE: 1
COUGH: 0
SORE THROAT: 0
BACK PAIN: 0
CONSTIPATION: 0
SHORTNESS OF BREATH: 0
DIARRHEA: 0
EYE REDNESS: 0
WHEEZING: 0
NAUSEA: 0
EYE DISCHARGE: 0
ABDOMINAL PAIN: 0
GASTROINTESTINAL NEGATIVE: 1
BLOOD IN STOOL: 0
VOMITING: 0
EYES NEGATIVE: 1
EYE PAIN: 0

## 2022-02-17 ENCOUNTER — HOSPITAL ENCOUNTER (OUTPATIENT)
Dept: ULTRASOUND IMAGING | Age: 67
Discharge: HOME | End: 2022-02-17
Payer: MEDICARE

## 2022-02-17 ENCOUNTER — CLINICAL DOCUMENTATION (OUTPATIENT)
Dept: HEMATOLOGY | Age: 67
End: 2022-02-17

## 2022-02-17 ENCOUNTER — HOSPITAL ENCOUNTER (OUTPATIENT)
Dept: INFUSION THERAPY | Age: 67
Discharge: HOME | End: 2022-02-17
Payer: MEDICARE

## 2022-02-17 DIAGNOSIS — D45 POLYCYTHEMIA VERA (HCC): ICD-10-CM

## 2022-02-17 DIAGNOSIS — R16.1 SPLENOMEGALY: ICD-10-CM

## 2022-02-17 LAB
HCT VFR BLD CALC: 43.6 % (ref 40.1–51)
HEMOGLOBIN: 13.3 G/DL (ref 13.7–17.5)
MCH RBC QN AUTO: 23 PG (ref 25.7–32.2)
MCHC RBC AUTO-ENTMCNC: 30.5 G/DL (ref 32.3–36.5)
MCV RBC AUTO: 75.4 FL (ref 79–92.2)
PDW BLD-RTO: 19.4 % (ref 11.6–14.4)
PLATELET # BLD: 390 K/UL (ref 163–337)
PMV BLD AUTO: 11.6 FL (ref 7.4–10.4)
RBC # BLD: 5.78 M/UL (ref 4.63–6.08)
WBC # BLD: 42.94 K/UL (ref 4.23–9.07)

## 2022-02-17 PROCEDURE — 85027 COMPLETE CBC AUTOMATED: CPT

## 2022-02-17 PROCEDURE — 76705 ECHO EXAM OF ABDOMEN: CPT

## 2022-02-17 NOTE — PROGRESS NOTES
Platelet count 340,777 today, hematocrit 43.6 and WBC 42.94. Denies any febrile illness. Counts wax and wane. Continues on Hydrea 500 mg p.o. twice daily.     Recommendations continue with Hydrea 500 mg twice daily

## 2022-04-04 DIAGNOSIS — D72.829 LEUKOCYTOSIS, UNSPECIFIED TYPE: Primary | ICD-10-CM

## 2022-04-04 ASSESSMENT — ENCOUNTER SYMPTOMS
EYES NEGATIVE: 1
DIARRHEA: 0
NAUSEA: 0
COUGH: 0
SORE THROAT: 0
EYE REDNESS: 0
GASTROINTESTINAL NEGATIVE: 1
RESPIRATORY NEGATIVE: 1
BACK PAIN: 0
EYE PAIN: 0
BLOOD IN STOOL: 0
ABDOMINAL PAIN: 0
VOMITING: 0
CONSTIPATION: 0
SHORTNESS OF BREATH: 0
EYE DISCHARGE: 0
WHEEZING: 0
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
## 2022-04-06 ENCOUNTER — HOSPITAL ENCOUNTER (OUTPATIENT)
Dept: INFUSION THERAPY | Age: 67
Discharge: HOME | End: 2022-04-06
Payer: MEDICARE

## 2022-04-06 ENCOUNTER — OFFICE VISIT (OUTPATIENT)
Dept: HEMATOLOGY | Age: 67
End: 2022-04-06
Payer: MEDICARE

## 2022-04-06 VITALS
DIASTOLIC BLOOD PRESSURE: 78 MMHG | WEIGHT: 174.5 LBS | OXYGEN SATURATION: 74 % | SYSTOLIC BLOOD PRESSURE: 140 MMHG | HEART RATE: 122 BPM | BODY MASS INDEX: 24.98 KG/M2 | HEIGHT: 70 IN

## 2022-04-06 DIAGNOSIS — D45 POLYCYTHEMIA VERA (HCC): ICD-10-CM

## 2022-04-06 DIAGNOSIS — D45 POLYCYTHEMIA VERA (HCC): Primary | ICD-10-CM

## 2022-04-06 DIAGNOSIS — D72.829 LEUKOCYTOSIS, UNSPECIFIED TYPE: ICD-10-CM

## 2022-04-06 DIAGNOSIS — R71.8 MICROCYTOSIS: ICD-10-CM

## 2022-04-06 DIAGNOSIS — R16.1 SPLENOMEGALY: ICD-10-CM

## 2022-04-06 LAB
ALBUMIN SERPL-MCNC: 4 G/DL (ref 3.5–5.2)
ALP BLD-CCNC: 186 U/L (ref 40–130)
ALT SERPL-CCNC: 16 U/L (ref 21–72)
ANION GAP SERPL CALCULATED.3IONS-SCNC: 12 MMOL/L (ref 7–19)
ANISOCYTOSIS: ABNORMAL
AST SERPL-CCNC: 18 U/L (ref 17–59)
BASOPHILS ABSOLUTE: 0.9 K/UL (ref 0–0.2)
BASOPHILS RELATIVE PERCENT: 3 % (ref 0–1)
BILIRUB SERPL-MCNC: 2 MG/DL (ref 0.2–1.3)
BUN BLDV-MCNC: 21 MG/DL (ref 9–20)
CALCIUM SERPL-MCNC: 8.7 MG/DL (ref 8.4–10.2)
CHLORIDE BLD-SCNC: 103 MMOL/L (ref 98–111)
CO2: 25 MMOL/L (ref 22–29)
CREAT SERPL-MCNC: 0.7 MG/DL (ref 0.6–1.2)
EOSINOPHILS ABSOLUTE: 0.31 K/UL (ref 0–0.6)
EOSINOPHILS RELATIVE PERCENT: 1 % (ref 0–5)
GFR NON-AFRICAN AMERICAN: >60
GLOBULIN: 2.4 G/DL
GLUCOSE BLD-MCNC: 315 MG/DL (ref 74–106)
HCT VFR BLD CALC: 45.5 % (ref 42–52)
HEMOGLOBIN: 12.9 G/DL (ref 14–18)
HYPOCHROMIA: ABNORMAL
IMMATURE GRANULOCYTES #: 1.2 K/UL
LYMPHOCYTES ABSOLUTE: 2.7 K/UL (ref 1.1–4.5)
LYMPHOCYTES RELATIVE PERCENT: 9 % (ref 20–40)
MCH RBC QN AUTO: 22.1 PG (ref 27–31)
MCHC RBC AUTO-ENTMCNC: 28.4 G/DL (ref 33–37)
MCV RBC AUTO: 78 FL (ref 80–94)
MICROCYTES: ABNORMAL
MONOCYTES ABSOLUTE: 0.6 K/UL (ref 0–0.9)
MONOCYTES RELATIVE PERCENT: 2 % (ref 0–10)
NEUTROPHILS ABSOLUTE: 25.9 K/UL (ref 1.5–7.5)
NEUTROPHILS RELATIVE PERCENT: 85 % (ref 50–65)
PDW BLD-RTO: 18.8 % (ref 11.5–14.5)
PLATELET # BLD: 261 K/UL (ref 130–400)
PLATELET SLIDE REVIEW: ADEQUATE
POTASSIUM SERPL-SCNC: 4.9 MMOL/L (ref 3.5–5.1)
RBC # BLD: 5.83 M/UL (ref 4.7–6.1)
SODIUM BLD-SCNC: 140 MMOL/L (ref 137–145)
TOTAL PROTEIN: 6.4 G/DL (ref 6.3–8.2)
WBC # BLD: 30.5 K/UL (ref 4.8–10.8)

## 2022-04-06 PROCEDURE — 99213 OFFICE O/P EST LOW 20 MIN: CPT | Performed by: NURSE PRACTITIONER

## 2022-04-06 PROCEDURE — 80053 COMPREHEN METABOLIC PANEL: CPT

## 2022-04-06 PROCEDURE — 99212 OFFICE O/P EST SF 10 MIN: CPT

## 2022-04-06 PROCEDURE — 36415 COLL VENOUS BLD VENIPUNCTURE: CPT

## 2022-04-06 PROCEDURE — 85027 COMPLETE CBC AUTOMATED: CPT

## 2022-04-07 ENCOUNTER — TELEPHONE (OUTPATIENT)
Dept: INFUSION THERAPY | Age: 67
End: 2022-04-07

## 2022-04-07 NOTE — TELEPHONE ENCOUNTER
----- Message from MAURILIO Smith sent at 4/6/2022  8:00 PM CDT -----  Please fax the last few CMP results to PCP with attention related to elevated glucose, his med list only has metformin on it.

## 2022-04-07 NOTE — TELEPHONE ENCOUNTER
Spoke with Carly Strong regarding elevated glucose. He stated at this time he does not have a PCP but would call and get apt with one. He will call us back on where to fax labs.

## 2022-04-12 ENCOUNTER — TELEPHONE (OUTPATIENT)
Dept: INFUSION THERAPY | Age: 67
End: 2022-04-12

## 2022-04-12 NOTE — TELEPHONE ENCOUNTER
----- Message from MAURILIO Nagy sent at 4/6/2022  8:00 PM CDT -----  Please fax the last few CMP results to PCP with attention related to elevated glucose, his med list only has metformin on it.

## 2022-05-18 ENCOUNTER — HOSPITAL ENCOUNTER (OUTPATIENT)
Dept: INFUSION THERAPY | Age: 67
Discharge: HOME | End: 2022-05-18
Payer: MEDICARE

## 2022-05-18 DIAGNOSIS — D45 POLYCYTHEMIA VERA (HCC): ICD-10-CM

## 2022-05-18 DIAGNOSIS — D72.829 LEUKOCYTOSIS, UNSPECIFIED TYPE: ICD-10-CM

## 2022-05-18 LAB
ALBUMIN SERPL-MCNC: 4.3 G/DL (ref 3.5–5.2)
ALP BLD-CCNC: 189 U/L (ref 40–130)
ALT SERPL-CCNC: 11 U/L (ref 5–41)
ANION GAP SERPL CALCULATED.3IONS-SCNC: 13 MMOL/L (ref 7–19)
AST SERPL-CCNC: 12 U/L (ref 5–40)
BILIRUB SERPL-MCNC: 1.6 MG/DL (ref 0.2–1.2)
BUN BLDV-MCNC: 19 MG/DL (ref 8–23)
CALCIUM SERPL-MCNC: 9 MG/DL (ref 8.8–10.2)
CHLORIDE BLD-SCNC: 100 MMOL/L (ref 98–111)
CO2: 25 MMOL/L (ref 22–29)
CREAT SERPL-MCNC: 0.7 MG/DL (ref 0.5–1.2)
GFR AFRICAN AMERICAN: >59
GFR NON-AFRICAN AMERICAN: >60
GLUCOSE BLD-MCNC: 274 MG/DL (ref 74–109)
HCT VFR BLD CALC: 46.1 % (ref 40.1–51)
HEMOGLOBIN: 13 G/DL (ref 13.7–17.5)
LYMPHOCYTES ABSOLUTE: 1.39 K/UL (ref 1.18–3.74)
LYMPHOCYTES RELATIVE PERCENT: 4.9 % (ref 19.3–53.1)
MCH RBC QN AUTO: 22.6 PG (ref 25.7–32.2)
MCHC RBC AUTO-ENTMCNC: 28.2 G/DL (ref 32.3–36.5)
MCV RBC AUTO: 80 FL (ref 79–92.2)
MONOCYTES ABSOLUTE: 0.5 K/UL (ref 0.24–0.82)
MONOCYTES RELATIVE PERCENT: 1.8 % (ref 4.7–12.5)
NEUTROPHILS ABSOLUTE: 24.9 K/UL (ref 1.56–6.13)
NEUTROPHILS RELATIVE PERCENT: 88.5 % (ref 34–71.1)
PDW BLD-RTO: 19.5 % (ref 11.6–14.4)
PLATELET # BLD: 488 K/UL (ref 163–337)
PMV BLD AUTO: 11.9 FL (ref 7.4–10.4)
POTASSIUM SERPL-SCNC: 4.8 MMOL/L (ref 3.5–5)
RBC # BLD: 5.76 M/UL (ref 4.63–6.08)
SODIUM BLD-SCNC: 138 MMOL/L (ref 136–145)
TOTAL PROTEIN: 6.5 G/DL (ref 6.6–8.7)
WBC # BLD: 28.16 K/UL (ref 4.23–9.07)

## 2022-05-18 PROCEDURE — 36415 COLL VENOUS BLD VENIPUNCTURE: CPT

## 2022-05-18 PROCEDURE — 85025 COMPLETE CBC W/AUTO DIFF WBC: CPT

## 2022-05-18 PROCEDURE — 36415 COLL VENOUS BLD VENIPUNCTURE: CPT | Performed by: NURSE PRACTITIONER

## 2022-06-29 ENCOUNTER — OFFICE VISIT (OUTPATIENT)
Dept: HEMATOLOGY | Age: 67
End: 2022-06-29
Payer: MEDICARE

## 2022-06-29 ENCOUNTER — HOSPITAL ENCOUNTER (OUTPATIENT)
Dept: INFUSION THERAPY | Age: 67
Discharge: HOME | End: 2022-06-29
Payer: MEDICARE

## 2022-06-29 VITALS
DIASTOLIC BLOOD PRESSURE: 82 MMHG | WEIGHT: 168 LBS | HEART RATE: 118 BPM | SYSTOLIC BLOOD PRESSURE: 138 MMHG | HEIGHT: 70 IN | BODY MASS INDEX: 24.05 KG/M2 | OXYGEN SATURATION: 98 %

## 2022-06-29 DIAGNOSIS — D72.829 LEUKOCYTOSIS, UNSPECIFIED TYPE: ICD-10-CM

## 2022-06-29 DIAGNOSIS — D45 POLYCYTHEMIA VERA (HCC): Primary | ICD-10-CM

## 2022-06-29 DIAGNOSIS — R71.8 MICROCYTOSIS: ICD-10-CM

## 2022-06-29 DIAGNOSIS — D69.6 THROMBOCYTOPENIA (HCC): ICD-10-CM

## 2022-06-29 DIAGNOSIS — D45 POLYCYTHEMIA VERA (HCC): ICD-10-CM

## 2022-06-29 DIAGNOSIS — R16.1 SPLENOMEGALY: ICD-10-CM

## 2022-06-29 LAB
ALBUMIN SERPL-MCNC: 4.7 G/DL (ref 3.5–5.2)
ALP BLD-CCNC: 167 U/L (ref 40–130)
ALT SERPL-CCNC: 17 U/L (ref 21–72)
ANION GAP SERPL CALCULATED.3IONS-SCNC: 12 MMOL/L (ref 7–19)
AST SERPL-CCNC: 24 U/L (ref 17–59)
BASOPHILS ABSOLUTE: 0.67 K/UL (ref 0.01–0.08)
BASOPHILS RELATIVE PERCENT: 2 % (ref 0.1–1.2)
BILIRUB SERPL-MCNC: 1.9 MG/DL (ref 0.2–1.3)
BUN BLDV-MCNC: 22 MG/DL (ref 9–20)
CALCIUM SERPL-MCNC: 9.5 MG/DL (ref 8.4–10.2)
CHLORIDE BLD-SCNC: 99 MMOL/L (ref 98–111)
CO2: 26 MMOL/L (ref 22–29)
CREAT SERPL-MCNC: 0.9 MG/DL (ref 0.6–1.2)
EOSINOPHILS ABSOLUTE: 0.32 K/UL (ref 0.04–0.54)
EOSINOPHILS RELATIVE PERCENT: 0.9 % (ref 0.7–7)
GFR NON-AFRICAN AMERICAN: >60
GLOBULIN: 2.3 G/DL
GLUCOSE BLD-MCNC: 213 MG/DL (ref 74–106)
HCT VFR BLD CALC: 48.5 % (ref 40.1–51)
HEMOGLOBIN: 14 G/DL (ref 13.7–17.5)
LYMPHOCYTES ABSOLUTE: 2.01 K/UL (ref 1.18–3.74)
LYMPHOCYTES RELATIVE PERCENT: 5.9 % (ref 19.3–53.1)
MCH RBC QN AUTO: 23.1 PG (ref 25.7–32.2)
MCHC RBC AUTO-ENTMCNC: 28.9 G/DL (ref 32.3–36.5)
MCV RBC AUTO: 80.2 FL (ref 79–92.2)
MONOCYTES ABSOLUTE: 0.67 K/UL (ref 0.24–0.82)
MONOCYTES RELATIVE PERCENT: 2 % (ref 4.7–12.5)
NEUTROPHILS ABSOLUTE: 29.12 K/UL (ref 1.56–6.13)
NEUTROPHILS RELATIVE PERCENT: 84.7 % (ref 34–71.1)
PDW BLD-RTO: 19.9 % (ref 11.6–14.4)
PLATELET # BLD: 318 K/UL (ref 163–337)
POTASSIUM SERPL-SCNC: 5.2 MMOL/L (ref 3.5–5.1)
RBC # BLD: 6.05 M/UL (ref 4.63–6.08)
SODIUM BLD-SCNC: 137 MMOL/L (ref 137–145)
TOTAL PROTEIN: 6.9 G/DL (ref 6.3–8.2)
WBC # BLD: 34.34 K/UL (ref 4.23–9.07)

## 2022-06-29 PROCEDURE — 36415 COLL VENOUS BLD VENIPUNCTURE: CPT

## 2022-06-29 PROCEDURE — 99212 OFFICE O/P EST SF 10 MIN: CPT

## 2022-06-29 PROCEDURE — 99195 PHLEBOTOMY: CPT

## 2022-06-29 PROCEDURE — 85025 COMPLETE CBC W/AUTO DIFF WBC: CPT

## 2022-06-29 PROCEDURE — 99214 OFFICE O/P EST MOD 30 MIN: CPT | Performed by: NURSE PRACTITIONER

## 2022-06-29 PROCEDURE — 1123F ACP DISCUSS/DSCN MKR DOCD: CPT | Performed by: NURSE PRACTITIONER

## 2022-06-29 PROCEDURE — 80053 COMPREHEN METABOLIC PANEL: CPT
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
## 2022-06-29 NOTE — PROGRESS NOTES
THERAPEUTIC PHLEBOTOMY    Most recent Hemoglobin 14.0Gm/dl and Hematocrit 48.5%    Date obtained: 06/29/2022      Pre-phlebotomy Vital Signs: Refer to Doc flow sheet    Start time: 1115    Tourniquet placed on patient's arm and arm palpated for venous access. Area of venous access cleansed with alcohol. Sheath removed from the needle and needle inserted into the left antecubital site. Blood flowing well down the tubing into collection bag. No adjustment of needle needed to maintain adequate blood flow. Scale monitoring amount of blood in bag. Stop Time: 7690  Needle removed from patient's arm. Pressure applied to patient's arm until bleeding stopped. Dry sterile dressing applied over puncture site and secured with Coban/self-adherent wrap. Final amount of blood removed: 55ml  Post Vital Signs: B/P 132/84    Patient tolerated procedure well. No redness, edema, or signs of active bleeding noted. Discharge Instructions provided: No heavy pushing or lifting for the next 24 hours. Keep dressing dry and in place for 4 to 6 hours. If a fever GREATER than 100.5 develops, contact office. Patient discharged ambulatory with belongings.

## 2022-07-06 ASSESSMENT — ENCOUNTER SYMPTOMS
SHORTNESS OF BREATH: 0
BACK PAIN: 0
EYE PAIN: 0
NAUSEA: 0
RESPIRATORY NEGATIVE: 1
WHEEZING: 0
ABDOMINAL PAIN: 0
COUGH: 0
EYE REDNESS: 0
CONSTIPATION: 0
GASTROINTESTINAL NEGATIVE: 1
EYE DISCHARGE: 0
VOMITING: 0
EYES NEGATIVE: 1
BLOOD IN STOOL: 0
DIARRHEA: 0
SORE THROAT: 0

## 2022-07-26 ENCOUNTER — HOSPITAL ENCOUNTER (OUTPATIENT)
Dept: INFUSION THERAPY | Age: 67
Setting detail: INFUSION SERIES
Discharge: HOME | End: 2022-07-26
Payer: MEDICARE

## 2022-07-26 ENCOUNTER — HOSPITAL ENCOUNTER (OUTPATIENT)
Dept: INFUSION THERAPY | Age: 67
Discharge: HOME | End: 2022-07-26

## 2022-07-26 VITALS
OXYGEN SATURATION: 95 % | HEART RATE: 115 BPM | DIASTOLIC BLOOD PRESSURE: 75 MMHG | SYSTOLIC BLOOD PRESSURE: 125 MMHG | TEMPERATURE: 96.8 F | RESPIRATION RATE: 18 BRPM

## 2022-07-26 DIAGNOSIS — D45 POLYCYTHEMIA VERA (HCC): Primary | ICD-10-CM

## 2022-07-26 LAB
ALBUMIN SERPL-MCNC: 4.2 G/DL (ref 3.5–5.2)
ALP BLD-CCNC: 178 U/L (ref 40–130)
ALT SERPL-CCNC: 10 U/L (ref 5–41)
ANION GAP SERPL CALCULATED.3IONS-SCNC: 10 MMOL/L (ref 7–19)
AST SERPL-CCNC: 14 U/L (ref 5–40)
BILIRUB SERPL-MCNC: 2.8 MG/DL (ref 0.2–1.2)
BUN BLDV-MCNC: 14 MG/DL (ref 8–23)
CALCIUM SERPL-MCNC: 9 MG/DL (ref 8.8–10.2)
CHLORIDE BLD-SCNC: 101 MMOL/L (ref 98–111)
CO2: 25 MMOL/L (ref 22–29)
CREAT SERPL-MCNC: 1.1 MG/DL (ref 0.5–1.2)
GFR AFRICAN AMERICAN: >59
GFR NON-AFRICAN AMERICAN: >60
GLUCOSE BLD-MCNC: 240 MG/DL (ref 74–109)
HCT VFR BLD CALC: 43.3 % (ref 42–52)
HEMOGLOBIN: 12.4 G/DL (ref 14–18)
MCH RBC QN AUTO: 23.2 PG (ref 27–31)
MCHC RBC AUTO-ENTMCNC: 28.6 G/DL (ref 33–37)
MCV RBC AUTO: 80.9 FL (ref 80–94)
PDW BLD-RTO: 18.2 % (ref 11.5–14.5)
PLATELET # BLD: 230 K/UL (ref 130–400)
POTASSIUM SERPL-SCNC: 4.2 MMOL/L (ref 3.5–5)
RBC # BLD: 5.35 M/UL (ref 4.7–6.1)
SODIUM BLD-SCNC: 136 MMOL/L (ref 136–145)
TOTAL PROTEIN: 6.7 G/DL (ref 6.6–8.7)
WBC # BLD: 35 K/UL (ref 4.8–10.8)

## 2022-07-26 PROCEDURE — 99211 OFF/OP EST MAY X REQ PHY/QHP: CPT

## 2022-07-26 PROCEDURE — 85027 COMPLETE CBC AUTOMATED: CPT

## 2022-07-26 PROCEDURE — 80053 COMPREHEN METABOLIC PANEL: CPT

## 2022-07-26 RX ORDER — 0.9 % SODIUM CHLORIDE 0.9 %
500 INTRAVENOUS SOLUTION INTRAVENOUS ONCE
Status: CANCELLED | OUTPATIENT
Start: 2022-07-26 | End: 2022-07-26

## 2022-07-26 NOTE — PROGRESS NOTES
CBC resulted HCT at 43.3 %, so Therapeutic Phlebotomy was not done today. Pt informed of results and discharged home without incident.

## 2022-08-10 RX ORDER — HYDROXYUREA 500 MG/1
CAPSULE ORAL
Qty: 180 CAPSULE | Refills: 1 | Status: SHIPPED | OUTPATIENT
Start: 2022-08-10

## 2022-08-23 ENCOUNTER — HOSPITAL ENCOUNTER (OUTPATIENT)
Dept: INFUSION THERAPY | Age: 67
Setting detail: INFUSION SERIES
Discharge: HOME | End: 2022-08-23
Payer: MEDICARE

## 2022-08-23 VITALS
OXYGEN SATURATION: 99 % | HEART RATE: 113 BPM | RESPIRATION RATE: 18 BRPM | SYSTOLIC BLOOD PRESSURE: 145 MMHG | TEMPERATURE: 97.6 F | DIASTOLIC BLOOD PRESSURE: 90 MMHG

## 2022-08-23 DIAGNOSIS — D45 POLYCYTHEMIA VERA (HCC): Primary | ICD-10-CM

## 2022-08-23 DIAGNOSIS — D72.829 LEUKOCYTOSIS, UNSPECIFIED TYPE: ICD-10-CM

## 2022-08-23 LAB
ALBUMIN SERPL-MCNC: 4.5 G/DL (ref 3.5–5.2)
ALP BLD-CCNC: 182 U/L (ref 40–130)
ALT SERPL-CCNC: 12 U/L (ref 5–41)
ANION GAP SERPL CALCULATED.3IONS-SCNC: 12 MMOL/L (ref 7–19)
ANISOCYTOSIS: ABNORMAL
AST SERPL-CCNC: 17 U/L (ref 5–40)
ATYPICAL LYMPHOCYTE RELATIVE PERCENT: 2 % (ref 0–8)
BANDED NEUTROPHILS RELATIVE PERCENT: 4 % (ref 0–5)
BASOPHILS ABSOLUTE: 0 K/UL (ref 0–0.2)
BASOPHILS RELATIVE PERCENT: 0 % (ref 0–1)
BILIRUB SERPL-MCNC: 1.9 MG/DL (ref 0.2–1.2)
BUN BLDV-MCNC: 20 MG/DL (ref 8–23)
CALCIUM SERPL-MCNC: 9.1 MG/DL (ref 8.8–10.2)
CHLORIDE BLD-SCNC: 98 MMOL/L (ref 98–111)
CO2: 22 MMOL/L (ref 22–29)
CREAT SERPL-MCNC: 0.8 MG/DL (ref 0.5–1.2)
EOSINOPHILS ABSOLUTE: 0.33 K/UL (ref 0–0.6)
EOSINOPHILS RELATIVE PERCENT: 1 % (ref 0–5)
GFR AFRICAN AMERICAN: >59
GFR NON-AFRICAN AMERICAN: >60
GLUCOSE BLD-MCNC: 215 MG/DL (ref 74–109)
HCT VFR BLD CALC: 45.3 % (ref 42–52)
HEMOGLOBIN: 12.9 G/DL (ref 14–18)
HYPERSEGMENTED NEUTROPHILS: ABNORMAL
HYPOCHROMIA: ABNORMAL
IMMATURE GRANULOCYTES #: 1.4 K/UL
LYMPHOCYTES ABSOLUTE: 1.6 K/UL (ref 1.1–4.5)
LYMPHOCYTES RELATIVE PERCENT: 3 % (ref 20–40)
MCH RBC QN AUTO: 23.1 PG (ref 27–31)
MCHC RBC AUTO-ENTMCNC: 28.5 G/DL (ref 33–37)
MCV RBC AUTO: 81 FL (ref 80–94)
MONOCYTES ABSOLUTE: 0 K/UL (ref 0–0.9)
MONOCYTES RELATIVE PERCENT: 0 % (ref 0–10)
NEUTROPHILS ABSOLUTE: 30.9 K/UL (ref 1.5–7.5)
NEUTROPHILS RELATIVE PERCENT: 90 % (ref 50–65)
PDW BLD-RTO: 18.6 % (ref 11.5–14.5)
PLATELET # BLD: 276 K/UL (ref 130–400)
PLATELET SLIDE REVIEW: ADEQUATE
POLYCHROMASIA: ABNORMAL
POTASSIUM SERPL-SCNC: 4.3 MMOL/L (ref 3.5–5)
RBC # BLD: 5.59 M/UL (ref 4.7–6.1)
SODIUM BLD-SCNC: 132 MMOL/L (ref 136–145)
TOTAL PROTEIN: 7 G/DL (ref 6.6–8.7)
WBC # BLD: 32.9 K/UL (ref 4.8–10.8)

## 2022-08-23 PROCEDURE — 80053 COMPREHEN METABOLIC PANEL: CPT

## 2022-08-23 PROCEDURE — 99211 OFF/OP EST MAY X REQ PHY/QHP: CPT

## 2022-08-23 PROCEDURE — 85025 COMPLETE CBC W/AUTO DIFF WBC: CPT

## 2022-08-23 RX ORDER — 0.9 % SODIUM CHLORIDE 0.9 %
500 INTRAVENOUS SOLUTION INTRAVENOUS ONCE
OUTPATIENT
Start: 2022-08-23 | End: 2022-08-23

## 2022-08-23 NOTE — DISCHARGE INSTRUCTIONS
Blood Draw for Donation or Treatment: Care Instructions  Overview     You have just had some blood removed. This procedure is called phlebotomy (say\"kplq-JYL-bqb-arben\"). People have their blood taken (drawn) for several reasons. You may have just donated blood so that it can be used to help someone else. Or you may have had blood removed to treat a medical condition, such as hemochromatosis or polycythemia. These take more blood than the sample that is needed for simple lab tests. For donation, about a pint of blood is drawn. If it's drawn fortreatment, then more or less than a pint may be taken. The puncture wound caused by the poke from the needle for giving blood usually heals without trouble. Most people feel fine after they give blood. But thereare some simple things you can do to take care of yourself before you go home. Right after you give blood, you may be asked to sit for a while and have some water or juice and a snack. When you leave, get up slowly to make sure that you're not lightheaded. You may want to have a family member or friend take you home. Follow-up care is a key part of your treatment and safety. Be sure to make and go to all appointments, and call your doctor if you are having problems. It's also a good idea to know your test results and keep alist of the medicines you take. How can you care for yourself at home? In the hours after you give blood, make sure to:  Drink plenty of fluids to help replace the lost fluid. If you have kidney, heart, or liver disease and have to limit fluids, talk with your doctor before you increase the amount of fluids you drink. Limit your physical activity for several hours. If you feel a little lightheaded, lie down for a while, and have some snacks. Call the blood bank or clinic if you feel sick within 24 hours after you give blood.   Eat foods rich in iron, such as meat, fish, beans, or leafy green vegetables, for several weeks to help your body make new red blood cells. When should you call for help? Call your doctor now or seek immediate medical care if:    You are dizzy or lightheaded or feel like you may faint. You have signs of infection, such as: Increased pain, swelling, warmth, or redness. Red streaks leading from the area. Pus draining from the area. A fever. Watch closely for changes in your health, and be sure to contact your doctor ifyou have any problems. Where can you learn more? Go to https://[facility].TRAN.SL. org and sign in to your [employer] account. Enter B695 in the [employer] box to learn more about \"Blood Draw for Donation or Treatment: Care Instructions. \"     If you do not have an account, please click on the \"Sign Up Now\" link. Current as of: November 29, 2021               Content Version: 13.3  © 2006-2022 [employer]. Care instructions adapted under license by [facility] ([facility]). If you have questions about a medical condition or this instruction, always ask your healthcare professional. [address] any warranty or liability for your use of this information.

## 2022-08-24 ENCOUNTER — HOSPITAL ENCOUNTER (OUTPATIENT)
Dept: ULTRASOUND IMAGING | Age: 67
Discharge: HOME | End: 2022-08-24
Payer: MEDICARE

## 2022-08-24 DIAGNOSIS — R16.1 SPLENOMEGALY: ICD-10-CM

## 2022-08-24 DIAGNOSIS — D45 POLYCYTHEMIA VERA (HCC): ICD-10-CM

## 2022-08-24 PROCEDURE — 76705 ECHO EXAM OF ABDOMEN: CPT

## 2022-08-24 PROCEDURE — 76705 ECHO EXAM OF ABDOMEN: CPT | Performed by: RADIOLOGY

## 2022-09-06 ENCOUNTER — TELEPHONE (OUTPATIENT)
Dept: HEMATOLOGY | Age: 67
End: 2022-09-06

## 2022-09-06 NOTE — TELEPHONE ENCOUNTER
----- Message from MAURILIO Lopez sent at 8/23/2022  4:34 PM CDT -----  Continue Hydrea 500 mg p.o. twice daily. Will need a phlebotomy for hematocrit of 45.3. Please call patient and make an appointment, is scheduled for ultrasound on 8/24/2022 maybe he could come to the office after the study is completed.

## 2022-09-20 DIAGNOSIS — D45 POLYCYTHEMIA VERA (HCC): Primary | ICD-10-CM
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
## 2022-09-21 ENCOUNTER — HOSPITAL ENCOUNTER (OUTPATIENT)
Dept: INFUSION THERAPY | Age: 67
Discharge: HOME | End: 2022-09-21
Payer: MEDICARE

## 2022-09-21 ENCOUNTER — OFFICE VISIT (OUTPATIENT)
Dept: HEMATOLOGY | Age: 67
End: 2022-09-21
Payer: MEDICARE

## 2022-09-21 VITALS
BODY MASS INDEX: 25.05 KG/M2 | OXYGEN SATURATION: 98 % | HEIGHT: 70 IN | HEART RATE: 110 BPM | SYSTOLIC BLOOD PRESSURE: 150 MMHG | DIASTOLIC BLOOD PRESSURE: 90 MMHG | WEIGHT: 175 LBS

## 2022-09-21 DIAGNOSIS — D45 POLYCYTHEMIA VERA (HCC): Primary | ICD-10-CM

## 2022-09-21 DIAGNOSIS — R74.8 ELEVATED LIVER ENZYMES: ICD-10-CM

## 2022-09-21 DIAGNOSIS — D72.829 LEUKOCYTOSIS, UNSPECIFIED TYPE: ICD-10-CM

## 2022-09-21 DIAGNOSIS — R16.1 SPLENOMEGALY: ICD-10-CM

## 2022-09-21 DIAGNOSIS — D45 POLYCYTHEMIA VERA (HCC): ICD-10-CM

## 2022-09-21 DIAGNOSIS — R71.8 MICROCYTOSIS: ICD-10-CM

## 2022-09-21 LAB
ALBUMIN SERPL-MCNC: 4.3 G/DL (ref 3.5–5.2)
ALP BLD-CCNC: 166 U/L (ref 40–130)
ALT SERPL-CCNC: 15 U/L (ref 21–72)
ANION GAP SERPL CALCULATED.3IONS-SCNC: 15 MMOL/L (ref 7–19)
AST SERPL-CCNC: 23 U/L (ref 17–59)
BASOPHILS ABSOLUTE: 0.71 K/UL (ref 0.01–0.08)
BASOPHILS RELATIVE PERCENT: 1.8 % (ref 0.1–1.2)
BILIRUB SERPL-MCNC: 1.6 MG/DL (ref 0.2–1.3)
BUN BLDV-MCNC: 21 MG/DL (ref 9–20)
CALCIUM SERPL-MCNC: 8.8 MG/DL (ref 8.4–10.2)
CHLORIDE BLD-SCNC: 100 MMOL/L (ref 98–111)
CO2: 25 MMOL/L (ref 22–29)
CREAT SERPL-MCNC: 0.9 MG/DL (ref 0.6–1.2)
EOSINOPHILS ABSOLUTE: 0.35 K/UL (ref 0.04–0.54)
EOSINOPHILS RELATIVE PERCENT: 0.9 % (ref 0.7–7)
GFR NON-AFRICAN AMERICAN: >60
GLOBULIN: 2.5 G/DL
GLUCOSE BLD-MCNC: 204 MG/DL (ref 74–106)
HCT VFR BLD CALC: 43.3 % (ref 40.1–51)
HEMOGLOBIN: 11.9 G/DL (ref 13.7–17.5)
LYMPHOCYTES ABSOLUTE: 2.23 K/UL (ref 1.18–3.74)
LYMPHOCYTES RELATIVE PERCENT: 5.7 % (ref 19.3–53.1)
MCH RBC QN AUTO: 23 PG (ref 25.7–32.2)
MCHC RBC AUTO-ENTMCNC: 27.5 G/DL (ref 32.3–36.5)
MCV RBC AUTO: 83.8 FL (ref 79–92.2)
MONOCYTES ABSOLUTE: 0.71 K/UL (ref 0.24–0.82)
MONOCYTES RELATIVE PERCENT: 1.8 % (ref 4.7–12.5)
NEUTROPHILS ABSOLUTE: 33.58 K/UL (ref 1.56–6.13)
NEUTROPHILS RELATIVE PERCENT: 85.3 % (ref 34–71.1)
PDW BLD-RTO: 18.7 % (ref 11.6–14.4)
PLATELET # BLD: 342 K/UL (ref 163–337)
POTASSIUM SERPL-SCNC: 4.5 MMOL/L (ref 3.5–5.1)
RBC # BLD: 5.17 M/UL (ref 4.63–6.08)
SODIUM BLD-SCNC: 140 MMOL/L (ref 137–145)
TOTAL PROTEIN: 6.8 G/DL (ref 6.3–8.2)
WBC # BLD: 39.36 K/UL (ref 4.23–9.07)

## 2022-09-21 PROCEDURE — 80053 COMPREHEN METABOLIC PANEL: CPT

## 2022-09-21 PROCEDURE — 99212 OFFICE O/P EST SF 10 MIN: CPT

## 2022-09-21 PROCEDURE — 36415 COLL VENOUS BLD VENIPUNCTURE: CPT

## 2022-09-21 PROCEDURE — 85025 COMPLETE CBC W/AUTO DIFF WBC: CPT

## 2022-09-21 PROCEDURE — 99214 OFFICE O/P EST MOD 30 MIN: CPT | Performed by: NURSE PRACTITIONER

## 2022-09-21 PROCEDURE — 1123F ACP DISCUSS/DSCN MKR DOCD: CPT | Performed by: NURSE PRACTITIONER

## 2022-09-27 ASSESSMENT — ENCOUNTER SYMPTOMS
EYE REDNESS: 0
NAUSEA: 0
DIARRHEA: 0
EYE DISCHARGE: 0
CONSTIPATION: 0
EYE PAIN: 0
WHEEZING: 0
ABDOMINAL PAIN: 0
SORE THROAT: 0
GASTROINTESTINAL NEGATIVE: 1
EYES NEGATIVE: 1
RESPIRATORY NEGATIVE: 1
BACK PAIN: 0
BLOOD IN STOOL: 0
COUGH: 0
VOMITING: 0
SHORTNESS OF BREATH: 0

## 2022-10-19 ENCOUNTER — HOSPITAL ENCOUNTER (OUTPATIENT)
Dept: INFUSION THERAPY | Age: 67
Setting detail: INFUSION SERIES
Discharge: HOME | End: 2022-10-19
Payer: MEDICARE

## 2022-10-19 VITALS
RESPIRATION RATE: 18 BRPM | TEMPERATURE: 97.1 F | DIASTOLIC BLOOD PRESSURE: 83 MMHG | OXYGEN SATURATION: 100 % | SYSTOLIC BLOOD PRESSURE: 141 MMHG | HEART RATE: 112 BPM

## 2022-10-19 DIAGNOSIS — D45 POLYCYTHEMIA VERA (HCC): Primary | ICD-10-CM

## 2022-10-19 LAB
ALBUMIN SERPL-MCNC: 4.3 G/DL (ref 3.5–5.2)
ALP BLD-CCNC: 213 U/L (ref 40–130)
ALT SERPL-CCNC: 18 U/L (ref 5–41)
ANION GAP SERPL CALCULATED.3IONS-SCNC: 13 MMOL/L (ref 7–19)
AST SERPL-CCNC: 14 U/L (ref 5–40)
BILIRUB SERPL-MCNC: 1.7 MG/DL (ref 0.2–1.2)
BUN BLDV-MCNC: 25 MG/DL (ref 8–23)
CALCIUM SERPL-MCNC: 9.2 MG/DL (ref 8.8–10.2)
CHLORIDE BLD-SCNC: 99 MMOL/L (ref 98–111)
CO2: 24 MMOL/L (ref 22–29)
CREAT SERPL-MCNC: 0.7 MG/DL (ref 0.5–1.2)
GFR SERPL CREATININE-BSD FRML MDRD: >60 ML/MIN/{1.73_M2}
GLUCOSE BLD-MCNC: 265 MG/DL (ref 74–109)
HCT VFR BLD CALC: 43.4 % (ref 42–52)
HEMOGLOBIN: 12.5 G/DL (ref 14–18)
MCH RBC QN AUTO: 23.1 PG (ref 27–31)
MCHC RBC AUTO-ENTMCNC: 28.8 G/DL (ref 33–37)
MCV RBC AUTO: 80.2 FL (ref 80–94)
PDW BLD-RTO: 18.1 % (ref 11.5–14.5)
PLATELET # BLD: 136 K/UL (ref 130–400)
POTASSIUM SERPL-SCNC: 4.5 MMOL/L (ref 3.5–5)
RBC # BLD: 5.41 M/UL (ref 4.7–6.1)
SODIUM BLD-SCNC: 136 MMOL/L (ref 136–145)
TOTAL PROTEIN: 6.8 G/DL (ref 6.6–8.7)
WBC # BLD: 30.7 K/UL (ref 4.8–10.8)

## 2022-10-19 PROCEDURE — 80053 COMPREHEN METABOLIC PANEL: CPT

## 2022-10-19 PROCEDURE — 85027 COMPLETE CBC AUTOMATED: CPT

## 2022-10-19 PROCEDURE — 99211 OFF/OP EST MAY X REQ PHY/QHP: CPT

## 2022-10-19 RX ORDER — 0.9 % SODIUM CHLORIDE 0.9 %
500 INTRAVENOUS SOLUTION INTRAVENOUS ONCE
OUTPATIENT
Start: 2022-10-19 | End: 2022-10-19

## 2022-10-19 NOTE — DISCHARGE INSTRUCTIONS
Blood Draw for Donation or Treatment: Care Instructions  Overview     You have just had some blood removed. This procedure is called phlebotomy (say \"ipuc-DGX-yjy-arben\"). People have their blood taken (drawn) for several reasons. You may have just donated blood so that it can be used to help someone else. Or you may have had blood removed to treat a medical condition, such as hemochromatosis or polycythemia. These take more blood than the sample that is needed for simple lab tests. For donation, about a pint of blood is drawn. If it's drawn for treatment, then more or less than a pint may be taken. The puncture wound caused by the poke from the needle for giving blood usually heals without trouble. Most people feel fine after they give blood. But there are some simple things you can do to take care of yourself before you go home. Right after you give blood, you may be asked to sit for a while and have some water or juice and a snack. When you leave, get up slowly to make sure that you're not lightheaded. You may want to have a family member or friend take you home. Follow-up care is a key part of your treatment and safety. Be sure to make and go to all appointments, and call your doctor if you are having problems. It's also a good idea to know your test results and keep a list of the medicines you take. How can you care for yourself at home? In the hours after you give blood, make sure to:  Drink plenty of fluids to help replace the lost fluid. If you have kidney, heart, or liver disease and have to limit fluids, talk with your doctor before you increase the amount of fluids you drink. Limit your physical activity for several hours. If you feel a little lightheaded, lie down for a while, and have some snacks. Call the blood bank or clinic if you feel sick within 24 hours after you give blood.   Eat foods rich in iron, such as meat, fish, beans, or leafy green vegetables, for several weeks to help your body make new red blood cells. When should you call for help? Call your doctor now or seek immediate medical care if:    You are dizzy or lightheaded or feel like you may faint. You have signs of infection, such as: Increased pain, swelling, warmth, or redness. Red streaks leading from the area. Pus draining from the area. A fever. Watch closely for changes in your health, and be sure to contact your doctor if you have any problems. Where can you learn more? Go to https://ACS Biomarker.Knowthena. org and sign in to your [employer] account. Enter O522 in the [employer] box to learn more about \"Blood Draw for Donation or Treatment: Care Instructions. \"     If you do not have an account, please click on the \"Sign Up Now\" link. Current as of: November 29, 2021               Content Version: 13.4  © 5393-4551 Healthwise, [employer]. Care instructions adapted under license by [facility] ([facility]). If you have questions about a medical condition or this instruction, always ask your healthcare professional. [address] any warranty or liability for your use of this information.

## 2022-10-19 NOTE — PROGRESS NOTES
STAT CBC/CMP drawn as ordered. Hct 43.4. No therapeutic phlebotomy needed. Appt made for 4 weeks from today. Pt Dc'd home.

## 2022-11-16 ENCOUNTER — HOSPITAL ENCOUNTER (OUTPATIENT)
Dept: INFUSION THERAPY | Age: 67
Setting detail: INFUSION SERIES
Discharge: HOME | End: 2022-11-16
Payer: MEDICARE

## 2022-11-16 VITALS
RESPIRATION RATE: 18 BRPM | HEART RATE: 111 BPM | OXYGEN SATURATION: 97 % | DIASTOLIC BLOOD PRESSURE: 81 MMHG | SYSTOLIC BLOOD PRESSURE: 146 MMHG | TEMPERATURE: 98.6 F

## 2022-11-16 DIAGNOSIS — D45 POLYCYTHEMIA VERA (HCC): Primary | ICD-10-CM

## 2022-11-16 LAB
ALBUMIN SERPL-MCNC: 4.2 G/DL (ref 3.5–5.2)
ALP BLD-CCNC: 178 U/L (ref 40–130)
ALT SERPL-CCNC: 16 U/L (ref 5–41)
ANION GAP SERPL CALCULATED.3IONS-SCNC: 10 MMOL/L (ref 7–19)
AST SERPL-CCNC: 16 U/L (ref 5–40)
BILIRUB SERPL-MCNC: 1.9 MG/DL (ref 0.2–1.2)
BUN BLDV-MCNC: 19 MG/DL (ref 8–23)
CALCIUM SERPL-MCNC: 8.9 MG/DL (ref 8.8–10.2)
CHLORIDE BLD-SCNC: 99 MMOL/L (ref 98–111)
CO2: 25 MMOL/L (ref 22–29)
CREAT SERPL-MCNC: 0.7 MG/DL (ref 0.5–1.2)
GFR SERPL CREATININE-BSD FRML MDRD: >60 ML/MIN/{1.73_M2}
GLUCOSE BLD-MCNC: 199 MG/DL (ref 74–109)
HCT VFR BLD CALC: 41.6 % (ref 42–52)
HEMOGLOBIN: 11.9 G/DL (ref 14–18)
MCH RBC QN AUTO: 23.2 PG (ref 27–31)
MCHC RBC AUTO-ENTMCNC: 28.6 G/DL (ref 33–37)
MCV RBC AUTO: 81.1 FL (ref 80–94)
PDW BLD-RTO: 18.2 % (ref 11.5–14.5)
PLATELET # BLD: 134 K/UL (ref 130–400)
POTASSIUM SERPL-SCNC: 4.3 MMOL/L (ref 3.5–5)
RBC # BLD: 5.13 M/UL (ref 4.7–6.1)
SODIUM BLD-SCNC: 134 MMOL/L (ref 136–145)
TOTAL PROTEIN: 6.4 G/DL (ref 6.6–8.7)
WBC # BLD: 22.8 K/UL (ref 4.8–10.8)

## 2022-11-16 PROCEDURE — 85027 COMPLETE CBC AUTOMATED: CPT

## 2022-11-16 PROCEDURE — 80053 COMPREHEN METABOLIC PANEL: CPT

## 2022-11-16 PROCEDURE — 99211 OFF/OP EST MAY X REQ PHY/QHP: CPT

## 2022-11-16 RX ORDER — 0.9 % SODIUM CHLORIDE 0.9 %
500 INTRAVENOUS SOLUTION INTRAVENOUS ONCE
OUTPATIENT
Start: 2022-11-16 | End: 2022-11-16

## 2022-11-16 NOTE — DISCHARGE INSTRUCTIONS
Blood Draw for Donation or Treatment: Care Instructions  Overview     You have just had some blood removed. This procedure is called phlebotomy (say \"veaf-BYI-jno-arben\"). People have their blood taken (drawn) for several reasons. You may have just donated blood so that it can be used to help someone else. Or you may have had blood removed to treat a medical condition, such as hemochromatosis or polycythemia. These take more blood than the sample that is needed for simple lab tests. For donation, about a pint of blood is drawn. If it's drawn for treatment, then more or less than a pint may be taken. The puncture wound caused by the poke from the needle for giving blood usually heals without trouble. Most people feel fine after they give blood. But there are some simple things you can do to take care of yourself before you go home. Right after you give blood, you may be asked to sit for a while and have some water or juice and a snack. When you leave, get up slowly to make sure that you're not lightheaded. You may want to have a family member or friend take you home. Follow-up care is a key part of your treatment and safety. Be sure to make and go to all appointments, and call your doctor if you are having problems. It's also a good idea to know your test results and keep a list of the medicines you take. How can you care for yourself at home? In the hours after you give blood, make sure to:  Drink plenty of fluids to help replace the lost fluid. If you have kidney, heart, or liver disease and have to limit fluids, talk with your doctor before you increase the amount of fluids you drink. Limit your physical activity for several hours. If you feel a little lightheaded, lie down for a while, and have some snacks. Call the blood bank or clinic if you feel sick within 24 hours after you give blood.   Eat foods rich in iron, such as meat, fish, beans, or leafy green vegetables, for several weeks to help your body make new red blood cells. When should you call for help? Call your doctor now or seek immediate medical care if:    You are dizzy or lightheaded or feel like you may faint. You have signs of infection, such as: Increased pain, swelling, warmth, or redness. Red streaks leading from the area. Pus draining from the area. A fever. Watch closely for changes in your health, and be sure to contact your doctor if you have any problems. Where can you learn more? Go to https://PicRate.Me.Flypad. org and sign in to your [employer] account. Enter A227 in the [facility] box to learn more about \"Blood Draw for Donation or Treatment: Care Instructions. \"     If you do not have an account, please click on the \"Sign Up Now\" link. Current as of: November 29, 2021               Content Version: 13.4  © 2006-2022 Healthwise, Incorporated. Care instructions adapted under license by [facility] ([facility]). If you have questions about a medical condition or this instruction, always ask your healthcare professional. [address] any warranty or liability for your use of this information.

## 2022-11-16 NOTE — PROGRESS NOTES
Stat CBC drawn and resulted Hematocrit of 41.6. No Therapeutic phlebotomy needed today. Pt informed and discharged home.

## 2023-01-18 ENCOUNTER — HOSPITAL ENCOUNTER (OUTPATIENT)
Dept: INFUSION THERAPY | Age: 68
Setting detail: INFUSION SERIES
Discharge: HOME | End: 2023-01-18
Payer: MEDICARE

## 2023-01-18 VITALS
HEART RATE: 112 BPM | OXYGEN SATURATION: 96 % | TEMPERATURE: 97.8 F | RESPIRATION RATE: 18 BRPM | DIASTOLIC BLOOD PRESSURE: 80 MMHG | SYSTOLIC BLOOD PRESSURE: 138 MMHG

## 2023-01-18 LAB
ALBUMIN SERPL-MCNC: 4.1 G/DL (ref 3.5–5.2)
ALP BLD-CCNC: 189 U/L (ref 40–130)
ALT SERPL-CCNC: 14 U/L (ref 5–41)
ANION GAP SERPL CALCULATED.3IONS-SCNC: 13 MMOL/L (ref 7–19)
AST SERPL-CCNC: 14 U/L (ref 5–40)
ATYPICAL LYMPHOCYTE RELATIVE PERCENT: 1 % (ref 0–8)
BANDED NEUTROPHILS RELATIVE PERCENT: 1 % (ref 0–5)
BASOPHILS ABSOLUTE: 0.3 K/UL (ref 0–0.2)
BASOPHILS RELATIVE PERCENT: 1 % (ref 0–1)
BILIRUB SERPL-MCNC: 1.8 MG/DL (ref 0.2–1.2)
BUN BLDV-MCNC: 16 MG/DL (ref 8–23)
CALCIUM SERPL-MCNC: 8.8 MG/DL (ref 8.8–10.2)
CHLORIDE BLD-SCNC: 97 MMOL/L (ref 98–111)
CO2: 23 MMOL/L (ref 22–29)
CREAT SERPL-MCNC: 0.8 MG/DL (ref 0.5–1.2)
EOSINOPHILS ABSOLUTE: 0.77 K/UL (ref 0–0.6)
EOSINOPHILS RELATIVE PERCENT: 3 % (ref 0–5)
GFR SERPL CREATININE-BSD FRML MDRD: >60 ML/MIN/{1.73_M2}
GLUCOSE BLD-MCNC: 198 MG/DL (ref 74–109)
HCT VFR BLD CALC: 42.5 % (ref 42–52)
HEMOGLOBIN: 12.4 G/DL (ref 14–18)
HYPOCHROMIA: ABNORMAL
IMMATURE GRANULOCYTES #: 1.1 K/UL
LYMPHOCYTES ABSOLUTE: 1 K/UL (ref 1.1–4.5)
LYMPHOCYTES RELATIVE PERCENT: 3 % (ref 20–40)
MCH RBC QN AUTO: 23.7 PG (ref 27–31)
MCHC RBC AUTO-ENTMCNC: 29.2 G/DL (ref 33–37)
MCV RBC AUTO: 81.1 FL (ref 80–94)
MONOCYTES ABSOLUTE: 0.8 K/UL (ref 0–0.9)
MONOCYTES RELATIVE PERCENT: 3 % (ref 0–10)
NEUTROPHILS ABSOLUTE: 23 K/UL (ref 1.5–7.5)
NEUTROPHILS RELATIVE PERCENT: 88 % (ref 50–65)
PDW BLD-RTO: 18.5 % (ref 11.5–14.5)
PLATELET # BLD: 146 K/UL (ref 130–400)
PLATELET SLIDE REVIEW: ADEQUATE
POTASSIUM SERPL-SCNC: 4.5 MMOL/L (ref 3.5–5)
RBC # BLD: 5.24 M/UL (ref 4.7–6.1)
SODIUM BLD-SCNC: 133 MMOL/L (ref 136–145)
TOTAL PROTEIN: 6.4 G/DL (ref 6.6–8.7)
WBC # BLD: 25.8 K/UL (ref 4.8–10.8)

## 2023-01-18 PROCEDURE — 99211 OFF/OP EST MAY X REQ PHY/QHP: CPT

## 2023-01-18 PROCEDURE — 85025 COMPLETE CBC W/AUTO DIFF WBC: CPT

## 2023-01-18 PROCEDURE — 80053 COMPREHEN METABOLIC PANEL: CPT

## 2023-01-18 NOTE — DISCHARGE INSTRUCTIONS
Blood Draw for Donation or Treatment: Care Instructions  Overview     You have just had some blood removed. This procedure is called phlebotomy (say \"scug-SJS-svd-arben\"). People have their blood taken (drawn) for several reasons. You may have just donated blood so that it can be used to help someone else. Or you may have had blood removed to treat a medical condition, such as hemochromatosis or polycythemia. These take more blood than the sample that is needed for simple lab tests. For donation, about a pint of blood is drawn. If it's drawn for treatment, then more or less than a pint may be taken. The puncture wound caused by the poke from the needle for giving blood usually heals without trouble. Most people feel fine after they give blood. But there are some simple things you can do to take care of yourself before you go home. Right after you give blood, you may be asked to sit for a while and have some water or juice and a snack. When you leave, get up slowly to make sure that you're not lightheaded. You may want to have a family member or friend take you home. Follow-up care is a key part of your treatment and safety. Be sure to make and go to all appointments, and call your doctor if you are having problems. It's also a good idea to know your test results and keep a list of the medicines you take. How can you care for yourself at home? In the hours after you give blood, make sure to:  Drink plenty of fluids to help replace the lost fluid. If you have kidney, heart, or liver disease and have to limit fluids, talk with your doctor before you increase the amount of fluids you drink. Limit your physical activity for several hours. If you feel a little lightheaded, lie down for a while, and have some snacks. Call the blood bank or clinic if you feel sick within 24 hours after you give blood.   Eat foods rich in iron, such as meat, fish, beans, or leafy green vegetables, for several weeks to help your body make new red blood cells. When should you call for help? Call your doctor now or seek immediate medical care if:    You are dizzy or lightheaded or feel like you may faint. You have signs of infection, such as: Increased pain, swelling, warmth, or redness. Red streaks leading from the area. Pus draining from the area. A fever. Watch closely for changes in your health, and be sure to contact your doctor if you have any problems. Where can you learn more? Go to http://www.woods.com/ and enter O538 to learn more about \"Blood Draw for Donation or Treatment: Care Instructions. \"  Current as of: September 8, 2022               Content Version: 13.5  © 2006-2022 Healthwise, Incorporated. Care instructions adapted under license by [facility] ([facility]). If you have questions about a medical condition or this instruction, always ask your healthcare professional. [address] any warranty or liability for your use of this information.

## 2023-01-18 NOTE — PROGRESS NOTES
1/18/2023  3:09 PM - Matthew Man Incoming Lab Results From [facility] Value Flag Ref Range Units Status   WBC 25.8   High   4.8 - 10.8 K/uL Final   RBC 5.24   4.70 - 6.10 M/uL Final   Hemoglobin 12.4   Low   14.0 - 18.0 g/dL Final   Hematocrit 42.5   42.0 - 52.0 % Final   MCV 81.1   80.0 - 94.0 fL Final   MCH 23.7   Low   27.0 - 31.0 pg Final   MCHC 29.2   Low   33.0 - 37.0 g/dL Final   RDW 18.5   High   11.5 - 14.5 % Final   Platelets 453   340 - 400 K/uL Final   Immature Granulocytes # 1.1    K/uL Final       Patient present for evaluation for Therapeutic Phlebotomy. Order parameters not met for therapeutic phlebotomy this visit. Patient scheduled for one month per MD orders. Patient agreeable to appointment date and time. Patient discharged home.  Electronically signed by Americo Zhong RN on 1/18/2023 at 3:31 PM

## 2023-01-31 ASSESSMENT — ENCOUNTER SYMPTOMS
VOMITING: 0
CONSTIPATION: 0
EYE PAIN: 0
DIARRHEA: 0
SHORTNESS OF BREATH: 0
EYE DISCHARGE: 0
WHEEZING: 0
RESPIRATORY NEGATIVE: 1
ABDOMINAL PAIN: 0
SORE THROAT: 0
BACK PAIN: 0
COUGH: 0
BLOOD IN STOOL: 0
EYE REDNESS: 0
EYES NEGATIVE: 1
GASTROINTESTINAL NEGATIVE: 1
NAUSEA: 0

## 2023-02-01 ENCOUNTER — TELEPHONE (OUTPATIENT)
Dept: HEMATOLOGY | Age: 68
End: 2023-02-01

## 2023-02-01 ENCOUNTER — TELEMEDICINE (OUTPATIENT)
Dept: HEMATOLOGY | Age: 68
End: 2023-02-01
Payer: MEDICARE

## 2023-02-01 DIAGNOSIS — D72.829 LEUKOCYTOSIS, UNSPECIFIED TYPE: ICD-10-CM

## 2023-02-01 DIAGNOSIS — R16.1 SPLENOMEGALY: ICD-10-CM

## 2023-02-01 DIAGNOSIS — R74.8 ELEVATED LIVER ENZYMES: ICD-10-CM

## 2023-02-01 DIAGNOSIS — R71.8 MICROCYTOSIS: ICD-10-CM

## 2023-02-01 DIAGNOSIS — D45 POLYCYTHEMIA VERA (HCC): Primary | ICD-10-CM

## 2023-02-01 PROCEDURE — 99422 OL DIG E/M SVC 11-20 MIN: CPT | Performed by: NURSE PRACTITIONER

## 2023-02-01 ASSESSMENT — ENCOUNTER SYMPTOMS
SHORTNESS OF BREATH: 0
BACK PAIN: 0
WHEEZING: 0
EYE PAIN: 0
SORE THROAT: 0
NAUSEA: 0
COUGH: 0
CONSTIPATION: 0
ABDOMINAL PAIN: 0
BLOOD IN STOOL: 0
RESPIRATORY NEGATIVE: 1
VOMITING: 0
GASTROINTESTINAL NEGATIVE: 1
EYE DISCHARGE: 0
EYES NEGATIVE: 1
EYE REDNESS: 0
DIARRHEA: 0
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
## 2023-02-01 NOTE — TELEPHONE ENCOUNTER
Pt called to see if he could do a phone visit due to weather.       Electronically signed by Emilee Hardy MA on 2/1/2023 at 8:45 AM

## 2023-02-08 RX ORDER — HYDROXYUREA 500 MG/1
CAPSULE ORAL
Qty: 180 CAPSULE | Refills: 1 | Status: SHIPPED | OUTPATIENT
Start: 2023-02-08

## 2023-03-15 ENCOUNTER — HOSPITAL ENCOUNTER (OUTPATIENT)
Dept: INFUSION THERAPY | Age: 68
Discharge: HOME | DRG: 309 | End: 2023-03-15
Payer: MEDICARE

## 2023-03-15 ENCOUNTER — APPOINTMENT (OUTPATIENT)
Dept: GENERAL RADIOLOGY | Age: 68
DRG: 309 | End: 2023-03-15
Payer: MEDICARE

## 2023-03-15 ENCOUNTER — HOSPITAL ENCOUNTER (INPATIENT)
Age: 68
LOS: 2 days | Discharge: HOME | DRG: 309 | End: 2023-03-17
Attending: EMERGENCY MEDICINE | Admitting: INTERNAL MEDICINE
Payer: MEDICARE

## 2023-03-15 ENCOUNTER — OFFICE VISIT (OUTPATIENT)
Dept: HEMATOLOGY | Age: 68
End: 2023-03-15
Payer: MEDICARE

## 2023-03-15 VITALS — SYSTOLIC BLOOD PRESSURE: 122 MMHG | DIASTOLIC BLOOD PRESSURE: 74 MMHG | HEART RATE: 160 BPM

## 2023-03-15 DIAGNOSIS — R16.1 SPLENOMEGALY: ICD-10-CM

## 2023-03-15 DIAGNOSIS — D72.829 LEUKOCYTOSIS, UNSPECIFIED TYPE: ICD-10-CM

## 2023-03-15 DIAGNOSIS — I49.9 IRREGULAR HEART RATE: ICD-10-CM

## 2023-03-15 DIAGNOSIS — D45 POLYCYTHEMIA VERA (HCC): ICD-10-CM

## 2023-03-15 DIAGNOSIS — Z86.03 HISTORY OF POLYCYTHEMIA VERA: ICD-10-CM

## 2023-03-15 DIAGNOSIS — I48.91 ATRIAL FIBRILLATION WITH RVR (HCC): Primary | ICD-10-CM

## 2023-03-15 DIAGNOSIS — D45 POLYCYTHEMIA VERA (HCC): Primary | ICD-10-CM

## 2023-03-15 DIAGNOSIS — D75.839 THROMBOCYTOSIS: ICD-10-CM

## 2023-03-15 DIAGNOSIS — R71.8 MICROCYTOSIS: ICD-10-CM

## 2023-03-15 PROBLEM — R79.89 ELEVATED TSH: Status: ACTIVE | Noted: 2023-03-15

## 2023-03-15 PROBLEM — I47.19 NARROW COMPLEX TACHYCARDIA: Status: ACTIVE | Noted: 2023-03-15

## 2023-03-15 PROBLEM — I47.1 NARROW COMPLEX TACHYCARDIA: Status: ACTIVE | Noted: 2023-03-15

## 2023-03-15 LAB
ALBUMIN SERPL-MCNC: 4 G/DL (ref 3.5–5.2)
ALP SERPL-CCNC: 358 U/L (ref 40–130)
ALT SERPL-CCNC: 12 U/L (ref 5–41)
ANION GAP SERPL CALCULATED.3IONS-SCNC: 12 MMOL/L (ref 7–19)
AST SERPL-CCNC: 19 U/L (ref 5–40)
BASOPHILS # BLD: 0.5 K/UL (ref 0–0.2)
BASOPHILS NFR BLD: 1 % (ref 0–1)
BILIRUB SERPL-MCNC: 1.1 MG/DL (ref 0.2–1.2)
BUN SERPL-MCNC: 18 MG/DL (ref 8–23)
CALCIUM SERPL-MCNC: 9.2 MG/DL (ref 8.8–10.2)
CHLORIDE SERPL-SCNC: 98 MMOL/L (ref 98–111)
CO2 SERPL-SCNC: 24 MMOL/L (ref 22–29)
CREAT SERPL-MCNC: 1 MG/DL (ref 0.5–1.2)
EOSINOPHIL # BLD: 1.01 K/UL (ref 0–0.6)
EOSINOPHIL NFR BLD: 2 % (ref 0–5)
ERYTHROCYTE [DISTWIDTH] IN BLOOD BY AUTOMATED COUNT: 20.6 % (ref 11.6–14.4)
ERYTHROCYTE [DISTWIDTH] IN BLOOD BY AUTOMATED COUNT: 21.1 % (ref 11.5–14.5)
GLUCOSE BLD-MCNC: 183 MG/DL (ref 70–99)
GLUCOSE BLD-MCNC: 248 MG/DL (ref 70–99)
GLUCOSE SERPL-MCNC: 196 MG/DL (ref 74–109)
HBA1C MFR BLD: 7.7 % (ref 4–6)
HCT VFR BLD AUTO: 43 % (ref 40.1–51)
HCT VFR BLD AUTO: 45.9 % (ref 42–52)
HGB BLD-MCNC: 12.5 G/DL (ref 13.7–17.5)
HGB BLD-MCNC: 13.3 G/DL (ref 14–18)
HYPOCHROMIA BLD QL SMEAR: ABNORMAL
IMM GRANULOCYTES # BLD: 2.3 K/UL
LYMPHOCYTES # BLD: 1.93 K/UL (ref 1.18–3.74)
LYMPHOCYTES # BLD: 3 K/UL (ref 1.1–4.5)
LYMPHOCYTES NFR BLD: 4.1 % (ref 19.3–53.1)
LYMPHOCYTES NFR BLD: 6 % (ref 20–40)
MAGNESIUM SERPL-MCNC: 1.6 MG/DL (ref 1.6–2.4)
MCH RBC QN AUTO: 24.5 PG (ref 25.7–32.2)
MCH RBC QN AUTO: 24.7 PG (ref 27–31)
MCHC RBC AUTO-ENTMCNC: 29 G/DL (ref 33–37)
MCHC RBC AUTO-ENTMCNC: 29.1 G/DL (ref 32.3–36.5)
MCV RBC AUTO: 84.3 FL (ref 79–92.2)
MCV RBC AUTO: 85.2 FL (ref 80–94)
MONOCYTES # BLD: 1.78 K/UL (ref 0.24–0.82)
MONOCYTES # BLD: 2 K/UL (ref 0–0.9)
MONOCYTES NFR BLD: 3.8 % (ref 4.7–12.5)
MONOCYTES NFR BLD: 4 % (ref 0–10)
NEUTROPHILS # BLD: 39.63 K/UL (ref 1.56–6.13)
NEUTROPHILS # BLD: 44.1 K/UL (ref 1.5–7.5)
NEUTS BAND NFR BLD MANUAL: 1 % (ref 0–5)
NEUTS SEG NFR BLD: 84.3 % (ref 34–71.1)
NEUTS SEG NFR BLD: 86 % (ref 50–65)
PERFORMED ON: ABNORMAL
PERFORMED ON: ABNORMAL
PLATELET # BLD AUTO: 1027 K/UL (ref 163–337)
PLATELET # BLD AUTO: 1167 K/UL (ref 130–400)
PLATELET SLIDE REVIEW: ABNORMAL
PMV BLD AUTO: 10.7 FL (ref 7.4–10.4)
PMV BLD AUTO: 10.7 FL (ref 9.4–12.4)
POLYCHROMASIA BLD QL SMEAR: ABNORMAL
POTASSIUM SERPL-SCNC: 5.2 MMOL/L (ref 3.5–5)
PROT SERPL-MCNC: 6.4 G/DL (ref 6.6–8.7)
RBC # BLD AUTO: 5.1 M/UL (ref 4.63–6.08)
RBC # BLD AUTO: 5.39 M/UL (ref 4.7–6.1)
SARS-COV-2 RDRP RESP QL NAA+PROBE: NOT DETECTED
SODIUM SERPL-SCNC: 134 MMOL/L (ref 136–145)
T4 FREE SERPL-MCNC: 1.26 NG/DL (ref 0.93–1.7)
TROPONIN T SERPL-MCNC: <0.01 NG/ML (ref 0–0.03)
TROPONIN T SERPL-MCNC: <0.01 NG/ML (ref 0–0.03)
TSH SERPL DL<=0.005 MIU/L-ACNC: 4.65 UIU/ML (ref 0.27–4.2)
WBC # BLD AUTO: 47.01 K/UL (ref 4.23–9.07)
WBC # BLD AUTO: 50.7 K/UL (ref 4.8–10.8)

## 2023-03-15 PROCEDURE — 1123F ACP DISCUSS/DSCN MKR DOCD: CPT | Performed by: NURSE PRACTITIONER

## 2023-03-15 PROCEDURE — 2500000003 HC RX 250 WO HCPCS: Performed by: EMERGENCY MEDICINE

## 2023-03-15 PROCEDURE — 99215 OFFICE O/P EST HI 40 MIN: CPT | Performed by: NURSE PRACTITIONER

## 2023-03-15 PROCEDURE — 3074F SYST BP LT 130 MM HG: CPT | Performed by: NURSE PRACTITIONER

## 2023-03-15 PROCEDURE — 84484 ASSAY OF TROPONIN QUANT: CPT

## 2023-03-15 PROCEDURE — 96376 TX/PRO/DX INJ SAME DRUG ADON: CPT

## 2023-03-15 PROCEDURE — 99285 EMERGENCY DEPT VISIT HI MDM: CPT

## 2023-03-15 PROCEDURE — 83735 ASSAY OF MAGNESIUM: CPT

## 2023-03-15 PROCEDURE — 87635 SARS-COV-2 COVID-19 AMP PRB: CPT

## 2023-03-15 PROCEDURE — 36415 COLL VENOUS BLD VENIPUNCTURE: CPT

## 2023-03-15 PROCEDURE — 2580000003 HC RX 258: Performed by: EMERGENCY MEDICINE

## 2023-03-15 PROCEDURE — 2140000000 HC CCU INTERMEDIATE R&B

## 2023-03-15 PROCEDURE — 84439 ASSAY OF FREE THYROXINE: CPT

## 2023-03-15 PROCEDURE — 2580000003 HC RX 258: Performed by: NURSE PRACTITIONER

## 2023-03-15 PROCEDURE — 93005 ELECTROCARDIOGRAM TRACING: CPT | Performed by: EMERGENCY MEDICINE

## 2023-03-15 PROCEDURE — 3078F DIAST BP <80 MM HG: CPT | Performed by: NURSE PRACTITIONER

## 2023-03-15 PROCEDURE — 83036 HEMOGLOBIN GLYCOSYLATED A1C: CPT

## 2023-03-15 PROCEDURE — 6370000000 HC RX 637 (ALT 250 FOR IP): Performed by: NURSE PRACTITIONER

## 2023-03-15 PROCEDURE — 6360000002 HC RX W HCPCS: Performed by: NURSE PRACTITIONER

## 2023-03-15 PROCEDURE — 85025 COMPLETE CBC W/AUTO DIFF WBC: CPT

## 2023-03-15 PROCEDURE — 96365 THER/PROPH/DIAG IV INF INIT: CPT

## 2023-03-15 PROCEDURE — 71045 X-RAY EXAM CHEST 1 VIEW: CPT

## 2023-03-15 PROCEDURE — 96366 THER/PROPH/DIAG IV INF ADDON: CPT

## 2023-03-15 PROCEDURE — 84443 ASSAY THYROID STIM HORMONE: CPT

## 2023-03-15 PROCEDURE — 80053 COMPREHEN METABOLIC PANEL: CPT

## 2023-03-15 PROCEDURE — 82962 GLUCOSE BLOOD TEST: CPT

## 2023-03-15 RX ORDER — ALLOPURINOL 300 MG/1
300 TABLET ORAL DAILY
Status: DISCONTINUED | OUTPATIENT
Start: 2023-03-15 | End: 2023-03-17 | Stop reason: HOSPADM

## 2023-03-15 RX ORDER — SODIUM CHLORIDE 9 MG/ML
INJECTION, SOLUTION INTRAVENOUS PRN
Status: DISCONTINUED | OUTPATIENT
Start: 2023-03-15 | End: 2023-03-17 | Stop reason: HOSPADM

## 2023-03-15 RX ORDER — ONDANSETRON 4 MG/1
4 TABLET, ORALLY DISINTEGRATING ORAL EVERY 8 HOURS PRN
Status: DISCONTINUED | OUTPATIENT
Start: 2023-03-15 | End: 2023-03-17 | Stop reason: HOSPADM

## 2023-03-15 RX ORDER — HYDROXYUREA 500 MG/1
CAPSULE ORAL
Qty: 90 CAPSULE | Refills: 5 | Status: ON HOLD | OUTPATIENT
Start: 2023-03-15 | End: 2023-03-17 | Stop reason: SDUPTHER

## 2023-03-15 RX ORDER — ACETAMINOPHEN 650 MG/1
650 SUPPOSITORY RECTAL EVERY 6 HOURS PRN
Status: DISCONTINUED | OUTPATIENT
Start: 2023-03-15 | End: 2023-03-17 | Stop reason: HOSPADM

## 2023-03-15 RX ORDER — DILTIAZEM HYDROCHLORIDE 5 MG/ML
10 INJECTION INTRAVENOUS ONCE
Status: COMPLETED | OUTPATIENT
Start: 2023-03-15 | End: 2023-03-15

## 2023-03-15 RX ORDER — DEXTROSE MONOHYDRATE 100 MG/ML
INJECTION, SOLUTION INTRAVENOUS CONTINUOUS PRN
Status: DISCONTINUED | OUTPATIENT
Start: 2023-03-15 | End: 2023-03-17 | Stop reason: HOSPADM

## 2023-03-15 RX ORDER — ONDANSETRON 2 MG/ML
4 INJECTION INTRAMUSCULAR; INTRAVENOUS EVERY 6 HOURS PRN
Status: DISCONTINUED | OUTPATIENT
Start: 2023-03-15 | End: 2023-03-17 | Stop reason: HOSPADM

## 2023-03-15 RX ORDER — SODIUM CHLORIDE 0.9 % (FLUSH) 0.9 %
5-40 SYRINGE (ML) INJECTION EVERY 12 HOURS SCHEDULED
Status: DISCONTINUED | OUTPATIENT
Start: 2023-03-15 | End: 2023-03-17 | Stop reason: HOSPADM

## 2023-03-15 RX ORDER — INSULIN LISPRO 100 [IU]/ML
0-4 INJECTION, SOLUTION INTRAVENOUS; SUBCUTANEOUS
Status: DISCONTINUED | OUTPATIENT
Start: 2023-03-15 | End: 2023-03-17 | Stop reason: HOSPADM

## 2023-03-15 RX ORDER — ASPIRIN 81 MG/1
81 TABLET ORAL DAILY
Status: DISCONTINUED | OUTPATIENT
Start: 2023-03-15 | End: 2023-03-17 | Stop reason: HOSPADM

## 2023-03-15 RX ORDER — HYDROXYUREA 500 MG/1
500 CAPSULE ORAL NIGHTLY
Status: DISCONTINUED | OUTPATIENT
Start: 2023-03-15 | End: 2023-03-16

## 2023-03-15 RX ORDER — INSULIN LISPRO 100 [IU]/ML
0-4 INJECTION, SOLUTION INTRAVENOUS; SUBCUTANEOUS NIGHTLY
Status: DISCONTINUED | OUTPATIENT
Start: 2023-03-15 | End: 2023-03-17 | Stop reason: HOSPADM

## 2023-03-15 RX ORDER — HYDROXYUREA 500 MG/1
500 CAPSULE ORAL 2 TIMES DAILY
Status: DISCONTINUED | OUTPATIENT
Start: 2023-03-15 | End: 2023-03-15 | Stop reason: SDUPTHER

## 2023-03-15 RX ORDER — SODIUM CHLORIDE 0.9 % (FLUSH) 0.9 %
5-40 SYRINGE (ML) INJECTION PRN
Status: DISCONTINUED | OUTPATIENT
Start: 2023-03-15 | End: 2023-03-17 | Stop reason: HOSPADM

## 2023-03-15 RX ORDER — POLYETHYLENE GLYCOL 3350 17 G/17G
17 POWDER, FOR SOLUTION ORAL DAILY PRN
Status: DISCONTINUED | OUTPATIENT
Start: 2023-03-15 | End: 2023-03-17 | Stop reason: HOSPADM

## 2023-03-15 RX ORDER — ACETAMINOPHEN 325 MG/1
650 TABLET ORAL EVERY 6 HOURS PRN
Status: DISCONTINUED | OUTPATIENT
Start: 2023-03-15 | End: 2023-03-17 | Stop reason: HOSPADM

## 2023-03-15 RX ORDER — HYDROXYUREA 500 MG/1
1000 CAPSULE ORAL DAILY
Status: DISCONTINUED | OUTPATIENT
Start: 2023-03-16 | End: 2023-03-16

## 2023-03-15 RX ORDER — ENOXAPARIN SODIUM 100 MG/ML
40 INJECTION SUBCUTANEOUS DAILY
Status: DISCONTINUED | OUTPATIENT
Start: 2023-03-15 | End: 2023-03-16

## 2023-03-15 RX ADMIN — ENOXAPARIN SODIUM 40 MG: 100 INJECTION SUBCUTANEOUS at 18:24

## 2023-03-15 RX ADMIN — HYDROXYUREA 500 MG: 500 CAPSULE ORAL at 21:39

## 2023-03-15 RX ADMIN — SODIUM CHLORIDE, PRESERVATIVE FREE 10 ML: 5 INJECTION INTRAVENOUS at 21:41

## 2023-03-15 RX ADMIN — SODIUM CHLORIDE 5 MG/HR: 900 INJECTION, SOLUTION INTRAVENOUS at 12:33

## 2023-03-15 RX ADMIN — DILTIAZEM HYDROCHLORIDE 10 MG: 5 INJECTION INTRAVENOUS at 11:58

## 2023-03-15 RX ADMIN — ALLOPURINOL 300 MG: 300 TABLET ORAL at 18:24

## 2023-03-15 RX ADMIN — ASPIRIN 81 MG: 81 TABLET, COATED ORAL at 18:24

## 2023-03-15 RX ADMIN — SODIUM ZIRCONIUM CYCLOSILICATE 10 G: 10 POWDER, FOR SUSPENSION ORAL at 18:24

## 2023-03-15 ASSESSMENT — ENCOUNTER SYMPTOMS
DIARRHEA: 0
COUGH: 0
SHORTNESS OF BREATH: 0
SORE THROAT: 0
ABDOMINAL PAIN: 0
RESPIRATORY NEGATIVE: 1
EYE REDNESS: 0
NAUSEA: 0
EYE DISCHARGE: 0
EYES NEGATIVE: 1
ABDOMINAL PAIN: 1
WHEEZING: 0
COUGH: 0
EYE PAIN: 0
GASTROINTESTINAL NEGATIVE: 1
CONSTIPATION: 0
BACK PAIN: 0
SHORTNESS OF BREATH: 0
VOMITING: 0
BLOOD IN STOOL: 0

## 2023-03-15 ASSESSMENT — PAIN SCALES - GENERAL: PAINLEVEL_OUTOF10: 3

## 2023-03-15 ASSESSMENT — PAIN - FUNCTIONAL ASSESSMENT: PAIN_FUNCTIONAL_ASSESSMENT: 0-10

## 2023-03-15 NOTE — ED NOTES
Pt hooked up to crash cart and is being monitored. Pt heart rate in SVT increasing and then decreasing back down. Pt asymptomatic at this time.      Noe Mccollum RN  03/15/23 [id number]

## 2023-03-15 NOTE — ED NOTES
Pt heart rate increasing to 180-199. Provider notified and EKG was performed.       Bryon Mack RN  03/15/23 [id number]
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
## 2023-03-15 NOTE — CARE COORDINATION
Case Management Assessment  Initial Evaluation    Date/Time of Evaluation: 3/15/2023 4:32 PM  Assessment Completed by: Bucky Garcia    If patient is discharged prior to next notation, then this note serves as note for discharge by case management. Patient Name: Zack Aguero                   YOB: 1955  Diagnosis: Atrial fibrillation with RVR ([address] [I48.91]                   Date / Time: 3/15/2023 11:18 AM    Patient Admission Status: Inpatient   Readmission Risk (Low < 19, Mod (19-27), High > 27): No data recorded  Current PCP: Brennan Matthew MD  PCP verified by CM? Yes    Chart Reviewed: Yes      History Provided by: Patient  Patient Orientation: Alert and Oriented    Patient Cognition: Alert    Hospitalization in the last 30 days (Readmission):  No    If yes, Readmission Assessment in  Navigator will be completed. Advance Directives:      Code Status: Full Code   Patient's Primary Decision Maker is: Legal Next of Kin (Daughter Cheryln Hammans May)      Discharge Planning:    Patient lives with: Alone Type of Home: House  Primary Care Giver: Self  Patient Support Systems include: Children, Family Members   Current Financial resources: Medicare  Current community resources: None  Current services prior to admission: Durable Medical Equipment [employer])            Current DME: Cane            Type of Home Care services:  None (Pt. denies needs)    ADLS  Prior functional level: Independent in ADLs/IADLs  Current functional level: Independent in ADLs/IADLs    PT AM-PAC:   /24  OT AM-PAC:   /24    Family can provide assistance at DC: Yes  Would you like Case Management to discuss the discharge plan with any other family members/significant others, and if so, who? Yes (Daughter, Cheryln Hammans May)  Plans to Return to Present Housing: Yes  Other Identified Issues/Barriers to RETURNING to current housing: N/A   Potential Assistance needed at discharge:  Other (Comment) (Pt. denies needs)            Potential DME: Patient expects to discharge to: House  Plan for transportation at discharge: Other (see comment) (Pt. drove himself unsure at discharge)    Financial    Payor: Adam Spencer / Plan: Claudette Cedars ESSENTIAL/PLUS / Product Type: *No Product type* /     Does insurance require precert for SNF: Yes    Potential assistance Purchasing Medications: No (Pt. denies needs)  Meds-to-Beds request:        [facility]/pharmacy #[id number] - [place], [address] [facility] [facility] [address] [address]  Phone: [phone] Fax: [phone]      Notes:    Factors facilitating achievement of predicted outcomes: Family support, Motivated, Cooperative, Pleasant, Sense of humor, Good insight into deficits, and Has needed Durable Medical Equipment at home    Barriers to discharge: Pain    Additional Case Management Notes: Pt. Cell phone will not open with cell phone numbers. SW attempted to see if it needed to be charged, provided a , it was still unable to open. Sw was able to provide Pt. With his daughter, Cecile Aguero  cell phone number and he was able to contact her and notify her he was in the ER. Pt. Nurse was notified on this update.      The Plan for Transition of Care is related to the following treatment goals of Atrial fibrillation with RVR ([address] [I48.91]    Kasia Madden  Case Management Department
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
## 2023-03-15 NOTE — PROGRESS NOTES
4 Eyes Skin Assessment    Joanna Aguero is being assessed upon: Admission    I agree that Zoë Mesa, RN, along with Ector Luna RN (either 2 RN's or 1 LPN and 1 RN) have performed a thorough Head to Toe Skin Assessment on the patient. ALL assessment sites listed below have been assessed. Areas assessed by both nurses:     [x]   Head, Face, and Ears   [x]   Shoulders, Back, and Chest  [x]   Arms, Elbows, and Hands   [x]   Coccyx, Sacrum, and Ischium  [x]   Legs, Feet, and Heels    Does the Patient have Skin Breakdown?  No    Manjit Prevention initiated: No  Wound Care Orders initiated: No    [facility] nurse consulted for Pressure Injury (Stage 3,4, Unstageable, DTI, NWPT, and Complex wounds) and New or Established Ostomies: No        Primary Nurse eSignature: Bautista Johnson RN on 3/15/2023 at 5:01 PM      Co-Signer eSignature: Electronically signed by Ector Luna RN on 3/15/23 at 6:54 PM CDT
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
## 2023-03-15 NOTE — PROGRESS NOTES
Fredis Loaiza May arrived to room # 715. Presented with: afib rvr    Mental Status: Patient is oriented, alert, coherent, logical, thought processes intact, and able to concentrate and follow conversation. Vitals:    03/15/23 1656   BP: (!) 146/72   Pulse: (!) 110   Resp: 18   Temp: 97.3 °F (36.3 °C)   SpO2: 100%     Patient safety contract and falls prevention contract reviewed with patient Yes. Oriented Patient to room. Call light within reach. Yes.   Needs, issues or concerns expressed at this time: no.      Electronically signed by Kalli Carlson RN on 3/15/2023 at 5:00 PM

## 2023-03-16 ENCOUNTER — APPOINTMENT (OUTPATIENT)
Dept: CT IMAGING | Age: 68
DRG: 309 | End: 2023-03-16
Payer: MEDICARE

## 2023-03-16 LAB
ANION GAP SERPL CALCULATED.3IONS-SCNC: 10 MMOL/L (ref 7–19)
ANISOCYTOSIS BLD QL SMEAR: ABNORMAL
BASOPHILS # BLD: 0.8 K/UL (ref 0–0.2)
BASOPHILS NFR BLD: 2 % (ref 0–1)
BUN SERPL-MCNC: 17 MG/DL (ref 8–23)
BURR CELLS BLD QL SMEAR: ABNORMAL
CALCIUM SERPL-MCNC: 8.6 MG/DL (ref 8.8–10.2)
CHLORIDE SERPL-SCNC: 99 MMOL/L (ref 98–111)
CO2 SERPL-SCNC: 23 MMOL/L (ref 22–29)
CREAT SERPL-MCNC: 0.8 MG/DL (ref 0.5–1.2)
EKG P AXIS: NORMAL DEGREES
EKG P-R INTERVAL: NORMAL MS
EKG Q-T INTERVAL: 262 MS
EKG QRS DURATION: 88 MS
EKG QTC CALCULATION (BAZETT): 494 MS
EKG T AXIS: 100 DEGREES
EOSINOPHIL # BLD: 0.42 K/UL (ref 0–0.6)
EOSINOPHIL NFR BLD: 1 % (ref 0–5)
ERYTHROCYTE [DISTWIDTH] IN BLOOD BY AUTOMATED COUNT: 21 % (ref 11.5–14.5)
GLUCOSE BLD-MCNC: 151 MG/DL (ref 70–99)
GLUCOSE BLD-MCNC: 195 MG/DL (ref 70–99)
GLUCOSE BLD-MCNC: 225 MG/DL (ref 70–99)
GLUCOSE BLD-MCNC: 262 MG/DL (ref 70–99)
GLUCOSE SERPL-MCNC: 135 MG/DL (ref 74–109)
HCT VFR BLD AUTO: 39.7 % (ref 42–52)
HGB BLD-MCNC: 11.4 G/DL (ref 14–18)
HYPOCHROMIA BLD QL SMEAR: ABNORMAL
IMM GRANULOCYTES # BLD: 1.7 K/UL
INR PPP: 1.19 (ref 0.88–1.18)
LV EF: 58 %
LVEF MODALITY: NORMAL
LYMPHOCYTES # BLD: 0.4 K/UL (ref 1.1–4.5)
LYMPHOCYTES NFR BLD: 1 % (ref 20–40)
MCH RBC QN AUTO: 24.8 PG (ref 27–31)
MCHC RBC AUTO-ENTMCNC: 28.7 G/DL (ref 33–37)
MCV RBC AUTO: 86.3 FL (ref 80–94)
MONOCYTES # BLD: 1.3 K/UL (ref 0–0.9)
MONOCYTES NFR BLD: 3 % (ref 0–10)
MONONUC CELLS NFR BLD MANUAL: 2 %
NEUTROPHILS # BLD: 38.4 K/UL (ref 1.5–7.5)
NEUTS BAND NFR BLD MANUAL: 1 % (ref 0–5)
NEUTS SEG NFR BLD: 90 % (ref 50–65)
OVALOCYTES BLD QL SMEAR: ABNORMAL
PERFORMED ON: ABNORMAL
PLATELET # BLD AUTO: 1052 K/UL (ref 130–400)
PLATELET SLIDE REVIEW: ADEQUATE
PMV BLD AUTO: 11 FL (ref 9.4–12.4)
POLYCHROMASIA BLD QL SMEAR: ABNORMAL
POTASSIUM SERPL-SCNC: 4.4 MMOL/L (ref 3.5–5)
PROTHROMBIN TIME: 15.1 SEC (ref 12–14.6)
RBC # BLD AUTO: 4.6 M/UL (ref 4.7–6.1)
SODIUM SERPL-SCNC: 132 MMOL/L (ref 136–145)
TROPONIN T SERPL-MCNC: <0.01 NG/ML (ref 0–0.03)
WBC # BLD AUTO: 42.2 K/UL (ref 4.8–10.8)

## 2023-03-16 PROCEDURE — 85025 COMPLETE CBC W/AUTO DIFF WBC: CPT

## 2023-03-16 PROCEDURE — 6370000000 HC RX 637 (ALT 250 FOR IP): Performed by: INTERNAL MEDICINE

## 2023-03-16 PROCEDURE — 6360000004 HC RX CONTRAST MEDICATION: Performed by: NURSE PRACTITIONER

## 2023-03-16 PROCEDURE — 82962 GLUCOSE BLOOD TEST: CPT

## 2023-03-16 PROCEDURE — 94760 N-INVAS EAR/PLS OXIMETRY 1: CPT

## 2023-03-16 PROCEDURE — 2580000003 HC RX 258: Performed by: NURSE PRACTITIONER

## 2023-03-16 PROCEDURE — 2140000000 HC CCU INTERMEDIATE R&B

## 2023-03-16 PROCEDURE — C8929 TTE W OR WO FOL WCON,DOPPLER: HCPCS

## 2023-03-16 PROCEDURE — 36415 COLL VENOUS BLD VENIPUNCTURE: CPT

## 2023-03-16 PROCEDURE — 6370000000 HC RX 637 (ALT 250 FOR IP): Performed by: NURSE PRACTITIONER

## 2023-03-16 PROCEDURE — 80048 BASIC METABOLIC PNL TOTAL CA: CPT

## 2023-03-16 PROCEDURE — 84484 ASSAY OF TROPONIN QUANT: CPT

## 2023-03-16 PROCEDURE — 85610 PROTHROMBIN TIME: CPT

## 2023-03-16 PROCEDURE — 6360000004 HC RX CONTRAST MEDICATION: Performed by: INTERNAL MEDICINE

## 2023-03-16 PROCEDURE — 99223 1ST HOSP IP/OBS HIGH 75: CPT | Performed by: INTERNAL MEDICINE

## 2023-03-16 PROCEDURE — 71275 CT ANGIOGRAPHY CHEST: CPT

## 2023-03-16 PROCEDURE — 2580000003 HC RX 258: Performed by: INTERNAL MEDICINE

## 2023-03-16 PROCEDURE — 2580000003 HC RX 258: Performed by: EMERGENCY MEDICINE

## 2023-03-16 PROCEDURE — 2500000003 HC RX 250 WO HCPCS: Performed by: EMERGENCY MEDICINE

## 2023-03-16 PROCEDURE — 99222 1ST HOSP IP/OBS MODERATE 55: CPT | Performed by: INTERNAL MEDICINE

## 2023-03-16 RX ORDER — HYDROXYUREA 500 MG/1
1000 CAPSULE ORAL 2 TIMES DAILY
Status: DISCONTINUED | OUTPATIENT
Start: 2023-03-16 | End: 2023-03-17 | Stop reason: HOSPADM

## 2023-03-16 RX ORDER — VERAPAMIL HYDROCHLORIDE 80 MG/1
80 TABLET ORAL EVERY 8 HOURS SCHEDULED
Status: DISCONTINUED | OUTPATIENT
Start: 2023-03-16 | End: 2023-03-17 | Stop reason: HOSPADM

## 2023-03-16 RX ORDER — SODIUM CHLORIDE 9 MG/ML
INJECTION, SOLUTION INTRAVENOUS CONTINUOUS
Status: ACTIVE | OUTPATIENT
Start: 2023-03-16 | End: 2023-03-16

## 2023-03-16 RX ADMIN — IOPAMIDOL 75 ML: 755 INJECTION, SOLUTION INTRAVENOUS at 10:09

## 2023-03-16 RX ADMIN — ALLOPURINOL 300 MG: 300 TABLET ORAL at 09:29

## 2023-03-16 RX ADMIN — SODIUM CHLORIDE 2.5 MG/HR: 900 INJECTION, SOLUTION INTRAVENOUS at 00:09

## 2023-03-16 RX ADMIN — SODIUM CHLORIDE: 9 INJECTION, SOLUTION INTRAVENOUS at 09:42

## 2023-03-16 RX ADMIN — INSULIN LISPRO 2 UNITS: 100 INJECTION, SOLUTION INTRAVENOUS; SUBCUTANEOUS at 12:10

## 2023-03-16 RX ADMIN — VERAPAMIL HYDROCHLORIDE 80 MG: 80 TABLET ORAL at 22:10

## 2023-03-16 RX ADMIN — SODIUM CHLORIDE, PRESERVATIVE FREE 10 ML: 5 INJECTION INTRAVENOUS at 09:44

## 2023-03-16 RX ADMIN — PERFLUTREN 1.5 ML: 6.52 INJECTION, SUSPENSION INTRAVENOUS at 08:12

## 2023-03-16 RX ADMIN — HYDROXYUREA 1000 MG: 500 CAPSULE ORAL at 09:29

## 2023-03-16 RX ADMIN — APIXABAN 5 MG: 5 TABLET, FILM COATED ORAL at 09:29

## 2023-03-16 RX ADMIN — VERAPAMIL HYDROCHLORIDE 80 MG: 80 TABLET ORAL at 09:29

## 2023-03-16 RX ADMIN — ASPIRIN 81 MG: 81 TABLET, COATED ORAL at 09:29

## 2023-03-16 RX ADMIN — HYDROXYUREA 1000 MG: 500 CAPSULE ORAL at 20:49

## 2023-03-16 ASSESSMENT — ENCOUNTER SYMPTOMS
EYES NEGATIVE: 1
SHORTNESS OF BREATH: 0
VOMITING: 0
DIARRHEA: 0
RESPIRATORY NEGATIVE: 1
NAUSEA: 0
GASTROINTESTINAL NEGATIVE: 1

## 2023-03-16 NOTE — PROGRESS NOTES
Hospitalist Progress Note      PCP: Bentley Valadez MD    Date of Admission: 3/15/2023    Chief Complaint: noted with tachycardia to 160s at [facility] office - concern for Afib - directed to ED. Subjective:     Pt feels well today. Denies SOB, no chest pain. HR remains sinus tachycardia on dilt infusion this am.           Medications:  Reviewed    Infusion Medications    sodium chloride 100 mL/hr at 03/16/23 0942    sodium chloride      dextrose       Scheduled Medications    hydroxyurea  1,000 mg Oral BID    verapamil  80 mg Oral 3 times per day    sodium chloride flush  5-40 mL IntraVENous 2 times per day    allopurinol  300 mg Oral Daily    aspirin  81 mg Oral Daily    insulin lispro  0-4 Units SubCUTAneous TID WC    insulin lispro  0-4 Units SubCUTAneous Nightly     PRN Meds: perflutren lipid microspheres, sodium chloride flush, sodium chloride, ondansetron **OR** ondansetron, polyethylene glycol, acetaminophen **OR** acetaminophen, glucose, dextrose bolus **OR** dextrose bolus, glucagon (rDNA), dextrose      Intake/Output Summary (Last 24 hours) at 3/16/2023 1256  Last data filed at 3/16/2023 0944  Gross per 24 hour   Intake 10 ml   Output --   Net 10 ml       Physical Exam Performed:    BP (!) 151/76   Pulse (!) 106   Temp 97.7 °F (36.5 °C) (Temporal)   Resp 16   Ht 5' 10\" (1.778 m)   Wt 160 lb 9.6 oz (72.8 kg)   SpO2 96%   BMI 23.04 kg/m²     General appearance: No apparent distress, appears stated age and cooperative. HEENT: Pupils equal, round, and reactive to light. Conjunctivae/corneas clear. Neck: Supple, with full range of motion. No jugular venous distention. Trachea midline. Respiratory:  Normal respiratory effort. Clear to auscultation, bilaterally without Rales/Wheezes/Rhonchi. Cardiovascular: Regular rate and rhythm with normal S1/S2 without murmurs, rubs or gallops. Abdomen: Soft, non-tender, non-distended with normal bowel sounds.   Musculoskeletal: No clubbing, cyanosis or edema bilaterally. Full range of motion without deformity. Skin: Skin color, texture, turgor normal.  No rashes or lesions. Neurologic:  Neurovascularly intact without any focal sensory/motor deficits. Cranial nerves: II-XII intact, grossly non-focal.  Psychiatric: Alert and oriented, thought content appropriate, normal insight  Capillary Refill: Brisk, 3 seconds, normal   Peripheral Pulses: +2 palpable, equal bilaterally       Labs:   Recent Labs     03/15/23  1031 03/15/23  1131 03/16/23  0156   WBC 47.01* 50.7* 42.2*   HGB 12.5* 13.3* 11.4*   HCT 43.0 45.9 39.7*   PLT 1,027* 1,167* 1,052*     Recent Labs     03/15/23  1131 03/16/23  0156   * 132*   K 5.2* 4.4   CL 98 99   CO2 24 23   BUN 18 17   CREATININE 1.0 0.8   CALCIUM 9.2 8.6*     Recent Labs     03/15/23  1131   AST 19   ALT 12   BILITOT 1.1   ALKPHOS 358*     Recent Labs     03/16/23  0156   INR 1.19*     Recent Labs     03/15/23  1131 03/15/23  1933 03/15/23  2346   TROPONINI <0.01 <0.01 <0.01       Urinalysis:      Lab Results   Component Value Date/Time    NITRU Negative 09/20/2017 05:08 PM    BLOODU Negative 09/20/2017 05:08 PM    SPECGRAV 1.017 09/20/2017 05:08 PM    GLUCOSEU >=1000 09/20/2017 05:08 PM       Radiology:  XR CHEST PORTABLE   Final Result   Patchy abnormalities in the lung bases, left more pronounced than right, with trace pleural fluid likely limits Bobbetta Settle; consider aspiration.     Recommendation: Follow up to clear is recommended; consider PA and lateral views, CT chest.    Dictated and Electronically Signed by Jessica Curry MD at 15-Mar-2023 02:09:43 PM EST               CTA PULMONARY W CONTRAST    (Results Pending)       IP CONSULT TO CARDIOLOGY  IP CONSULT TO ONCOLOGY    Assessment/Plan:    Active Hospital Problems    Diagnosis     Atrial fibrillation with RVR ([address] [I48.91]      Priority: Medium    Narrow complex tachycardia ([address] [I47.1]      Priority: Medium    Elevated TSH [R79.89]      Priority: Medium Diabetes ([facility] [E11.9]     Polycythemia vera ([facility] [address] [D45]     Hypertension [I10]     Splenomegaly [R16.1]        Tachycardia. No Signs of Afib on telemetry  Appears sinus tachycardia. ED report of possibly self limited run of SVT. Plan:    - CTA PE eval.    - cardiology consult - noted plan for cardiac event monitor on discharge. - ECHO reviewed. - serial trop negative. - stop dilt infusion - transition to PO verapamil. Polycythemia with marked thromobocytosis. Noted hemonc team making med adjustments. DMII -   Cont ISS coverage. DVT Prophylaxis: lovenox  Diet: ADULT DIET;  Regular  Code Status: Full Code    Dispo - cc        Hilary Moser MD
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
## 2023-03-17 VITALS
OXYGEN SATURATION: 95 % | BODY MASS INDEX: 23.22 KG/M2 | TEMPERATURE: 98.6 F | RESPIRATION RATE: 17 BRPM | HEART RATE: 97 BPM | WEIGHT: 162.2 LBS | DIASTOLIC BLOOD PRESSURE: 72 MMHG | HEIGHT: 70 IN | SYSTOLIC BLOOD PRESSURE: 145 MMHG

## 2023-03-17 LAB
ANION GAP SERPL CALCULATED.3IONS-SCNC: 10 MMOL/L (ref 7–19)
ANISOCYTOSIS BLD QL SMEAR: ABNORMAL
BASOPHILS # BLD: 0.4 K/UL (ref 0–0.2)
BASOPHILS NFR BLD: 1 % (ref 0–1)
BUN SERPL-MCNC: 18 MG/DL (ref 8–23)
CALCIUM SERPL-MCNC: 8.3 MG/DL (ref 8.8–10.2)
CHLORIDE SERPL-SCNC: 104 MMOL/L (ref 98–111)
CO2 SERPL-SCNC: 23 MMOL/L (ref 22–29)
CREAT SERPL-MCNC: 0.9 MG/DL (ref 0.5–1.2)
DACRYOCYTES BLD QL SMEAR: ABNORMAL
EOSINOPHIL # BLD: 1.1 K/UL (ref 0–0.6)
EOSINOPHIL NFR BLD: 3 % (ref 0–5)
ERYTHROCYTE [DISTWIDTH] IN BLOOD BY AUTOMATED COUNT: 20.5 % (ref 11.5–14.5)
GLUCOSE BLD-MCNC: 146 MG/DL (ref 70–99)
GLUCOSE BLD-MCNC: 305 MG/DL (ref 70–99)
GLUCOSE SERPL-MCNC: 189 MG/DL (ref 74–109)
HCT VFR BLD AUTO: 36.9 % (ref 42–52)
HGB BLD-MCNC: 10.9 G/DL (ref 14–18)
IMM GRANULOCYTES # BLD: 1.7 K/UL
LYMPHOCYTES # BLD: 3.3 K/UL (ref 1.1–4.5)
LYMPHOCYTES NFR BLD: 8 % (ref 20–40)
MCH RBC QN AUTO: 25.2 PG (ref 27–31)
MCHC RBC AUTO-ENTMCNC: 29.5 G/DL (ref 33–37)
MCV RBC AUTO: 85.2 FL (ref 80–94)
MONOCYTES # BLD: 1.1 K/UL (ref 0–0.9)
MONOCYTES NFR BLD: 3 % (ref 0–10)
MONONUC CELLS NFR BLD MANUAL: 1 %
NEUTROPHILS # BLD: 30.3 K/UL (ref 1.5–7.5)
NEUTS SEG NFR BLD: 83 % (ref 50–65)
OVALOCYTES BLD QL SMEAR: ABNORMAL
PERFORMED ON: ABNORMAL
PERFORMED ON: ABNORMAL
PLATELET # BLD AUTO: 1006 K/UL (ref 130–400)
PLATELET SLIDE REVIEW: ABNORMAL
PMV BLD AUTO: 11.1 FL (ref 9.4–12.4)
POLYCHROMASIA BLD QL SMEAR: ABNORMAL
POTASSIUM SERPL-SCNC: 4.3 MMOL/L (ref 3.5–5)
RBC # BLD AUTO: 4.33 M/UL (ref 4.7–6.1)
SODIUM SERPL-SCNC: 137 MMOL/L (ref 136–145)
URATE SERPL-MCNC: 3.5 MG/DL (ref 3.4–7)
VARIANT LYMPHS NFR BLD: 1 % (ref 0–8)
WBC # BLD AUTO: 36.5 K/UL (ref 4.8–10.8)

## 2023-03-17 PROCEDURE — 2580000003 HC RX 258: Performed by: NURSE PRACTITIONER

## 2023-03-17 PROCEDURE — 6370000000 HC RX 637 (ALT 250 FOR IP): Performed by: NURSE PRACTITIONER

## 2023-03-17 PROCEDURE — 80048 BASIC METABOLIC PNL TOTAL CA: CPT

## 2023-03-17 PROCEDURE — 99231 SBSQ HOSP IP/OBS SF/LOW 25: CPT | Performed by: INTERNAL MEDICINE

## 2023-03-17 PROCEDURE — 6370000000 HC RX 637 (ALT 250 FOR IP): Performed by: INTERNAL MEDICINE

## 2023-03-17 PROCEDURE — 36415 COLL VENOUS BLD VENIPUNCTURE: CPT

## 2023-03-17 PROCEDURE — 94760 N-INVAS EAR/PLS OXIMETRY 1: CPT

## 2023-03-17 PROCEDURE — 84550 ASSAY OF BLOOD/URIC ACID: CPT

## 2023-03-17 PROCEDURE — 93246 EXT ECG>7D<15D RECORDING: CPT

## 2023-03-17 PROCEDURE — 85025 COMPLETE CBC W/AUTO DIFF WBC: CPT

## 2023-03-17 PROCEDURE — APPSS15 APP SPLIT SHARED TIME 0-15 MINUTES: Performed by: NURSE PRACTITIONER

## 2023-03-17 PROCEDURE — 82962 GLUCOSE BLOOD TEST: CPT

## 2023-03-17 RX ORDER — METOPROLOL SUCCINATE 25 MG/1
25 TABLET, EXTENDED RELEASE ORAL DAILY
Status: DISCONTINUED | OUTPATIENT
Start: 2023-03-17 | End: 2023-03-17 | Stop reason: HOSPADM

## 2023-03-17 RX ORDER — HYDROXYUREA 500 MG/1
1000 CAPSULE ORAL 2 TIMES DAILY
Qty: 120 CAPSULE | Refills: 5 | Status: SHIPPED | OUTPATIENT
Start: 2023-03-17

## 2023-03-17 RX ORDER — METOPROLOL SUCCINATE 25 MG/1
25 TABLET, EXTENDED RELEASE ORAL DAILY
Qty: 30 TABLET | Refills: 3 | Status: SHIPPED | OUTPATIENT
Start: 2023-03-17 | End: 2023-03-20

## 2023-03-17 RX ADMIN — VERAPAMIL HYDROCHLORIDE 80 MG: 80 TABLET ORAL at 06:16

## 2023-03-17 RX ADMIN — ASPIRIN 81 MG: 81 TABLET, COATED ORAL at 08:25

## 2023-03-17 RX ADMIN — METOPROLOL SUCCINATE 25 MG: 25 TABLET, EXTENDED RELEASE ORAL at 10:28

## 2023-03-17 RX ADMIN — ALLOPURINOL 300 MG: 300 TABLET ORAL at 08:25

## 2023-03-17 RX ADMIN — INSULIN LISPRO 3 UNITS: 100 INJECTION, SOLUTION INTRAVENOUS; SUBCUTANEOUS at 11:36

## 2023-03-17 RX ADMIN — SODIUM CHLORIDE, PRESERVATIVE FREE 10 ML: 5 INJECTION INTRAVENOUS at 08:42

## 2023-03-17 RX ADMIN — HYDROXYUREA 1000 MG: 500 CAPSULE ORAL at 08:25

## 2023-03-17 ASSESSMENT — PAIN SCALES - GENERAL: PAINLEVEL_OUTOF10: 0

## 2023-03-17 NOTE — DISCHARGE SUMMARY
Hospital Medicine Discharge Summary    Patient ID: Rickey Aguero      Patient's PCP: Annita Kapoor MD    Admit Date: 3/15/2023     Discharge Date: 3/17/2023    Admitting Provider: Conrad Rodriguez MD     Discharge Provider: Conrad Rodriguez MD     Discharge Diagnoses: Active Hospital Problems    Diagnosis     Atrial fibrillation with RVR ([facility] [I48.91]      Priority: Medium    Narrow complex tachycardia (HCC) [I47.1]      Priority: Medium    Elevated TSH [R79.89]      Priority: Medium    Diabetes ([facility] [E11.9]     Polycythemia vera ([facility] [D45]     Hypertension [I10]     Splenomegaly [R16.1]        The patient was seen and examined on day of discharge and this discharge summary is in conjunction with any daily progress note from day of discharge. Hospital Course: 72yo man Hx of polycythemia vera, presented to [address] [place] [address] office for routine blood work. Found to be tachycardic and with markedly elevated Plt counts far off his baseline. Had phlebotomy done and directed to ED for evaluation. Tachycardia. No Signs of Afib on telemetry  Appears sinus tachycardia. ED report of possibly self limited run of SVT - none noted on telemetry over the past 48hrs since admission. Plan:               - CTA PE eval - negative for PE.               - cardiology consult - noted plan for cardiac event monitor on discharge. - ECHO reviewed. - serial trop negative. - stopped dilt infusion - transitioned to PO verapamil - continue on discharge. Polycythemia with marked thromobocytosis. Noted hemonc team making med adjustments - hydrea dose to be increased on discharge and pt will have short term follow up with hematology for repeat spleen US as well. Jorge Hoff DMII -   Cont ISS coverage.                    Physical Exam Performed:     BP (!) 145/72   Pulse 97   Temp 98.6 °F (37 °C) (Oral)   Resp 17   Ht 5' 10\" (1.778 m)   Wt 162 lb 3.2 oz (73.6 kg)   SpO2 95%   BMI 23.27 kg/m²       General appearance:  No apparent distress, appears stated age and cooperative.  HEENT:  Normal cephalic, atraumatic without obvious deformity. Pupils equal, round, and reactive to light.  Extra ocular muscles intact. Conjunctivae/corneas clear.  Neck: Supple, with full range of motion. No jugular venous distention. Trachea midline.  Respiratory:  Normal respiratory effort. Clear to auscultation, bilaterally without Rales/Wheezes/Rhonchi.  Cardiovascular:  Regular rate and rhythm with normal S1/S2 without murmurs, rubs or gallops.  Abdomen: Soft, non-tender, non-distended with normal bowel sounds.  Musculoskeletal:  No clubbing, cyanosis or edema bilaterally.  Full range of motion without deformity.  Skin: Skin color, texture, turgor normal.  No rashes or lesions.  Neurologic:  Neurovascularly intact without any focal sensory/motor deficits. Cranial nerves: II-XII intact, grossly non-focal.  Psychiatric:  Alert and oriented, thought content appropriate, normal insight  Capillary Refill: Brisk,< 3 seconds   Peripheral Pulses: +2 palpable, equal bilaterally       Labs: For convenience and continuity at follow-up the following most recent labs are provided:      CBC:    Lab Results   Component Value Date/Time    WBC 36.5 03/17/2023 01:33 AM    HGB 10.9 03/17/2023 01:33 AM    HCT 36.9 03/17/2023 01:33 AM    PLT 1,006 03/17/2023 01:33 AM       Renal:    Lab Results   Component Value Date/Time     03/17/2023 01:33 AM    K 4.3 03/17/2023 01:33 AM     03/17/2023 01:33 AM    CO2 23 03/17/2023 01:33 AM    BUN 18 03/17/2023 01:33 AM    CREATININE 0.9 03/17/2023 01:33 AM    CALCIUM 8.3 03/17/2023 01:33 AM         Significant Diagnostic Studies    Radiology:   CTA PULMONARY W CONTRAST   Final Result   No filling defect is seen to suggest a pulmonary artery embolism.   No acute airspace opacities are seen.   The ascending aorta is enlarged.   The main pulmonary  artery, which can be seen with pulmonary artery hypertension. Moderate splenomegaly   Their nodule is seen within the lungs which can be followed in 1 year. XR CHEST PORTABLE   Final Result   Patchy abnormalities in the lung bases, left more pronounced than right, with trace pleural fluid likely limits [employer]; consider aspiration. Recommendation: Follow up to clear is recommended; consider PA and lateral views, CT chest.    Dictated and Electronically Signed by Jony Valdez MD at 15-Mar-2023 02:09:43 PM EST                      Consults:     IP CONSULT TO CARDIOLOGY  IP CONSULT TO ONCOLOGY    Disposition:  home    Condition at Discharge: Stable    Discharge Instructions/Follow-up:  HemOnc 1-2 weeks. Cardiology Cardiac Event monitor follow up 4 weeks. Code Status:  Full Code     Activity: activity as tolerated    Diet: diabetic diet      Discharge Medications:     Current Discharge Medication List             Details   verapamil (CALAN SR) 120 MG extended release tablet Take 1 tablet by mouth daily  Qty: 30 tablet, Refills: 2      metoprolol succinate (TOPROL XL) 25 MG extended release tablet Take 1 tablet by mouth daily  Qty: 30 tablet, Refills: 3                Details   hydroxyurea (HYDREA) 500 MG chemo capsule Take 2 capsules by mouth 2 times daily Take two tablets (1000 mg) by mouth twice daily  Qty: 120 capsule, Refills: 5    Associated Diagnoses: Polycythemia vera (HCC)                Details   Multiple Vitamin (MULTI-VITAMIN DAILY) TABS Take by mouth      allopurinol (ZYLOPRIM) 300 MG tablet TAKE 1 TABLET BY MOUTH EVERY DAY  Refills: 5      metFORMIN (GLUCOPHAGE) 500 MG tablet TAKE 2 TABLETS BY MOUTH TWICE A DAY  Refills: 5             Time Spent on discharge: 40min in the examination, evaluation, counseling and review of medications and discharge plan.       Signed:    Robert Higginbotham MD   3/17/2023      Thank you Elin Nieves MD for the opportunity to be involved in this patient's care. If you have any questions or concerns, please feel free to contact me at [phone].
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
## 2023-03-17 NOTE — PROGRESS NOTES
Patient name: Em Aguero  Patient : 1955  3/17/2023  6:34 AM  ROOM [id number]    Portions of this note have been copied forward, however, changed to reflect the most current clinical status of this patient. Subjective: Up in chair and reports feeling better. Denies any chest pain. No new complaints this morning    HISTORY OF PRESENT ILLNESS:   Em Aguero is a 78 y.o.  male with significant PMH of polycythemia vera, JAK2 positive with mild (1+/3) fibrosis and splenomegaly. Current recommendation is for cytoreductive therapy with Hydrea, aspirin and and phlebotomize for hematocrit >45. Currently taking Hydrea 500 mg p.o. twice daily and aspirin 81 mg p.o. daily . Phoenix last required a phlebotomy on 2022 for hematocrit of 45.3. Phoenix returns today in scheduled follow-up for evaluation, lab monitoring and treatment recommendations. Phoenix reported overall not feeling well with increased fatigue and having some left rib pain. He denied any headaches or vision changes. CBC revealed an elevated platelet count of [phone], he reports compliant with the Hydrea and aspirin. His last platelet count was [phone],537 on 2023 with a hemoglobin of 12.4, hematocrit 42.5 and WBC 25.8. He denied any shortness of air or chest pain. Proceeded with a phlebotomy of 250 mL due to overall not feeling well and platelet count of 2,399,888. Post phlebotomy with recheck of vital signs he was noted to be in atrial fibrillation with heart rate ranging in the 160's and a blood pressure of 122/74. Mally Enamorado to go directly to [facility]) emergency room, I also spoke with his PCP, Dr. Twin Lockett. Phoenix has no known cardiac disease. HEMATOLOGY HISTORY:      Diagnosis   JESUS-2 polycythemia vera   High risk   Splenomegaly     TREATMENT SUMMARY:  Phlebotomies only initiated 2008 until 2012. Hydrea-Hydrea 1000 mg a.m/1000 mg p.m. Periodic phlebotomies as indicated despite Hydrea.  Last 6/17/2020  ASA 81mg daily  Phlebotomize for hematocrit > 45 or symptomatic     HEMATOLOGICAL HISTORY: JESUS-2 positive polycythemia vera diagnosed 07/2008  Mr. Aguero was seen by Dr. Quiroga on 07/25/08 where he was found to have an elevated hemoglobin and hematocrit of 20.2 and 58.5 respectively.  Dr. Zendejas had a conference with Dr. Quiroga prior to Mr. Ageuro’s initial visit. Mr. Aguero had had two phlebotomies prior to his initial visit on 08/26/08. Workup included an erythropoietin level, which was normal a JESUS-2 which was positive consistent with a myeloproliferative disorder and a relatively unremarkable chemistry profile. A bone marrow biopsy and aspirate was done on 10/07/08 that showed increased megakaryocytes in the bone marrow but with otherwise normal flow cytometric studies and absent stainable iron. This absent iron is most likely a consequence of the repeated phlebotomies for his erythrocytosis associated with his P-vera. He also has leukocytosis, a component of his myeloproliferative disorder. Periodic phlebotomies were done from July 2008 until March 2012. At that time transition was made to Hydrea. He still required periodic phlebotomies despite the Hydrea.  CT of the abdomen and pelvis with contrast performed at [facility] on 2/23/2016 did reveal splenomegaly at 16 cm.     Ultrasound of the spleen on 1/15/2020 documented splenomegaly with a spleen measuring 6 x 13.9 x 16.2 cm.     Peripheral blood smear on 2/24/2021 was negative for BCR/ABL 1 rearrangement by FISH and flow cytometry from peripheral blood documented no B-cell monoclonality and no T-cell debris antigenic expression.  Blasts were not increased.  Red cells with slight polychromasia, rare teardrop cells and occasional nucleated forms.  Marked thrombocytosis with giant platelets noted.  The teardrop cells and rare nucleated red cells are worrisome for marrow fibrosis development.     Ultrasound of the spleen on 3/15/2021 showed further spleen enlargement  with spleen measuring 20.3 x 7 x 18.6 centimeters compared to 16.2 x 6 x 13.9 cm on 1/15/2020. Bone marrow aspiration biopsy on 3/22/2021:  Myeloproliferative neoplasm, JAK2 V617F positive, involving a hypercellular marrow (greater than 90%)  Overall mild (1+/3) fibrosis  No increase in blast.  Negative for dysplasia. No stainable storage iron and no ring cider blasts. 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 Comment by Dr. Lalitha Dodge (pathologist [facility]): Findings are consistent with history of polycythemia vera with Hydrea therapy and do not fulfill criteria for post PV myelofibrosis. I reviewed Mr. Vince Naranjo bone marrow results with Dr. Jojo Perez and his recommendations continue with Hydrea at this time. If comes anemic in the future can then consider changing treatment to [facility] versus Hydrea     02/17/2022 US Spleen- Splenomegaly. The spleen size has increase in the previous study. The spleen measures 21.7 x 7.9 x 21.3 cm (19 0 4 mL). It has moderate increase in size since the previous study (20.3 x 7 x 18.6 and volume 13 80 mL).      08/24/2022 US spleen- splenomegaly; measures 20.1 x 7.4 x 19.7 cm     Medications  Current Facility-Administered Medications   Medication Dose Route Frequency Provider Last Rate Last Admin    hydroxyurea (HYDREA) chemo capsule 1,000 mg  1,000 mg Oral BID Hellen Zendejas MD   1,000 mg at 03/16/23 2049    perflutren lipid microspheres (DEFINITY) injection 1.5 mL  1.5 mL IntraVENous ONCE PRN Kimberly Schmitz MD   1.5 mL at 03/16/23 0812    verapamil (CALAN) tablet 80 mg  80 mg Oral 3 times per day Kimberly Schmitz MD   80 mg at 03/17/23 0616    sodium chloride flush 0.9 % injection 5-40 mL  5-40 mL IntraVENous 2 times per day Cloyce Sans, APRN - CNP   10 mL at 03/16/23 0944    sodium chloride flush 0.9 % injection 5-40 mL  5-40 mL IntraVENous PRN Cloyce Sans, APRN - CNP        0.9 % sodium chloride infusion   IntraVENous PRN Cloyce Sans, APRN - CNP ondansetron (ZOFRAN-ODT) disintegrating tablet 4 mg  4 mg Oral Q8H PRN Sharolyn Budd, APRN - CNP        Or    ondansetron (ZOFRAN) injection 4 mg  4 mg IntraVENous Q6H PRN Sharolyn Budd, APRN - CNP        polyethylene glycol (GLYCOLAX) packet 17 g  17 g Oral Daily PRN Sharolyn Budd, APRN - CNP        acetaminophen (TYLENOL) tablet 650 mg  650 mg Oral Q6H PRN Sharolyn Budd, APRN - CNP        Or    acetaminophen (TYLENOL) suppository 650 mg  650 mg Rectal Q6H PRN Sharolyn Budd, APRN - CNP        allopurinol (ZYLOPRIM) tablet 300 mg  300 mg Oral Daily Sharolyn Budd, APRN - CNP   300 mg at 03/16/23 0373    aspirin EC tablet 81 mg  81 mg Oral Daily Sharolyn Budd, APRN - CNP   81 mg at 03/16/23 0929    insulin lispro (HUMALOG) injection vial 0-4 Units  0-4 Units SubCUTAneous TID WC Sharolyn Budd, APRN - CNP   2 Units at 03/16/23 1210    insulin lispro (HUMALOG) injection vial 0-4 Units  0-4 Units SubCUTAneous Nightly Sharolyn Budd, APRN - CNP        glucose chewable tablet 16 g  4 tablet Oral PRN Sharolyn Budd, APRN - CNP        dextrose bolus 10% 125 mL  125 mL IntraVENous PRN Sharolyn Budd, APRN - CNP        Or    dextrose bolus 10% 250 mL  250 mL IntraVENous PRN Sharolyn Budd, APRN - CNP        glucagon (rDNA) injection 1 mg  1 mg SubCUTAneous PRN Sharolyn Budd, APRN - CNP        dextrose 10 % infusion   IntraVENous Continuous PRN Sharolyn Budd, APRN - CNP           Allergies  Patient has no known allergies.       Current Pain Assessment  Pain Assessment  Pain Assessment: None - Denies Pain  Pain Level: 3    OBJECTIVE:  VITALS: BP (!) 148/75   Pulse 96   Temp 98.6 °F (37 °C) (Oral)   Resp 17   Ht 5' 10\" (1.778 m)   Wt 162 lb 3.2 oz (73.6 kg)   SpO2 97%   BMI 23.27 kg/m²   I&O:   Intake/Output Summary (Last 24 hours) at 3/17/2023 0634  Last data filed at 3/17/2023 0445  Gross per 24 hour   Intake 250 ml   Output 1300 ml   Net -1050 ml     PHYSICAL EXAM:  CONSTITUTIONAL: Alert, appropriate, no acute distress  EYES: Non icteric, EOM intact, pupils equal round   ENT: Mucus membranes moist,external inspection of ears and nose are normal  NECK: Supple, no masses. No palpable thyroid mass  CHEST/LUNGS: CTA bilaterally, normal respiratory effort   CARDIOVASCULAR: Tachycardic, no murmurs. No lower extremity edema  ABDOMEN: soft non-tender, active bowel sounds, no HSM. No palpable masses  EXTREMITIES: warm, full ROM in all 4 extremities, no focal weakness. SKIN: warm, dry with no rashes or lesions  LYMPH: No cervical, clavicular, axillary, or inguinal lymphadenopathy  NEUROLOGIC: follows commands, non focal     BMP:   Recent Labs     03/16/23  0156 03/17/23 0133   * 137   K 4.4 4.3   CL 99 104   CO2 23 23   BUN 17 18   CREATININE 0.8 0.9   GLUCOSE 135* 189*   CALCIUM 8.6* 8.3*     Recent Labs     03/17/23  0133 03/16/23  0156 03/15/23  1131   WBC 36.5* 42.2* 50.7*   HGB 10.9* 11.4* 13.3*   HCT 36.9* 39.7* 45.9   MCV 85.2 86.3 85.2   PLT 1,006* 1,052* 1,167*     CMP:    Recent Labs     03/15/23  1131 03/16/23 0156 03/17/23 0133   * 132* 137   K 5.2* 4.4 4.3   CL 98 99 104   CO2 24 23 23   BUN 18 17 18   CREATININE 1.0 0.8 0.9   LABGLOM >60 >60 >60   GLUCOSE 196* 135* 189*   PROT 6.4*  --   --    LABALBU 4.0  --   --    CALCIUM 9.2 8.6* 8.3*   BILITOT 1.1  --   --    ALKPHOS 358*  --   --    AST 19  --   --    ALT 12  --   --      Radiology:   XR CHEST PORTABLE    Result Date: 3/15/2023  [facility] Clinical data:Chest pain and tachycardia. Technique:Single frontal view of the chest. Prior studies: No prior studies submitted. Findings:Patchy abnormalities in the lung bases, left more pronounced than right. There may be trace pleural fluid. Upper lung fields are grossly clear. Cardiac silhouette is within normal limits. Multiple cardiac monitor wires. No acute osseous abnormality is detected. ACDF hardware in the lower cervical spine.      Patchy abnormalities in the lung bases, left more pronounced than right, with trace pleural fluid likely limits Lucianne Thalia; consider aspiration. Recommendation: Follow up to clear is recommended; consider PA and lateral views, CT chest. Dictated and Electronically Signed by Wesley John MD at 15-Mar-2023 02:09:43 PM EST             CTA PULMONARY W CONTRAST    Result Date: 3/16/2023  CT ANGIOGRAM CHEST WITH IV CONTRAST: HISTORY: Tachycardia, polycythemia TECHNIQUE: Postcontrast CT through the chest was performed per protocol for CT angiography. 3D Multiplanar reformations were performed at the workstation. Reconstructed sagittal and coronal images were also obtained through the chest. This exam was performed according to our departmental dose-optimization program, which includes automated exposure control, adjustment of the mA and/or KV according to the patients size and/or use of iterative reconstruction technique. COMPARISON: None available FINDINGS: CARDIOMEDIASTINAL STRUCTURES: The heart size is mildly enlarged. No pericardial effusion is seen. LYMPH NODES: No significant mediastinal, supraclavicular, or axillary lymphadenopathy is seen. AORTA: The ascending aorta is enlarged and measures at least 4.1 cm in transverse dimension. PULMONARY ARTERY: The main pulmonary artery is enlarged and measures at least 3.1 cm in transverse dimension. There are no findings to suggest a pulmonary embolism. THYROID: The thyroid gland is diffusely heterogeneous. TRACHEA: The central tracheobronchial tree is patent. PARENCHYMA: No focal consolidative airspace opacities are seen. There is mild centrilobular paraseptal emphysematous change present. There are linear airspace opacities within the lung bases likely reflecting atelectasis. There is a 2 mm right upper lobe nodule on image 25 of series 2. There is a ground-glass nodule seen at the right middle lobe on image 51 of series 2 measuring up to 4 mm. PLEURA: There is trace bilateral pleural fluid. .There is no evidence of a pneumothorax.  BONES AND SOFT TISSUES: No acute osseous abnormality is seen. Anterior cervical fusion hardware is seen. Multilevel degenerative changes are seen at the thoracic spine. UPPER ABDOMEN: Evaluation of the upper abdomen is limited by the arterial phase. The spleen is enlarged and measures at least 17.1 cm in size. There is trace free fluid at the upper abdomen. No filling defect is seen to suggest a pulmonary artery embolism. No acute airspace opacities are seen. The ascending aorta is enlarged. The main pulmonary artery, which can be seen with pulmonary artery hypertension. Moderate splenomegaly Their nodule is seen within the lungs which can be followed in 1 year. ASSESSMENT:  Tachyarrhythmia-apparently narrow complex tachycardia. Troponins negative. Patient on aspirin and Toprol-XL 25 mg daily. Cardiology assisting. 2D echo on 3/16/2023 reported estimated EF of 55 to 60% and grade 1 diastolic dysfunction. Planning for Zio patch upon discharge. Polycythemia vera-hematocrit at goal.  Patient has thrombocytosis and leukocytosis. He is currently on Hydrea 1000 mg p.o. a.m./500 mg p.m. He undergoes phlebotomy if hematocrit> 45      Recent Labs     03/17/23  0133 03/16/23  0156 03/15/23  1131   WBC 36.5* 42.2* 50.7*   HGB 10.9* 11.4* 13.3*   HCT 36.9* 39.7* 45.9   MCV 85.2 86.3 85.2   PLT 1,006* 1,052* 1,167*     Uric acid 3.5 on 3/17/2023     Increase dose of Hydrea 1000mg PO BID. may increase even further every 2 weeks to keep platelet counts in the range of 100-400,000 and control of hyperleukocytosis. -Continue low-dose aspirin     Hyperkalemia-Lokelma. Potassium has normalized this morning 4.3  Type 2 diabetes  Hypertension    Hyponatremia. Normalized with a sodium of 137 today 3/17/2022  Abnormal TSH  DVT prophylaxis-Lovenox.      PLAN:  Continue Hydrea 1000 mg p.o. twice daily  Continue to monitor hematocrit  Cardiology assisting and planning a Zio patch at discharge   CBC daily    Okay from hematology standpoint to discharge when appropriate with others. Has scheduled lab follow-up appointments on 3/29/2023 has a follow-up appointment scheduled for 3/29/2023 and a follow-up appointment with MAURILIO Briceno on 4/12/2023.     MAURILIO Og    03/17/23  6:34 AM

## 2023-03-20 RX ORDER — METOPROLOL SUCCINATE 25 MG/1
TABLET, EXTENDED RELEASE ORAL
Qty: 90 TABLET | Refills: 1 | Status: SHIPPED | OUTPATIENT
Start: 2023-03-20

## 2023-03-27 NOTE — PROGRESS NOTES
Physician Progress Note      PATIENTGaylmg Jacobson  CSN #:                  [id number]  :                       1955  ADMIT DATE:       3/15/2023 11:18 AM  DISCH DATE:        3/17/2023 1:28 PM  RESPONDING  PROVIDER #:        Kasey THORPE          QUERY TEXT:    Patient admitted with SVT/A Fib. If possible, please document in progress   notes and discharge summary further specificity regarding the type of atrial   fibrillation: The medical record reflects the following:  Risk Factors: HTN DM  Clinical Indicators: SVT/ A Fib in ER with rates up to 190s  Treatment: Cardizem infusion, Eliquis 5 mg BID    Chronic: nonspecific term that could be referring to paroxysmal, persistent,   or permanent  Longstanding persistent: persistent and continuous, lasting > 1 year. Paroxysmal - self-terminating or intermittent; resolves with or without   intervention within 7 days of onset; may recur with various frequency. Persistent - Fails to terminate within 7 days; Often requires meds or   cardioversion to restore to NSR. Permanent - longstanding & persistent; Medication has been ineffective in   restoring NSR &/or cardioversion is contraindicated    Definitions per [employer] Training Guide and Quick Reference Guide, Ty [place]mar 112 5   Diseases and Disorders of the Circulatory System; 2019; [employer]. Software content   from the [employer]? Advanced CDI Transformation Program  Options provided:  -- Paroxysmal Atrial Fibrillation  -- Longstanding Persistent Atrial Fibrillation  -- Permanent Atrial Fibrillation  -- Persistent Atrial Fibrillation  -- Chronic Atrial Fibrillation, unspecified  -- Other - I will add my own diagnosis  -- Disagree - Not applicable / Not valid  -- Disagree - Clinically unable to determine / Unknown  -- Refer to Clinical Documentation Reviewer    PROVIDER RESPONSE TEXT:    This patient has unspecified chronic atrial fibrillation.     Query created by: Nickolas Taylor on 3/21/2023 1:12 PM      QUERY

## 2023-04-06 ENCOUNTER — HOSPITAL ENCOUNTER (OUTPATIENT)
Dept: ULTRASOUND IMAGING | Age: 68
Discharge: HOME | End: 2023-04-06
Payer: MEDICARE

## 2023-04-06 ENCOUNTER — HOSPITAL ENCOUNTER (OUTPATIENT)
Dept: INFUSION THERAPY | Age: 68
Discharge: HOME | End: 2023-04-06
Payer: MEDICARE

## 2023-04-06 DIAGNOSIS — D45 POLYCYTHEMIA VERA (HCC): ICD-10-CM

## 2023-04-06 DIAGNOSIS — R16.1 SPLENOMEGALY: ICD-10-CM

## 2023-04-06 LAB
BASOPHILS # BLD: 0.21 K/UL (ref 0.01–0.08)
BASOPHILS NFR BLD: 1 % (ref 0.1–1.2)
EOSINOPHIL # BLD: 0.33 K/UL (ref 0.04–0.54)
EOSINOPHIL NFR BLD: 1.6 % (ref 0.7–7)
ERYTHROCYTE [DISTWIDTH] IN BLOOD BY AUTOMATED COUNT: 23.9 % (ref 11.6–14.4)
HCT VFR BLD AUTO: 38.9 % (ref 40.1–51)
HGB BLD-MCNC: 10.9 G/DL (ref 13.7–17.5)
LYMPHOCYTES # BLD: 1.87 K/UL (ref 1.18–3.74)
LYMPHOCYTES NFR BLD: 9.1 % (ref 19.3–53.1)
MCH RBC QN AUTO: 25.5 PG (ref 25.7–32.2)
MCHC RBC AUTO-ENTMCNC: 28 G/DL (ref 32.3–36.5)
MCV RBC AUTO: 91.1 FL (ref 79–92.2)
MONOCYTES # BLD: 0.77 K/UL (ref 0.24–0.82)
MONOCYTES NFR BLD: 3.7 % (ref 4.7–12.5)
NEUTROPHILS # BLD: 16.16 K/UL (ref 1.56–6.13)
NEUTS SEG NFR BLD: 78.2 % (ref 34–71.1)
PLATELET # BLD AUTO: 439 K/UL (ref 163–337)
PMV BLD AUTO: 12.2 FL (ref 7.4–10.4)
RBC # BLD AUTO: 4.27 M/UL (ref 4.63–6.08)
WBC # BLD AUTO: 20.66 K/UL (ref 4.23–9.07)

## 2023-04-06 PROCEDURE — 36415 COLL VENOUS BLD VENIPUNCTURE: CPT

## 2023-04-06 PROCEDURE — 76705 ECHO EXAM OF ABDOMEN: CPT

## 2023-04-06 PROCEDURE — 85025 COMPLETE CBC W/AUTO DIFF WBC: CPT

## 2023-04-12 ENCOUNTER — OFFICE VISIT (OUTPATIENT)
Dept: HEMATOLOGY | Age: 68
End: 2023-04-12
Payer: MEDICARE

## 2023-04-12 ENCOUNTER — HOSPITAL ENCOUNTER (OUTPATIENT)
Dept: INFUSION THERAPY | Age: 68
Discharge: HOME | End: 2023-04-12
Payer: MEDICARE

## 2023-04-12 VITALS
DIASTOLIC BLOOD PRESSURE: 60 MMHG | BODY MASS INDEX: 22.93 KG/M2 | SYSTOLIC BLOOD PRESSURE: 120 MMHG | WEIGHT: 159.8 LBS | HEART RATE: 57 BPM | OXYGEN SATURATION: 90 %

## 2023-04-12 DIAGNOSIS — D72.829 LEUKOCYTOSIS, UNSPECIFIED TYPE: ICD-10-CM

## 2023-04-12 DIAGNOSIS — D45 POLYCYTHEMIA VERA (HCC): ICD-10-CM

## 2023-04-12 DIAGNOSIS — R71.8 MICROCYTOSIS: ICD-10-CM

## 2023-04-12 DIAGNOSIS — D45 POLYCYTHEMIA VERA (HCC): Primary | ICD-10-CM

## 2023-04-12 DIAGNOSIS — R16.1 SPLENOMEGALY: ICD-10-CM

## 2023-04-12 LAB
BASOPHILS # BLD: 0.31 K/UL (ref 0.01–0.08)
BASOPHILS NFR BLD: 1.3 % (ref 0.1–1.2)
EOSINOPHIL # BLD: 0.38 K/UL (ref 0.04–0.54)
EOSINOPHIL NFR BLD: 1.5 % (ref 0.7–7)
ERYTHROCYTE [DISTWIDTH] IN BLOOD BY AUTOMATED COUNT: 24.2 % (ref 11.6–14.4)
HCT VFR BLD AUTO: 37.8 % (ref 40.1–51)
HGB BLD-MCNC: 10.6 G/DL (ref 13.7–17.5)
LYMPHOCYTES # BLD: 1.89 K/UL (ref 1.18–3.74)
LYMPHOCYTES NFR BLD: 7.7 % (ref 19.3–53.1)
MCH RBC QN AUTO: 25.9 PG (ref 25.7–32.2)
MCHC RBC AUTO-ENTMCNC: 28 G/DL (ref 32.3–36.5)
MCV RBC AUTO: 92.4 FL (ref 79–92.2)
MONOCYTES # BLD: 0.6 K/UL (ref 0.24–0.82)
MONOCYTES NFR BLD: 2.4 % (ref 4.7–12.5)
NEUTROPHILS # BLD: 20.83 K/UL (ref 1.56–6.13)
NEUTS SEG NFR BLD: 84.5 % (ref 34–71.1)
PLATELET # BLD AUTO: 908 K/UL (ref 163–337)
PMV BLD AUTO: 11.6 FL (ref 7.4–10.4)
RBC # BLD AUTO: 4.09 M/UL (ref 4.63–6.08)
WBC # BLD AUTO: 24.64 K/UL (ref 4.23–9.07)

## 2023-04-12 PROCEDURE — 85025 COMPLETE CBC W/AUTO DIFF WBC: CPT

## 2023-04-12 PROCEDURE — 3074F SYST BP LT 130 MM HG: CPT | Performed by: NURSE PRACTITIONER

## 2023-04-12 PROCEDURE — 99214 OFFICE O/P EST MOD 30 MIN: CPT | Performed by: NURSE PRACTITIONER

## 2023-04-12 PROCEDURE — 99211 OFF/OP EST MAY X REQ PHY/QHP: CPT

## 2023-04-12 PROCEDURE — 3078F DIAST BP <80 MM HG: CPT | Performed by: NURSE PRACTITIONER

## 2023-04-12 PROCEDURE — 36415 COLL VENOUS BLD VENIPUNCTURE: CPT

## 2023-04-12 PROCEDURE — 1123F ACP DISCUSS/DSCN MKR DOCD: CPT | Performed by: NURSE PRACTITIONER

## 2023-04-17 ASSESSMENT — ENCOUNTER SYMPTOMS
EYE DISCHARGE: 0
EYE PAIN: 0
CONSTIPATION: 0
EYES NEGATIVE: 1
COUGH: 0
VOMITING: 0
RESPIRATORY NEGATIVE: 1
GASTROINTESTINAL NEGATIVE: 1
NAUSEA: 0
DIARRHEA: 0
SHORTNESS OF BREATH: 0
ABDOMINAL PAIN: 0
BACK PAIN: 0
SORE THROAT: 0
BLOOD IN STOOL: 0
WHEEZING: 0
EYE REDNESS: 0

## 2023-04-21 ENCOUNTER — HOSPITAL ENCOUNTER (OUTPATIENT)
Dept: INFUSION THERAPY | Age: 68
Discharge: HOME | End: 2023-04-21
Payer: MEDICARE

## 2023-04-21 ENCOUNTER — OFFICE VISIT (OUTPATIENT)
Dept: CARDIOLOGY CLINIC | Age: 68
End: 2023-04-21
Payer: MEDICARE

## 2023-04-21 VITALS
BODY MASS INDEX: 22.9 KG/M2 | DIASTOLIC BLOOD PRESSURE: 86 MMHG | WEIGHT: 160 LBS | HEIGHT: 70 IN | OXYGEN SATURATION: 98 % | SYSTOLIC BLOOD PRESSURE: 132 MMHG | HEART RATE: 94 BPM

## 2023-04-21 DIAGNOSIS — I10 ESSENTIAL HYPERTENSION: ICD-10-CM

## 2023-04-21 DIAGNOSIS — D45 POLYCYTHEMIA VERA (HCC): ICD-10-CM

## 2023-04-21 DIAGNOSIS — I47.1 SVT (SUPRAVENTRICULAR TACHYCARDIA) (HCC): Primary | ICD-10-CM

## 2023-04-21 LAB
BASOPHILS # BLD: 0.16 K/UL (ref 0.01–0.08)
BASOPHILS NFR BLD: 1.7 % (ref 0.1–1.2)
EOSINOPHIL # BLD: 0.04 K/UL (ref 0.04–0.54)
EOSINOPHIL NFR BLD: 0.4 % (ref 0.7–7)
ERYTHROCYTE [DISTWIDTH] IN BLOOD BY AUTOMATED COUNT: 24.3 % (ref 11.6–14.4)
HCT VFR BLD AUTO: 38.6 % (ref 40.1–51)
HGB BLD-MCNC: 10.8 G/DL (ref 13.7–17.5)
LYMPHOCYTES # BLD: 0.83 K/UL (ref 1.18–3.74)
LYMPHOCYTES NFR BLD: 8.6 % (ref 19.3–53.1)
MCH RBC QN AUTO: 26.6 PG (ref 25.7–32.2)
MCHC RBC AUTO-ENTMCNC: 28 G/DL (ref 32.3–36.5)
MCV RBC AUTO: 95.1 FL (ref 79–92.2)
MONOCYTES # BLD: 0.2 K/UL (ref 0.24–0.82)
MONOCYTES NFR BLD: 2.1 % (ref 4.7–12.5)
NEUTROPHILS # BLD: 8.29 K/UL (ref 1.56–6.13)
NEUTS SEG NFR BLD: 86.4 % (ref 34–71.1)
PLATELET # BLD AUTO: 258 K/UL (ref 163–337)
PMV BLD AUTO: 13.2 FL (ref 7.4–10.4)
RBC # BLD AUTO: 4.06 M/UL (ref 4.63–6.08)
WBC # BLD AUTO: 9.6 K/UL (ref 4.23–9.07)

## 2023-04-21 PROCEDURE — 99214 OFFICE O/P EST MOD 30 MIN: CPT | Performed by: NURSE PRACTITIONER

## 2023-04-21 PROCEDURE — 1123F ACP DISCUSS/DSCN MKR DOCD: CPT | Performed by: NURSE PRACTITIONER

## 2023-04-21 PROCEDURE — 36415 COLL VENOUS BLD VENIPUNCTURE: CPT

## 2023-04-21 PROCEDURE — 3079F DIAST BP 80-89 MM HG: CPT | Performed by: NURSE PRACTITIONER

## 2023-04-21 PROCEDURE — 3075F SYST BP GE 130 - 139MM HG: CPT | Performed by: NURSE PRACTITIONER

## 2023-04-21 PROCEDURE — 93000 ELECTROCARDIOGRAM COMPLETE: CPT | Performed by: NURSE PRACTITIONER

## 2023-04-21 PROCEDURE — 85025 COMPLETE CBC W/AUTO DIFF WBC: CPT

## 2023-04-21 ASSESSMENT — ENCOUNTER SYMPTOMS
COUGH: 0
SORE THROAT: 0
SHORTNESS OF BREATH: 0
CHEST TIGHTNESS: 0
WHEEZING: 0
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
## 2023-04-21 NOTE — PROGRESS NOTES
Patient here for CBC evaluation. States that he is feeling better and getting some of his energy back. Skin w/d to touch. Color wnl. Reviewed labs with patient, WBC 9.60, Hgb 10.8, Hct 38.6, platelets 902,080. MAURILIO Aranda reviewed labs, wants to decrease Hydrea to 500 mg twice a day and return in one week for CBC. Instructed to call with any problems. Patient v/u.

## 2023-06-05 NOTE — TELEPHONE ENCOUNTER
For [facility] med records:  Please provide update on what needs to be provided here

## 2023-06-13 ENCOUNTER — APPOINTMENT (OUTPATIENT)
Dept: INFUSION THERAPY | Age: 68
End: 2023-06-13
Payer: MEDICARE

## 2023-06-22 ENCOUNTER — OFFICE VISIT (OUTPATIENT)
Dept: CARDIOLOGY CLINIC | Age: 68
End: 2023-06-22
Payer: MEDICARE

## 2023-06-22 VITALS
WEIGHT: 168 LBS | SYSTOLIC BLOOD PRESSURE: 134 MMHG | HEART RATE: 96 BPM | DIASTOLIC BLOOD PRESSURE: 72 MMHG | BODY MASS INDEX: 24.05 KG/M2 | HEIGHT: 70 IN

## 2023-06-22 DIAGNOSIS — I47.1 SVT (SUPRAVENTRICULAR TACHYCARDIA) (HCC): Primary | ICD-10-CM

## 2023-06-22 PROCEDURE — 3078F DIAST BP <80 MM HG: CPT | Performed by: INTERNAL MEDICINE

## 2023-06-22 PROCEDURE — 99214 OFFICE O/P EST MOD 30 MIN: CPT | Performed by: INTERNAL MEDICINE

## 2023-06-22 PROCEDURE — 3075F SYST BP GE 130 - 139MM HG: CPT | Performed by: INTERNAL MEDICINE

## 2023-06-22 PROCEDURE — 1123F ACP DISCUSS/DSCN MKR DOCD: CPT | Performed by: INTERNAL MEDICINE

## 2023-06-22 RX ORDER — METOPROLOL SUCCINATE 25 MG/1
25 TABLET, EXTENDED RELEASE ORAL DAILY
Qty: 90 TABLET | Refills: 3 | Status: SHIPPED | OUTPATIENT
Start: 2023-06-22

## 2023-06-22 ASSESSMENT — ENCOUNTER SYMPTOMS
SHORTNESS OF BREATH: 0
RESPIRATORY NEGATIVE: 1
VOMITING: 0
GASTROINTESTINAL NEGATIVE: 1
NAUSEA: 0
DIARRHEA: 0
EYES NEGATIVE: 1

## 2023-06-22 NOTE — PROGRESS NOTES
[facility] Progress Note                            Date:  6/22/2023  Patient: Lb Aguero  Age:  78 y.o., 1955      Reason for evaluation:         SUBJECTIVE:    Returns today follow-up assessment SVT seen in the hospital several months ago overall doing well. Denies palpitations denies dyspnea denies chest pain. Works part-time mowing yards and remains quite active no other symptoms or issues reported. On metoprolol and verapamil. Blood pressure 134/72 heart 96. Review of Systems   Constitutional: Negative. Negative for chills, fever and unexpected weight change. HENT: Negative. Eyes: Negative. Respiratory: Negative. Negative for shortness of breath. Cardiovascular: Negative. Negative for chest pain. Gastrointestinal: Negative. Negative for diarrhea, nausea and vomiting. Endocrine: Negative. Genitourinary: Negative. Musculoskeletal: Negative. Skin: Negative. Neurological: Negative. All other systems reviewed and are negative. OBJECTIVE:     /72   Pulse 96   Ht 5' 10\" (1.778 m)   Wt 168 lb (76.2 kg)   BMI 24.11 kg/m²     Labs:   CBC: No results for input(s): WBC, HGB, HCT, PLT in the last 72 hours. BMP:No results for input(s): NA, K, CO2, BUN, CREATININE, LABGLOM, GLUCOSE in the last 72 hours. BNP: No results for input(s): BNP in the last 72 hours. PT/INR: No results for input(s): PROTIME, INR in the last 72 hours. APTT:No results for input(s): APTT in the last 72 hours. CARDIAC ENZYMES:No results for input(s): CKTOTAL, CKMB, CKMBINDEX, TROPONINI in the last 72 hours. FASTING LIPID PANEL:No results found for: HDL, LDLDIRECT, LDLCALC, TRIG  LIVER PROFILE:No results for input(s): AST, ALT, LABALBU in the last 72 hours.         Past Medical History:   Diagnosis Date    Abnormal results of liver function studies     Body mass index (BMI) 22.0-22.9, adult     Chronic gastric ulcer without hemorrhage or perforation     Degenerative cervical

## 2023-06-28 ENCOUNTER — OFFICE VISIT (OUTPATIENT)
Dept: HEMATOLOGY | Age: 68
End: 2023-06-28
Payer: MEDICARE

## 2023-06-28 ENCOUNTER — HOSPITAL ENCOUNTER (OUTPATIENT)
Dept: INFUSION THERAPY | Age: 68
Discharge: HOME | End: 2023-06-28
Payer: MEDICARE

## 2023-06-28 VITALS
BODY MASS INDEX: 24.25 KG/M2 | OXYGEN SATURATION: 96 % | WEIGHT: 169 LBS | HEART RATE: 66 BPM | SYSTOLIC BLOOD PRESSURE: 118 MMHG | DIASTOLIC BLOOD PRESSURE: 80 MMHG

## 2023-06-28 DIAGNOSIS — R16.1 SPLENOMEGALY: ICD-10-CM

## 2023-06-28 DIAGNOSIS — R71.8 MICROCYTOSIS: ICD-10-CM

## 2023-06-28 DIAGNOSIS — D45 POLYCYTHEMIA VERA (HCC): ICD-10-CM

## 2023-06-28 DIAGNOSIS — D45 POLYCYTHEMIA VERA (HCC): Primary | ICD-10-CM

## 2023-06-28 DIAGNOSIS — D72.829 LEUKOCYTOSIS, UNSPECIFIED TYPE: ICD-10-CM

## 2023-06-28 LAB
BASOPHILS # BLD: 0.29 K/UL (ref 0.01–0.08)
BASOPHILS NFR BLD: 1.1 % (ref 0.1–1.2)
EOSINOPHIL # BLD: 0.33 K/UL (ref 0.04–0.54)
EOSINOPHIL NFR BLD: 1.2 % (ref 0.7–7)
ERYTHROCYTE [DISTWIDTH] IN BLOOD BY AUTOMATED COUNT: 19.9 % (ref 11.6–14.4)
HCT VFR BLD AUTO: 39.2 % (ref 40.1–51)
HGB BLD-MCNC: 11.4 G/DL (ref 13.7–17.5)
LYMPHOCYTES # BLD: 1.82 K/UL (ref 1.18–3.74)
LYMPHOCYTES NFR BLD: 6.7 % (ref 19.3–53.1)
MCH RBC QN AUTO: 27.1 PG (ref 25.7–32.2)
MCHC RBC AUTO-ENTMCNC: 29.1 G/DL (ref 32.3–36.5)
MCV RBC AUTO: 93.3 FL (ref 79–92.2)
MONOCYTES # BLD: 0.43 K/UL (ref 0.24–0.82)
MONOCYTES NFR BLD: 1.6 % (ref 4.7–12.5)
NEUTROPHILS # BLD: 23.74 K/UL (ref 1.56–6.13)
NEUTS SEG NFR BLD: 87 % (ref 34–71.1)
PLATELET # BLD AUTO: 245 K/UL (ref 163–337)
PMV BLD AUTO: 13.5 FL (ref 7.4–10.4)
RBC # BLD AUTO: 4.2 M/UL (ref 4.63–6.08)
WBC # BLD AUTO: 27.27 K/UL (ref 4.23–9.07)

## 2023-06-28 PROCEDURE — 3074F SYST BP LT 130 MM HG: CPT | Performed by: NURSE PRACTITIONER

## 2023-06-28 PROCEDURE — 36415 COLL VENOUS BLD VENIPUNCTURE: CPT

## 2023-06-28 PROCEDURE — 99211 OFF/OP EST MAY X REQ PHY/QHP: CPT

## 2023-06-28 PROCEDURE — 99214 OFFICE O/P EST MOD 30 MIN: CPT | Performed by: NURSE PRACTITIONER

## 2023-06-28 PROCEDURE — 1123F ACP DISCUSS/DSCN MKR DOCD: CPT | Performed by: NURSE PRACTITIONER

## 2023-06-28 PROCEDURE — 85025 COMPLETE CBC W/AUTO DIFF WBC: CPT

## 2023-06-28 PROCEDURE — 3078F DIAST BP <80 MM HG: CPT | Performed by: NURSE PRACTITIONER

## 2023-07-04 ASSESSMENT — ENCOUNTER SYMPTOMS
GASTROINTESTINAL NEGATIVE: 1
SORE THROAT: 0
ABDOMINAL PAIN: 0
DIARRHEA: 0
EYE PAIN: 0
BACK PAIN: 0
COUGH: 0
RESPIRATORY NEGATIVE: 1
CONSTIPATION: 0
EYES NEGATIVE: 1
SHORTNESS OF BREATH: 0
NAUSEA: 0
EYE DISCHARGE: 0
EYE REDNESS: 0
BLOOD IN STOOL: 0
WHEEZING: 0
VOMITING: 0

## 2023-07-26 ENCOUNTER — HOSPITAL ENCOUNTER (OUTPATIENT)
Dept: INFUSION THERAPY | Age: 68
Discharge: HOME | End: 2023-07-26
Payer: MEDICARE

## 2023-07-26 DIAGNOSIS — D72.828 OTHER ELEVATED WHITE BLOOD CELL (WBC) COUNT: ICD-10-CM

## 2023-07-26 DIAGNOSIS — R71.8 OTHER ABNORMALITY OF RED BLOOD CELLS: ICD-10-CM

## 2023-07-26 DIAGNOSIS — D45 POLYCYTHEMIA VERA (HCC): ICD-10-CM

## 2023-07-26 DIAGNOSIS — D64.89 ANEMIA DUE TO OTHER CAUSE, NOT CLASSIFIED: ICD-10-CM

## 2023-07-26 DIAGNOSIS — D72.828 OTHER ELEVATED WHITE BLOOD CELL (WBC) COUNT: Primary | ICD-10-CM

## 2023-07-26 LAB
CRP SERPL HS-MCNC: 9.28 MG/DL (ref 0–0.5)
ERYTHROCYTE [DISTWIDTH] IN BLOOD BY AUTOMATED COUNT: 19.9 % (ref 11.6–14.4)
ERYTHROCYTE [SEDIMENTATION RATE] IN BLOOD BY WESTERGREN METHOD: 9 MM/HR (ref 0–15)
FERRITIN SERPL-MCNC: 60.5 NG/ML (ref 30–400)
HCT VFR BLD AUTO: 38.1 % (ref 40.1–51)
HGB BLD-MCNC: 11.5 G/DL (ref 13.7–17.5)
IRON SATN MFR SERPL: 6 % (ref 14–50)
IRON SERPL-MCNC: 22 UG/DL (ref 59–158)
LYMPHOCYTES # BLD: 2.8 K/UL (ref 1.18–3.74)
LYMPHOCYTES NFR BLD: 4.7 % (ref 19.3–53.1)
MCH RBC QN AUTO: 26.1 PG (ref 25.7–32.2)
MCHC RBC AUTO-ENTMCNC: 30.2 G/DL (ref 32.3–36.5)
MCV RBC AUTO: 86.4 FL (ref 79–92.2)
MONOCYTES # BLD: 1.07 K/UL (ref 0.24–0.82)
MONOCYTES NFR BLD: 1.8 % (ref 4.7–12.5)
NEUTROPHILS # BLD: 50.89 K/UL (ref 1.56–6.13)
NEUTS SEG NFR BLD: 86 % (ref 34–71.1)
PLATELET # BLD AUTO: 290 K/UL (ref 163–337)
RBC # BLD AUTO: 4.41 M/UL (ref 4.63–6.08)
TIBC SERPL-MCNC: 371 UG/DL (ref 250–400)
WBC # BLD AUTO: 59.23 K/UL (ref 4.23–9.07)

## 2023-07-26 PROCEDURE — 85025 COMPLETE CBC W/AUTO DIFF WBC: CPT

## 2023-07-26 PROCEDURE — 36415 COLL VENOUS BLD VENIPUNCTURE: CPT

## 2023-07-26 NOTE — PROGRESS NOTES
Patient here for CBC evaluation for polycythemia vera. States that he is feeling good. Skin w/d to touch. Color wnl. Reviewed labs with patient, WBC 59.23, Hgb 11.5, Hct 38.1, platelets 163,148. States that he is taking Hydrea 500 mg twice a day without any side effects. Denies any fever, chills, recent illness, or recent steroid in relation to elevated WBC. MAURILIO Moralez reviewed labs and ordered additional blood work, path review, flow cytometry, JAK2, sed rate, and CRP. Follow up appointment to see Brain Suzanna changed to 08/09. Instructed patient to call with any problems. Patient v/u.

## 2023-07-27 LAB
ACUTE LEUKEMIA MARKERS SPEC-IMP: NORMAL
EVENTS COUNTED SPEC: 26 MARKERS
PROF LEUK/LYMPH PHENO 16 OR MORE MARKERS: NORMAL
PROF LEUK/LYMPH PHENO 26 MARKERS: NORMAL
SOURCE: NORMAL

## 2023-08-08 NOTE — PROGRESS NOTES
needed polycythemia vera, high risk, splenomegaly, leukocytosis and mild anemia    2. Continue to follow with other medical providers as recommended  3. Labs at next visit: CMP and CBC    EMR Dragon/Transcription disclaimer:   Much of this encounter note is an electronic transcription/translation of spoken language to printed text. The electronic translation of spoken language may permit erroneous, or at times, nonsensical words or phrases to be inadvertently transcribed; although attempts have made to review the note for such errors, some may still exist.  Please excuse any unrecognized transcription errors and contact us if the error is unintelligible or needs documented correction. Also, portions of this note have been copied forward, however, changed to reflect the most current clinical status of this patient. Karishma Hess, ant starting this note as a registered nurse for [employer].   Electronically signed by MAURILIO Vázquez on 8/15/2023 at 4:54 PM.

## 2023-08-09 ENCOUNTER — OFFICE VISIT (OUTPATIENT)
Dept: HEMATOLOGY | Age: 68
End: 2023-08-09
Payer: MEDICARE

## 2023-08-09 ENCOUNTER — HOSPITAL ENCOUNTER (OUTPATIENT)
Dept: INFUSION THERAPY | Age: 68
Discharge: HOME | End: 2023-08-09
Payer: MEDICARE

## 2023-08-09 VITALS
OXYGEN SATURATION: 99 % | SYSTOLIC BLOOD PRESSURE: 128 MMHG | DIASTOLIC BLOOD PRESSURE: 60 MMHG | HEART RATE: 97 BPM | WEIGHT: 170.9 LBS | BODY MASS INDEX: 24.52 KG/M2

## 2023-08-09 DIAGNOSIS — R16.1 SPLENOMEGALY: ICD-10-CM

## 2023-08-09 DIAGNOSIS — D45 POLYCYTHEMIA VERA (HCC): Primary | ICD-10-CM

## 2023-08-09 DIAGNOSIS — D45 POLYCYTHEMIA VERA (HCC): ICD-10-CM

## 2023-08-09 DIAGNOSIS — R71.8 MICROCYTOSIS: ICD-10-CM

## 2023-08-09 DIAGNOSIS — D72.829 LEUKOCYTOSIS, UNSPECIFIED TYPE: ICD-10-CM

## 2023-08-09 LAB
BASOPHILS # BLD: 0.49 K/UL (ref 0.01–0.08)
BASOPHILS NFR BLD: 1.7 % (ref 0.1–1.2)
EOSINOPHIL # BLD: 0.33 K/UL (ref 0.04–0.54)
EOSINOPHIL NFR BLD: 1.1 % (ref 0.7–7)
ERYTHROCYTE [DISTWIDTH] IN BLOOD BY AUTOMATED COUNT: 19.9 % (ref 11.6–14.4)
HCT VFR BLD AUTO: 40.4 % (ref 40.1–51)
HGB BLD-MCNC: 12.2 G/DL (ref 13.7–17.5)
LYMPHOCYTES # BLD: 1.72 K/UL (ref 1.18–3.74)
LYMPHOCYTES NFR BLD: 5.9 % (ref 19.3–53.1)
MCH RBC QN AUTO: 25.1 PG (ref 25.7–32.2)
MCHC RBC AUTO-ENTMCNC: 30.2 G/DL (ref 32.3–36.5)
MCV RBC AUTO: 83.1 FL (ref 79–92.2)
MONOCYTES # BLD: 0.54 K/UL (ref 0.24–0.82)
MONOCYTES NFR BLD: 1.8 % (ref 4.7–12.5)
NEUTROPHILS # BLD: 25.53 K/UL (ref 1.56–6.13)
NEUTS SEG NFR BLD: 87.1 % (ref 34–71.1)
PLATELET # BLD AUTO: 227 K/UL (ref 163–337)
RBC # BLD AUTO: 4.86 M/UL (ref 4.63–6.08)
WBC # BLD AUTO: 29.31 K/UL (ref 4.23–9.07)

## 2023-08-09 PROCEDURE — 3074F SYST BP LT 130 MM HG: CPT | Performed by: NURSE PRACTITIONER

## 2023-08-09 PROCEDURE — 99211 OFF/OP EST MAY X REQ PHY/QHP: CPT

## 2023-08-09 PROCEDURE — 99214 OFFICE O/P EST MOD 30 MIN: CPT | Performed by: NURSE PRACTITIONER

## 2023-08-09 PROCEDURE — 85025 COMPLETE CBC W/AUTO DIFF WBC: CPT

## 2023-08-09 PROCEDURE — 3078F DIAST BP <80 MM HG: CPT | Performed by: NURSE PRACTITIONER

## 2023-08-09 PROCEDURE — 36415 COLL VENOUS BLD VENIPUNCTURE: CPT

## 2023-08-09 PROCEDURE — 1123F ACP DISCUSS/DSCN MKR DOCD: CPT | Performed by: NURSE PRACTITIONER

## 2023-08-11 LAB — JAK2 P.V617F BLD/T QL: NORMAL NG/ML

## 2023-08-15 ASSESSMENT — ENCOUNTER SYMPTOMS
CONSTIPATION: 0
SHORTNESS OF BREATH: 0
EYE REDNESS: 0
NAUSEA: 0
ABDOMINAL PAIN: 0
BLOOD IN STOOL: 0
WHEEZING: 0
RESPIRATORY NEGATIVE: 1
EYES NEGATIVE: 1
EYE DISCHARGE: 0
DIARRHEA: 0
BACK PAIN: 0
EYE PAIN: 0
VOMITING: 0
COUGH: 0
SORE THROAT: 0
GASTROINTESTINAL NEGATIVE: 1

## 2023-09-11 DIAGNOSIS — D45 POLYCYTHEMIA VERA (HCC): ICD-10-CM

## 2023-09-11 RX ORDER — HYDROXYUREA 500 MG/1
500 CAPSULE ORAL 2 TIMES DAILY
Qty: 180 CAPSULE | Refills: 1 | Status: SHIPPED | OUTPATIENT
Start: 2023-09-11

## 2023-09-25 ENCOUNTER — TELEPHONE (OUTPATIENT)
Dept: HEMATOLOGY | Age: 68
End: 2023-09-25

## 2023-09-25 NOTE — TELEPHONE ENCOUNTER
Conversation held regarding [employer] network changes effective 10/1/2023. will be changing insurances during open enrollment 10/15/2023. Continuity of Care requested today through Medicare.   10/1/2023 - 1/1/2024    Faxed previous office note to [phone]   5-14 day TAT     REQUESTED PROCEDURE CPT 92368 [id number]    REQUESTED LAB CPT 87042 [id number]    REQUESTED DIAGNOSIS CODE  D45, D72.829, R16.1, R71.8

## 2023-10-05 ENCOUNTER — TELEPHONE (OUTPATIENT)
Dept: HEMATOLOGY | Age: 68
End: 2023-10-05

## 2023-10-05 NOTE — TELEPHONE ENCOUNTER
I called patient to remind them of their appointment on 10/09/2023. Patient voiced their understanding of the appointment and confirmed they would be coming.

## 2023-10-08 DIAGNOSIS — D45 POLYCYTHEMIA VERA (HCC): Primary | ICD-10-CM

## 2023-10-08 NOTE — PROGRESS NOTES
rate and regular rhythm. Pulses: Normal pulses. Heart sounds: Normal heart sounds. Pulmonary:      Effort: Pulmonary effort is normal. No respiratory distress. Breath sounds: Normal breath sounds. No wheezing or rales. Chest:      Chest wall: No tenderness. Abdominal:      General: Bowel sounds are normal. There is no distension. Palpations: Abdomen is soft. There is no mass. Tenderness: There is no abdominal tenderness. There is no guarding. Musculoskeletal:         General: No tenderness or deformity. Cervical back: Normal range of motion and neck supple. Comments: Range of motion within normal limits x4 extremities   Skin:     General: Skin is warm. Findings: No bruising, erythema or rash. Neurological:      Mental Status: He is alert and oriented to person, place, and time. Cranial Nerves: No cranial nerve deficit. Coordination: Coordination normal.   Psychiatric:         Behavior: Behavior normal.         Thought Content: Thought content normal.       Labs reviewed today:   Lab Results   Component Value Date    WBC 25.13 (HH) 10/09/2023    HGB 12.2 (L) 10/09/2023    HCT 40.1 10/09/2023    MCV 81.3 10/09/2023     10/09/2023     Lab Results   Component Value Date    NEUTROABS 21.16 (H) 10/09/2023     ASSESSMENT/PLAN:      1. Polycythemia vera (HCC),JAK2 positive, high risk. Goal is to maintain a platelet count between 200,000-400,000 and a hematocrit <45. JAK2 positive with mild (1+/3) fibrosis and splenomegaly. Recommendations Hydrea 500 mg p.o. twice daily and aspirin 81 mg p.o. daily . Kathie Gray last required a phlebotomy on 1/18/2023. Reports compliant with Hydrea 500 mg twice daily and aspirin 81 mg daily without difficulty. Denies any headaches, vision changes or increased brain fogginess.       WBC 25.13, ANC 21.16, hemoglobin 12.2, hematocrit 40.1, MCV 81.3 and a platelet count of 849,177    -Continue Hydrea to 500 mg p.o.twice

## 2023-10-09 ENCOUNTER — HOSPITAL ENCOUNTER (OUTPATIENT)
Dept: INFUSION THERAPY | Age: 68
Discharge: HOME | End: 2023-10-09
Payer: MEDICARE

## 2023-10-09 ENCOUNTER — OFFICE VISIT (OUTPATIENT)
Dept: HEMATOLOGY | Age: 68
End: 2023-10-09
Payer: MEDICARE

## 2023-10-09 VITALS
OXYGEN SATURATION: 99 % | WEIGHT: 175.9 LBS | BODY MASS INDEX: 25.18 KG/M2 | DIASTOLIC BLOOD PRESSURE: 74 MMHG | SYSTOLIC BLOOD PRESSURE: 132 MMHG | HEIGHT: 70 IN | HEART RATE: 98 BPM

## 2023-10-09 DIAGNOSIS — R71.8 MICROCYTOSIS: ICD-10-CM

## 2023-10-09 DIAGNOSIS — R74.8 ELEVATED LIVER ENZYMES: ICD-10-CM

## 2023-10-09 DIAGNOSIS — D45 POLYCYTHEMIA VERA (HCC): ICD-10-CM

## 2023-10-09 DIAGNOSIS — D45 POLYCYTHEMIA VERA (HCC): Primary | ICD-10-CM

## 2023-10-09 DIAGNOSIS — D72.829 LEUKOCYTOSIS, UNSPECIFIED TYPE: ICD-10-CM

## 2023-10-09 LAB
ALBUMIN SERPL-MCNC: 4.3 G/DL (ref 3.5–5.2)
ALP SERPL-CCNC: 166 U/L (ref 40–130)
ALT SERPL-CCNC: 20 U/L (ref 21–72)
ANION GAP SERPL CALCULATED.3IONS-SCNC: 13 MMOL/L (ref 7–19)
AST SERPL-CCNC: 24 U/L (ref 17–59)
BASOPHILS # BLD: 0.54 K/UL (ref 0.01–0.08)
BASOPHILS NFR BLD: 2.1 % (ref 0.1–1.2)
BILIRUB SERPL-MCNC: 2 MG/DL (ref 0.2–1.3)
BUN SERPL-MCNC: 33 MG/DL (ref 9–20)
CALCIUM SERPL-MCNC: 9.1 MG/DL (ref 8.4–10.2)
CHLORIDE SERPL-SCNC: 103 MMOL/L (ref 98–111)
CO2 SERPL-SCNC: 23 MMOL/L (ref 22–29)
CREAT SERPL-MCNC: 0.7 MG/DL (ref 0.6–1.2)
EOSINOPHIL # BLD: 0.26 K/UL (ref 0.04–0.54)
EOSINOPHIL NFR BLD: 1 % (ref 0.7–7)
ERYTHROCYTE [DISTWIDTH] IN BLOOD BY AUTOMATED COUNT: 19.3 % (ref 11.6–14.4)
GLOBULIN: 2.8 G/DL
GLUCOSE SERPL-MCNC: 182 MG/DL (ref 74–106)
HCT VFR BLD AUTO: 40.1 % (ref 40.1–51)
HGB BLD-MCNC: 12.2 G/DL (ref 13.7–17.5)
LYMPHOCYTES # BLD: 1.61 K/UL (ref 1.18–3.74)
LYMPHOCYTES NFR BLD: 6.4 % (ref 19.3–53.1)
MCH RBC QN AUTO: 24.7 PG (ref 25.7–32.2)
MCHC RBC AUTO-ENTMCNC: 30.4 G/DL (ref 32.3–36.5)
MCV RBC AUTO: 81.3 FL (ref 79–92.2)
MONOCYTES # BLD: 0.7 K/UL (ref 0.24–0.82)
MONOCYTES NFR BLD: 2.8 % (ref 4.7–12.5)
NEUTROPHILS # BLD: 21.16 K/UL (ref 1.56–6.13)
NEUTS SEG NFR BLD: 84.3 % (ref 34–71.1)
PLATELET # BLD AUTO: 172 K/UL (ref 163–337)
POTASSIUM SERPL-SCNC: 4.5 MMOL/L (ref 3.5–5.1)
PROT SERPL-MCNC: 7.1 G/DL (ref 6.3–8.2)
RBC # BLD AUTO: 4.93 M/UL (ref 4.63–6.08)
SODIUM SERPL-SCNC: 139 MMOL/L (ref 137–145)
WBC # BLD AUTO: 25.13 K/UL (ref 4.23–9.07)

## 2023-10-09 PROCEDURE — 80053 COMPREHEN METABOLIC PANEL: CPT

## 2023-10-09 PROCEDURE — 3074F SYST BP LT 130 MM HG: CPT | Performed by: NURSE PRACTITIONER

## 2023-10-09 PROCEDURE — 99214 OFFICE O/P EST MOD 30 MIN: CPT | Performed by: NURSE PRACTITIONER

## 2023-10-09 PROCEDURE — 99212 OFFICE O/P EST SF 10 MIN: CPT

## 2023-10-09 PROCEDURE — 3078F DIAST BP <80 MM HG: CPT | Performed by: NURSE PRACTITIONER

## 2023-10-09 PROCEDURE — 85025 COMPLETE CBC W/AUTO DIFF WBC: CPT

## 2023-10-09 PROCEDURE — 1123F ACP DISCUSS/DSCN MKR DOCD: CPT | Performed by: NURSE PRACTITIONER

## 2023-10-09 PROCEDURE — 36415 COLL VENOUS BLD VENIPUNCTURE: CPT

## 2023-10-10 DIAGNOSIS — R74.8 ELEVATED LIVER ENZYMES: Primary | ICD-10-CM

## 2023-10-12 ASSESSMENT — ENCOUNTER SYMPTOMS
DIARRHEA: 0
RESPIRATORY NEGATIVE: 1
GASTROINTESTINAL NEGATIVE: 1
VOMITING: 0
BLOOD IN STOOL: 0
CONSTIPATION: 0
SORE THROAT: 0
COUGH: 0
SHORTNESS OF BREATH: 0
ABDOMINAL PAIN: 0
EYE DISCHARGE: 0
NAUSEA: 0
EYE REDNESS: 0
WHEEZING: 0
EYES NEGATIVE: 1
BACK PAIN: 0
EYE PAIN: 0

## 2023-11-20 ENCOUNTER — HOSPITAL ENCOUNTER (OUTPATIENT)
Dept: INFUSION THERAPY | Age: 68
Discharge: HOME | End: 2023-11-20
Payer: MEDICARE

## 2023-11-20 DIAGNOSIS — R74.8 ELEVATED LIVER ENZYMES: ICD-10-CM

## 2023-11-20 DIAGNOSIS — D45 POLYCYTHEMIA VERA (HCC): ICD-10-CM

## 2023-11-20 LAB
ALBUMIN SERPL-MCNC: 4 G/DL (ref 3.5–5.2)
ALP SERPL-CCNC: 191 U/L (ref 40–130)
ALT SERPL-CCNC: 14 U/L (ref 21–72)
ANION GAP SERPL CALCULATED.3IONS-SCNC: 10 MMOL/L (ref 7–19)
AST SERPL-CCNC: 26 U/L (ref 17–59)
BASOPHILS # BLD: 0.59 K/UL (ref 0.01–0.08)
BASOPHILS NFR BLD: 1.1 % (ref 0.1–1.2)
BILIRUB SERPL-MCNC: 2.4 MG/DL (ref 0.2–1.3)
BUN SERPL-MCNC: 18 MG/DL (ref 9–20)
CALCIUM SERPL-MCNC: 9.1 MG/DL (ref 8.4–10.2)
CHLORIDE SERPL-SCNC: 101 MMOL/L (ref 98–111)
CO2 SERPL-SCNC: 26 MMOL/L (ref 22–29)
CREAT SERPL-MCNC: 1 MG/DL (ref 0.6–1.2)
EOSINOPHIL # BLD: 0.33 K/UL (ref 0.04–0.54)
EOSINOPHIL NFR BLD: 0.6 % (ref 0.7–7)
ERYTHROCYTE [DISTWIDTH] IN BLOOD BY AUTOMATED COUNT: 20 % (ref 11.6–14.4)
GLOBULIN: 2.9 G/DL
GLUCOSE SERPL-MCNC: 165 MG/DL (ref 74–106)
HCT VFR BLD AUTO: 40.9 % (ref 40.1–51)
HGB BLD-MCNC: 11.9 G/DL (ref 13.7–17.5)
LYMPHOCYTES # BLD: 1.88 K/UL (ref 1.18–3.74)
LYMPHOCYTES NFR BLD: 3.4 % (ref 19.3–53.1)
MCH RBC QN AUTO: 24 PG (ref 25.7–32.2)
MCHC RBC AUTO-ENTMCNC: 29.1 G/DL (ref 32.3–36.5)
MCV RBC AUTO: 82.5 FL (ref 79–92.2)
MONOCYTES # BLD: 1.07 K/UL (ref 0.24–0.82)
MONOCYTES NFR BLD: 1.9 % (ref 4.7–12.5)
NEUTROPHILS # BLD: 49.33 K/UL (ref 1.56–6.13)
NEUTS SEG NFR BLD: 88.9 % (ref 34–71.1)
PLATELET # BLD AUTO: 447 K/UL (ref 163–337)
PMV BLD AUTO: 11.1 FL (ref 7.4–10.4)
POTASSIUM SERPL-SCNC: 4.8 MMOL/L (ref 3.5–5.1)
PROT SERPL-MCNC: 6.9 G/DL (ref 6.3–8.2)
RBC # BLD AUTO: 4.96 M/UL (ref 4.63–6.08)
SODIUM SERPL-SCNC: 137 MMOL/L (ref 137–145)
WBC # BLD AUTO: 55.49 K/UL (ref 4.23–9.07)

## 2023-11-20 PROCEDURE — 85025 COMPLETE CBC W/AUTO DIFF WBC: CPT

## 2023-11-20 PROCEDURE — 80053 COMPREHEN METABOLIC PANEL: CPT

## 2023-11-20 PROCEDURE — 36415 COLL VENOUS BLD VENIPUNCTURE: CPT

## 2023-11-21 ENCOUNTER — TELEPHONE (OUTPATIENT)
Dept: HEMATOLOGY | Age: 68
End: 2023-11-21

## 2023-11-21 DIAGNOSIS — D45 POLYCYTHEMIA VERA (HCC): Primary | ICD-10-CM

## 2023-11-21 NOTE — TELEPHONE ENCOUNTER
Patient was at office for CBC evaluation on 11/20/2023. States that he had chills for two days and aching all over. Is feeling better today but still tired. Denies any fever, sore throat, shortness of air, or congestion. Reviewed labs with patient. WBC is 55.49 compared to 25.13 on 10/09. Instructed that this elevation may be reactive due to him being sick recently. Taking Hydrea 500 mg twice a day and is tolerating it well. Denies any missed doses. Will continue the same dose and recheck CBC on 12/11 per MAURILIO Le orders. Instructed to call with any problems. Patient v/u and is agreeable to plan.

## 2023-11-21 NOTE — TELEPHONE ENCOUNTER
----- Message from MAURILIO Galarza sent at 11/21/2023  6:33 AM CST -----  Call Dina Grande and see if he has missed any doses of his Hydrea or if has he been sick

## 2023-12-02 ENCOUNTER — HOSPITAL ENCOUNTER (EMERGENCY)
Age: 68
Discharge: HOME | End: 2023-12-02
Attending: EMERGENCY MEDICINE
Payer: MEDICARE

## 2023-12-02 ENCOUNTER — APPOINTMENT (OUTPATIENT)
Dept: GENERAL RADIOLOGY | Age: 68
End: 2023-12-02
Payer: MEDICARE

## 2023-12-02 VITALS
DIASTOLIC BLOOD PRESSURE: 74 MMHG | BODY MASS INDEX: 25.05 KG/M2 | HEART RATE: 100 BPM | WEIGHT: 175 LBS | SYSTOLIC BLOOD PRESSURE: 143 MMHG | TEMPERATURE: 98.2 F | OXYGEN SATURATION: 96 % | RESPIRATION RATE: 14 BRPM | HEIGHT: 70 IN

## 2023-12-02 DIAGNOSIS — R07.89 ATYPICAL CHEST PAIN: ICD-10-CM

## 2023-12-02 DIAGNOSIS — E86.0 DEHYDRATION: Primary | ICD-10-CM

## 2023-12-02 LAB
ALBUMIN SERPL-MCNC: 4.6 G/DL (ref 3.5–5.2)
ALP SERPL-CCNC: 196 U/L (ref 40–130)
ALT SERPL-CCNC: 11 U/L (ref 5–41)
ANION GAP SERPL CALCULATED.3IONS-SCNC: 12 MMOL/L (ref 7–19)
ANISOCYTOSIS BLD QL SMEAR: ABNORMAL
AST SERPL-CCNC: 15 U/L (ref 5–40)
BASOPHILS # BLD: 0.3 K/UL (ref 0–0.2)
BASOPHILS NFR BLD: 1 % (ref 0–1)
BILIRUB SERPL-MCNC: 2.9 MG/DL (ref 0.2–1.2)
BUN SERPL-MCNC: 14 MG/DL (ref 8–23)
CALCIUM SERPL-MCNC: 9.4 MG/DL (ref 8.8–10.2)
CHLORIDE SERPL-SCNC: 95 MMOL/L (ref 98–111)
CO2 SERPL-SCNC: 26 MMOL/L (ref 22–29)
CREAT SERPL-MCNC: 0.7 MG/DL (ref 0.5–1.2)
EOSINOPHIL # BLD: 0.79 K/UL (ref 0–0.6)
EOSINOPHIL NFR BLD: 3 % (ref 0–5)
ERYTHROCYTE [DISTWIDTH] IN BLOOD BY AUTOMATED COUNT: 19.9 % (ref 11.5–14.5)
GLUCOSE SERPL-MCNC: 149 MG/DL (ref 74–109)
HCT VFR BLD AUTO: 46.3 % (ref 42–52)
HGB BLD-MCNC: 13.6 G/DL (ref 14–18)
IMM GRANULOCYTES # BLD: 0.5 K/UL
LIPASE SERPL-CCNC: 23 U/L (ref 13–60)
LYMPHOCYTES # BLD: 1.6 K/UL (ref 1.1–4.5)
LYMPHOCYTES NFR BLD: 5 % (ref 20–40)
MCH RBC QN AUTO: 24.6 PG (ref 27–31)
MCHC RBC AUTO-ENTMCNC: 29.4 G/DL (ref 33–37)
MCV RBC AUTO: 83.9 FL (ref 80–94)
MONOCYTES # BLD: 0.5 K/UL (ref 0–0.9)
MONOCYTES NFR BLD: 2 % (ref 0–10)
NEUTROPHILS # BLD: 23.1 K/UL (ref 1.5–7.5)
NEUTS SEG NFR BLD: 88 % (ref 50–65)
PLATELET # BLD AUTO: 482 K/UL (ref 130–400)
PLATELET SLIDE REVIEW: ABNORMAL
PMV BLD AUTO: 11.7 FL (ref 9.4–12.4)
POIKILOCYTOSIS BLD QL SMEAR: ABNORMAL
POTASSIUM SERPL-SCNC: 5.1 MMOL/L (ref 3.5–5)
PROT SERPL-MCNC: 7.2 G/DL (ref 6.6–8.7)
RBC # BLD AUTO: 5.52 M/UL (ref 4.7–6.1)
SCHISTOCYTES BLD QL SMEAR: ABNORMAL
SODIUM SERPL-SCNC: 133 MMOL/L (ref 136–145)
STOMATOCYTES BLD QL SMEAR: ABNORMAL
TROPONIN, HIGH SENSITIVITY: 14 NG/L (ref 0–22)
TROPONIN, HIGH SENSITIVITY: 20 NG/L (ref 0–22)
VARIANT LYMPHS NFR BLD: 1 % (ref 0–8)
WBC # BLD AUTO: 26.3 K/UL (ref 4.8–10.8)

## 2023-12-02 PROCEDURE — 96361 HYDRATE IV INFUSION ADD-ON: CPT

## 2023-12-02 PROCEDURE — 96374 THER/PROPH/DIAG INJ IV PUSH: CPT

## 2023-12-02 PROCEDURE — 84484 ASSAY OF TROPONIN QUANT: CPT

## 2023-12-02 PROCEDURE — 99285 EMERGENCY DEPT VISIT HI MDM: CPT

## 2023-12-02 PROCEDURE — 6360000002 HC RX W HCPCS: Performed by: NURSE PRACTITIONER

## 2023-12-02 PROCEDURE — 93005 ELECTROCARDIOGRAM TRACING: CPT | Performed by: NURSE PRACTITIONER

## 2023-12-02 PROCEDURE — 2580000003 HC RX 258: Performed by: NURSE PRACTITIONER

## 2023-12-02 PROCEDURE — 71045 X-RAY EXAM CHEST 1 VIEW: CPT

## 2023-12-02 PROCEDURE — 83690 ASSAY OF LIPASE: CPT

## 2023-12-02 PROCEDURE — C9113 INJ PANTOPRAZOLE SODIUM, VIA: HCPCS | Performed by: NURSE PRACTITIONER

## 2023-12-02 PROCEDURE — 80053 COMPREHEN METABOLIC PANEL: CPT

## 2023-12-02 PROCEDURE — 85025 COMPLETE CBC W/AUTO DIFF WBC: CPT

## 2023-12-02 PROCEDURE — 36415 COLL VENOUS BLD VENIPUNCTURE: CPT

## 2023-12-02 RX ORDER — PANTOPRAZOLE SODIUM 40 MG/10ML
40 INJECTION, POWDER, LYOPHILIZED, FOR SOLUTION INTRAVENOUS ONCE
Status: COMPLETED | OUTPATIENT
Start: 2023-12-02 | End: 2023-12-02

## 2023-12-02 RX ORDER — SODIUM CHLORIDE 9 MG/ML
INJECTION, SOLUTION INTRAVENOUS CONTINUOUS
Status: DISCONTINUED | OUTPATIENT
Start: 2023-12-02 | End: 2023-12-02 | Stop reason: HOSPADM

## 2023-12-02 RX ADMIN — SODIUM CHLORIDE: 9 INJECTION, SOLUTION INTRAVENOUS at 09:51

## 2023-12-02 RX ADMIN — PANTOPRAZOLE SODIUM 40 MG: 40 INJECTION, POWDER, FOR SOLUTION INTRAVENOUS at 11:59

## 2023-12-02 ASSESSMENT — ENCOUNTER SYMPTOMS
CHEST TIGHTNESS: 1
GASTROINTESTINAL NEGATIVE: 1
SHORTNESS OF BREATH: 0

## 2023-12-02 ASSESSMENT — HEART SCORE: ECG: 1

## 2023-12-02 NOTE — ED PROVIDER NOTES
[facility] EMERGENCY DEPT  EMERGENCY DEPARTMENT ENCOUNTER      Pt Name: Debora Aguero  MRN: [id number]  [address] [place] 1955  Date of evaluation: 12/2/2023  Provider: MAURILIO Dillard NP    CHIEF COMPLAINT       Chief Complaint   Patient presents with    Chest Pain     tightness    Emesis     Started Thursday morning that has since resolved    Diarrhea     Started Friday that has since resolved         HISTORY OF PRESENT ILLNESS   (Location/Symptom, Timing/Onset,Context/Setting, Quality, Duration, Modifying Factors, Severity)  Note limiting factors. Ivania De Jesus is a 80 y.o. male who presents to the emergency department who presents to the emergency department with complaint of chest pain. He indicates that the pain is at the center of his chest near his sternum. He recently had a viral illness that included vomiting and diarrhea. He states that the vomiting and diarrhea have stopped but now he has pain in the center of his chest.  He denies any radiation, denies diarrhea, and denies nausea. Reports that he has had atrial fibrillation in the past but denies any palpitations today. The history is provided by the patient. NursingNotes were reviewed. REVIEW OF SYSTEMS    (2-9 systems for level 4, 10 or more for level 5)     Review of Systems   Constitutional: Negative. HENT: Negative. Respiratory:  Positive for chest tightness. Negative for shortness of breath. Cardiovascular:  Positive for chest pain. Negative for palpitations. Gastrointestinal: Negative. Genitourinary: Negative. Musculoskeletal: Negative. Neurological: Negative. All other systems reviewed and are negative. A complete review of systems was performed and is negative except as noted above in the HPI.        PAST MEDICAL HISTORY     Past Medical History:   Diagnosis Date    Abnormal results of liver function studies     Body mass index (BMI) 22.0-22.9, adult     Chronic gastric ulcer without hemorrhage or Abdomen is soft. Musculoskeletal:         General: Normal range of motion. Skin:     General: Skin is warm and dry. Neurological:      General: No focal deficit present. Mental Status: He is alert and oriented to person, place, and time. Psychiatric:         Mood and Affect: Mood normal.         DIAGNOSTIC RESULTS     EKG: All EKG's are interpreted by the Emergency Department Physician who either signs or Co-signs this chart in the absence of a cardiologist.    Sinus rhythm with PACs rate 98, RYAN 118 QRS 93 QTc 442, axis within normal limits. Hide from PACs, no significant change from EKG 4/21/2023. RADIOLOGY:   Non-plain film images such as CT, Ultrasound and MRI are read by the radiologist. [place] images are visualized and preliminarily interpreted by the emergency physician with the below findings:        Interpretation per the Radiologist below, if available at the time of this note:    XR CHEST PORTABLE   Final Result       No acute cardiopulmonary findings. ______________________________________    Electronically signed by: Bert Alvarez M.D.    Date:     12/02/2023   Time:    09:38             ED BEDSIDE ULTRASOUND:   Performed by ED Physician - none    LABS:  Labs Reviewed   CBC WITH AUTO DIFFERENTIAL - Abnormal; Notable for the following components:       Result Value    WBC 26.3 (*)     Hemoglobin 13.6 (*)     MCH 24.6 (*)     MCHC 29.4 (*)     RDW 19.9 (*)     Platelets 017 (*)     Neutrophils % 88.0 (*)     Lymphocytes % 5.0 (*)     Neutrophils Absolute 23.1 (*)     Eosinophils Absolute 0.79 (*)     Basophils Absolute 0.30 (*)     Anisocytosis 1+ (*)     Poikilocytes 1+ (*)     Schistocytes Occasional (*)     Stomatocytes Occasional (*)     All other components within normal limits   COMPREHENSIVE METABOLIC PANEL W/ REFLEX TO MG FOR LOW K - Abnormal; Notable for the following components:    Sodium 133 (*)     Potassium reflex Magnesium 5.1 (*)     Chloride 95

## 2023-12-02 NOTE — DISCHARGE INSTRUCTIONS
Follow-up with outpatient cardiology as directed. Drink plenty of fluids  Return to ER for any new, worsening, or change in condition.

## 2023-12-04 LAB
EKG P AXIS: 72 DEGREES
EKG P-R INTERVAL: 112 MS
EKG Q-T INTERVAL: 362 MS
EKG QRS DURATION: 92 MS
EKG QTC CALCULATION (BAZETT): 425 MS
EKG T AXIS: 26 DEGREES

## 2023-12-04 PROCEDURE — 93010 ELECTROCARDIOGRAM REPORT: CPT | Performed by: INTERNAL MEDICINE

## 2023-12-11 ENCOUNTER — OFFICE VISIT (OUTPATIENT)
Dept: CARDIOLOGY CLINIC | Age: 68
End: 2023-12-11
Payer: MEDICARE

## 2023-12-11 VITALS
HEART RATE: 97 BPM | SYSTOLIC BLOOD PRESSURE: 134 MMHG | WEIGHT: 174 LBS | HEIGHT: 70 IN | BODY MASS INDEX: 24.91 KG/M2 | DIASTOLIC BLOOD PRESSURE: 78 MMHG

## 2023-12-11 DIAGNOSIS — I10 ESSENTIAL HYPERTENSION: ICD-10-CM

## 2023-12-11 DIAGNOSIS — I47.10 SVT (SUPRAVENTRICULAR TACHYCARDIA): Primary | ICD-10-CM

## 2023-12-11 PROCEDURE — 99214 OFFICE O/P EST MOD 30 MIN: CPT | Performed by: INTERNAL MEDICINE

## 2023-12-11 PROCEDURE — 1123F ACP DISCUSS/DSCN MKR DOCD: CPT | Performed by: INTERNAL MEDICINE

## 2023-12-11 PROCEDURE — 3078F DIAST BP <80 MM HG: CPT | Performed by: INTERNAL MEDICINE

## 2023-12-11 PROCEDURE — 3075F SYST BP GE 130 - 139MM HG: CPT | Performed by: INTERNAL MEDICINE

## 2023-12-11 RX ORDER — METOPROLOL SUCCINATE 25 MG/1
25 TABLET, EXTENDED RELEASE ORAL DAILY
Qty: 90 TABLET | Refills: 3 | Status: SHIPPED | OUTPATIENT
Start: 2023-12-11

## 2023-12-11 ASSESSMENT — ENCOUNTER SYMPTOMS
GASTROINTESTINAL NEGATIVE: 1
EYES NEGATIVE: 1
DIARRHEA: 0
RESPIRATORY NEGATIVE: 1
NAUSEA: 0
VOMITING: 0
SHORTNESS OF BREATH: 0

## 2024-01-05 DIAGNOSIS — Z79.899 MEDICATION MANAGEMENT: Primary | ICD-10-CM

## 2024-01-05 NOTE — PROGRESS NOTES
Progress Note      Pt Name: uJnaid Aguero  YOB: 1955  MRN: [id number]    Date of evaluation: 01/09/2024  History Obtained From:  patient, electronic medical record    CHIEF COMPLAINT:    Chief Complaint   Patient presents with    Follow-up     Polycythemia vera        HISTORY OF PRESENT ILLNESS:    Junaid Aguero is a 68 y.o.  male who is followed for diagnosis of polycythemia vera, JAK2 positive with mild (1+/3) fibrosis and splenomegaly.  Current recommendation is for cytoreductive therapy with Hydrea, aspirin and intermittent phlebotomy for hematocrit >45. Currently taking Hydrea 500 mg p.o. twice daily and aspirin 81 mg p.o. daily.  Junaid last required a phlebotomy on 1/18/2023.  Junaid returns today in scheduled follow-up for evaluation, lab monitoring and treatment recommendations.      Today's clinic visit to include physical assessment, review of systems, any lab or radiographic findings that were available and plan of care are documented below.    HEMATOLOGY HISTORY:     Diagnosis   JESUS-2 polycythemia vera   High risk   Splenomegaly     TREATMENT SUMMARY:  Phlebotomies only initiated July 2008 until March 2012.   Hydrea-Hydrea 1000 mg a.m/1000 mg p.m.   Periodic phlebotomies as indicated despite Hydrea. Last 6/17/2020  ASA 81mg daily  Phlebotomize for hematocrit > 45 or symptomatic    HEMATOLOGICAL HISTORY: JESUS-2 positive polycythemia vera diagnosed 07/2008  Mr. Aguero was seen by Dr. Quiroga on 07/25/08 where he was found to have an elevated hemoglobin and hematocrit of 20.2 and 58.5 respectively.  Dr. Zendejas had a conference with Dr. Qurioga prior to Mr. Aguero’s initial visit. Mr. Aguero had had two phlebotomies prior to his initial visit on 08/26/08. Workup included an erythropoietin level, which was normal a JESUS-2 which was positive consistent with a myeloproliferative disorder and a relatively unremarkable chemistry profile. A bone marrow biopsy and aspirate was done on 10/07/08 that showed

## 2024-01-08 ENCOUNTER — TELEPHONE (OUTPATIENT)
Dept: HEMATOLOGY | Age: 69
End: 2024-01-08

## 2024-01-09 ENCOUNTER — OFFICE VISIT (OUTPATIENT)
Dept: HEMATOLOGY | Age: 69
End: 2024-01-09

## 2024-01-09 ENCOUNTER — HOSPITAL ENCOUNTER (OUTPATIENT)
Dept: INFUSION THERAPY | Age: 69
Discharge: HOME | End: 2024-01-09
Payer: MEDICARE

## 2024-01-09 DIAGNOSIS — Z79.899 MEDICATION MANAGEMENT: ICD-10-CM

## 2024-01-09 DIAGNOSIS — D45 POLYCYTHEMIA VERA (HCC): Primary | ICD-10-CM

## 2024-01-09 DIAGNOSIS — D45 POLYCYTHEMIA VERA (HCC): ICD-10-CM

## 2024-01-09 DIAGNOSIS — D72.829 LEUKOCYTOSIS, UNSPECIFIED TYPE: ICD-10-CM

## 2024-01-09 DIAGNOSIS — R71.8 MICROCYTOSIS: ICD-10-CM

## 2024-01-09 DIAGNOSIS — R16.1 SPLENOMEGALY: ICD-10-CM

## 2024-01-09 LAB
ALBUMIN SERPL-MCNC: 4.3 G/DL (ref 3.5–5.2)
ALP SERPL-CCNC: 130 U/L (ref 40–130)
ALT SERPL-CCNC: 22 U/L (ref 21–72)
ANION GAP SERPL CALCULATED.3IONS-SCNC: 8 MMOL/L (ref 7–19)
AST SERPL-CCNC: 41 U/L (ref 17–59)
BASOPHILS # BLD: 0.39 K/UL (ref 0.01–0.08)
BASOPHILS NFR BLD: 2.1 % (ref 0.1–1.2)
BILIRUB SERPL-MCNC: 1.6 MG/DL (ref 0.2–1.3)
BUN SERPL-MCNC: 20 MG/DL (ref 9–20)
CALCIUM SERPL-MCNC: 9 MG/DL (ref 8.4–10.2)
CHLORIDE SERPL-SCNC: 105 MMOL/L (ref 98–111)
CO2 SERPL-SCNC: 25 MMOL/L (ref 22–29)
CREAT SERPL-MCNC: 0.8 MG/DL (ref 0.6–1.2)
EOSINOPHIL # BLD: 0.18 K/UL (ref 0.04–0.54)
EOSINOPHIL NFR BLD: 1 % (ref 0.7–7)
ERYTHROCYTE [DISTWIDTH] IN BLOOD BY AUTOMATED COUNT: 20.8 % (ref 11.6–14.4)
GLOBULIN: 2.8 G/DL
GLUCOSE SERPL-MCNC: 171 MG/DL (ref 74–106)
HCT VFR BLD AUTO: 40.4 % (ref 40.1–51)
HGB BLD-MCNC: 11.6 G/DL (ref 13.7–17.5)
LYMPHOCYTES # BLD: 1.43 K/UL (ref 1.18–3.74)
LYMPHOCYTES NFR BLD: 7.6 % (ref 19.3–53.1)
MCH RBC QN AUTO: 24 PG (ref 25.7–32.2)
MCHC RBC AUTO-ENTMCNC: 28.7 G/DL (ref 32.3–36.5)
MCV RBC AUTO: 83.5 FL (ref 79–92.2)
MONOCYTES # BLD: 0.47 K/UL (ref 0.24–0.82)
MONOCYTES NFR BLD: 2.5 % (ref 4.7–12.5)
NEUTROPHILS # BLD: 16.06 K/UL (ref 1.56–6.13)
NEUTS SEG NFR BLD: 84.8 % (ref 34–71.1)
PLATELET # BLD AUTO: 144 K/UL (ref 163–337)
POTASSIUM SERPL-SCNC: 5 MMOL/L (ref 3.5–5.1)
PROT SERPL-MCNC: 7.1 G/DL (ref 6.3–8.2)
RBC # BLD AUTO: 4.84 M/UL (ref 4.63–6.08)
SODIUM SERPL-SCNC: 138 MMOL/L (ref 137–145)
WBC # BLD AUTO: 18.91 K/UL (ref 4.23–9.07)

## 2024-01-09 PROCEDURE — 80053 COMPREHEN METABOLIC PANEL: CPT

## 2024-01-09 PROCEDURE — 99212 OFFICE O/P EST SF 10 MIN: CPT

## 2024-01-09 PROCEDURE — 85025 COMPLETE CBC W/AUTO DIFF WBC: CPT

## 2024-01-09 PROCEDURE — 36415 COLL VENOUS BLD VENIPUNCTURE: CPT

## 2024-01-12 VITALS
TEMPERATURE: 97.4 F | DIASTOLIC BLOOD PRESSURE: 68 MMHG | OXYGEN SATURATION: 98 % | HEART RATE: 101 BPM | WEIGHT: 166 LBS | SYSTOLIC BLOOD PRESSURE: 130 MMHG | HEIGHT: 70 IN | BODY MASS INDEX: 23.77 KG/M2

## 2024-01-12 ASSESSMENT — ENCOUNTER SYMPTOMS
BLOOD IN STOOL: 0
EYE DISCHARGE: 0
GASTROINTESTINAL NEGATIVE: 1
DIARRHEA: 0
EYE PAIN: 0
SHORTNESS OF BREATH: 0
VOMITING: 0
BACK PAIN: 0
ABDOMINAL PAIN: 0
COUGH: 0
CONSTIPATION: 0
WHEEZING: 0
RESPIRATORY NEGATIVE: 1
EYE REDNESS: 0
SORE THROAT: 0
NAUSEA: 0
EYES NEGATIVE: 1

## 2024-01-23 ENCOUNTER — CLINICAL DOCUMENTATION (OUTPATIENT)
Dept: HEMATOLOGY | Age: 69
End: 2024-01-23

## 2024-01-23 ENCOUNTER — HOSPITAL ENCOUNTER (OUTPATIENT)
Dept: INFUSION THERAPY | Age: 69
Discharge: HOME | End: 2024-01-23
Payer: MEDICARE

## 2024-01-23 DIAGNOSIS — D45 POLYCYTHEMIA VERA (HCC): ICD-10-CM

## 2024-01-23 LAB
BASOPHILS # BLD: 1.16 K/UL (ref 0.01–0.08)
BASOPHILS NFR BLD: 1.4 % (ref 0.1–1.2)
EOSINOPHIL # BLD: 1.45 K/UL (ref 0.04–0.54)
EOSINOPHIL NFR BLD: 1.7 % (ref 0.7–7)
ERYTHROCYTE [DISTWIDTH] IN BLOOD BY AUTOMATED COUNT: 21 % (ref 11.6–14.4)
HCT VFR BLD AUTO: 37.3 % (ref 40.1–51)
HGB BLD-MCNC: 10.8 G/DL (ref 13.7–17.5)
LYMPHOCYTES # BLD: 2.64 K/UL (ref 1.18–3.74)
LYMPHOCYTES NFR BLD: 3.1 % (ref 19.3–53.1)
MCH RBC QN AUTO: 23.9 PG (ref 25.7–32.2)
MCHC RBC AUTO-ENTMCNC: 29 G/DL (ref 32.3–36.5)
MCV RBC AUTO: 82.7 FL (ref 79–92.2)
MONOCYTES # BLD: 2.4 K/UL (ref 0.24–0.82)
MONOCYTES NFR BLD: 2.8 % (ref 4.7–12.5)
NEUTROPHILS # BLD: 68.6 K/UL (ref 1.56–6.13)
NEUTS SEG NFR BLD: 81.2 % (ref 34–71.1)
PLATELET # BLD AUTO: 2111 K/UL (ref 163–337)
PMV BLD AUTO: 10.2 FL (ref 7.4–10.4)
RBC # BLD AUTO: 4.51 M/UL (ref 4.63–6.08)
WBC # BLD AUTO: 84.51 K/UL (ref 4.23–9.07)

## 2024-01-23 PROCEDURE — 85025 COMPLETE CBC W/AUTO DIFF WBC: CPT

## 2024-01-23 PROCEDURE — 36415 COLL VENOUS BLD VENIPUNCTURE: CPT

## 2024-01-23 NOTE — PROGRESS NOTES
Spoke with Junaid related to his significant change in counts, had his Hydrea on hold due to platelet decline of 144,000.  He reports not overall feeling well and significantly fatigued.    Instructed to take Hydrea 1000 mg p.o. twice daily and return to the clinic on 1/30/2024 for repeat CBC and further dosing recommendations for Hydrea.    Hydrea is dosed at 40 mg/kg for a 24-hour timeframe, max dose for chains would be 3000 mg in 24 hours    Junaid verbalized understanding is agreement with current plan, reports he has not Hydrea on hand at home

## 2024-01-30 ENCOUNTER — TELEPHONE (OUTPATIENT)
Dept: HEMATOLOGY | Age: 69
End: 2024-01-30

## 2024-01-30 DIAGNOSIS — D45 POLYCYTHEMIA VERA (HCC): ICD-10-CM

## 2024-01-30 LAB
ANISOCYTOSIS BLD QL SMEAR: ABNORMAL
BASOPHILS # BLD: 0 K/UL (ref 0–0.2)
BASOPHILS NFR BLD: 0 % (ref 0–1)
EOSINOPHIL # BLD: 0 K/UL (ref 0–0.6)
EOSINOPHIL NFR BLD: 0 % (ref 0–5)
ERYTHROCYTE [DISTWIDTH] IN BLOOD BY AUTOMATED COUNT: 21.4 % (ref 11.5–14.5)
HCT VFR BLD AUTO: 39.7 % (ref 42–52)
HGB BLD-MCNC: 11.3 G/DL (ref 14–18)
HYPOCHROMIA BLD QL SMEAR: ABNORMAL
IMM GRANULOCYTES # BLD: 0.6 K/UL
LYMPHOCYTES # BLD: 1.6 K/UL (ref 1.1–4.5)
LYMPHOCYTES NFR BLD: 12 % (ref 20–40)
MCH RBC QN AUTO: 24.3 PG (ref 27–31)
MCHC RBC AUTO-ENTMCNC: 28.5 G/DL (ref 33–37)
MCV RBC AUTO: 85.4 FL (ref 80–94)
MONOCYTES # BLD: 0.2 K/UL (ref 0–0.9)
MONOCYTES NFR BLD: 2 % (ref 0–10)
NEUTROPHILS # BLD: 9.6 K/UL (ref 1.5–7.5)
NEUTS SEG NFR BLD: 84 % (ref 50–65)
OVALOCYTES BLD QL SMEAR: ABNORMAL
PLATELET # BLD AUTO: 1164 K/UL (ref 130–400)
PLATELET SLIDE REVIEW: ABNORMAL
PMV BLD AUTO: 10.9 FL (ref 9.4–12.4)
POLYCHROMASIA BLD QL SMEAR: ABNORMAL
RBC # BLD AUTO: 4.65 M/UL (ref 4.7–6.1)
VARIANT LYMPHS NFR BLD: 2 % (ref 0–8)
WBC # BLD AUTO: 11.4 K/UL (ref 4.8–10.8)

## 2024-01-30 PROCEDURE — 36415 COLL VENOUS BLD VENIPUNCTURE: CPT | Performed by: NURSE PRACTITIONER

## 2024-01-30 NOTE — TELEPHONE ENCOUNTER
Called patient and reviewed lab results, WBC 11.4 and platelet of 1,164,000, previously 84.51 and 2,111,000. Instructed that MAURILIO Zuniga would like for him to continue taking Hydrea 1000 mg twice a day and have labs repeated in one week. Patient states that he is feeling much better. Appointment set up for 02/06/2024 at 1200.  Instructed to call with any problems.  Patient v/u and is agreeable to plan.

## 2024-02-06 ENCOUNTER — HOSPITAL ENCOUNTER (OUTPATIENT)
Dept: INFUSION THERAPY | Age: 69
Discharge: HOME | End: 2024-02-06
Payer: MEDICARE

## 2024-02-06 DIAGNOSIS — D45 POLYCYTHEMIA VERA (HCC): ICD-10-CM

## 2024-02-06 LAB
BASOPHILS # BLD: 0.23 K/UL (ref 0.01–0.08)
BASOPHILS NFR BLD: 3.2 % (ref 0.1–1.2)
EOSINOPHIL # BLD: 0.08 K/UL (ref 0.04–0.54)
EOSINOPHIL NFR BLD: 1.1 % (ref 0.7–7)
ERYTHROCYTE [DISTWIDTH] IN BLOOD BY AUTOMATED COUNT: 21.7 % (ref 11.6–14.4)
HCT VFR BLD AUTO: 39.3 % (ref 40.1–51)
HGB BLD-MCNC: 11.5 G/DL (ref 13.7–17.5)
LYMPHOCYTES # BLD: 0.91 K/UL (ref 1.18–3.74)
LYMPHOCYTES NFR BLD: 12.6 % (ref 19.3–53.1)
MCH RBC QN AUTO: 24.3 PG (ref 25.7–32.2)
MCHC RBC AUTO-ENTMCNC: 29.3 G/DL (ref 32.3–36.5)
MCV RBC AUTO: 82.9 FL (ref 79–92.2)
MONOCYTES # BLD: 0.22 K/UL (ref 0.24–0.82)
MONOCYTES NFR BLD: 3 % (ref 4.7–12.5)
NEUTROPHILS # BLD: 5.71 K/UL (ref 1.56–6.13)
NEUTS SEG NFR BLD: 79 % (ref 34–71.1)
PLATELET # BLD AUTO: 119 K/UL (ref 163–337)
RBC # BLD AUTO: 4.74 M/UL (ref 4.63–6.08)
WBC # BLD AUTO: 7.23 K/UL (ref 4.23–9.07)

## 2024-02-06 PROCEDURE — 36415 COLL VENOUS BLD VENIPUNCTURE: CPT

## 2024-02-06 PROCEDURE — 85025 COMPLETE CBC W/AUTO DIFF WBC: CPT

## 2024-02-06 NOTE — PROGRESS NOTES
Patient here for CBC evaluation.  WBC 7.23, Hgb 11.5, Hct 39.3, platelets 119,000>  He is currently taking Hydrea 1000 mg twice a day.  Instructed that MAURILIO Zuniga wants to decrease dose to 500 mg daily and recheck his lab in two weeks.  Instructed to call with any problems.  Patient v/u and is agreeable to plan.

## 2024-02-20 ENCOUNTER — OFFICE VISIT (OUTPATIENT)
Age: 69
End: 2024-02-20
Payer: MEDICARE

## 2024-02-20 ENCOUNTER — HOSPITAL ENCOUNTER (OUTPATIENT)
Dept: INFUSION THERAPY | Age: 69
Discharge: HOME | End: 2024-02-20
Payer: MEDICARE

## 2024-02-20 VITALS
WEIGHT: 165 LBS | TEMPERATURE: 98 F | HEIGHT: 69 IN | HEART RATE: 85 BPM | DIASTOLIC BLOOD PRESSURE: 77 MMHG | BODY MASS INDEX: 24.44 KG/M2 | RESPIRATION RATE: 20 BRPM | SYSTOLIC BLOOD PRESSURE: 145 MMHG

## 2024-02-20 DIAGNOSIS — D48.9 NEOPLASM OF UNCERTAIN BEHAVIOR: Primary | ICD-10-CM

## 2024-02-20 DIAGNOSIS — D45 POLYCYTHEMIA VERA (HCC): ICD-10-CM

## 2024-02-20 LAB
BASOPHILS # BLD: 0.27 K/UL (ref 0.01–0.08)
BASOPHILS NFR BLD: 0.8 % (ref 0.1–1.2)
EOSINOPHIL # BLD: 0.51 K/UL (ref 0.04–0.54)
EOSINOPHIL NFR BLD: 1.6 % (ref 0.7–7)
ERYTHROCYTE [DISTWIDTH] IN BLOOD BY AUTOMATED COUNT: 25.8 % (ref 11.6–14.4)
HCT VFR BLD AUTO: 33.5 % (ref 40.1–51)
HGB BLD-MCNC: 9.7 G/DL (ref 13.7–17.5)
LYMPHOCYTES # BLD: 1.55 K/UL (ref 1.18–3.74)
LYMPHOCYTES NFR BLD: 4.8 % (ref 19.3–53.1)
MCH RBC QN AUTO: 25.7 PG (ref 25.7–32.2)
MCHC RBC AUTO-ENTMCNC: 29 G/DL (ref 32.3–36.5)
MCV RBC AUTO: 88.9 FL (ref 79–92.2)
MONOCYTES # BLD: 1.02 K/UL (ref 0.24–0.82)
MONOCYTES NFR BLD: 3.2 % (ref 4.7–12.5)
NEUTROPHILS # BLD: 26.44 K/UL (ref 1.56–6.13)
NEUTS SEG NFR BLD: 82.2 % (ref 34–71.1)
PLATELET # BLD AUTO: 971 K/UL (ref 163–337)
PMV BLD AUTO: 10.5 FL (ref 7.4–10.4)
RBC # BLD AUTO: 3.77 M/UL (ref 4.63–6.08)
WBC # BLD AUTO: 32.17 K/UL (ref 4.23–9.07)

## 2024-02-20 PROCEDURE — 36415 COLL VENOUS BLD VENIPUNCTURE: CPT

## 2024-02-20 PROCEDURE — 3078F DIAST BP <80 MM HG: CPT | Performed by: NURSE PRACTITIONER

## 2024-02-20 PROCEDURE — 1159F MED LIST DOCD IN RCRD: CPT | Performed by: NURSE PRACTITIONER

## 2024-02-20 PROCEDURE — 85025 COMPLETE CBC W/AUTO DIFF WBC: CPT

## 2024-02-20 PROCEDURE — 11104 PUNCH BX SKIN SINGLE LESION: CPT | Performed by: NURSE PRACTITIONER

## 2024-02-20 PROCEDURE — 99203 OFFICE O/P NEW LOW 30 MIN: CPT | Performed by: NURSE PRACTITIONER

## 2024-02-20 PROCEDURE — 1160F RVW MEDS BY RX/DR IN RCRD: CPT | Performed by: NURSE PRACTITIONER

## 2024-02-20 PROCEDURE — 3077F SYST BP >= 140 MM HG: CPT | Performed by: NURSE PRACTITIONER

## 2024-02-20 RX ORDER — ALLOPURINOL 300 MG/1
300 TABLET ORAL
COMMUNITY
Start: 2023-12-16

## 2024-02-20 RX ORDER — HYDROXYUREA 500 MG/1
1 CAPSULE ORAL 2 TIMES DAILY
COMMUNITY
Start: 2023-09-11

## 2024-02-20 RX ORDER — ALBUTEROL SULFATE 90 UG/1
AEROSOL, METERED RESPIRATORY (INHALATION)
COMMUNITY
Start: 2023-12-20

## 2024-02-20 NOTE — PROGRESS NOTES
PRIMARY CARE PROVIDER: Jeremy Correa MD  REFERRING PROVIDER: Jeremy Correa,*    Chief Complaint   Patient presents with    Skin Lesion     Right ear lesion and left cheek lesion       Subjective   History of Present Illness:  Nikita Bhardwaj is a  68 y.o. male who presents for consultation regarding skin lesions of the right auricle and left inferior cheek at jawline.  The lesions have the following characteristics:    Location: right auricle  Quality: enlarging, bleeding  Severity: severe  Duration: 4-5 months  Timing: constant  Modifying Factors: none  Associated Signs & Symptoms: It is not painful.  He denies lymphadenopathy.    Location: left inferior cheek at jawline  Quality: won't heal, bleeding  Severity: mild  Duration: 5-6 months  Timing: constant  Modifying Factors: none  Associated Signs & Symptoms: It is not painful.    He states he is currently taking 81 mg of aspirin for preventative reasons.  He states he can stop this prior to any procedure.    Review of Systems:  Review of Systems   Constitutional:  Negative for chills, fatigue, fever and unexpected weight change.   HENT:  Negative for facial swelling.    Respiratory:  Negative for cough, chest tightness and shortness of breath.    Cardiovascular:  Negative for chest pain.   Musculoskeletal:  Negative for neck pain.   Skin:  Negative for color change.   Neurological:  Negative for facial asymmetry.   Hematological:  Negative for adenopathy. Does not bruise/bleed easily.       Past History:  Past Medical History:   Diagnosis Date    Diabetes mellitus     Hypertension      History reviewed. No pertinent surgical history.  History reviewed. No pertinent family history.  Social History     Tobacco Use    Smoking status: Never   Substance Use Topics    Alcohol use: No     Allergies:  Patient has no known allergies.    Current Outpatient Medications:     albuterol sulfate  (90 Base) MCG/ACT inhaler, 2 PUFFS INHALE EVERY 6 HR,X7 DAYS,  Disp: , Rfl:     allopurinol (ZYLOPRIM) 300 MG tablet, Take 1 tablet by mouth., Disp: , Rfl:     chlorthalidone (HYGROTON) 25 MG tablet, Take 1 tablet by mouth Daily., Disp: , Rfl:     hydroxyurea (HYDREA) 500 MG capsule, Take 1 capsule by mouth 2 (Two) Times a Day., Disp: , Rfl:     labetalol (NORMODYNE) 300 MG tablet, Take 1 tablet by mouth 2 (Two) Times a Day., Disp: , Rfl:     metFORMIN (GLUCOPHAGE) 1000 MG tablet, Take 1 tablet by mouth 2 (Two) Times a Day With Meals., Disp: , Rfl:     trandolapril (MAVIK) 4 MG tablet, Take 1 tablet by mouth Daily., Disp: , Rfl:     Objective     Vital Signs:  Temp:  [98 °F (36.7 °C)] 98 °F (36.7 °C)  Heart Rate:  [85] 85  Resp:  [20] 20  BP: (145)/(77) 145/77    Physical Exam:  Physical Exam  Vitals reviewed.   Constitutional:       General: He is not in acute distress.     Appearance: He is well-developed.   HENT:      Head: Normocephalic and atraumatic.        Right Ear: External ear normal.      Left Ear: External ear normal.      Ears:        Mouth/Throat:      Lips: Pink.   Eyes:      General: Lids are normal.   Pulmonary:      Effort: Pulmonary effort is normal. No respiratory distress.      Breath sounds: No stridor.   Musculoskeletal:      Cervical back: Neck supple.   Neurological:      Mental Status: He is alert and oriented to person, place, and time.   Psychiatric:         Mood and Affect: Mood normal.         Speech: Speech normal.         Behavior: Behavior normal. Behavior is cooperative.               Assessment   1. Neoplasm of uncertain behavior        Plan     I have offered biopsy of the skin lesions today in the office to rule out malignancy.  The patient is in agreement with this plan.  3 mm punch biopsies were performed.  See procedure note.  Post biopsy care instructions were given.    The risks, benefits, and alternatives of the procedure including but not limited to pain, scarring, bleeding, infection, persistent symptoms, and risks of the anesthesia  were discussed full with the patient. Need for further therapy, depending on the pathologic outcome, was discussed. Questions were answered. No guarantees were made or implied.      He will follow-up in approximately 2 weeks to discuss pathology results and to determine surgical planning with Dr. Larry.    My findings and recommendations were discussed and questions were answered.     Lucy Pirtchard, APRN  02/20/24  13:00 CST

## 2024-02-20 NOTE — PROGRESS NOTES
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  The skin was cleansed with alcohol.  The skin was infiltrated with 1 mL of 1% lidocaine with 1-100,000 epinephrine.  After approximately 10 minutes, a 3 millimeter punch biopsy was taken by placing circular motion on the biopsy tool, the skin was elevated and clipped with iris scissors.  The specimen was sent in formalin.  Hemostasis was achieved with silver nitrate.  Antibiotic ointment was placed over the biopsy site.  The patient tolerated the procedure with minimal discomfort.      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  The skin was cleansed with alcohol.  The skin was infiltrated with 1 mL of 1% lidocaine with 1-100,000 epinephrine.  After approximately 10 minutes, a 5 millimeter punch biopsy was taken by placing circular motion on the biopsy tool, the skin was elevated and clipped with iris scissors.  The specimen was sent in formalin.  Hemostasis was achieved with silver nitrate.  Antibiotic ointment was placed over the biopsy site.  The patient tolerated the procedure with minimal discomfort.        Lucy Pritchard, KEMAR  02/20/24  11:05 CST

## 2024-02-26 ENCOUNTER — TELEPHONE (OUTPATIENT)
Age: 69
End: 2024-02-26
Payer: COMMERCIAL

## 2024-02-26 LAB
CYTO UR: NORMAL
CYTO UR: NORMAL
LAB AP CASE REPORT: NORMAL
LAB AP CASE REPORT: NORMAL
LAB AP CLINICAL INFORMATION: NORMAL
LAB AP CLINICAL INFORMATION: NORMAL
Lab: NORMAL
Lab: NORMAL
PATH REPORT.FINAL DX SPEC: NORMAL
PATH REPORT.FINAL DX SPEC: NORMAL
PATH REPORT.GROSS SPEC: NORMAL
PATH REPORT.GROSS SPEC: NORMAL

## 2024-02-26 NOTE — TELEPHONE ENCOUNTER
Tried calling patient regarding biopsy results.  There was no answer on his home or mobile phone.  Voicemail box had not been set up.

## 2024-02-27 ENCOUNTER — TELEPHONE (OUTPATIENT)
Age: 69
End: 2024-02-27
Payer: COMMERCIAL

## 2024-02-27 NOTE — TELEPHONE ENCOUNTER
They returned to my phone call regarding his biopsy results.  Biopsy results were given.  Patient agrees to keep his next scheduled follow-up with Dr. Larry on 3/6/2024 to discuss surgical planning.

## 2024-03-06 ENCOUNTER — HOSPITAL ENCOUNTER (OUTPATIENT)
Dept: INFUSION THERAPY | Age: 69
Discharge: HOME | End: 2024-03-06
Payer: MEDICARE

## 2024-03-06 ENCOUNTER — OFFICE VISIT (OUTPATIENT)
Age: 69
End: 2024-03-06
Payer: MEDICARE

## 2024-03-06 VITALS — TEMPERATURE: 97.6 F | DIASTOLIC BLOOD PRESSURE: 79 MMHG | SYSTOLIC BLOOD PRESSURE: 120 MMHG | HEART RATE: 82 BPM

## 2024-03-06 DIAGNOSIS — C44.329 SQUAMOUS CELL CANCER OF SKIN OF JAWLINE: ICD-10-CM

## 2024-03-06 DIAGNOSIS — D45 POLYCYTHEMIA VERA (HCC): ICD-10-CM

## 2024-03-06 DIAGNOSIS — I10 ESSENTIAL HYPERTENSION: Chronic | ICD-10-CM

## 2024-03-06 DIAGNOSIS — E11.69 TYPE 2 DIABETES MELLITUS WITH OTHER SPECIFIED COMPLICATION, UNSPECIFIED WHETHER LONG TERM INSULIN USE: Primary | ICD-10-CM

## 2024-03-06 DIAGNOSIS — C44.222 SQUAMOUS CELL CARCINOMA, EAR, RIGHT: ICD-10-CM

## 2024-03-06 LAB
BASOPHILS # BLD: 0.42 K/UL (ref 0.01–0.08)
BASOPHILS NFR BLD: 1.7 % (ref 0.1–1.2)
EOSINOPHIL # BLD: 0.16 K/UL (ref 0.04–0.54)
EOSINOPHIL NFR BLD: 0.6 % (ref 0.7–7)
ERYTHROCYTE [DISTWIDTH] IN BLOOD BY AUTOMATED COUNT: 25.7 % (ref 11.6–14.4)
HCT VFR BLD AUTO: 35.9 % (ref 40.1–51)
HGB BLD-MCNC: 10.3 G/DL (ref 13.7–17.5)
LYMPHOCYTES # BLD: 1.28 K/UL (ref 1.18–3.74)
LYMPHOCYTES NFR BLD: 5.2 % (ref 19.3–53.1)
MCH RBC QN AUTO: 25.5 PG (ref 25.7–32.2)
MCHC RBC AUTO-ENTMCNC: 28.7 G/DL (ref 32.3–36.5)
MCV RBC AUTO: 88.9 FL (ref 79–92.2)
MONOCYTES # BLD: 0.83 K/UL (ref 0.24–0.82)
MONOCYTES NFR BLD: 3.4 % (ref 4.7–12.5)
NEUTROPHILS # BLD: 20.84 K/UL (ref 1.56–6.13)
NEUTS SEG NFR BLD: 84.3 % (ref 34–71.1)
PLATELET # BLD AUTO: 531 K/UL (ref 163–337)
PMV BLD AUTO: 10.6 FL (ref 7.4–10.4)
RBC # BLD AUTO: 4.04 M/UL (ref 4.63–6.08)
WBC # BLD AUTO: 24.72 K/UL (ref 4.23–9.07)

## 2024-03-06 PROCEDURE — 36415 COLL VENOUS BLD VENIPUNCTURE: CPT

## 2024-03-06 PROCEDURE — 85025 COMPLETE CBC W/AUTO DIFF WBC: CPT

## 2024-03-06 NOTE — H&P (VIEW-ONLY)
Chief Complaint   Patient presents with    Skin Lesion       Right ear lesion and left cheek lesion            Subjective  History of Present Illness:  Nikita Bhardwaj is a  68 y.o. male who returns to discuss the pathology and treatment options of lesion of the  right auricle and left inferior cheek at jawline.  The lesions have the following characteristics:     Location: right auricle  Quality: enlarging, bleeding  Severity: severe  Duration: 4-5 months  Timing: constant  Modifying Factors: none  Associated Signs & Symptoms: It is not painful.  He denies lymphadenopathy.     Location: left inferior cheek at jawline  Quality: won't heal, bleeding  Severity: mild  Duration: 5-6 months  Timing: constant  Modifying Factors: none  Associated Signs & Symptoms: It is not painful.     He states he is currently taking 81 mg of aspirin for preventative reasons.  He states he can stop this prior to any procedure.     Past Medical History:   Diagnosis Date    Diabetes mellitus     Hypertension        History reviewed. No pertinent surgical history.    History reviewed. No pertinent family history.    Social History     Socioeconomic History    Marital status:    Tobacco Use    Smoking status: Never   Substance and Sexual Activity    Alcohol use: No    Sexual activity: Yes     Partners: Male       No Known Allergies      Current Outpatient Medications:     albuterol sulfate  (90 Base) MCG/ACT inhaler, 2 PUFFS INHALE EVERY 6 HR,X7 DAYS, Disp: , Rfl:     allopurinol (ZYLOPRIM) 300 MG tablet, Take 1 tablet by mouth., Disp: , Rfl:     chlorthalidone (HYGROTON) 25 MG tablet, Take 1 tablet by mouth Daily., Disp: , Rfl:     hydroxyurea (HYDREA) 500 MG capsule, Take 1 capsule by mouth 2 (Two) Times a Day., Disp: , Rfl:     labetalol (NORMODYNE) 300 MG tablet, Take 1 tablet by mouth 2 (Two) Times a Day., Disp: , Rfl:     metFORMIN (GLUCOPHAGE) 1000 MG tablet, Take 1 tablet by mouth 2 (Two) Times a Day With Meals., Disp: , Rfl:      trandolapril (MAVIK) 4 MG tablet, Take 1 tablet by mouth Daily., Disp: , Rfl:     ROS:  Review of Systems   Constitutional:  Negative for chills, fatigue, fever and unexpected weight change.   HENT:  Negative for facial swelling.    Respiratory:  Negative for cough, chest tightness and shortness of breath.    Cardiovascular:  Negative for chest pain.   Musculoskeletal:  Negative for neck pain.   Skin:  Negative for color change.   Neurological:  Negative for facial asymmetry.   Hematological:  Negative for adenopathy. Does not bruise/bleed easily.         Current Medications         Objective  Vital Signs:  /79   Pulse 82   Temp 97.6 °F (36.4 °C) (Temporal)     Physical Exam:    Constitutional:       General: He is not in acute distress.     Appearance: He is well-developed.   HENT:      Head: Normocephalic and atraumatic.     Left cheek lesion palpates like it is abutting the parotid, but no invading.     Right Ear: External ear normal.      Left Ear: External ear normal.      Ears:     Ear lesion palpates as if is abutting the cartilage - will likely need cartilage excision.     Mouth/Throat:      Lips: Pink.   Eyes:      General: Lids are normal.   Pulmonary:      Effort: Pulmonary effort is normal. No respiratory distress.      Breath sounds: No stridor.   Musculoskeletal:      Cervical back: Neck supple.   Neurological:      Mental Status: He is alert and oriented to person, place, and time.   Psychiatric:         Mood and Affect: Mood normal.         Speech: Speech normal.         Behavior: Behavior normal. Behavior is cooperative.           Excision with likely cartilage resection - possible skin graft versus postauricular island flap          Radical excision with likely rhombic flap reconstruction    Pathology:       Assessment  1. Neoplasm of uncertain behavior                Plan    I discussed that I am concerned of the left jawline lesion due to its poor differentiation.  I discussed options  including surgical excision, radiation, chemotherapy.  We would like to proceed with a contrasted CT scan of the neck to assess for involvement of the underlying parotid gland and any lymphadenopathy, and I recommended radical excision with likely rhombic flap reconstruction.  As far as the right ear goes, I also discussed that I recommended surgical resection, but other options would include radiation therapy.  Reconstruction will be based upon the defect, either a skin graft or possible island flap reconstruction.  We will go ahead and set him up a consultation with radiation oncology    Discussion of skin lesion. Discussed risks, benefits, alternatives, and possible complications of excision of the skin lesion with reconstruction utilizing local tissue rearrangement, full-thickness skin grafting, or local interpolated flaps. Risks include, but are not limited too: bleeding, infection, hematoma, recurrence, need for additional procedures, flap failure, cosmetic deformity. Patient understands risks and would like to proceed with surgery.  Alternatives include doing nothing.       My findings and recommendations were discussed and questions were answered.

## 2024-03-12 ENCOUNTER — HOSPITAL ENCOUNTER (OUTPATIENT)
Dept: CT IMAGING | Facility: HOSPITAL | Age: 69
Discharge: HOME | End: 2024-03-12
Payer: MEDICARE

## 2024-03-12 ENCOUNTER — HOSPITAL ENCOUNTER (OUTPATIENT)
Dept: GENERAL RADIOLOGY | Facility: HOSPITAL | Age: 69
Discharge: HOME | End: 2024-03-12
Payer: MEDICARE

## 2024-03-12 ENCOUNTER — PRE-ADMISSION TESTING (OUTPATIENT)
Dept: PREADMISSION TESTING | Facility: HOSPITAL | Age: 69
End: 2024-03-12
Payer: MEDICARE

## 2024-03-12 VITALS
DIASTOLIC BLOOD PRESSURE: 76 MMHG | WEIGHT: 167.55 LBS | HEART RATE: 96 BPM | HEIGHT: 70 IN | RESPIRATION RATE: 18 BRPM | BODY MASS INDEX: 23.99 KG/M2 | OXYGEN SATURATION: 97 % | SYSTOLIC BLOOD PRESSURE: 136 MMHG

## 2024-03-12 DIAGNOSIS — C44.329 SQUAMOUS CELL CANCER OF SKIN OF JAWLINE: ICD-10-CM

## 2024-03-12 DIAGNOSIS — C44.222 SQUAMOUS CELL CARCINOMA, EAR, RIGHT: ICD-10-CM

## 2024-03-12 LAB
ANION GAP SERPL CALCULATED.3IONS-SCNC: 8 MMOL/L (ref 5–15)
BUN SERPL-MCNC: 22 MG/DL (ref 8–23)
BUN/CREAT SERPL: 34.9 (ref 7–25)
CALCIUM SPEC-SCNC: 9.1 MG/DL (ref 8.6–10.5)
CHLORIDE SERPL-SCNC: 104 MMOL/L (ref 98–107)
CO2 SERPL-SCNC: 25 MMOL/L (ref 22–29)
CREAT BLDA-MCNC: 1.5 MG/DL (ref 0.6–1.3)
CREAT SERPL-MCNC: 0.63 MG/DL (ref 0.76–1.27)
DEPRECATED RDW RBC AUTO: 76.8 FL (ref 37–54)
EGFRCR SERPLBLD CKD-EPI 2021: 104.3 ML/MIN/1.73
ERYTHROCYTE [DISTWIDTH] IN BLOOD BY AUTOMATED COUNT: 24.5 % (ref 12.3–15.4)
GLUCOSE SERPL-MCNC: 123 MG/DL (ref 65–99)
HCT VFR BLD AUTO: 37.5 % (ref 37.5–51)
HGB BLD-MCNC: 11.1 G/DL (ref 13–17.7)
MCH RBC QN AUTO: 26.1 PG (ref 26.6–33)
MCHC RBC AUTO-ENTMCNC: 29.6 G/DL (ref 31.5–35.7)
MCV RBC AUTO: 88 FL (ref 79–97)
PLATELET # BLD AUTO: 366 10*3/MM3 (ref 140–450)
PMV BLD AUTO: 10.7 FL (ref 6–12)
POTASSIUM SERPL-SCNC: 5 MMOL/L (ref 3.5–5.2)
RBC # BLD AUTO: 4.26 10*6/MM3 (ref 4.14–5.8)
SODIUM SERPL-SCNC: 137 MMOL/L (ref 136–145)
WBC NRBC COR # BLD AUTO: 27.57 10*3/MM3 (ref 3.4–10.8)

## 2024-03-12 PROCEDURE — 36415 COLL VENOUS BLD VENIPUNCTURE: CPT

## 2024-03-12 PROCEDURE — 80048 BASIC METABOLIC PNL TOTAL CA: CPT

## 2024-03-12 PROCEDURE — 93005 ELECTROCARDIOGRAM TRACING: CPT

## 2024-03-12 PROCEDURE — 85027 COMPLETE CBC AUTOMATED: CPT

## 2024-03-12 PROCEDURE — 70491 CT SOFT TISSUE NECK W/DYE: CPT

## 2024-03-12 PROCEDURE — 71045 X-RAY EXAM CHEST 1 VIEW: CPT

## 2024-03-12 PROCEDURE — 25510000001 IOPAMIDOL 61 % SOLUTION: Performed by: OTOLARYNGOLOGY

## 2024-03-12 PROCEDURE — 82565 ASSAY OF CREATININE: CPT

## 2024-03-12 RX ORDER — VERAPAMIL HYDROCHLORIDE 120 MG/1
120 CAPSULE, EXTENDED RELEASE ORAL NIGHTLY
COMMUNITY
End: 2024-03-18

## 2024-03-12 RX ORDER — METOPROLOL SUCCINATE 25 MG/1
25 TABLET, EXTENDED RELEASE ORAL DAILY
COMMUNITY
End: 2024-03-18

## 2024-03-12 RX ORDER — MULTIPLE VITAMINS W/ MINERALS TAB 9MG-400MCG
1 TAB ORAL DAILY
COMMUNITY
End: 2024-03-18

## 2024-03-12 RX ORDER — ASPIRIN 81 MG/1
81 TABLET ORAL DAILY
COMMUNITY
End: 2024-03-18

## 2024-03-12 RX ORDER — LANOLIN ALCOHOL/MO/W.PET/CERES
1000 CREAM (GRAM) TOPICAL DAILY
COMMUNITY
End: 2024-03-18

## 2024-03-12 RX ADMIN — IOPAMIDOL 100 ML: 612 INJECTION, SOLUTION INTRAVENOUS at 16:22

## 2024-03-12 NOTE — DISCHARGE INSTRUCTIONS

## 2024-03-13 ENCOUNTER — TELEPHONE (OUTPATIENT)
Dept: OTOLARYNGOLOGY | Facility: CLINIC | Age: 69
End: 2024-03-13
Payer: MEDICARE

## 2024-03-13 LAB
QT INTERVAL: 376 MS
QTC INTERVAL: 464 MS

## 2024-03-13 NOTE — TELEPHONE ENCOUNTER
I called to review the CT scan, that demonstrates that the left jawline lesion goes down to the platysma/SMAS musculature, but does not seem to invade any further into the parotid gland.      Electronically signed by Noam Larry MD, 03/13/24, 4:41 PM CDT.

## 2024-03-16 DIAGNOSIS — D45 POLYCYTHEMIA VERA (HCC): ICD-10-CM

## 2024-03-18 ENCOUNTER — PREP FOR SURGERY (OUTPATIENT)
Dept: OTHER | Facility: HOSPITAL | Age: 69
End: 2024-03-18
Payer: MEDICARE

## 2024-03-18 ENCOUNTER — HOSPITAL ENCOUNTER (OUTPATIENT)
Facility: HOSPITAL | Age: 69
Setting detail: HOSPITAL OUTPATIENT SURGERY
Discharge: HOME | End: 2024-03-18
Attending: OTOLARYNGOLOGY | Admitting: OTOLARYNGOLOGY
Payer: MEDICARE

## 2024-03-18 ENCOUNTER — ANESTHESIA EVENT (OUTPATIENT)
Dept: PERIOP | Facility: HOSPITAL | Age: 69
End: 2024-03-18
Payer: MEDICARE

## 2024-03-18 ENCOUNTER — ANESTHESIA (OUTPATIENT)
Dept: PERIOP | Facility: HOSPITAL | Age: 69
End: 2024-03-18
Payer: MEDICARE

## 2024-03-18 VITALS
SYSTOLIC BLOOD PRESSURE: 119 MMHG | TEMPERATURE: 97.4 F | DIASTOLIC BLOOD PRESSURE: 63 MMHG | OXYGEN SATURATION: 95 % | HEART RATE: 104 BPM | RESPIRATION RATE: 18 BRPM

## 2024-03-18 DIAGNOSIS — C44.222 SQUAMOUS CELL CARCINOMA, EAR, RIGHT: Primary | ICD-10-CM

## 2024-03-18 DIAGNOSIS — C44.329 SQUAMOUS CELL CANCER OF SKIN OF JAWLINE: ICD-10-CM

## 2024-03-18 DIAGNOSIS — C44.222 SQUAMOUS CELL CARCINOMA, EAR, RIGHT: ICD-10-CM

## 2024-03-18 LAB
GLUCOSE BLDC GLUCOMTR-MCNC: 179 MG/DL (ref 70–130)
GLUCOSE BLDC GLUCOMTR-MCNC: 191 MG/DL (ref 70–130)

## 2024-03-18 PROCEDURE — 25810000003 LACTATED RINGERS PER 1000 ML: Performed by: OTOLARYNGOLOGY

## 2024-03-18 PROCEDURE — 14301 TIS TRNFR ANY 30.1-60 SQ CM: CPT | Performed by: OTOLARYNGOLOGY

## 2024-03-18 PROCEDURE — 25010000002 SUGAMMADEX 200 MG/2ML SOLUTION: Performed by: NURSE ANESTHETIST, CERTIFIED REGISTERED

## 2024-03-18 PROCEDURE — 25810000003 SODIUM CHLORIDE 0.9 % SOLUTION 250 ML FLEX CONT: Performed by: NURSE ANESTHETIST, CERTIFIED REGISTERED

## 2024-03-18 PROCEDURE — 88305 TISSUE EXAM BY PATHOLOGIST: CPT | Performed by: OTOLARYNGOLOGY

## 2024-03-18 PROCEDURE — 82948 REAGENT STRIP/BLOOD GLUCOSE: CPT

## 2024-03-18 PROCEDURE — 14302 TIS TRNFR ADDL 30 SQ CM: CPT | Performed by: OTOLARYNGOLOGY

## 2024-03-18 PROCEDURE — 25010000002 FENTANYL CITRATE (PF) 100 MCG/2ML SOLUTION: Performed by: NURSE ANESTHETIST, CERTIFIED REGISTERED

## 2024-03-18 PROCEDURE — 88331 PATH CONSLTJ SURG 1 BLK 1SPC: CPT | Performed by: PATHOLOGY

## 2024-03-18 PROCEDURE — 11646 EXC F/E/E/N/L MAL+MRG >4 CM: CPT | Performed by: OTOLARYNGOLOGY

## 2024-03-18 PROCEDURE — 25010000002 VASOPRESSIN 20 UNIT/ML SOLUTION: Performed by: NURSE ANESTHETIST, CERTIFIED REGISTERED

## 2024-03-18 PROCEDURE — 25010000002 CEFAZOLIN PER 500 MG: Performed by: OTOLARYNGOLOGY

## 2024-03-18 PROCEDURE — 15731 FOREHEAD FLAP W/VASC PEDICLE: CPT | Performed by: OTOLARYNGOLOGY

## 2024-03-18 PROCEDURE — 25010000002 PROPOFOL 10 MG/ML EMULSION: Performed by: NURSE ANESTHETIST, CERTIFIED REGISTERED

## 2024-03-18 PROCEDURE — 25010000002 PHENYLEPHRINE 10 MG/ML SOLUTION 5 ML VIAL: Performed by: NURSE ANESTHETIST, CERTIFIED REGISTERED

## 2024-03-18 PROCEDURE — 38510 BIOPSY/REMOVAL LYMPH NODES: CPT | Performed by: OTOLARYNGOLOGY

## 2024-03-18 DEVICE — HEMOST ABS SURGICEL SNOW 4X4IN: Type: IMPLANTABLE DEVICE | Site: EAR | Status: FUNCTIONAL

## 2024-03-18 RX ORDER — LIDOCAINE HYDROCHLORIDE AND EPINEPHRINE 10; 10 MG/ML; UG/ML
INJECTION, SOLUTION INFILTRATION; PERINEURAL AS NEEDED
Status: DISCONTINUED | OUTPATIENT
Start: 2024-03-18 | End: 2024-03-18 | Stop reason: HOSPADM

## 2024-03-18 RX ORDER — HYDROCODONE BITARTRATE AND ACETAMINOPHEN 5; 325 MG/1; MG/1
1 TABLET ORAL EVERY 4 HOURS PRN
Status: DISCONTINUED | OUTPATIENT
Start: 2024-03-18 | End: 2024-03-18 | Stop reason: HOSPADM

## 2024-03-18 RX ORDER — METOPROLOL TARTRATE 1 MG/ML
INJECTION, SOLUTION INTRAVENOUS AS NEEDED
Status: DISCONTINUED | OUTPATIENT
Start: 2024-03-18 | End: 2024-03-18 | Stop reason: SURG

## 2024-03-18 RX ORDER — LIDOCAINE HYDROCHLORIDE 20 MG/ML
INJECTION, SOLUTION EPIDURAL; INFILTRATION; INTRACAUDAL; PERINEURAL AS NEEDED
Status: DISCONTINUED | OUTPATIENT
Start: 2024-03-18 | End: 2024-03-18 | Stop reason: SURG

## 2024-03-18 RX ORDER — LABETALOL HYDROCHLORIDE 5 MG/ML
5 INJECTION, SOLUTION INTRAVENOUS
Status: DISCONTINUED | OUTPATIENT
Start: 2024-03-18 | End: 2024-03-18 | Stop reason: HOSPADM

## 2024-03-18 RX ORDER — NALOXONE HCL 0.4 MG/ML
0.4 VIAL (ML) INJECTION AS NEEDED
Status: DISCONTINUED | OUTPATIENT
Start: 2024-03-18 | End: 2024-03-18 | Stop reason: HOSPADM

## 2024-03-18 RX ORDER — FENTANYL CITRATE 50 UG/ML
INJECTION, SOLUTION INTRAMUSCULAR; INTRAVENOUS AS NEEDED
Status: DISCONTINUED | OUTPATIENT
Start: 2024-03-18 | End: 2024-03-18 | Stop reason: SURG

## 2024-03-18 RX ORDER — SODIUM CHLORIDE, SODIUM LACTATE, POTASSIUM CHLORIDE, CALCIUM CHLORIDE 600; 310; 30; 20 MG/100ML; MG/100ML; MG/100ML; MG/100ML
100 INJECTION, SOLUTION INTRAVENOUS CONTINUOUS
Status: DISCONTINUED | OUTPATIENT
Start: 2024-03-18 | End: 2024-03-18 | Stop reason: SDUPTHER

## 2024-03-18 RX ORDER — DROPERIDOL 2.5 MG/ML
0.62 INJECTION, SOLUTION INTRAMUSCULAR; INTRAVENOUS ONCE AS NEEDED
Status: DISCONTINUED | OUTPATIENT
Start: 2024-03-18 | End: 2024-03-18 | Stop reason: HOSPADM

## 2024-03-18 RX ORDER — BUPIVACAINE HCL/0.9 % NACL/PF 0.125 %
PLASTIC BAG, INJECTION (ML) EPIDURAL AS NEEDED
Status: DISCONTINUED | OUTPATIENT
Start: 2024-03-18 | End: 2024-03-18 | Stop reason: SURG

## 2024-03-18 RX ORDER — DOXYCYCLINE HYCLATE 100 MG/1
100 CAPSULE ORAL 2 TIMES DAILY
Qty: 28 CAPSULE | Refills: 0 | Status: SHIPPED | OUTPATIENT
Start: 2024-03-18 | End: 2024-04-01

## 2024-03-18 RX ORDER — MAGNESIUM HYDROXIDE 1200 MG/15ML
LIQUID ORAL AS NEEDED
Status: DISCONTINUED | OUTPATIENT
Start: 2024-03-18 | End: 2024-03-18 | Stop reason: HOSPADM

## 2024-03-18 RX ORDER — HYDROCODONE BITARTRATE AND ACETAMINOPHEN 7.5; 325 MG/1; MG/1
1 TABLET ORAL EVERY 4 HOURS PRN
Qty: 18 TABLET | Refills: 0 | Status: SHIPPED | OUTPATIENT
Start: 2024-03-18 | End: 2024-03-21

## 2024-03-18 RX ORDER — SODIUM CHLORIDE 0.9 % (FLUSH) 0.9 %
3 SYRINGE (ML) INJECTION EVERY 12 HOURS SCHEDULED
Status: DISCONTINUED | OUTPATIENT
Start: 2024-03-18 | End: 2024-03-18 | Stop reason: HOSPADM

## 2024-03-18 RX ORDER — HYDROCODONE BITARTRATE AND ACETAMINOPHEN 10; 325 MG/1; MG/1
1 TABLET ORAL EVERY 4 HOURS PRN
Status: DISCONTINUED | OUTPATIENT
Start: 2024-03-18 | End: 2024-03-18 | Stop reason: HOSPADM

## 2024-03-18 RX ORDER — SODIUM CHLORIDE 0.9 % (FLUSH) 0.9 %
3 SYRINGE (ML) INJECTION AS NEEDED
Status: DISCONTINUED | OUTPATIENT
Start: 2024-03-18 | End: 2024-03-18 | Stop reason: HOSPADM

## 2024-03-18 RX ORDER — HYDROCODONE BITARTRATE AND ACETAMINOPHEN 7.5; 325 MG/1; MG/1
1 TABLET ORAL ONCE AS NEEDED
Status: DISCONTINUED | OUTPATIENT
Start: 2024-03-18 | End: 2024-03-18 | Stop reason: HOSPADM

## 2024-03-18 RX ORDER — VASOPRESSIN 20 U/ML
INJECTION PARENTERAL AS NEEDED
Status: DISCONTINUED | OUTPATIENT
Start: 2024-03-18 | End: 2024-03-18 | Stop reason: SURG

## 2024-03-18 RX ORDER — SODIUM CHLORIDE 0.9 % (FLUSH) 0.9 %
3-10 SYRINGE (ML) INJECTION AS NEEDED
Status: DISCONTINUED | OUTPATIENT
Start: 2024-03-18 | End: 2024-03-18 | Stop reason: HOSPADM

## 2024-03-18 RX ORDER — DOCUSATE SODIUM 250 MG
250 CAPSULE ORAL 2 TIMES DAILY PRN
Qty: 30 CAPSULE | Refills: 0 | Status: SHIPPED | OUTPATIENT
Start: 2024-03-18

## 2024-03-18 RX ORDER — ROCURONIUM BROMIDE 10 MG/ML
INJECTION, SOLUTION INTRAVENOUS AS NEEDED
Status: DISCONTINUED | OUTPATIENT
Start: 2024-03-18 | End: 2024-03-18 | Stop reason: SURG

## 2024-03-18 RX ORDER — LIDOCAINE HYDROCHLORIDE 10 MG/ML
0.5 INJECTION, SOLUTION EPIDURAL; INFILTRATION; INTRACAUDAL; PERINEURAL ONCE AS NEEDED
Status: DISCONTINUED | OUTPATIENT
Start: 2024-03-18 | End: 2024-03-18 | Stop reason: HOSPADM

## 2024-03-18 RX ORDER — FENTANYL CITRATE 50 UG/ML
50 INJECTION, SOLUTION INTRAMUSCULAR; INTRAVENOUS
Status: DISCONTINUED | OUTPATIENT
Start: 2024-03-18 | End: 2024-03-18 | Stop reason: HOSPADM

## 2024-03-18 RX ORDER — ONDANSETRON 2 MG/ML
4 INJECTION INTRAMUSCULAR; INTRAVENOUS ONCE AS NEEDED
Status: DISCONTINUED | OUTPATIENT
Start: 2024-03-18 | End: 2024-03-18 | Stop reason: HOSPADM

## 2024-03-18 RX ORDER — SODIUM CHLORIDE, SODIUM LACTATE, POTASSIUM CHLORIDE, CALCIUM CHLORIDE 600; 310; 30; 20 MG/100ML; MG/100ML; MG/100ML; MG/100ML
1000 INJECTION, SOLUTION INTRAVENOUS CONTINUOUS
Status: DISCONTINUED | OUTPATIENT
Start: 2024-03-18 | End: 2024-03-18 | Stop reason: HOSPADM

## 2024-03-18 RX ORDER — HYDROXYUREA 500 MG/1
CAPSULE ORAL
Qty: 270 CAPSULE | Refills: 1 | Status: SHIPPED | OUTPATIENT
Start: 2024-03-18

## 2024-03-18 RX ORDER — FLUMAZENIL 0.1 MG/ML
0.2 INJECTION INTRAVENOUS AS NEEDED
Status: DISCONTINUED | OUTPATIENT
Start: 2024-03-18 | End: 2024-03-18 | Stop reason: HOSPADM

## 2024-03-18 RX ORDER — SODIUM CHLORIDE 9 MG/ML
40 INJECTION, SOLUTION INTRAVENOUS AS NEEDED
Status: DISCONTINUED | OUTPATIENT
Start: 2024-03-18 | End: 2024-03-18 | Stop reason: HOSPADM

## 2024-03-18 RX ORDER — PROPOFOL 10 MG/ML
VIAL (ML) INTRAVENOUS AS NEEDED
Status: DISCONTINUED | OUTPATIENT
Start: 2024-03-18 | End: 2024-03-18 | Stop reason: SURG

## 2024-03-18 RX ADMIN — Medication 200 MCG: at 10:49

## 2024-03-18 RX ADMIN — VASOPRESSIN 2 UNITS: 20 INJECTION INTRAVENOUS at 11:04

## 2024-03-18 RX ADMIN — FENTANYL CITRATE 25 MCG: 50 INJECTION, SOLUTION INTRAMUSCULAR; INTRAVENOUS at 10:09

## 2024-03-18 RX ADMIN — PROPOFOL 200 MG: 10 INJECTION, EMULSION INTRAVENOUS at 09:04

## 2024-03-18 RX ADMIN — VASOPRESSIN 2 UNITS: 20 INJECTION INTRAVENOUS at 09:58

## 2024-03-18 RX ADMIN — PHENYLEPHRINE HYDROCHLORIDE 1 MCG/KG/MIN: 10 INJECTION INTRAVENOUS at 11:07

## 2024-03-18 RX ADMIN — SUGAMMADEX 200 MG: 100 INJECTION, SOLUTION INTRAVENOUS at 09:17

## 2024-03-18 RX ADMIN — VASOPRESSIN 2 UNITS: 20 INJECTION INTRAVENOUS at 10:07

## 2024-03-18 RX ADMIN — VASOPRESSIN 2 UNITS: 20 INJECTION INTRAVENOUS at 10:04

## 2024-03-18 RX ADMIN — ROCURONIUM BROMIDE 40 MG: 10 INJECTION, SOLUTION INTRAVENOUS at 09:03

## 2024-03-18 RX ADMIN — Medication 200 MCG: at 10:43

## 2024-03-18 RX ADMIN — VASOPRESSIN 1 UNITS: 20 INJECTION INTRAVENOUS at 11:08

## 2024-03-18 RX ADMIN — SODIUM CHLORIDE, POTASSIUM CHLORIDE, SODIUM LACTATE AND CALCIUM CHLORIDE 1000 ML: 600; 310; 30; 20 INJECTION, SOLUTION INTRAVENOUS at 08:04

## 2024-03-18 RX ADMIN — SODIUM CHLORIDE, POTASSIUM CHLORIDE, SODIUM LACTATE AND CALCIUM CHLORIDE: 600; 310; 30; 20 INJECTION, SOLUTION INTRAVENOUS at 11:26

## 2024-03-18 RX ADMIN — CEFAZOLIN 2000 MG: 2 INJECTION, POWDER, FOR SOLUTION INTRAMUSCULAR; INTRAVENOUS at 09:11

## 2024-03-18 RX ADMIN — FENTANYL CITRATE 100 MCG: 50 INJECTION, SOLUTION INTRAMUSCULAR; INTRAVENOUS at 09:03

## 2024-03-18 RX ADMIN — Medication 100 MCG: at 09:30

## 2024-03-18 RX ADMIN — METOPROLOL TARTRATE 1 MG: 5 INJECTION INTRAVENOUS at 09:12

## 2024-03-18 RX ADMIN — VASOPRESSIN 1 UNITS: 20 INJECTION INTRAVENOUS at 11:13

## 2024-03-18 RX ADMIN — PROPOFOL 100 MG: 10 INJECTION, EMULSION INTRAVENOUS at 09:06

## 2024-03-18 RX ADMIN — FENTANYL CITRATE 50 MCG: 50 INJECTION, SOLUTION INTRAMUSCULAR; INTRAVENOUS at 10:24

## 2024-03-18 RX ADMIN — Medication 100 MCG: at 09:39

## 2024-03-18 RX ADMIN — Medication 200 MCG: at 09:28

## 2024-03-18 RX ADMIN — FENTANYL CITRATE 25 MCG: 50 INJECTION, SOLUTION INTRAMUSCULAR; INTRAVENOUS at 10:11

## 2024-03-18 RX ADMIN — VASOPRESSIN 2 UNITS: 20 INJECTION INTRAVENOUS at 11:27

## 2024-03-18 RX ADMIN — SODIUM CHLORIDE, POTASSIUM CHLORIDE, SODIUM LACTATE AND CALCIUM CHLORIDE: 600; 310; 30; 20 INJECTION, SOLUTION INTRAVENOUS at 09:30

## 2024-03-18 RX ADMIN — VASOPRESSIN 2 UNITS: 20 INJECTION INTRAVENOUS at 09:50

## 2024-03-18 RX ADMIN — Medication 200 MCG: at 09:44

## 2024-03-18 RX ADMIN — Medication 200 MCG: at 09:25

## 2024-03-18 RX ADMIN — Medication 100 MCG: at 09:34

## 2024-03-18 RX ADMIN — Medication 100 MCG: at 09:22

## 2024-03-18 RX ADMIN — LIDOCAINE HYDROCHLORIDE 100 MG: 20 INJECTION, SOLUTION EPIDURAL; INFILTRATION; INTRACAUDAL; PERINEURAL at 09:04

## 2024-03-18 RX ADMIN — HYDROCODONE BITARTRATE AND ACETAMINOPHEN 1 TABLET: 10; 325 TABLET ORAL at 13:12

## 2024-03-18 NOTE — ANESTHESIA PROCEDURE NOTES
Airway  Date/Time: 3/18/2024 9:07 AM  Airway not difficult    General Information and Staff    Patient location during procedure: OR  CRNA/CAA: Triston Jung CRNA    Indications and Patient Condition  Indications for airway management: airway protection    Preoxygenated: yes  Mask difficulty assessment: 2 - vent by mask + OA or adjuvant +/- NMBA    Final Airway Details  Final airway type: endotracheal airway      Successful airway: ETT  Cuffed: yes   Successful intubation technique: video laryngoscopy  Facilitating devices/methods: intubating stylet  Endotracheal tube insertion site: oral  Blade: Shi  Blade size: 3.5  ETT size (mm): 7.5  Cormack-Lehane Classification: grade I - full view of glottis  Placement verified by: chest auscultation and capnometry   Number of attempts at approach: 1  Assessment: lips, teeth, and gum same as pre-op and atraumatic intubation

## 2024-03-18 NOTE — ANESTHESIA POSTPROCEDURE EVALUATION
Patient: Nikita Bhardwaj    Procedure Summary       Date: 03/18/24 Room / Location:  [facility] OR 03 /  [facility]    Anesthesia Start: 0901 Anesthesia Stop: 1142    Procedure: 1) Radical excision of skin cancer of the left jawline with flap reconstruction 2) Excision of skin cancer of the right ear with flap reconstruction (Bilateral) Diagnosis:       Squamous cell carcinoma, ear, right      Squamous cell cancer of skin of jawline      (Squamous cell carcinoma, ear, right [C44.222])      (Squamous cell cancer of skin of jawline [C44.329])    Surgeons: Noam Larry MD Provider: Triston Jung CRNA    Anesthesia Type: general ASA Status: 3            Anesthesia Type: general    Vitals  Vitals Value Taken Time   /70 03/18/24 1224   Temp 97.4 °F (36.3 °C) 03/18/24 1224   Pulse 110 03/18/24 1224   Resp 16 03/18/24 1224   SpO2 93 % 03/18/24 1224           Post Anesthesia Care and Evaluation    Patient location during evaluation: PACU  Patient participation: complete - patient participated  Level of consciousness: awake and awake and alert  Pain score: 0  Pain management: adequate    Airway patency: patent  Anesthetic complications: No anesthetic complications  PONV Status: none  Cardiovascular status: acceptable  Respiratory status: acceptable  Hydration status: acceptable    Comments: Patient discharged according to acceptable Mae score per RN assessment. See nursing records for further information.     Blood pressure 131/70, pulse 106, temperature 97.4 °F (36.3 °C), temperature source Temporal, resp. rate 18, SpO2 94%.

## 2024-03-18 NOTE — ANESTHESIA PREPROCEDURE EVALUATION
Anesthesia Evaluation     Patient summary reviewed   no history of anesthetic complications:   NPO Solid Status: > 8 hours             Airway   Mallampati: II  TM distance: >3 FB  No difficulty expected  Dental      Pulmonary    (+) ,sleep apnea  (-) asthma, not a smoker  Cardiovascular   Exercise tolerance: good (4-7 METS)    (+) hypertension (off beta blockers, reports was causing pressure to bottom out), dysrhythmias Tachycardia      Neuro/Psych  (-) seizures, TIA, CVA  GI/Hepatic/Renal/Endo    (+) renal disease-, diabetes mellitus  (-) liver disease    Musculoskeletal     Abdominal    Substance History      OB/GYN          Other   blood dyscrasia (polycythemia),   history of cancer                  Anesthesia Plan    ASA 3     general     (Ok to go after 9 am. Patient had oatmeal this morning and reports he was not told when to be here.    Tachycardic, reviewed and sinus. Patient reports feeling nervous this am. Will plan for b blockade prn intraprocedure)  intravenous induction     Anesthetic plan, risks, benefits, and alternatives have been provided, discussed and informed consent has been obtained with: patient.    CODE STATUS:

## 2024-03-18 NOTE — OP NOTE
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     PROCEDURE:  1) radical/extended excision of malignant neoplasm skin of left jawline, 4.5 cm x 4.0 cm, submuscular   2) local tissue rearrangement (rhombic flap) skin of left jawline, 10.0 cm x 8.0 cm   3) radical/extended excision of malignant neoplasm skin of the right ear, 3.3 cm x 3.6 cm, through periosteum   4) postauricular interpolated flap with preservation of vascular pedicle, 3.5 cm x 3.6 cm   5) excision of lymph node of left neck, deep    SURGEON:  Noam Larry MD, FACS    FACILITY: [facility] Operating Room    ANESTHESIA: General    DICTATED BY:  Noam Larry MD, FACS    IVF: Per anesthesia    EBL: Preanesthesia    IMPLANTS: None    DRAINS: None    SPECIMENS: 1) Squamous cell carcinoma of the left jawline, stitch at 12:00-frozen   2) Squamous cell carcinoma of the right ear, stitch at 12:00-frozen   3) possible lymph node of left neck--permanent   4) additional margin of right ear, 3:00 to 5:00, new margin is blue-permanent    COMPLICATIONS: None apparent    INDICATIONS FOR SURGERY: Mr. Bhardwaj presented with 2 biopsy-proven squamous cell carcinomas.  The squamous of carcinoma of the left jawline was deeply invading and poorly differentiated.  For surgical excision and reconstruction.    OPERATIVE FINDINGS:   Left jawline:  Lesion: 20 mm x 20 mm  Margins: 1 cm  Defect: 4.5 cm x 4.0 cm  Flap and Defect: 10.0 cm x 8.0 cm  Depth: Through the platysma muscle    Right ear:  Lesion: 23 mm x 23 mm  Margins: 5 mm, cough followed by an additional 3 mm  Defect: 3.3 cm x 3.6 cm  Flap and Defect: 3.5 cm x 3.6  cm  Depth: Through perichondrium      OPERATIVE DETAILS:       After patient verification and informed consent was obtained, the patient was taken to the operating room and laid supine on the operative table.  The skin was cleansed with alcohol, the lesions were marked, and the skin infiltrated with 10 cc of of a mixture composed of 9 cc of 1% lidocaine with 1-100,000 epinephrine with 1 cc of 100mg/ml tranexamic acid.  The skin was sterilely prepped with Betadine and the patient was sterilely draped.    The right ear lesion was addressed:    The lesion was marked and 10 mmm margins were drawn around the periphery of this.  A fresh #15 blade was used to incise the skin and dissection was carried in the subperichondrial plane utilizing a freer elevator.    Lesion was oriented with a stitch at 12:00 and sent for frozen section analysis.    The left jawline lesion was then addressed:  The skin surrounding the lesion was marked at the periphery the lesion.  I then measured 10 mm margins around the periphery of this.  I converted this into a shanthi shape, extending the defect to 4.5 cm x 4.0 cm, in anticipation of the rhombic flap recruited from inferiorly, and pedicled posteriorly.    The skin surrounding the lesion was incised in  with a 15 blade.  The skin was retracted with skin hooks and the lesion was removed from the underlying deep subcutaneous fat plane utilizing sharp dissection with the 15 blade and tenotomy scissors.  The deep musculature which corresponded to the platysma muscle is at the deep end of the defect.  Gross inspection of the tumor demonstrated approximate 1 to 2 mm of mobile fat of the fat aspect.  Due to the close proximity of the central aspect and due to the poorly differentiated characteristics, I then incised the platysma muscle utilizing a 15 blade and undermined this in the submuscular plane utilizing that 15 blade.  I then placed surgical ink at the deep margin.  I brought the specimen  down to pathology and oriented this with Dr. Crowder.  Hemostasis was obtained with bipolar cautery set at 15.      Frozen section analysis demonstrated clear peripheral and deep margins on both, but the 3:00 to 5:00 margin was close on the ear.  As a result, I took an additional 3 mm margin in this area, inked at the distal aspect with a surgical pen and sent this for permanent section pathology.    The left jawline was closed:  The surrounding skin of the predesigned flap was incised and was widely undermined in the subcutaneous plane utilizing a fresh 15 blade to release and allow flap closure.  The patient responded during this dissection, and I infiltrated another 5 cc of 1% lidocaine with 1-100,000 epinephrine.  Hemostasis was again obtained with bipolar cautery set at 15.  As I was dissecting the flap over the sternocleidomastoid muscle, a 5 mm mass was visualized which appeared somewhat to resemble a lymph node versus severed piece of the sternocleidomastoid muscle.  I dissected this from the surrounding superficial sternocleidomastoid muscle utilizing tenotomy scissors and sent this for permanent section pathology.  The tissue was rotated into place and closed utilizing 3-0 undyed Vicryl suture in buried fashion.  The overlying skin was closed with running, locking 5-0 Prolene in the cheek, and running, locking 4-0 Prolene in the neck.   Antibiotic ointment was placed to the flap and incision.    The right ear defect was closed:  I recruited a postauricular pedicled interpolated Diefenbach flap by incising onto the conchal bowl utilizing a fresh 15 blade and extending into the mastoid skin in a U-shaped dissection.  I then undermined the deep subcutaneous fat and submuscular plane utilizing needle tip cautery set at 15.  Hemostasis was obtained with the bipolar cautery set at 15.  I then transferred the flap and secured this utilizing buried 5-0 undyed Monocryl suture followed by running, locking 5-0  Prolene.  I placed wrapped the pedicle and Xeroform, and placed fibrillar snow in the postauricular defect.  Surgical staples were also placed at the postauricular skin.    DISPOSITION:  The procedures were completed without complication and tolerated well.  The patient was released in the company of his wife to return home in satisfactory condition.  A follow-up appointment will be scheduled, routine post-op medications prescribed (if required), and post-op instructions were given to the responsible party.           Noam Larry MD, FACS  Board Certified Facial Plastic and Reconstructive Surgery  Board Certified Otolaryngology -- Head and Neck Surgery    Electronically signed by Noam Larry MD, 03/18/24, 12:29 PM CDT.

## 2024-03-19 LAB
CYTO UR: NORMAL
LAB AP CASE REPORT: NORMAL
Lab: NORMAL
Lab: NORMAL
PATH REPORT.FINAL DX SPEC: NORMAL
PATH REPORT.GROSS SPEC: NORMAL

## 2024-03-20 ENCOUNTER — ANESTHESIA (OUTPATIENT)
Dept: PERIOP | Facility: HOSPITAL | Age: 69
End: 2024-03-20
Payer: MEDICARE

## 2024-03-20 ENCOUNTER — PREP FOR SURGERY (OUTPATIENT)
Dept: OTHER | Facility: HOSPITAL | Age: 69
End: 2024-03-20
Payer: MEDICARE

## 2024-03-20 ENCOUNTER — HOSPITAL ENCOUNTER (OUTPATIENT)
Facility: HOSPITAL | Age: 69
Discharge: HOME | End: 2024-03-21
Attending: INTERNAL MEDICINE | Admitting: OTOLARYNGOLOGY
Payer: MEDICARE

## 2024-03-20 ENCOUNTER — APPOINTMENT (OUTPATIENT)
Dept: CT IMAGING | Facility: HOSPITAL | Age: 69
End: 2024-03-20
Payer: MEDICARE

## 2024-03-20 ENCOUNTER — ANESTHESIA EVENT (OUTPATIENT)
Dept: PERIOP | Facility: HOSPITAL | Age: 69
End: 2024-03-20
Payer: MEDICARE

## 2024-03-20 DIAGNOSIS — S00.83XA HEMATOMA OF FACE, INITIAL ENCOUNTER: ICD-10-CM

## 2024-03-20 DIAGNOSIS — S00.83XA HEMATOMA OF FACE, INITIAL ENCOUNTER: Primary | ICD-10-CM

## 2024-03-20 DIAGNOSIS — S00.83XA FACIAL HEMATOMA, INITIAL ENCOUNTER: Primary | ICD-10-CM

## 2024-03-20 LAB
ALBUMIN SERPL-MCNC: 4.1 G/DL (ref 3.5–5.2)
ALBUMIN/GLOB SERPL: 1.6 G/DL
ALP SERPL-CCNC: 194 U/L (ref 39–117)
ALT SERPL W P-5'-P-CCNC: 12 U/L (ref 1–41)
ANION GAP SERPL CALCULATED.3IONS-SCNC: 9 MMOL/L (ref 5–15)
ANISOCYTOSIS BLD QL: ABNORMAL
AST SERPL-CCNC: 15 U/L (ref 1–40)
BILIRUB SERPL-MCNC: 2.3 MG/DL (ref 0–1.2)
BUN SERPL-MCNC: 17 MG/DL (ref 8–23)
BUN/CREAT SERPL: 27 (ref 7–25)
CALCIUM SPEC-SCNC: 8.8 MG/DL (ref 8.6–10.5)
CHLORIDE SERPL-SCNC: 100 MMOL/L (ref 98–107)
CO2 SERPL-SCNC: 26 MMOL/L (ref 22–29)
CREAT SERPL-MCNC: 0.63 MG/DL (ref 0.76–1.27)
CRP SERPL-MCNC: 5.27 MG/DL (ref 0–0.5)
DACRYOCYTES BLD QL SMEAR: ABNORMAL
DEPRECATED RDW RBC AUTO: 74.8 FL (ref 37–54)
EGFRCR SERPLBLD CKD-EPI 2021: 104.3 ML/MIN/1.73
ELLIPTOCYTES BLD QL SMEAR: ABNORMAL
ERYTHROCYTE [DISTWIDTH] IN BLOOD BY AUTOMATED COUNT: 23.8 % (ref 12.3–15.4)
ERYTHROCYTE [SEDIMENTATION RATE] IN BLOOD: 23 MM/HR (ref 0–20)
GLOBULIN UR ELPH-MCNC: 2.6 GM/DL
GLUCOSE BLDC GLUCOMTR-MCNC: 119 MG/DL (ref 70–130)
GLUCOSE SERPL-MCNC: 203 MG/DL (ref 65–99)
HCT VFR BLD AUTO: 31.9 % (ref 37.5–51)
HGB BLD-MCNC: 9.3 G/DL (ref 13–17.7)
HYPOCHROMIA BLD QL: ABNORMAL
LYMPHOCYTES # BLD MANUAL: 0.72 10*3/MM3 (ref 0.7–3.1)
LYMPHOCYTES NFR BLD MANUAL: 1 % (ref 5–12)
MCH RBC QN AUTO: 25.8 PG (ref 26.6–33)
MCHC RBC AUTO-ENTMCNC: 29.2 G/DL (ref 31.5–35.7)
MCV RBC AUTO: 88.6 FL (ref 79–97)
METAMYELOCYTES NFR BLD MANUAL: 1 % (ref 0–0)
MICROCYTES BLD QL: ABNORMAL
MONOCYTES # BLD: 0.36 10*3/MM3 (ref 0.1–0.9)
MYELOCYTES NFR BLD MANUAL: 1 % (ref 0–0)
NEUTROPHILS # BLD AUTO: 34.19 10*3/MM3 (ref 1.7–7)
NEUTROPHILS NFR BLD MANUAL: 93.9 % (ref 42.7–76)
NEUTS BAND NFR BLD MANUAL: 1 % (ref 0–5)
NEUTS HYPERSEG # BLD: PRESENT 10*3/UL
OVALOCYTES BLD QL SMEAR: ABNORMAL
PLAT MORPH BLD: NORMAL
PLATELET # BLD AUTO: 250 10*3/MM3 (ref 140–450)
PMV BLD AUTO: 11.6 FL (ref 6–12)
POIKILOCYTOSIS BLD QL SMEAR: ABNORMAL
POLYCHROMASIA BLD QL SMEAR: ABNORMAL
POTASSIUM SERPL-SCNC: 4.9 MMOL/L (ref 3.5–5.2)
PROT SERPL-MCNC: 6.7 G/DL (ref 6–8.5)
RBC # BLD AUTO: 3.6 10*6/MM3 (ref 4.14–5.8)
SODIUM SERPL-SCNC: 135 MMOL/L (ref 136–145)
STOMATOCYTES BLD QL SMEAR: ABNORMAL
VARIANT LYMPHS NFR BLD MANUAL: 1 % (ref 0–5)
VARIANT LYMPHS NFR BLD MANUAL: 1 % (ref 19.6–45.3)
WBC NRBC COR # BLD AUTO: 36.03 10*3/MM3 (ref 3.4–10.8)

## 2024-03-20 PROCEDURE — 25010000002 GLYCOPYRROLATE 0.4 MG/2ML SOLUTION

## 2024-03-20 PROCEDURE — 85025 COMPLETE CBC W/AUTO DIFF WBC: CPT | Performed by: INTERNAL MEDICINE

## 2024-03-20 PROCEDURE — 25010000002 CEFAZOLIN PER 500 MG: Performed by: OTOLARYNGOLOGY

## 2024-03-20 PROCEDURE — 25010000002 FENTANYL CITRATE (PF) 100 MCG/2ML SOLUTION

## 2024-03-20 PROCEDURE — 25010000002 HYDROMORPHONE PER 4 MG: Performed by: INTERNAL MEDICINE

## 2024-03-20 PROCEDURE — 25010000002 ONDANSETRON PER 1 MG

## 2024-03-20 PROCEDURE — 85007 BL SMEAR W/DIFF WBC COUNT: CPT | Performed by: INTERNAL MEDICINE

## 2024-03-20 PROCEDURE — G0378 HOSPITAL OBSERVATION PER HR: HCPCS

## 2024-03-20 PROCEDURE — 96374 THER/PROPH/DIAG INJ IV PUSH: CPT

## 2024-03-20 PROCEDURE — 80053 COMPREHEN METABOLIC PANEL: CPT | Performed by: INTERNAL MEDICINE

## 2024-03-20 PROCEDURE — 82948 REAGENT STRIP/BLOOD GLUCOSE: CPT

## 2024-03-20 PROCEDURE — 85652 RBC SED RATE AUTOMATED: CPT | Performed by: INTERNAL MEDICINE

## 2024-03-20 PROCEDURE — 25010000002 ONDANSETRON PER 1 MG: Performed by: INTERNAL MEDICINE

## 2024-03-20 PROCEDURE — 25010000002 PROPOFOL 10 MG/ML EMULSION

## 2024-03-20 PROCEDURE — 25010000002 FENTANYL CITRATE (PF) 50 MCG/ML SOLUTION: Performed by: ANESTHESIOLOGY

## 2024-03-20 PROCEDURE — 25810000003 LACTATED RINGERS PER 1000 ML: Performed by: ANESTHESIOLOGY

## 2024-03-20 PROCEDURE — 25010000002 MORPHINE PER 10 MG: Performed by: OTOLARYNGOLOGY

## 2024-03-20 PROCEDURE — 96375 TX/PRO/DX INJ NEW DRUG ADDON: CPT

## 2024-03-20 PROCEDURE — 99285 EMERGENCY DEPT VISIT HI MDM: CPT

## 2024-03-20 PROCEDURE — 10140 I&D HMTMA SEROMA/FLUID COLLJ: CPT | Performed by: OTOLARYNGOLOGY

## 2024-03-20 PROCEDURE — 86140 C-REACTIVE PROTEIN: CPT | Performed by: INTERNAL MEDICINE

## 2024-03-20 PROCEDURE — 99024 POSTOP FOLLOW-UP VISIT: CPT | Performed by: OTOLARYNGOLOGY

## 2024-03-20 PROCEDURE — 88304 TISSUE EXAM BY PATHOLOGIST: CPT | Performed by: OTOLARYNGOLOGY

## 2024-03-20 RX ORDER — MAGNESIUM HYDROXIDE 1200 MG/15ML
LIQUID ORAL AS NEEDED
Status: DISCONTINUED | OUTPATIENT
Start: 2024-03-20 | End: 2024-03-20 | Stop reason: HOSPADM

## 2024-03-20 RX ORDER — HYDROCODONE BITARTRATE AND ACETAMINOPHEN 5; 325 MG/1; MG/1
1 TABLET ORAL EVERY 4 HOURS PRN
Status: DISCONTINUED | OUTPATIENT
Start: 2024-03-20 | End: 2024-03-20

## 2024-03-20 RX ORDER — SODIUM CHLORIDE 0.9 % (FLUSH) 0.9 %
10 SYRINGE (ML) INJECTION EVERY 12 HOURS SCHEDULED
Status: DISCONTINUED | OUTPATIENT
Start: 2024-03-20 | End: 2024-03-20 | Stop reason: HOSPADM

## 2024-03-20 RX ORDER — GLYCOPYRROLATE 0.2 MG/ML
INJECTION INTRAMUSCULAR; INTRAVENOUS AS NEEDED
Status: DISCONTINUED | OUTPATIENT
Start: 2024-03-20 | End: 2024-03-20 | Stop reason: SURG

## 2024-03-20 RX ORDER — PROPOFOL 10 MG/ML
VIAL (ML) INTRAVENOUS AS NEEDED
Status: DISCONTINUED | OUTPATIENT
Start: 2024-03-20 | End: 2024-03-20 | Stop reason: SURG

## 2024-03-20 RX ORDER — HYDROCODONE BITARTRATE AND ACETAMINOPHEN 10; 325 MG/1; MG/1
1 TABLET ORAL EVERY 4 HOURS PRN
Status: DISCONTINUED | OUTPATIENT
Start: 2024-03-20 | End: 2024-03-20

## 2024-03-20 RX ORDER — ROCURONIUM BROMIDE 10 MG/ML
INJECTION, SOLUTION INTRAVENOUS AS NEEDED
Status: DISCONTINUED | OUTPATIENT
Start: 2024-03-20 | End: 2024-03-20 | Stop reason: SURG

## 2024-03-20 RX ORDER — SODIUM CHLORIDE 0.9 % (FLUSH) 0.9 %
10 SYRINGE (ML) INJECTION EVERY 12 HOURS SCHEDULED
Status: DISCONTINUED | OUTPATIENT
Start: 2024-03-20 | End: 2024-03-21 | Stop reason: HOSPADM

## 2024-03-20 RX ORDER — SODIUM CHLORIDE 9 MG/ML
40 INJECTION, SOLUTION INTRAVENOUS AS NEEDED
Status: DISCONTINUED | OUTPATIENT
Start: 2024-03-20 | End: 2024-03-21 | Stop reason: HOSPADM

## 2024-03-20 RX ORDER — ONDANSETRON 2 MG/ML
4 INJECTION INTRAMUSCULAR; INTRAVENOUS ONCE AS NEEDED
Status: DISCONTINUED | OUTPATIENT
Start: 2024-03-20 | End: 2024-03-20 | Stop reason: HOSPADM

## 2024-03-20 RX ORDER — IBUPROFEN 600 MG/1
600 TABLET ORAL EVERY 6 HOURS PRN
Status: DISCONTINUED | OUTPATIENT
Start: 2024-03-20 | End: 2024-03-20 | Stop reason: HOSPADM

## 2024-03-20 RX ORDER — BUPIVACAINE HCL/0.9 % NACL/PF 0.125 %
PLASTIC BAG, INJECTION (ML) EPIDURAL AS NEEDED
Status: DISCONTINUED | OUTPATIENT
Start: 2024-03-20 | End: 2024-03-20 | Stop reason: SURG

## 2024-03-20 RX ORDER — NALOXONE HCL 0.4 MG/ML
0.4 VIAL (ML) INJECTION
Status: DISCONTINUED | OUTPATIENT
Start: 2024-03-20 | End: 2024-03-21 | Stop reason: HOSPADM

## 2024-03-20 RX ORDER — MORPHINE SULFATE 2 MG/ML
1 INJECTION, SOLUTION INTRAMUSCULAR; INTRAVENOUS EVERY 4 HOURS PRN
Status: DISCONTINUED | OUTPATIENT
Start: 2024-03-20 | End: 2024-03-21 | Stop reason: HOSPADM

## 2024-03-20 RX ORDER — ONDANSETRON 2 MG/ML
INJECTION INTRAMUSCULAR; INTRAVENOUS AS NEEDED
Status: DISCONTINUED | OUTPATIENT
Start: 2024-03-20 | End: 2024-03-20 | Stop reason: SURG

## 2024-03-20 RX ORDER — SODIUM CHLORIDE 0.9 % (FLUSH) 0.9 %
10 SYRINGE (ML) INJECTION AS NEEDED
Status: DISCONTINUED | OUTPATIENT
Start: 2024-03-20 | End: 2024-03-20 | Stop reason: HOSPADM

## 2024-03-20 RX ORDER — DROPERIDOL 2.5 MG/ML
0.62 INJECTION, SOLUTION INTRAMUSCULAR; INTRAVENOUS ONCE AS NEEDED
Status: DISCONTINUED | OUTPATIENT
Start: 2024-03-20 | End: 2024-03-20 | Stop reason: HOSPADM

## 2024-03-20 RX ORDER — SODIUM CHLORIDE, SODIUM LACTATE, POTASSIUM CHLORIDE, CALCIUM CHLORIDE 600; 310; 30; 20 MG/100ML; MG/100ML; MG/100ML; MG/100ML
9 INJECTION, SOLUTION INTRAVENOUS CONTINUOUS
Status: DISCONTINUED | OUTPATIENT
Start: 2024-03-20 | End: 2024-03-21 | Stop reason: HOSPADM

## 2024-03-20 RX ORDER — DOCUSATE SODIUM 100 MG/1
100 CAPSULE, LIQUID FILLED ORAL 2 TIMES DAILY
Status: DISCONTINUED | OUTPATIENT
Start: 2024-03-20 | End: 2024-03-21 | Stop reason: HOSPADM

## 2024-03-20 RX ORDER — LABETALOL HYDROCHLORIDE 5 MG/ML
5 INJECTION, SOLUTION INTRAVENOUS
Status: DISCONTINUED | OUTPATIENT
Start: 2024-03-20 | End: 2024-03-20 | Stop reason: HOSPADM

## 2024-03-20 RX ORDER — SODIUM CHLORIDE 0.9 % (FLUSH) 0.9 %
10 SYRINGE (ML) INJECTION AS NEEDED
Status: DISCONTINUED | OUTPATIENT
Start: 2024-03-20 | End: 2024-03-21 | Stop reason: HOSPADM

## 2024-03-20 RX ORDER — DEXTROSE MONOHYDRATE 25 G/50ML
12.5 INJECTION, SOLUTION INTRAVENOUS AS NEEDED
Status: DISCONTINUED | OUTPATIENT
Start: 2024-03-20 | End: 2024-03-20 | Stop reason: HOSPADM

## 2024-03-20 RX ORDER — FENTANYL CITRATE 50 UG/ML
50 INJECTION, SOLUTION INTRAMUSCULAR; INTRAVENOUS
Status: DISCONTINUED | OUTPATIENT
Start: 2024-03-20 | End: 2024-03-20 | Stop reason: HOSPADM

## 2024-03-20 RX ORDER — ONDANSETRON 2 MG/ML
4 INJECTION INTRAMUSCULAR; INTRAVENOUS ONCE
Status: COMPLETED | OUTPATIENT
Start: 2024-03-20 | End: 2024-03-20

## 2024-03-20 RX ORDER — HYDROMORPHONE HYDROCHLORIDE 1 MG/ML
0.5 INJECTION, SOLUTION INTRAMUSCULAR; INTRAVENOUS; SUBCUTANEOUS ONCE
Status: COMPLETED | OUTPATIENT
Start: 2024-03-20 | End: 2024-03-20

## 2024-03-20 RX ORDER — LIDOCAINE HYDROCHLORIDE 20 MG/ML
INJECTION, SOLUTION EPIDURAL; INFILTRATION; INTRACAUDAL; PERINEURAL AS NEEDED
Status: DISCONTINUED | OUTPATIENT
Start: 2024-03-20 | End: 2024-03-20 | Stop reason: SURG

## 2024-03-20 RX ORDER — HYDROCODONE BITARTRATE AND ACETAMINOPHEN 7.5; 325 MG/1; MG/1
1 TABLET ORAL EVERY 4 HOURS PRN
Status: DISCONTINUED | OUTPATIENT
Start: 2024-03-20 | End: 2024-03-21 | Stop reason: HOSPADM

## 2024-03-20 RX ORDER — NEOSTIGMINE METHYLSULFATE 5 MG/5 ML
SYRINGE (ML) INTRAVENOUS AS NEEDED
Status: DISCONTINUED | OUTPATIENT
Start: 2024-03-20 | End: 2024-03-20 | Stop reason: SURG

## 2024-03-20 RX ORDER — NALOXONE HCL 0.4 MG/ML
0.4 VIAL (ML) INJECTION AS NEEDED
Status: DISCONTINUED | OUTPATIENT
Start: 2024-03-20 | End: 2024-03-20 | Stop reason: HOSPADM

## 2024-03-20 RX ORDER — FLUMAZENIL 0.1 MG/ML
0.2 INJECTION INTRAVENOUS AS NEEDED
Status: DISCONTINUED | OUTPATIENT
Start: 2024-03-20 | End: 2024-03-20 | Stop reason: HOSPADM

## 2024-03-20 RX ORDER — FENTANYL CITRATE 50 UG/ML
INJECTION, SOLUTION INTRAMUSCULAR; INTRAVENOUS AS NEEDED
Status: DISCONTINUED | OUTPATIENT
Start: 2024-03-20 | End: 2024-03-20 | Stop reason: SURG

## 2024-03-20 RX ORDER — FENTANYL CITRATE 50 UG/ML
25 INJECTION, SOLUTION INTRAMUSCULAR; INTRAVENOUS
Status: DISCONTINUED | OUTPATIENT
Start: 2024-03-20 | End: 2024-03-20 | Stop reason: HOSPADM

## 2024-03-20 RX ORDER — DOXYCYCLINE 50 MG/1
100 CAPSULE ORAL EVERY 12 HOURS SCHEDULED
Qty: 28 CAPSULE | Refills: 0 | Status: DISCONTINUED | OUTPATIENT
Start: 2024-03-20 | End: 2024-03-21 | Stop reason: HOSPADM

## 2024-03-20 RX ADMIN — Medication 100 MCG: at 14:18

## 2024-03-20 RX ADMIN — Medication 100 MCG: at 14:00

## 2024-03-20 RX ADMIN — Medication 200 MCG: at 14:33

## 2024-03-20 RX ADMIN — Medication 200 MCG: at 14:06

## 2024-03-20 RX ADMIN — FENTANYL CITRATE 50 MCG: 50 INJECTION, SOLUTION INTRAMUSCULAR; INTRAVENOUS at 15:24

## 2024-03-20 RX ADMIN — HYDROCODONE BITARTRATE AND ACETAMINOPHEN 1 TABLET: 7.5; 325 TABLET ORAL at 20:05

## 2024-03-20 RX ADMIN — CEFAZOLIN 1000 MG: 1 INJECTION, POWDER, FOR SOLUTION INTRAMUSCULAR; INTRAVENOUS at 22:00

## 2024-03-20 RX ADMIN — DOCUSATE SODIUM 100 MG: 100 CAPSULE, LIQUID FILLED ORAL at 22:51

## 2024-03-20 RX ADMIN — HYDROMORPHONE HYDROCHLORIDE 0.5 MG: 1 INJECTION, SOLUTION INTRAMUSCULAR; INTRAVENOUS; SUBCUTANEOUS at 11:48

## 2024-03-20 RX ADMIN — GLYCOPYRROLATE 0.4 MG: 0.2 INJECTION INTRAMUSCULAR; INTRAVENOUS at 14:33

## 2024-03-20 RX ADMIN — SODIUM CHLORIDE, POTASSIUM CHLORIDE, SODIUM LACTATE AND CALCIUM CHLORIDE 9 ML/HR: 600; 310; 30; 20 INJECTION, SOLUTION INTRAVENOUS at 12:58

## 2024-03-20 RX ADMIN — MUPIROCIN: 20 OINTMENT TOPICAL at 22:00

## 2024-03-20 RX ADMIN — FENTANYL CITRATE 100 MCG: 50 INJECTION, SOLUTION INTRAMUSCULAR; INTRAVENOUS at 13:54

## 2024-03-20 RX ADMIN — ROCURONIUM BROMIDE 50 MG: 10 INJECTION, SOLUTION INTRAVENOUS at 13:49

## 2024-03-20 RX ADMIN — Medication 3 MG: at 14:33

## 2024-03-20 RX ADMIN — LIDOCAINE HYDROCHLORIDE 80 MG: 20 INJECTION, SOLUTION EPIDURAL; INFILTRATION; INTRACAUDAL; PERINEURAL at 13:49

## 2024-03-20 RX ADMIN — ONDANSETRON 4 MG: 2 INJECTION INTRAMUSCULAR; INTRAVENOUS at 11:48

## 2024-03-20 RX ADMIN — ONDANSETRON 4 MG: 2 INJECTION INTRAMUSCULAR; INTRAVENOUS at 14:25

## 2024-03-20 RX ADMIN — Medication 100 MCG: at 13:58

## 2024-03-20 RX ADMIN — PROPOFOL 180 MG: 10 INJECTION, EMULSION INTRAVENOUS at 13:49

## 2024-03-20 RX ADMIN — Medication 10 ML: at 22:00

## 2024-03-20 RX ADMIN — MORPHINE SULFATE 1 MG: 2 INJECTION, SOLUTION INTRAMUSCULAR; INTRAVENOUS at 16:30

## 2024-03-20 RX ADMIN — CEFAZOLIN 2000 MG: 2 INJECTION, POWDER, FOR SOLUTION INTRAMUSCULAR; INTRAVENOUS at 13:55

## 2024-03-20 NOTE — ED NOTES
Patient placed in gown in preparation for surgery, belongings place in bag and given to family member at bedside.

## 2024-03-20 NOTE — ANESTHESIA PREPROCEDURE EVALUATION
Anesthesia Evaluation     Patient summary reviewed   no history of anesthetic complications:   NPO Solid Status: > 8 hours             Airway   Mallampati: II  TM distance: >3 FB  Small opening and Difficult intubation highly probable  Dental      Pulmonary    (+) ,sleep apnea  (-) asthma, not a smoker  Cardiovascular   Exercise tolerance: good (4-7 METS)    (+) hypertension (off beta blockers, reports was causing pressure to bottom out), dysrhythmias Tachycardia      Neuro/Psych  (-) seizures, TIA, CVA  GI/Hepatic/Renal/Endo    (+) renal disease-, diabetes mellitus  (-) liver disease    Musculoskeletal     Abdominal    Substance History      OB/GYN          Other   blood dyscrasia (polycythemia),   history of cancer                  Anesthesia Plan    ASA 3     general     (Bleeding preventing pt from opening mouth.  Possible difficult airway.)  intravenous induction     Anesthetic plan, risks, benefits, and alternatives have been provided, discussed and informed consent has been obtained with: patient.    CODE STATUS:

## 2024-03-20 NOTE — ANESTHESIA PROCEDURE NOTES
Airway  Urgency: elective    Date/Time: 3/20/2024 1:50 PM  Difficult airway    General Information and Staff    Patient location during procedure: OR    Indications and Patient Condition  Indications for airway management: airway protection    Preoxygenated: yes  Mask difficulty assessment: 1 - vent by mask    Final Airway Details  Final airway type: endotracheal airway      Successful airway: ETT  Cuffed: yes   Successful intubation technique: direct laryngoscopy  Endotracheal tube insertion site: oral  Blade: Shi  Blade size: 4  ETT size (mm): 7.5  Cormack-Lehane Classification: grade I - full view of glottis  Placement verified by: chest auscultation and capnometry   Measured from: teeth  ETT/EBT  to teeth (cm): 21  Number of attempts at approach: 1  Assessment: lips, teeth, and gum same as pre-op and atraumatic intubation

## 2024-03-20 NOTE — ED PROVIDER NOTES
Subjective   History of Present Illness  68-year-old male who presents emergency department for facial swelling.  Had a squamous cell cancer removed on 3/18 by ENT.  He has no noted fever.  He has had difficulty swallowing, and states he has not been able to take his pain medication due to swallowing issues.  He has had a decreased appetite.  He states he has not had anything significant to eat since Sunday.  He has only been eating yogurt, pudding, and drinking boost because of jaw pain.  He is unable to see out of his left eye due to edema.    Review of Systems   Constitutional:  Negative for chills and fever.   Skin:  Positive for color change and wound.        Facial edema       Past Medical History:   Diagnosis Date    Diabetes mellitus     Hypertension     Polycythemia vera     Sleep apnea     Stomach ulcer     SVT (supraventricular tachycardia)        No Known Allergies    Past Surgical History:   Procedure Laterality Date    CERVICAL FUSION      COLONOSCOPY      ENDOSCOPY      KIDNEY STONE SURGERY      SKIN LESION EXCISION Bilateral 3/18/2024    Procedure: 1) Radical excision of skin cancer of the left jawline with flap reconstruction 2) Excision of skin cancer of the right ear with flap reconstruction;  Surgeon: Noam Larry MD;  Location: [facility];  Service: ENT;  Laterality: Bilateral;       No family history on file.    Social History     Socioeconomic History    Marital status:    Tobacco Use    Smoking status: Never    Smokeless tobacco: Never   Vaping Use    Vaping status: Never Used   Substance and Sexual Activity    Alcohol use: No    Drug use: Never    Sexual activity: Yes     Partners: Male           Objective   Physical Exam  Vitals reviewed.   HENT:      Head: Normocephalic and atraumatic.      Ears:      Comments: Right ear auricle edema with sutures. Left cheek edema. Sutures. Edema of the area. Bruising of the area. Left eye has a periorbital hematoma and the lids are edematous  and the eye is swollen together. Bruising into the neck and left shoulder. Mild edema of the left submandibular area.      Nose: Nose normal.   Eyes:      General: No scleral icterus.     Conjunctiva/sclera: Conjunctivae normal.   Cardiovascular:      Rate and Rhythm: Normal rate and regular rhythm.      Heart sounds: Normal heart sounds.   Pulmonary:      Effort: Pulmonary effort is normal.      Breath sounds: Normal breath sounds.   Abdominal:      Palpations: Abdomen is soft.   Musculoskeletal:         General: No tenderness.      Cervical back: Normal range of motion and neck supple.   Skin:     General: Skin is warm and dry.      Findings: Bruising present.      Comments: Mild drainage to the posterior aspect of the facial sutures.    Neurological:      Mental Status: He is alert and oriented to person, place, and time.      Cranial Nerves: No cranial nerve deficit.   Psychiatric:         Mood and Affect: Mood normal.         Behavior: Behavior normal.         Procedures           ED Course  ED Course as of 03/20/24 1236   Wed Mar 20, 2024   1134 Messaged with Dr. Larry. He noted that he will come down to the ED and evaluate the patient.  [AJ]   1234 Dr. Larry is at bedside.  He notes that he will take the patient to the OR.  We discussed the plan after OR, and he notes that he will admit the patient overnight for observation. [AJ]      ED Course User Index  [AJ] Prashant Fabian DO                                 Lab Results (last 24 hours)       Procedure Component Value Units Date/Time    CBC & Differential [[id number]]  (Abnormal) Collected: 03/20/24 1148    Specimen: Blood Updated: 03/20/24 1236    Narrative:      The following orders were created for panel order CBC & Differential.  Procedure                               Abnormality         Status                     ---------                               -----------         ------                     CBC Auto Differential[[id number]]         Abnormal            Final result                 Please view results for these tests on the individual orders.    Comprehensive Metabolic Panel [[id number]]  (Abnormal) Collected: 03/20/24 1148    Specimen: Blood Updated: 03/20/24 1212     Glucose 203 mg/dL      BUN 17 mg/dL      Creatinine 0.63 mg/dL      Sodium 135 mmol/L      Potassium 4.9 mmol/L      Chloride 100 mmol/L      CO2 26.0 mmol/L      Calcium 8.8 mg/dL      Total Protein 6.7 g/dL      Albumin 4.1 g/dL      ALT (SGPT) 12 U/L      AST (SGOT) 15 U/L      Alkaline Phosphatase 194 U/L      Total Bilirubin 2.3 mg/dL      Globulin 2.6 gm/dL      A/G Ratio 1.6 g/dL      BUN/Creatinine Ratio 27.0     Anion Gap 9.0 mmol/L      eGFR 104.3 mL/min/1.73     Narrative:      GFR Normal >60  Chronic Kidney Disease <60  Kidney Failure <15      Sedimentation Rate [[id number]]  (Abnormal) Collected: 03/20/24 1148    Specimen: Blood Updated: 03/20/24 1203     Sed Rate 23 mm/hr     C-reactive Protein [[id number]]  (Abnormal) Collected: 03/20/24 1148    Specimen: Blood Updated: 03/20/24 1215     C-Reactive Protein 5.27 mg/dL     CBC Auto Differential [[id number]]  (Abnormal) Collected: 03/20/24 1148    Specimen: Blood Updated: 03/20/24 1236     WBC 36.03 10*3/mm3      RBC 3.60 10*6/mm3      Hemoglobin 9.3 g/dL      Hematocrit 31.9 %      MCV 88.6 fL      MCH 25.8 pg      MCHC 29.2 g/dL      RDW 23.8 %      RDW-SD 74.8 fl      MPV 11.6 fL      Platelets 250 10*3/mm3     Manual Differential [[id number]]  (Abnormal) Collected: 03/20/24 1148    Specimen: Blood Updated: 03/20/24 1236     Neutrophil % 93.9 %      Lymphocyte % 1.0 %      Monocyte % 1.0 %      Bands %  1.0 %      Metamyelocyte % 1.0 %      Myelocyte % 1.0 %      Atypical Lymphocyte % 1.0 %      Neutrophils Absolute 34.19 10*3/mm3      Lymphocytes Absolute 0.72 10*3/mm3      Monocytes Absolute 0.36 10*3/mm3      Anisocytosis Mod/2+     Dacrocytes Slight/1+     Elliptocytes Slight/1+     Hypochromia Slight/1+      Microcytes Slight/1+     Ovalocytes Mod/2+     Poikilocytes Mod/2+     Polychromasia Mod/2+     Stomatocytes Slight/1+     Hypersegmented Neutrophils Present     Platelet Morphology Normal                      Medical Decision Making  DDX: cellulitis, abscess, post surgical changes     Problems Addressed:  Facial hematoma, initial encounter: complicated acute illness or injury    Amount and/or Complexity of Data Reviewed  Labs: ordered.     Details: Esr crp cbc cmp  Radiology: ordered.     Details: CT soft tissues cervical spine    Risk  Prescription drug management.  Decision regarding hospitalization.        Final diagnoses:   Facial hematoma, initial encounter       ED Disposition  ED Disposition       ED Disposition   Decision to Admit    Condition   --    Comment   Level of Care: Med/Surg [1]   Diagnosis: Facial hematoma, initial encounter [[id number]]   Admitting Physician: DAYRON YEN [[id number]]                 No follow-up provider specified.       Medication List      No changes were made to your prescriptions during this visit.            Prashant Fabian,   03/20/24 1134       Prashant Fabian,   03/20/24 1236

## 2024-03-20 NOTE — H&P
ENT/[facility]PR[facility] ([facility]) History and Physical Exam:    3/20/2024  Patient Care Team:  Jeremy Correa MD as PCP - General (Family Medicine)    Chief complaint: Difficulty swallowing and neck fullness    Subjective .     History of present illness:  Nikita Bhardwaj is a  68 y.o. 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  He reported some swelling Monday night, that progressed Tuesday.  By Tuesday night he was having difficulty swallowing.  He is only had some yogurt last night.      Review of Systems  Review of Systems   Constitutional:  Negative for chills and fever.   HENT:  Positive for trouble swallowing.    Respiratory:  Negative for shortness of breath.    Skin:  Positive for color change.   Hematological:  Negative for adenopathy. Bruises/bleeds easily.       History  Past Medical History:   Diagnosis Date    Diabetes mellitus     Hypertension     Polycythemia vera     Sleep apnea     Stomach ulcer     SVT (supraventricular tachycardia)    ,   Past Surgical History:   Procedure Laterality Date    CERVICAL FUSION      COLONOSCOPY      ENDOSCOPY      KIDNEY STONE SURGERY      SKIN LESION EXCISION Bilateral 3/18/2024    Procedure: 1) Radical excision of skin cancer of the left jawline with flap reconstruction 2) Excision of skin cancer of the right ear with flap reconstruction;  Surgeon: Noam Larry MD;  Location: [facility] OR;  Service: ENT;  Laterality: Bilateral;   , No family history on file.,   Social History     Tobacco Use    Smoking status: Never    Smokeless tobacco: Never   Vaping Use    Vaping status: Never Used   Substance Use Topics    Alcohol use: No    Drug use: Never   , (Not in a hospital admission)   and Allergies:  Patient has no known allergies.    Objective     Vital Signs   Temp:  [98.6 °F (37 °C)] 98.6 °F (37 °C)  Heart Rate:  [104-116] 108  Resp:  [16-17] 17  BP:  (123-167)/(65-82) 145/73    Physical Exam:   Physical Exam  Vitals and nursing note reviewed.   Constitutional:       General: He is not in acute distress.     Appearance: He is well-developed. He is not diaphoretic.   HENT:      Head: Normocephalic.        Right Ear: External ear normal.      Left Ear: External ear normal.      Nose: Nose normal.   Eyes:     Neck:      Thyroid: No thyromegaly.      Vascular: No JVD.      Trachea: No tracheal deviation.     Pulmonary:      Effort: Pulmonary effort is normal.      Breath sounds: No stridor.   Musculoskeletal:         General: No deformity. Normal range of motion.      Cervical back: Normal range of motion and neck supple.   Neurological:      Mental Status: He is alert and oriented to person, place, and time.      Cranial Nerves: No cranial nerve deficit.      Coordination: Coordination normal.   Psychiatric:         Speech: Speech normal.         Behavior: Behavior normal. Behavior is cooperative.         Thought Content: Thought content normal.         Judgment: Judgment normal.         Results Review:     Lab Results (last 24 hours)       Procedure Component Value Units Date/Time    CBC & Differential [[id number]]  (Abnormal) Collected: 03/20/24 1148    Specimen: Blood Updated: 03/20/24 1236    Narrative:      The following orders were created for panel order CBC & Differential.  Procedure                               Abnormality         Status                     ---------                               -----------         ------                     CBC Auto Differential[[id number]]        Abnormal            Final result                 Please view results for these tests on the individual orders.    CBC Auto Differential [[id number]]  (Abnormal) Collected: 03/20/24 1148    Specimen: Blood Updated: 03/20/24 1236     WBC 36.03 10*3/mm3      RBC 3.60 10*6/mm3      Hemoglobin 9.3 g/dL      Hematocrit 31.9 %      MCV 88.6 fL      MCH 25.8 pg      MCHC 29.2 g/dL       RDW 23.8 %      RDW-SD 74.8 fl      MPV 11.6 fL      Platelets 250 10*3/mm3     Manual Differential [[id number]]  (Abnormal) Collected: 03/20/24 1148    Specimen: Blood Updated: 03/20/24 1236     Neutrophil % 93.9 %      Lymphocyte % 1.0 %      Monocyte % 1.0 %      Bands %  1.0 %      Metamyelocyte % 1.0 %      Myelocyte % 1.0 %      Atypical Lymphocyte % 1.0 %      Neutrophils Absolute 34.19 10*3/mm3      Lymphocytes Absolute 0.72 10*3/mm3      Monocytes Absolute 0.36 10*3/mm3      Anisocytosis Mod/2+     Dacrocytes Slight/1+     Elliptocytes Slight/1+     Hypochromia Slight/1+     Microcytes Slight/1+     Ovalocytes Mod/2+     Poikilocytes Mod/2+     Polychromasia Mod/2+     Stomatocytes Slight/1+     Hypersegmented Neutrophils Present     Platelet Morphology Normal    C-reactive Protein [[id number]]  (Abnormal) Collected: 03/20/24 1148    Specimen: Blood Updated: 03/20/24 1215     C-Reactive Protein 5.27 mg/dL     Comprehensive Metabolic Panel [[id number]]  (Abnormal) Collected: 03/20/24 1148    Specimen: Blood Updated: 03/20/24 1212     Glucose 203 mg/dL      BUN 17 mg/dL      Creatinine 0.63 mg/dL      Sodium 135 mmol/L      Potassium 4.9 mmol/L      Chloride 100 mmol/L      CO2 26.0 mmol/L      Calcium 8.8 mg/dL      Total Protein 6.7 g/dL      Albumin 4.1 g/dL      ALT (SGPT) 12 U/L      AST (SGOT) 15 U/L      Alkaline Phosphatase 194 U/L      Total Bilirubin 2.3 mg/dL      Globulin 2.6 gm/dL      A/G Ratio 1.6 g/dL      BUN/Creatinine Ratio 27.0     Anion Gap 9.0 mmol/L      eGFR 104.3 mL/min/1.73     Narrative:      GFR Normal >60  Chronic Kidney Disease <60  Kidney Failure <15      Sedimentation Rate [[id number]]  (Abnormal) Collected: 03/20/24 1148    Specimen: Blood Updated: 03/20/24 1203     Sed Rate 23 mm/hr            Imaging Results (Last 24 Hours)       ** No results found for the last 24 hours. **            Assessment & Plan       Hematoma of face, initial encounter    Facial hematoma, initial  encounter      I have recommended emergent wound exploration with hematoma evacuation and closure.  Will likely keep him in the hospital overnight to check for recurrence.    INCISION AND DRAINAGE: Incision and drainage was recommended. The risks, benefits, and alternatives of incision and drainage including but not limited to pain, scarring, bleeding, infection, need for further medical and or surgical therapy, risks of the anesthesia were discussed full with the patient and questions were answered. No guarantees were made or implied.        Noam Larry MD  03/20/24  13:22 CDT

## 2024-03-20 NOTE — OP NOTE
PATIENT NAME:  Nikita Bhardwaj    DATE:  03/20/24    PREOPERATIVE DIAGNOSIS: Postoperative hematoma of left cheek and neck    POSTOPERATIVE DIAGNOSIS: Postoperative hematoma of left cheek and neck    PROCEDURE: Wound exploration and drainage of hematoma of left cheek and neck    SURGEON:  Noam Larry MD, FACS    FACILITY: [facility] Operating Room    ANESTHESIA: General    DICTATED BY:  Noam Larry MD, FACS    IVF: Per anesthesia    EBL: 200 cc    IMPLANTS: None    DRAINS: 10 WILBERTO x 1    SPECIMENS: Hematoma of left cheek and neck    COMPLICATIONS: None apparent     INDICATIONS FOR SURGERY: Mr. Bhardwaj underwent radical excision of a deeply invading squamous cell carcinoma of the left cheek at the [facility] on Monday.  He presented to the emergency room with an expanding hematoma.    OPERATIVE FINDINGS: There was a arterial bleed at the superiormost aspect of the preauricular defect, from a branch of the superficial temporal artery system    OPERATIVE DETAILS: After patient verification and consent was obtained, Mr. Bhardwaj was taken emergently to the operating room laid supine on the operative table.  After the induction of general endotracheal anesthesia, the skin was gently cleansed with Betadine.  Patient was then sterilely prepped and draped.  I removed the Prolene sutures, and the underlying Vicryl sutures from the entire flap to reveal a large clotted hematoma.  There was immediate arterial bleeding from a branch of the superficial temporal artery.  Hemostasis was obtained with bipolar cautery set at 15.  The clot was evacuated and hemostasis was further obtained from small areas of oozing with the bipolar cautery.  I then irrigated the wound with a liter of saline, and asked the anesthetist to perform a Valsalva.  No additional bleeding was noted.  I placed a 10 WILBERTO drain inferiorly through the skin and secured this utilizing a 2-0 silk suture.  I then reclosed the flap utilizing buried 3-0 undyed Vicryl  suture followed by running, locking 5-0 Prolene in the cheek and running, locking 4-0 Prolene in the neck.  A drain dressing was placed on the drain entry site.  Mupirocin ointment was placed over the incisions and flap.      DISPOSITION:  The procedures were completed without complication and tolerated well.  The patient is admitted for overnight observation and drain management.    Noam Larry MD, FACS  Board Certified Facial Plastic and Reconstructive Surgery  Board Certified Otolaryngology -- Head and Neck Surgery    Electronically signed by Noam Larry MD, 03/20/24, 3:03 PM CDT.

## 2024-03-21 VITALS
DIASTOLIC BLOOD PRESSURE: 84 MMHG | OXYGEN SATURATION: 94 % | RESPIRATION RATE: 16 BRPM | HEART RATE: 100 BPM | HEIGHT: 70 IN | SYSTOLIC BLOOD PRESSURE: 149 MMHG | BODY MASS INDEX: 23.91 KG/M2 | TEMPERATURE: 98.6 F | WEIGHT: 167 LBS

## 2024-03-21 PROBLEM — S00.83XA HEMATOMA OF FACE, INITIAL ENCOUNTER: Status: RESOLVED | Noted: 2024-03-20 | Resolved: 2024-03-21

## 2024-03-21 PROBLEM — S00.83XA FACIAL HEMATOMA, INITIAL ENCOUNTER: Status: RESOLVED | Noted: 2024-03-20 | Resolved: 2024-03-21

## 2024-03-21 LAB
CYTO UR: NORMAL
LAB AP CASE REPORT: NORMAL
Lab: NORMAL
PATH REPORT.FINAL DX SPEC: NORMAL
PATH REPORT.GROSS SPEC: NORMAL

## 2024-03-21 PROCEDURE — 99238 HOSP IP/OBS DSCHRG MGMT 30/<: CPT | Performed by: OTOLARYNGOLOGY

## 2024-03-21 PROCEDURE — 25010000002 CEFAZOLIN PER 500 MG: Performed by: OTOLARYNGOLOGY

## 2024-03-21 PROCEDURE — G0378 HOSPITAL OBSERVATION PER HR: HCPCS

## 2024-03-21 RX ORDER — HYDROXYUREA 500 MG/1
1000 CAPSULE ORAL DAILY
COMMUNITY

## 2024-03-21 RX ORDER — HYDROXYUREA 500 MG/1
500 CAPSULE ORAL
COMMUNITY

## 2024-03-21 RX ORDER — ALLOPURINOL 300 MG/1
300 TABLET ORAL DAILY
COMMUNITY

## 2024-03-21 RX ADMIN — CEFAZOLIN 1000 MG: 1 INJECTION, POWDER, FOR SOLUTION INTRAMUSCULAR; INTRAVENOUS at 06:04

## 2024-03-21 RX ADMIN — DOCUSATE SODIUM 100 MG: 100 CAPSULE, LIQUID FILLED ORAL at 08:15

## 2024-03-21 RX ADMIN — HYDROCODONE BITARTRATE AND ACETAMINOPHEN 1 TABLET: 7.5; 325 TABLET ORAL at 08:15

## 2024-03-21 RX ADMIN — Medication 10 ML: at 08:21

## 2024-03-21 RX ADMIN — MUPIROCIN: 20 OINTMENT TOPICAL at 08:16

## 2024-03-21 RX ADMIN — HYDROCODONE BITARTRATE AND ACETAMINOPHEN 1 TABLET: 7.5; 325 TABLET ORAL at 01:16

## 2024-03-21 NOTE — PLAN OF CARE
Goal Outcome Evaluation:  Plan of Care Reviewed With: patient        Progress: no change  Outcome Evaluation: pt up ad fern. kareen pulled by dr at bedside. facial swelling has decreased significantly. voiding in urinal. on room air. vss. safety maintained.

## 2024-03-21 NOTE — DISCHARGE SUMMARY
ENT/[facility]PR[facility] ([facility]) Discharge Summary:    Date of Admission: 3/20/2024  Date of Discharge:  3/21/2024    Discharge Diagnosis: Postoperative hematoma of left cheek and neck    Presenting Problem/History of Present Illness  Facial hematoma, initial encounter [S00.83XA]       Hospital Course  Patient is a 68 y.o. male presented with a postoperative hematoma involving the left cheek and neck following radical excision of a squamous cell carcinoma of the left jawline on Monday, March 18.  He was taken urgently to the operating room for wound exploration, evacuation of the hematoma, control of bleeding vessels, and drain placement.  He did well overnight, with 10 cc of blood in his WILBERTO drain at the prior shift.  When I saw him at 1030, he had 5 cc of sanguinous discharge in his drain.  His drain was removed.    Procedures Performed  Procedure(s):  HEMATOMA EVACUATION HEAD NECK       Consults:   Consults       No orders found for last 30 day(s).            Pertinent Test Results: None    Condition on Discharge: Stable    Vital Signs  Temp:  [97 °F (36.1 °C)-98.6 °F (37 °C)] 98.6 °F (37 °C)  Heart Rate:  [100-116] 100  Resp:  [15-17] 16  BP: (122-167)/(63-84) 149/84    Physical Exam:   Physical Exam  HENT:      Left Ear: External ear normal.      Ears:        Nose: Nose normal.   Eyes:      Pupils: Pupils are equal, round, and reactive to light.     Neck:     Neurological:      Mental Status: He is alert.         Discharge Disposition      Discharge Medications     Discharge Medications        ASK your doctor about these medications        Instructions Start Date   allopurinol 300 MG tablet  Commonly known as: ZYLOPRIM   300 mg, Oral, Daily      docusate sodium 250 MG capsule  Commonly known as: COLACE   250 mg, Oral, 2 Times Daily PRN      doxycycline 100 MG capsule  Commonly known as: Vibramycin   100 mg, Oral, 2 Times Daily      HYDROcodone-acetaminophen 7.5-325 MG per tablet  Commonly known as: NORCO   1 tablet, Oral,  Every 4 Hours PRN      hydroxyurea 500 MG capsule  Commonly known as: HYDREA   1,000 mg, Oral, Daily      hydroxyurea 500 MG capsule  Commonly known as: HYDREA   500 mg, Oral, Every Night at Bedtime      metFORMIN 500 MG tablet  Commonly known as: GLUCOPHAGE   1,000 mg, Oral, 2 Times Daily With Meals               Discharge Diet: Advance as tolerated    Activity at Discharge: No lifting greater than 10 pounds     Follow-up Appointments  Future Appointments   Date Time Provider Department Center   3/25/2024 11:15 AM Noam Larry MD [facility]           Noam Larry MD  03/21/24  10:47 CDT

## 2024-03-21 NOTE — PLAN OF CARE
Goal Outcome Evaluation:  Plan of Care Reviewed With: patient        Progress: no change   Admitted to  S/P evacuation of Hematoma to left side of face/neck.  Sutures intact and Bactroban ointment applied. Pt awake and alert.  Pain meds admin as requested.  Awake most of the night and slept later in the night.  Wife at bedside.  Voiding small amounts to urinal.  IV antibiotics admin as ordered. Safety maintained.

## 2024-03-25 ENCOUNTER — OFFICE VISIT (OUTPATIENT)
Age: 69
End: 2024-03-25
Payer: MEDICARE

## 2024-03-25 VITALS — TEMPERATURE: 97.6 F | HEART RATE: 88 BPM | SYSTOLIC BLOOD PRESSURE: 133 MMHG | DIASTOLIC BLOOD PRESSURE: 67 MMHG

## 2024-03-25 DIAGNOSIS — E11.69 TYPE 2 DIABETES MELLITUS WITH OTHER SPECIFIED COMPLICATION, UNSPECIFIED WHETHER LONG TERM INSULIN USE: Primary | ICD-10-CM

## 2024-03-25 DIAGNOSIS — C44.329 SQUAMOUS CELL CANCER OF SKIN OF JAWLINE: ICD-10-CM

## 2024-03-25 DIAGNOSIS — C44.222 SQUAMOUS CELL CARCINOMA, EAR, RIGHT: ICD-10-CM

## 2024-03-25 DIAGNOSIS — T14.8XXA HEMATOMA: ICD-10-CM

## 2024-03-25 PROCEDURE — 3075F SYST BP GE 130 - 139MM HG: CPT | Performed by: OTOLARYNGOLOGY

## 2024-03-25 PROCEDURE — 99024 POSTOP FOLLOW-UP VISIT: CPT | Performed by: OTOLARYNGOLOGY

## 2024-03-25 PROCEDURE — 3078F DIAST BP <80 MM HG: CPT | Performed by: OTOLARYNGOLOGY

## 2024-03-25 NOTE — ANESTHESIA POSTPROCEDURE EVALUATION
"Patient: Nikita Bhardwaj    Procedure Summary       Date: 03/20/24 Room / Location:  [facility] OR 02 /  [facility] OR    Anesthesia Start: 1347 Anesthesia Stop: 1455    Procedure: HEMATOMA EVACUATION HEAD NECK (Left) Diagnosis:       Hematoma of face, initial encounter      (Hematoma of face, initial encounter [S00.83XA])    Surgeons: Noam Larry MD Provider: Praveen Chan CRNA    Anesthesia Type: general ASA Status: 3            Anesthesia Type: general    Vitals  Vitals Value Taken Time   /69 03/20/24 1540   Temp 97 °F (36.1 °C) 03/20/24 1550   Pulse 102 03/20/24 1550   Resp 15 03/20/24 1550   SpO2 97 % 03/20/24 1550           Post Anesthesia Care and Evaluation    Patient location during evaluation: PACU  Patient participation: complete - patient participated  Level of consciousness: awake and alert  Pain management: adequate    Airway patency: patent  Anesthetic complications: No anesthetic complications    Cardiovascular status: acceptable  Respiratory status: acceptable  Hydration status: acceptable    Comments: Blood pressure 149/84, pulse 100, temperature 98.6 °F (37 °C), temperature source Oral, resp. rate 16, height 178 cm (70.08\"), weight 75.8 kg (167 lb), SpO2 94%.    Pt discharged from PACU based on umer score >8    "

## 2024-03-25 NOTE — PROGRESS NOTES
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  Postoperatively, he developed a left cheek and neck hematoma that was drained on 3/20/2024.  He returns today for wound check    SUBJECTIVE:  He is doing well.  He reports minimal pain.  His dysphagia has resolved.  He is just taking ibuprofen for pain.    OBJECTIVE:  /67   Pulse 88   Temp 97.6 °F (36.4 °C) (Temporal)   His flap is healing well on the left cheek, sutures will remain in place due to his recent hematoma.    The right ear flap is healing well.  The Xeroform bolster was changed.    Pathology:      ASSESSMENT:  Diagnoses and all orders for this visit:    1. Type 2 diabetes mellitus with other specified complication, unspecified whether long term insulin use (Primary)    2. Squamous cell carcinoma, ear, right    3. Squamous cell cancer of skin of jawline    4. Hematoma        PLAN:   Continue current wound care.  Follow-up next week for dressing change and 1 week later for surgical takedown.        Noam Larry MD   03/25/2024  09:22 CDT

## 2024-04-01 ENCOUNTER — OFFICE VISIT (OUTPATIENT)
Age: 69
End: 2024-04-01
Payer: MEDICARE

## 2024-04-01 VITALS
BODY MASS INDEX: 23.91 KG/M2 | HEART RATE: 85 BPM | DIASTOLIC BLOOD PRESSURE: 82 MMHG | TEMPERATURE: 98 F | SYSTOLIC BLOOD PRESSURE: 153 MMHG | RESPIRATION RATE: 20 BRPM | WEIGHT: 167 LBS | HEIGHT: 70 IN

## 2024-04-01 DIAGNOSIS — T14.8XXA HEMATOMA: ICD-10-CM

## 2024-04-01 DIAGNOSIS — C44.329 SQUAMOUS CELL CANCER OF SKIN OF JAWLINE: ICD-10-CM

## 2024-04-01 DIAGNOSIS — C44.222 SQUAMOUS CELL CARCINOMA, EAR, RIGHT: Primary | ICD-10-CM

## 2024-04-01 DIAGNOSIS — E11.69 TYPE 2 DIABETES MELLITUS WITH OTHER SPECIFIED COMPLICATION, UNSPECIFIED WHETHER LONG TERM INSULIN USE: ICD-10-CM

## 2024-04-01 PROCEDURE — 3077F SYST BP >= 140 MM HG: CPT | Performed by: NURSE PRACTITIONER

## 2024-04-01 PROCEDURE — 99024 POSTOP FOLLOW-UP VISIT: CPT | Performed by: NURSE PRACTITIONER

## 2024-04-01 PROCEDURE — 3079F DIAST BP 80-89 MM HG: CPT | Performed by: NURSE PRACTITIONER

## 2024-04-01 PROCEDURE — 1159F MED LIST DOCD IN RCRD: CPT | Performed by: NURSE PRACTITIONER

## 2024-04-01 PROCEDURE — 1160F RVW MEDS BY RX/DR IN RCRD: CPT | Performed by: NURSE PRACTITIONER

## 2024-04-01 NOTE — PROGRESS NOTES
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  Postoperatively, he developed a left cheek and neck hematoma that was drained on 3/20/2024.  He returns today for wound check.    SUBJECTIVE:  He is doing well.  He reports minimal pain.  He denies fever, chills, bleeding, or drainage.    OBJECTIVE:  /82   Pulse 85   Temp 98 °F (36.7 °C)   Resp 20   Ht 177.8 cm (70\")   Wt 75.8 kg (167 lb)   BMI 23.96 kg/m²   Left cheek flap is healing well without evidence of infection.  Sutures were removed.  There is a 1.8 x 0.6 cm area of epidermolysis to the distal portion of the flap.    The right ear flap is healing well without evidence of infection. It is well perfused. Sutures and staples were removed.  Xeroform dressing was changed.    Pathology:      ASSESSMENT:  Diagnoses and all orders for this visit:    1. Squamous cell carcinoma, ear, right (Primary)    2. Squamous cell cancer of skin of jawline    3. Type 2 diabetes mellitus with other specified complication, unspecified whether long term insulin use    4. Hematoma          PLAN:   Continue current wound care.  Follow-up next week for surgical takedown.  Call for any problems        KEMAR Ibarra       "

## 2024-04-05 ENCOUNTER — TELEPHONE (OUTPATIENT)
Dept: HEMATOLOGY | Age: 69
End: 2024-04-05

## 2024-04-08 ENCOUNTER — TELEPHONE (OUTPATIENT)
Age: 69
End: 2024-04-08
Payer: MEDICARE

## 2024-04-08 RX ORDER — METOPROLOL SUCCINATE 25 MG/1
25 TABLET, EXTENDED RELEASE ORAL EVERY MORNING
COMMUNITY

## 2024-04-09 ENCOUNTER — ANESTHESIA (OUTPATIENT)
Dept: PERIOP | Facility: HOSPITAL | Age: 69
End: 2024-04-09
Payer: MEDICARE

## 2024-04-09 ENCOUNTER — HOSPITAL ENCOUNTER (OUTPATIENT)
Facility: HOSPITAL | Age: 69
Setting detail: HOSPITAL OUTPATIENT SURGERY
Discharge: HOME | End: 2024-04-09
Attending: OTOLARYNGOLOGY | Admitting: OTOLARYNGOLOGY
Payer: MEDICARE

## 2024-04-09 ENCOUNTER — ANESTHESIA EVENT (OUTPATIENT)
Dept: PERIOP | Facility: HOSPITAL | Age: 69
End: 2024-04-09
Payer: MEDICARE

## 2024-04-09 VITALS
SYSTOLIC BLOOD PRESSURE: 129 MMHG | HEIGHT: 70 IN | TEMPERATURE: 98.1 F | DIASTOLIC BLOOD PRESSURE: 67 MMHG | RESPIRATION RATE: 16 BRPM | BODY MASS INDEX: 23.26 KG/M2 | HEART RATE: 95 BPM | OXYGEN SATURATION: 95 % | WEIGHT: 162.48 LBS

## 2024-04-09 DIAGNOSIS — C44.222 SQUAMOUS CELL CARCINOMA, EAR, RIGHT: ICD-10-CM

## 2024-04-09 LAB
ANION GAP SERPL CALCULATED.3IONS-SCNC: 10 MMOL/L (ref 5–15)
BUN SERPL-MCNC: 27 MG/DL (ref 8–23)
BUN/CREAT SERPL: 40.9 (ref 7–25)
CALCIUM SPEC-SCNC: 9.1 MG/DL (ref 8.6–10.5)
CHLORIDE SERPL-SCNC: 104 MMOL/L (ref 98–107)
CO2 SERPL-SCNC: 24 MMOL/L (ref 22–29)
CREAT SERPL-MCNC: 0.66 MG/DL (ref 0.76–1.27)
DEPRECATED RDW RBC AUTO: 70.7 FL (ref 37–54)
EGFRCR SERPLBLD CKD-EPI 2021: 102.8 ML/MIN/1.73
ERYTHROCYTE [DISTWIDTH] IN BLOOD BY AUTOMATED COUNT: 22.9 % (ref 12.3–15.4)
GLUCOSE BLDC GLUCOMTR-MCNC: 138 MG/DL (ref 70–130)
GLUCOSE BLDC GLUCOMTR-MCNC: 150 MG/DL (ref 70–130)
GLUCOSE SERPL-MCNC: 140 MG/DL (ref 65–99)
HCT VFR BLD AUTO: 37 % (ref 37.5–51)
HGB BLD-MCNC: 10.6 G/DL (ref 13–17.7)
MCH RBC QN AUTO: 25 PG (ref 26.6–33)
MCHC RBC AUTO-ENTMCNC: 28.6 G/DL (ref 31.5–35.7)
MCV RBC AUTO: 87.3 FL (ref 79–97)
PLATELET # BLD AUTO: 492 10*3/MM3 (ref 140–450)
PMV BLD AUTO: 11.2 FL (ref 6–12)
POTASSIUM SERPL-SCNC: 4.9 MMOL/L (ref 3.5–5.2)
RBC # BLD AUTO: 4.24 10*6/MM3 (ref 4.14–5.8)
SODIUM SERPL-SCNC: 138 MMOL/L (ref 136–145)
WBC NRBC COR # BLD AUTO: 35.1 10*3/MM3 (ref 3.4–10.8)

## 2024-04-09 PROCEDURE — 25010000002 FENTANYL CITRATE (PF) 50 MCG/ML SOLUTION: Performed by: ANESTHESIOLOGY

## 2024-04-09 PROCEDURE — 80048 BASIC METABOLIC PNL TOTAL CA: CPT | Performed by: OTOLARYNGOLOGY

## 2024-04-09 PROCEDURE — 15630 DELAY FLAP EYE/NOS/EAR/LIP: CPT | Performed by: OTOLARYNGOLOGY

## 2024-04-09 PROCEDURE — 25010000002 KETOROLAC TROMETHAMINE PER 15 MG

## 2024-04-09 PROCEDURE — 25810000003 LACTATED RINGERS PER 1000 ML: Performed by: OTOLARYNGOLOGY

## 2024-04-09 PROCEDURE — 25010000002 PROPOFOL 10 MG/ML EMULSION: Performed by: NURSE ANESTHETIST, CERTIFIED REGISTERED

## 2024-04-09 PROCEDURE — 82948 REAGENT STRIP/BLOOD GLUCOSE: CPT

## 2024-04-09 PROCEDURE — S0260 H&P FOR SURGERY: HCPCS | Performed by: OTOLARYNGOLOGY

## 2024-04-09 PROCEDURE — 25010000002 FENTANYL CITRATE (PF) 100 MCG/2ML SOLUTION: Performed by: NURSE ANESTHETIST, CERTIFIED REGISTERED

## 2024-04-09 PROCEDURE — 25010000002 CEFAZOLIN PER 500 MG: Performed by: OTOLARYNGOLOGY

## 2024-04-09 PROCEDURE — 14061 TIS TRNFR E/N/E/L10.1-30SQCM: CPT | Performed by: OTOLARYNGOLOGY

## 2024-04-09 PROCEDURE — 85027 COMPLETE CBC AUTOMATED: CPT | Performed by: OTOLARYNGOLOGY

## 2024-04-09 RX ORDER — DROPERIDOL 2.5 MG/ML
0.62 INJECTION, SOLUTION INTRAMUSCULAR; INTRAVENOUS ONCE AS NEEDED
Status: DISCONTINUED | OUTPATIENT
Start: 2024-04-09 | End: 2024-04-09 | Stop reason: HOSPADM

## 2024-04-09 RX ORDER — HYDROCODONE BITARTRATE AND ACETAMINOPHEN 5; 325 MG/1; MG/1
1 TABLET ORAL EVERY 4 HOURS PRN
Status: DISCONTINUED | OUTPATIENT
Start: 2024-04-09 | End: 2024-04-09 | Stop reason: HOSPADM

## 2024-04-09 RX ORDER — BUPIVACAINE HCL/0.9 % NACL/PF 0.125 %
PLASTIC BAG, INJECTION (ML) EPIDURAL AS NEEDED
Status: DISCONTINUED | OUTPATIENT
Start: 2024-04-09 | End: 2024-04-09 | Stop reason: SURG

## 2024-04-09 RX ORDER — SODIUM CHLORIDE 9 MG/ML
40 INJECTION, SOLUTION INTRAVENOUS AS NEEDED
Status: DISCONTINUED | OUTPATIENT
Start: 2024-04-09 | End: 2024-04-09 | Stop reason: HOSPADM

## 2024-04-09 RX ORDER — HYDROCODONE BITARTRATE AND ACETAMINOPHEN 7.5; 325 MG/1; MG/1
1 TABLET ORAL ONCE AS NEEDED
Status: DISCONTINUED | OUTPATIENT
Start: 2024-04-09 | End: 2024-04-09 | Stop reason: HOSPADM

## 2024-04-09 RX ORDER — SODIUM CHLORIDE, SODIUM LACTATE, POTASSIUM CHLORIDE, CALCIUM CHLORIDE 600; 310; 30; 20 MG/100ML; MG/100ML; MG/100ML; MG/100ML
1000 INJECTION, SOLUTION INTRAVENOUS CONTINUOUS
Status: DISCONTINUED | OUTPATIENT
Start: 2024-04-09 | End: 2024-04-09 | Stop reason: HOSPADM

## 2024-04-09 RX ORDER — SODIUM CHLORIDE, SODIUM LACTATE, POTASSIUM CHLORIDE, CALCIUM CHLORIDE 600; 310; 30; 20 MG/100ML; MG/100ML; MG/100ML; MG/100ML
100 INJECTION, SOLUTION INTRAVENOUS CONTINUOUS
Status: DISCONTINUED | OUTPATIENT
Start: 2024-04-09 | End: 2024-04-09 | Stop reason: HOSPADM

## 2024-04-09 RX ORDER — SODIUM CHLORIDE 0.9 % (FLUSH) 0.9 %
3 SYRINGE (ML) INJECTION AS NEEDED
Status: DISCONTINUED | OUTPATIENT
Start: 2024-04-09 | End: 2024-04-09 | Stop reason: HOSPADM

## 2024-04-09 RX ORDER — LABETALOL HYDROCHLORIDE 5 MG/ML
5 INJECTION, SOLUTION INTRAVENOUS
Status: DISCONTINUED | OUTPATIENT
Start: 2024-04-09 | End: 2024-04-09 | Stop reason: HOSPADM

## 2024-04-09 RX ORDER — FENTANYL CITRATE 50 UG/ML
INJECTION, SOLUTION INTRAMUSCULAR; INTRAVENOUS AS NEEDED
Status: DISCONTINUED | OUTPATIENT
Start: 2024-04-09 | End: 2024-04-09 | Stop reason: SURG

## 2024-04-09 RX ORDER — LIDOCAINE HYDROCHLORIDE 20 MG/ML
INJECTION, SOLUTION EPIDURAL; INFILTRATION; INTRACAUDAL; PERINEURAL AS NEEDED
Status: DISCONTINUED | OUTPATIENT
Start: 2024-04-09 | End: 2024-04-09 | Stop reason: SURG

## 2024-04-09 RX ORDER — HYDROCODONE BITARTRATE AND ACETAMINOPHEN 10; 325 MG/1; MG/1
1 TABLET ORAL EVERY 4 HOURS PRN
Status: DISCONTINUED | OUTPATIENT
Start: 2024-04-09 | End: 2024-04-09 | Stop reason: HOSPADM

## 2024-04-09 RX ORDER — LIDOCAINE HYDROCHLORIDE 10 MG/ML
0.5 INJECTION, SOLUTION EPIDURAL; INFILTRATION; INTRACAUDAL; PERINEURAL ONCE AS NEEDED
Status: DISCONTINUED | OUTPATIENT
Start: 2024-04-09 | End: 2024-04-09 | Stop reason: HOSPADM

## 2024-04-09 RX ORDER — SODIUM CHLORIDE 0.9 % (FLUSH) 0.9 %
3 SYRINGE (ML) INJECTION EVERY 12 HOURS SCHEDULED
Status: DISCONTINUED | OUTPATIENT
Start: 2024-04-09 | End: 2024-04-09 | Stop reason: HOSPADM

## 2024-04-09 RX ORDER — NALOXONE HCL 0.4 MG/ML
0.4 VIAL (ML) INJECTION AS NEEDED
Status: DISCONTINUED | OUTPATIENT
Start: 2024-04-09 | End: 2024-04-09 | Stop reason: HOSPADM

## 2024-04-09 RX ORDER — FLUMAZENIL 0.1 MG/ML
0.2 INJECTION INTRAVENOUS AS NEEDED
Status: DISCONTINUED | OUTPATIENT
Start: 2024-04-09 | End: 2024-04-09 | Stop reason: HOSPADM

## 2024-04-09 RX ORDER — MAGNESIUM HYDROXIDE 1200 MG/15ML
LIQUID ORAL AS NEEDED
Status: DISCONTINUED | OUTPATIENT
Start: 2024-04-09 | End: 2024-04-09 | Stop reason: HOSPADM

## 2024-04-09 RX ORDER — SODIUM CHLORIDE 0.9 % (FLUSH) 0.9 %
3-10 SYRINGE (ML) INJECTION AS NEEDED
Status: DISCONTINUED | OUTPATIENT
Start: 2024-04-09 | End: 2024-04-09 | Stop reason: HOSPADM

## 2024-04-09 RX ORDER — PROPOFOL 10 MG/ML
VIAL (ML) INTRAVENOUS AS NEEDED
Status: DISCONTINUED | OUTPATIENT
Start: 2024-04-09 | End: 2024-04-09 | Stop reason: SURG

## 2024-04-09 RX ORDER — CEPHALEXIN 500 MG/1
500 CAPSULE ORAL 3 TIMES DAILY
Qty: 21 CAPSULE | Refills: 0 | Status: SHIPPED | OUTPATIENT
Start: 2024-04-09

## 2024-04-09 RX ORDER — ONDANSETRON 2 MG/ML
4 INJECTION INTRAMUSCULAR; INTRAVENOUS ONCE AS NEEDED
Status: DISCONTINUED | OUTPATIENT
Start: 2024-04-09 | End: 2024-04-09 | Stop reason: HOSPADM

## 2024-04-09 RX ORDER — KETOROLAC TROMETHAMINE 30 MG/ML
15 INJECTION, SOLUTION INTRAMUSCULAR; INTRAVENOUS ONCE
Status: COMPLETED | OUTPATIENT
Start: 2024-04-09 | End: 2024-04-09

## 2024-04-09 RX ORDER — FENTANYL CITRATE 50 UG/ML
50 INJECTION, SOLUTION INTRAMUSCULAR; INTRAVENOUS
Status: DISCONTINUED | OUTPATIENT
Start: 2024-04-09 | End: 2024-04-09 | Stop reason: HOSPADM

## 2024-04-09 RX ADMIN — CEFAZOLIN 2000 MG: 2 INJECTION, POWDER, FOR SOLUTION INTRAMUSCULAR; INTRAVENOUS at 12:53

## 2024-04-09 RX ADMIN — FENTANYL CITRATE 50 MCG: 50 INJECTION, SOLUTION INTRAMUSCULAR; INTRAVENOUS at 12:52

## 2024-04-09 RX ADMIN — SODIUM CHLORIDE, POTASSIUM CHLORIDE, SODIUM LACTATE AND CALCIUM CHLORIDE 1000 ML: 600; 310; 30; 20 INJECTION, SOLUTION INTRAVENOUS at 12:07

## 2024-04-09 RX ADMIN — Medication 200 MCG: at 13:18

## 2024-04-09 RX ADMIN — HYDROCODONE BITARTRATE AND ACETAMINOPHEN 1 TABLET: 10; 325 TABLET ORAL at 14:17

## 2024-04-09 RX ADMIN — PROPOFOL 200 MG: 10 INJECTION, EMULSION INTRAVENOUS at 12:47

## 2024-04-09 RX ADMIN — LIDOCAINE HYDROCHLORIDE 100 MG: 20 INJECTION, SOLUTION EPIDURAL; INFILTRATION; INTRACAUDAL; PERINEURAL at 12:47

## 2024-04-09 RX ADMIN — FENTANYL CITRATE 50 MCG: 50 INJECTION, SOLUTION INTRAMUSCULAR; INTRAVENOUS at 12:50

## 2024-04-09 RX ADMIN — KETOROLAC TROMETHAMINE 15 MG: 30 INJECTION, SOLUTION INTRAMUSCULAR; INTRAVENOUS at 15:20

## 2024-04-09 RX ADMIN — FENTANYL CITRATE 50 MCG: 50 INJECTION, SOLUTION INTRAMUSCULAR; INTRAVENOUS at 14:22

## 2024-04-09 NOTE — NURSING NOTE
Lab called to report a WBC of 35.1 with the labs drawn this AM.  Pt states his WBC is chronically high and is currently being followed by another doctor for this.  Dr. Larry notifed and no further orders received.

## 2024-04-09 NOTE — ANESTHESIA PROCEDURE NOTES
Airway  Urgency: elective    Date/Time: 4/9/2024 12:49 PM  Airway not difficult    General Information and Staff    Patient location during procedure: OR  CRNA/CAA: Nia Choe CRNA    Indications and Patient Condition  Indications for airway management: airway protection    Preoxygenated: yes  Mask difficulty assessment: 1 - vent by mask    Final Airway Details  Final airway type: supraglottic airway      Successful airway: I-gel  Size 4     Number of attempts at approach: 1  Assessment: lips, teeth, and gum same as pre-op

## 2024-04-09 NOTE — OP NOTE
PATIENT NAME:  Nikita Bhardwaj    DATE:  04/09/24    PREOPERATIVE DIAGNOSIS: History of squamous cell carcinoma of the right ear    POSTOPERATIVE DIAGNOSIS: History of squamous cell carcinoma of the right ear    PROCEDURE:  1) pedicle division and flap inset of right ear   2) local tissue rearrangement closure (unilateral advancement flap) of right postauricular defect, 4.0 cm x 5.0 cm    SURGEON:  Noam Larry MD, FACS    FACILITY: [facility] Operating Room    ANESTHESIA: General    DICTATED BY:  Noam Larry MD, FACS    IVF: Per anesthesia    EBL: 50 cc    IMPLANTS: None    DRAINS: None    SPECIMENS: None    COMPLICATIONS: None    INDICATIONS FOR SURGERY: Mr. Bhardwaj underwent excision of a squamous cell carcinoma of the right ear with postauricular pedicled flap reconstruction on March 18, 2024.  His flap appears viable and is ready for pedicle division and closure of the defect.    OPERATIVE FINDINGS:     Defect: 4.0 cm x 2.0 cm  Flap and Defect: 4.0 cm x 5.0 cm      OPERATIVE DETAILS:     After patient verification and consent was reviewed, the patient was taken to the operating room laid supine on the operative table.  A formal timeout procedure was performed after the induction of anesthesia.  The skin of the right ear, right postauricular mastoid skin, and left cheek (in the area of the prior flap reconstruction, for possible skin graft utilization) area was cleansed with alcohol and infiltrated with 6 cc of 1% lidocaine with 1 100,000 epinephrine.    The patient was then sterilely prepped and draped.    The skin of the pedicle was incised lysing a 15 blade and thinned back towards the inset site.  The peripheral skin was then excised to freshen the edges and allow flap inset.  The flap was also thinned in order to better approximate the native auricular contour.  Hemostasis was obtained with bipolar cautery set at 15.  The flap was then inset utilizing running, locking 5-0 Prolene.    The subsequent  postauricular defect was then addressed.  The skin surrounding the defect was incised utilizing a fresh 15 blade and discarded.  A unilateral advancement flap was then designed and the skin was incised with a 15 blade.  The skin was undermined in the subcutaneous plane, hemostasis was again obtained with bipolar cautery set at 15.  I then advanced the flap encloses utilizing buried 3-0 undyed Vicryl followed by running, locking 3-0 chromic in the postauricular sulcus, and surgical staples at the superior and inferior flap incisions.    Antibiotic ointment was placed to the incisions and flaps.      DISPOSITION:  The procedures were completed without complication and tolerated well.  The patient was released in the company of his family to return home in satisfactory condition.  A follow-up appointment will be scheduled, routine post-op medications prescribed (if required), and post-op instructions were given to the responsible party.           Noam Larry MD, FACS  Board Certified Facial Plastic and Reconstructive Surgery  Board Certified Otolaryngology -- Head and Neck Surgery    Electronically signed by Noam Larry MD, 04/09/24, 1:48 PM CDT.

## 2024-04-09 NOTE — H&P
[facility]   PREOPERATIVE HISTORY AND PHYSICAL    Patient Name:Nikita Bhardwaj  : 1955  MRN: [id number]  Primary Care Physician: Jeremy Correa MD  Date of admission: (Not on file)    Subjective   Subjective     Chief Complaint: preoperative evaluation    History of Present Illness  Nikita Bhardwaj is a 68 y.o. male who presents for preoperative evaluation. He is scheduled for Pedicle division and flap closure of the right postauricular defect and full-thickness skin graft (Right)    Review of Systems   Constitutional:  Negative for chills and fever.   HENT:  Positive for ear pain.    Skin:  Positive for wound. Negative for color change.        Personal History     Past Medical History:   Diagnosis Date    Diabetes mellitus     History of transfusion     w/o Adverse reaction    Hypertension     Polycythemia vera     SCC (squamous cell carcinoma), ear, right 2024    & Right jawline    Sleep apnea     Does Not have CPAP    Stomach ulcer     SVT (supraventricular tachycardia)        Past Surgical History:   Procedure Laterality Date    CERVICAL FUSION      COLONOSCOPY      ENDOSCOPY      HEMATOMA EVACUATION HEAD/NECK Left 3/20/2024    Procedure: HEMATOMA EVACUATION HEAD NECK;  Surgeon: Noam Larry MD;  Location: [facility] OR;  Service: ENT;  Laterality: Left;    KIDNEY STONE SURGERY      SKIN LESION EXCISION Bilateral 3/18/2024    Procedure: 1) Radical excision of skin cancer of the left jawline with flap reconstruction 2) Excision of skin cancer of the right ear with flap reconstruction;  Surgeon: Noam Larry MD;  Location: [facility] OR;  Service: ENT;  Laterality: Bilateral;       Family History: His family history is not on file.     Social History: He  reports that he has never smoked. He has never used smokeless tobacco. He reports that he does not drink alcohol and does not use drugs.    Home Medications:  allopurinol, hydroxyurea, metFORMIN, and metoprolol succinate XL    Allergies:  He has No  Known Allergies.    Objective    Objective     Vitals:         Physical Exam  Vitals and nursing note reviewed.   Constitutional:       General: He is not in acute distress.     Appearance: He is well-developed. He is not diaphoretic.   HENT:      Head: Normocephalic and atraumatic.        Right Ear: External ear normal.      Left Ear: External ear normal.      Nose: Nose normal.   Eyes:      General: No scleral icterus.        Right eye: No discharge.         Left eye: No discharge.      Conjunctiva/sclera: Conjunctivae normal.      Pupils: Pupils are equal, round, and reactive to light.   Neck:      Thyroid: No thyromegaly.      Vascular: No JVD.      Trachea: No tracheal deviation.   Pulmonary:      Effort: Pulmonary effort is normal.      Breath sounds: No stridor.   Musculoskeletal:         General: No deformity. Normal range of motion.      Cervical back: Normal range of motion and neck supple.   Lymphadenopathy:      Cervical: No cervical adenopathy.   Skin:     General: Skin is warm and dry.      Coloration: Skin is not pale.      Findings: No erythema or rash.   Neurological:      Mental Status: He is alert and oriented to person, place, and time.      Cranial Nerves: No cranial nerve deficit.      Coordination: Coordination normal.   Psychiatric:         Speech: Speech normal.         Behavior: Behavior normal. Behavior is cooperative.         Thought Content: Thought content normal.         Judgment: Judgment normal.         Assessment & Plan   Assessment / Plan     Brief Patient Summary:  Nikita Bhardwaj is a 68 y.o. male who presents for preoperative evaluation.    Pre-Op Diagnosis Codes:     * Squamous cell carcinoma, ear, right [C44.222]    Active Hospital Problems:  Active Hospital Problems    Diagnosis     **Squamous cell carcinoma, ear, right      Plan:   Procedure(s):  Pedicle division and flap closure of the right postauricular defect and full-thickness skin graft    Discussed risks, benefits,  alternatives, and possible complications of repair of the skin defect with reconstruction utilizing local tissue rearrangement, full-thickness skin grafting, or local interpolated flaps. Risks include, but are not limited too: bleeding, infection, hematoma, recurrence, need for additional procedures, flap failure, cosmetic deformity. Patient understands risks and would like to proceed with surgery.  Alternatives include doing nothing      Noam Larry MD  Electronically signed by Noam Larry MD, 04/09/24, 12:43 PM CDT.

## 2024-04-09 NOTE — ANESTHESIA POSTPROCEDURE EVALUATION
"Patient: Nikita Bhardwaj    Procedure Summary       Date: 04/09/24 Room / Location:  [facility] OR 03 /  [facility] OR    Anesthesia Start: 1243 Anesthesia Stop: 1345    Procedure: Pedicle division and flap closure of the right postauricular defect and full-thickness skin graft (Right) Diagnosis:       Squamous cell carcinoma, ear, right      (Squamous cell carcinoma, ear, right [C44.222])    Surgeons: Noam Larry MD Provider: Nia Choe CRNA    Anesthesia Type: general ASA Status: 3            Anesthesia Type: general    Vitals  Vitals Value Taken Time   /75 04/09/24 1453   Temp 98.1 °F (36.7 °C) 04/09/24 1450   Pulse 97 04/09/24 1453   Resp 16 04/09/24 1453   SpO2 95 % 04/09/24 1453           Post Anesthesia Care and Evaluation    Patient location during evaluation: PACU  Patient participation: complete - patient participated  Level of consciousness: awake and alert  Pain management: adequate    Airway patency: patent  Anesthetic complications: No anesthetic complications    Cardiovascular status: acceptable  Respiratory status: acceptable  Hydration status: acceptable    Comments: Blood pressure 126/75, pulse 97, temperature 98.1 °F (36.7 °C), resp. rate 16, height 178 cm (70.08\"), weight 73.7 kg (162 lb 7.7 oz), SpO2 94%.    Pt discharged from PACU based on umer score >8    "

## 2024-04-09 NOTE — ANESTHESIA PREPROCEDURE EVALUATION
Anesthesia Evaluation     Patient summary reviewed   no history of anesthetic complications:   NPO Solid Status: > 8 hours             Airway   Mallampati: II  TM distance: >3 FB  Dental - normal exam     Pulmonary    (+) ,sleep apnea  (-) asthma, not a smoker  Cardiovascular   Exercise tolerance: good (4-7 METS)    Patient on routine beta blocker    (+) hypertension (off beta blockers, reports was causing pressure to bottom out), dysrhythmias Tachycardia      Neuro/Psych  (-) seizures, TIA, CVA  GI/Hepatic/Renal/Endo    (+) renal disease-, diabetes mellitus  (-) liver disease    Musculoskeletal     Abdominal    Substance History      OB/GYN          Other   blood dyscrasia (polycythemia) anemia,   history of cancer                  Anesthesia Plan    ASA 3     general     (Pt reports no residual airway compromise (hematoma) )  intravenous induction     Anesthetic plan, risks, benefits, and alternatives have been provided, discussed and informed consent has been obtained with: patient.    CODE STATUS:

## 2024-04-17 ENCOUNTER — OFFICE VISIT (OUTPATIENT)
Age: 69
End: 2024-04-17
Payer: MEDICARE

## 2024-04-17 ENCOUNTER — HOSPITAL ENCOUNTER (OUTPATIENT)
Dept: INFUSION THERAPY | Age: 69
Discharge: HOME | End: 2024-04-17
Payer: MEDICARE

## 2024-04-17 VITALS — TEMPERATURE: 97.4 F | SYSTOLIC BLOOD PRESSURE: 140 MMHG | HEART RATE: 82 BPM | DIASTOLIC BLOOD PRESSURE: 79 MMHG

## 2024-04-17 DIAGNOSIS — D45 POLYCYTHEMIA VERA (HCC): ICD-10-CM

## 2024-04-17 DIAGNOSIS — C44.222 SQUAMOUS CELL CARCINOMA, EAR, RIGHT: Primary | ICD-10-CM

## 2024-04-17 DIAGNOSIS — C44.329 SQUAMOUS CELL CANCER OF SKIN OF JAWLINE: ICD-10-CM

## 2024-04-17 LAB
BASOPHILS # BLD: 0.59 K/UL (ref 0.01–0.08)
BASOPHILS NFR BLD: 1.6 % (ref 0.1–1.2)
EOSINOPHIL # BLD: 0.61 K/UL (ref 0.04–0.54)
EOSINOPHIL NFR BLD: 1.6 % (ref 0.7–7)
ERYTHROCYTE [DISTWIDTH] IN BLOOD BY AUTOMATED COUNT: 22.5 % (ref 11.6–14.4)
HCT VFR BLD AUTO: 34.6 % (ref 40.1–51)
HGB BLD-MCNC: 10.1 G/DL (ref 13.7–17.5)
LYMPHOCYTES # BLD: 1.81 K/UL (ref 1.18–3.74)
LYMPHOCYTES NFR BLD: 4.8 % (ref 19.3–53.1)
MCH RBC QN AUTO: 24.8 PG (ref 25.7–32.2)
MCHC RBC AUTO-ENTMCNC: 29.2 G/DL (ref 32.3–36.5)
MCV RBC AUTO: 85 FL (ref 79–92.2)
MONOCYTES # BLD: 0.81 K/UL (ref 0.24–0.82)
MONOCYTES NFR BLD: 2.2 % (ref 4.7–12.5)
NEUTROPHILS # BLD: 31.28 K/UL (ref 1.56–6.13)
NEUTS SEG NFR BLD: 83.8 % (ref 34–71.1)
PLATELET # BLD AUTO: 262 K/UL (ref 163–337)
PMV BLD AUTO: 10.3 FL (ref 7.4–10.4)
RBC # BLD AUTO: 4.07 M/UL (ref 4.63–6.08)
WBC # BLD AUTO: 37.36 K/UL (ref 4.23–9.07)

## 2024-04-17 PROCEDURE — 36415 COLL VENOUS BLD VENIPUNCTURE: CPT

## 2024-04-17 PROCEDURE — 99024 POSTOP FOLLOW-UP VISIT: CPT | Performed by: NURSE PRACTITIONER

## 2024-04-17 PROCEDURE — 85025 COMPLETE CBC W/AUTO DIFF WBC: CPT

## 2024-04-17 PROCEDURE — 99211 OFF/OP EST MAY X REQ PHY/QHP: CPT

## 2024-04-17 NOTE — PROGRESS NOTES
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  Postoperatively, he developed a left cheek and neck hematoma that was drained on 3/20/2024.  Most recently, he is status post pedicle division and flap inset of right ear with unilateral advancement flap of the right postauricular defect on 4/9/2024.      SUBJECTIVE:  He is doing well.  He reports minimal pain.  He denies fever, chills, bleeding, or drainage.    OBJECTIVE:  /79   Pulse 82   Temp 97.4 °F (36.3 °C) (Temporal)   Left cheek flap is healing well without evidence of infection. There is a 1.8 x 0.6 cm area of epidermolysis to the distal portion of the flap - this is healing in well.    The right ear flap is healing well without evidence of infection.  Sutures and staples were removed.    Pathology:      ASSESSMENT:  Diagnoses and all orders for this visit:    1. Squamous cell carcinoma, ear, right (Primary)    2. Squamous cell cancer of skin of jawline  -     Ambulatory Referral to Radiation Oncology            PLAN:   Pathology was reviewed.  Pathology of the right ear demonstrates moderately differentiated squamous cell carcinoma with clear but close margins.  Pathology of the left cheek demonstrates a poorly differentiated squamous cell carcinoma with clear margins, positive for perineural invasion.  Pathology of the left neck lymph node was negative for metastatic carcinoma.  Due to perineural invasion, we will refer to radiation oncology for consideration of radiation therapy.    In order to optimize wound healing, it is beneficial to protect the incisions from sunlight for the first year after the procedure.  Sunlight exposure may cause a  brown-red discoloration to the incisions, making them more obvious.  Use a sunscreen with titanium dioxide and/or zinc oxide as the active ingredient(s).  Utilize an SPF factor of at least 15.    Use an OTC silicone-based wound cream to the incision daily to optimize wound healing.    It is important to follow-up with your primary care provider or dermatologist every 3-6 months for routine skin cancer surveillance.    He will follow-up in 6 weeks.      Lucy Pritchard APRN

## 2024-04-17 NOTE — PROGRESS NOTES
Patient here for CBC evaluation.  Reviewed lab results with patient and wife, WBC 37.36, Hgb 10.1, Hct 34.6, platelets 262,000 which are down from 531,000.  MAURILIO Zuniga reviewed labs and wants patient to decrease Hydrea to 500 mg Monday Friday and then take 1000 mg on Saturday and Sunday. Repeat lab work in 6 weeks.  Patient and wife v/u and is agreeable to plan.

## 2024-04-22 ENCOUNTER — HOSPITAL ENCOUNTER (OUTPATIENT)
Dept: RADIATION ONCOLOGY | Facility: HOSPITAL | Age: 69
Setting detail: RADIATION/ONCOLOGY SERIES
End: 2024-04-22
Payer: MEDICARE

## 2024-04-22 PROBLEM — Z78.9 NON-SMOKER: Status: ACTIVE | Noted: 2024-04-22

## 2024-04-23 ENCOUNTER — APPOINTMENT (OUTPATIENT)
Dept: RADIATION ONCOLOGY | Facility: HOSPITAL | Age: 69
End: 2024-04-23
Payer: MEDICARE

## 2024-04-23 DIAGNOSIS — C44.329 SQUAMOUS CELL CANCER OF SKIN OF JAWLINE: Primary | ICD-10-CM

## 2024-04-23 DIAGNOSIS — Z78.9 NON-SMOKER: ICD-10-CM

## 2024-05-06 ENCOUNTER — CONSULT (OUTPATIENT)
Age: 69
End: 2024-05-06
Payer: MEDICARE

## 2024-05-06 ENCOUNTER — DOCUMENTATION (OUTPATIENT)
Dept: RADIATION ONCOLOGY | Facility: HOSPITAL | Age: 69
End: 2024-05-06
Payer: MEDICARE

## 2024-05-06 ENCOUNTER — HOSPITAL ENCOUNTER (OUTPATIENT)
Dept: RADIATION ONCOLOGY | Facility: HOSPITAL | Age: 69
Setting detail: RADIATION/ONCOLOGY SERIES
End: 2024-05-06
Payer: MEDICARE

## 2024-05-06 VITALS
DIASTOLIC BLOOD PRESSURE: 78 MMHG | WEIGHT: 167 LBS | SYSTOLIC BLOOD PRESSURE: 150 MMHG | BODY MASS INDEX: 23.91 KG/M2 | HEIGHT: 70 IN

## 2024-05-06 DIAGNOSIS — C44.222 SQUAMOUS CELL CARCINOMA, EAR, RIGHT: ICD-10-CM

## 2024-05-06 DIAGNOSIS — C44.329 SQUAMOUS CELL CANCER OF SKIN OF JAWLINE: Primary | ICD-10-CM

## 2024-05-06 DIAGNOSIS — Z78.9 NON-SMOKER: ICD-10-CM

## 2024-05-06 PROCEDURE — G0463 HOSPITAL OUTPT CLINIC VISIT: HCPCS | Performed by: RADIOLOGY

## 2024-05-06 PROCEDURE — 77334 RADIATION TREATMENT AID(S): CPT | Performed by: RADIOLOGY

## 2024-05-15 ENCOUNTER — HOSPITAL ENCOUNTER (OUTPATIENT)
Dept: INFUSION THERAPY | Age: 69
Discharge: HOME | End: 2024-05-15
Payer: MEDICARE

## 2024-05-15 DIAGNOSIS — D45 POLYCYTHEMIA VERA (HCC): ICD-10-CM

## 2024-05-15 LAB
BASOPHILS # BLD: 0.99 K/UL (ref 0.01–0.08)
BASOPHILS NFR BLD: 1.8 % (ref 0.1–1.2)
EOSINOPHIL # BLD: 0.86 K/UL (ref 0.04–0.54)
EOSINOPHIL NFR BLD: 1.6 % (ref 0.7–7)
ERYTHROCYTE [DISTWIDTH] IN BLOOD BY AUTOMATED COUNT: 21.1 % (ref 11.6–14.4)
HCT VFR BLD AUTO: 38.9 % (ref 40.1–51)
HGB BLD-MCNC: 11.3 G/DL (ref 13.7–17.5)
LYMPHOCYTES # BLD: 2.42 K/UL (ref 1.18–3.74)
LYMPHOCYTES NFR BLD: 4.5 % (ref 19.3–53.1)
MCH RBC QN AUTO: 23.9 PG (ref 25.7–32.2)
MCHC RBC AUTO-ENTMCNC: 29 G/DL (ref 32.3–36.5)
MCV RBC AUTO: 82.2 FL (ref 79–92.2)
MONOCYTES # BLD: 0.92 K/UL (ref 0.24–0.82)
MONOCYTES NFR BLD: 1.7 % (ref 4.7–12.5)
NEUTROPHILS # BLD: 45.49 K/UL (ref 1.56–6.13)
NEUTS SEG NFR BLD: 83.6 % (ref 34–71.1)
PLATELET # BLD AUTO: 374 K/UL (ref 163–337)
PMV BLD AUTO: 10.5 FL (ref 7.4–10.4)
RBC # BLD AUTO: 4.73 M/UL (ref 4.63–6.08)
WBC # BLD AUTO: 54.38 K/UL (ref 4.23–9.07)

## 2024-05-15 PROCEDURE — 85025 COMPLETE CBC W/AUTO DIFF WBC: CPT

## 2024-05-15 PROCEDURE — 36415 COLL VENOUS BLD VENIPUNCTURE: CPT

## 2024-06-03 PROCEDURE — 77301 RADIOTHERAPY DOSE PLAN IMRT: CPT | Performed by: RADIOLOGY

## 2024-06-03 PROCEDURE — 77338 DESIGN MLC DEVICE FOR IMRT: CPT | Performed by: RADIOLOGY

## 2024-06-03 PROCEDURE — 77470 SPECIAL RADIATION TREATMENT: CPT | Performed by: RADIOLOGY

## 2024-06-03 PROCEDURE — 77300 RADIATION THERAPY DOSE PLAN: CPT | Performed by: RADIOLOGY

## 2024-06-04 ENCOUNTER — HOSPITAL ENCOUNTER (OUTPATIENT)
Dept: RADIATION ONCOLOGY | Facility: HOSPITAL | Age: 69
Discharge: HOME | End: 2024-06-04

## 2024-06-05 ENCOUNTER — HOSPITAL ENCOUNTER (OUTPATIENT)
Dept: RADIATION ONCOLOGY | Facility: HOSPITAL | Age: 69
Setting detail: RADIATION/ONCOLOGY SERIES
End: 2024-06-05
Payer: MEDICARE

## 2024-06-05 ENCOUNTER — TELEPHONE (OUTPATIENT)
Dept: HEMATOLOGY | Age: 69
End: 2024-06-05

## 2024-06-05 NOTE — TELEPHONE ENCOUNTER
I called and reminded pt. of their appt on 06/10/2024. Patient is aware to come at time of appt and not lab appt time. Patient confirmed appt date and time.

## 2024-06-06 ENCOUNTER — HOSPITAL ENCOUNTER (OUTPATIENT)
Dept: RADIATION ONCOLOGY | Facility: HOSPITAL | Age: 69
Setting detail: RADIATION/ONCOLOGY SERIES
Discharge: HOME | End: 2024-06-06
Payer: MEDICARE

## 2024-06-06 LAB
RAD ONC ARIA COURSE ID: NORMAL
RAD ONC ARIA COURSE LAST TREATMENT DATE: NORMAL
RAD ONC ARIA COURSE START DATE: NORMAL
RAD ONC ARIA COURSE TREATMENT ELAPSED DAYS: 0
RAD ONC ARIA FIRST TREATMENT DATE: NORMAL
RAD ONC ARIA PLAN FRACTIONS TREATED TO DATE: 1
RAD ONC ARIA PLAN ID: NORMAL
RAD ONC ARIA PLAN PRESCRIBED DOSE PER FRACTION: 1.8 GY
RAD ONC ARIA PLAN PRIMARY REFERENCE POINT: NORMAL
RAD ONC ARIA PLAN TOTAL FRACTIONS PRESCRIBED: 28
RAD ONC ARIA PLAN TOTAL PRESCRIBED DOSE: 5040 CGY
RAD ONC ARIA REFERENCE POINT DOSAGE GIVEN TO DATE: 0.8 GY
RAD ONC ARIA REFERENCE POINT DOSAGE GIVEN TO DATE: 1.8 GY
RAD ONC ARIA REFERENCE POINT ID: NORMAL
RAD ONC ARIA REFERENCE POINT ID: NORMAL
RAD ONC ARIA REFERENCE POINT SESSION DOSAGE GIVEN: 0.8 GY
RAD ONC ARIA REFERENCE POINT SESSION DOSAGE GIVEN: 1.8 GY

## 2024-06-06 PROCEDURE — 77386: CPT | Performed by: RADIOLOGY

## 2024-06-07 ENCOUNTER — HOSPITAL ENCOUNTER (OUTPATIENT)
Dept: RADIATION ONCOLOGY | Facility: HOSPITAL | Age: 69
Discharge: HOME | End: 2024-06-07

## 2024-06-07 LAB
RAD ONC ARIA COURSE ID: NORMAL
RAD ONC ARIA COURSE LAST TREATMENT DATE: NORMAL
RAD ONC ARIA COURSE START DATE: NORMAL
RAD ONC ARIA COURSE TREATMENT ELAPSED DAYS: 1
RAD ONC ARIA FIRST TREATMENT DATE: NORMAL
RAD ONC ARIA PLAN FRACTIONS TREATED TO DATE: 2
RAD ONC ARIA PLAN ID: NORMAL
RAD ONC ARIA PLAN PRESCRIBED DOSE PER FRACTION: 1.8 GY
RAD ONC ARIA PLAN PRIMARY REFERENCE POINT: NORMAL
RAD ONC ARIA PLAN TOTAL FRACTIONS PRESCRIBED: 28
RAD ONC ARIA PLAN TOTAL PRESCRIBED DOSE: 5040 CGY
RAD ONC ARIA REFERENCE POINT DOSAGE GIVEN TO DATE: 1.59 GY
RAD ONC ARIA REFERENCE POINT DOSAGE GIVEN TO DATE: 3.6 GY
RAD ONC ARIA REFERENCE POINT ID: NORMAL
RAD ONC ARIA REFERENCE POINT ID: NORMAL
RAD ONC ARIA REFERENCE POINT SESSION DOSAGE GIVEN: 0.8 GY
RAD ONC ARIA REFERENCE POINT SESSION DOSAGE GIVEN: 1.8 GY

## 2024-06-07 PROCEDURE — 77386: CPT | Performed by: RADIOLOGY

## 2024-06-09 DIAGNOSIS — R74.8 ELEVATED LIVER ENZYMES: ICD-10-CM

## 2024-06-09 DIAGNOSIS — D45 POLYCYTHEMIA VERA (HCC): Primary | ICD-10-CM

## 2024-06-10 ENCOUNTER — HOSPITAL ENCOUNTER (OUTPATIENT)
Dept: INFUSION THERAPY | Age: 69
Discharge: HOME | End: 2024-06-10
Payer: MEDICARE

## 2024-06-10 ENCOUNTER — HOSPITAL ENCOUNTER (OUTPATIENT)
Dept: RADIATION ONCOLOGY | Facility: HOSPITAL | Age: 69
Setting detail: RADIATION/ONCOLOGY SERIES
Discharge: HOME | End: 2024-06-10
Payer: MEDICARE

## 2024-06-10 ENCOUNTER — OFFICE VISIT (OUTPATIENT)
Dept: HEMATOLOGY | Age: 69
End: 2024-06-10
Payer: MEDICARE

## 2024-06-10 VITALS
BODY MASS INDEX: 23.05 KG/M2 | TEMPERATURE: 98.2 F | SYSTOLIC BLOOD PRESSURE: 140 MMHG | DIASTOLIC BLOOD PRESSURE: 78 MMHG | WEIGHT: 161 LBS | HEIGHT: 70 IN | OXYGEN SATURATION: 98 % | HEART RATE: 99 BPM

## 2024-06-10 DIAGNOSIS — R16.1 SPLENOMEGALY: ICD-10-CM

## 2024-06-10 DIAGNOSIS — R74.8 ELEVATED LIVER ENZYMES: ICD-10-CM

## 2024-06-10 DIAGNOSIS — R71.8 MICROCYTOSIS: ICD-10-CM

## 2024-06-10 DIAGNOSIS — Z79.899 MEDICATION MANAGEMENT: ICD-10-CM

## 2024-06-10 DIAGNOSIS — D45 POLYCYTHEMIA VERA (HCC): ICD-10-CM

## 2024-06-10 DIAGNOSIS — D45 POLYCYTHEMIA VERA (HCC): Primary | ICD-10-CM

## 2024-06-10 DIAGNOSIS — D72.829 LEUKOCYTOSIS, UNSPECIFIED TYPE: ICD-10-CM

## 2024-06-10 LAB
ALBUMIN SERPL-MCNC: 4.9 G/DL (ref 3.5–5.2)
ALP SERPL-CCNC: 174 U/L (ref 40–130)
ALT SERPL-CCNC: 13 U/L (ref 21–72)
ANION GAP SERPL CALCULATED.3IONS-SCNC: 12 MMOL/L (ref 7–19)
AST SERPL-CCNC: 28 U/L (ref 17–59)
BASOPHILS # BLD: 0.75 K/UL (ref 0.01–0.08)
BASOPHILS NFR BLD: 1.7 % (ref 0.1–1.2)
BILIRUB SERPL-MCNC: 1.5 MG/DL (ref 0.2–1.3)
BUN SERPL-MCNC: 24 MG/DL (ref 9–20)
CALCIUM SERPL-MCNC: 8.9 MG/DL (ref 8.4–10.2)
CHLORIDE SERPL-SCNC: 101 MMOL/L (ref 98–111)
CO2 SERPL-SCNC: 24 MMOL/L (ref 22–29)
CREAT SERPL-MCNC: 0.9 MG/DL (ref 0.6–1.2)
EOSINOPHIL # BLD: 0.92 K/UL (ref 0.04–0.54)
EOSINOPHIL NFR BLD: 2.1 % (ref 0.7–7)
ERYTHROCYTE [DISTWIDTH] IN BLOOD BY AUTOMATED COUNT: 20.7 % (ref 11.6–14.4)
GLOBULIN: 2.2 G/DL
GLUCOSE SERPL-MCNC: 220 MG/DL (ref 74–106)
HCT VFR BLD AUTO: 39.3 % (ref 40.1–51)
HGB BLD-MCNC: 11.5 G/DL (ref 13.7–17.5)
LYMPHOCYTES # BLD: 1.81 K/UL (ref 1.18–3.74)
LYMPHOCYTES NFR BLD: 4.1 % (ref 19.3–53.1)
MCH RBC QN AUTO: 23.2 PG (ref 25.7–32.2)
MCHC RBC AUTO-ENTMCNC: 29.3 G/DL (ref 32.3–36.5)
MCV RBC AUTO: 79.4 FL (ref 79–92.2)
MONOCYTES # BLD: 0.77 K/UL (ref 0.24–0.82)
MONOCYTES NFR BLD: 1.7 % (ref 4.7–12.5)
NEUTROPHILS # BLD: 37.61 K/UL (ref 1.56–6.13)
NEUTS SEG NFR BLD: 84.5 % (ref 34–71.1)
PLATELET # BLD AUTO: 333 K/UL (ref 163–337)
PMV BLD AUTO: 11.3 FL (ref 7.4–10.4)
POTASSIUM SERPL-SCNC: 4.9 MMOL/L (ref 3.5–5.1)
PROT SERPL-MCNC: 7.1 G/DL (ref 6.3–8.2)
RAD ONC ARIA COURSE ID: NORMAL
RAD ONC ARIA COURSE LAST TREATMENT DATE: NORMAL
RAD ONC ARIA COURSE START DATE: NORMAL
RAD ONC ARIA COURSE TREATMENT ELAPSED DAYS: 4
RAD ONC ARIA FIRST TREATMENT DATE: NORMAL
RAD ONC ARIA PLAN FRACTIONS TREATED TO DATE: 3
RAD ONC ARIA PLAN ID: NORMAL
RAD ONC ARIA PLAN PRESCRIBED DOSE PER FRACTION: 1.8 GY
RAD ONC ARIA PLAN PRIMARY REFERENCE POINT: NORMAL
RAD ONC ARIA PLAN TOTAL FRACTIONS PRESCRIBED: 28
RAD ONC ARIA PLAN TOTAL PRESCRIBED DOSE: 5040 CGY
RAD ONC ARIA REFERENCE POINT DOSAGE GIVEN TO DATE: 2.39 GY
RAD ONC ARIA REFERENCE POINT DOSAGE GIVEN TO DATE: 5.4 GY
RAD ONC ARIA REFERENCE POINT ID: NORMAL
RAD ONC ARIA REFERENCE POINT ID: NORMAL
RAD ONC ARIA REFERENCE POINT SESSION DOSAGE GIVEN: 0.8 GY
RAD ONC ARIA REFERENCE POINT SESSION DOSAGE GIVEN: 1.8 GY
RBC # BLD AUTO: 4.95 M/UL (ref 4.63–6.08)
SODIUM SERPL-SCNC: 137 MMOL/L (ref 137–145)
WBC # BLD AUTO: 44.49 K/UL (ref 4.23–9.07)

## 2024-06-10 PROCEDURE — 80053 COMPREHEN METABOLIC PANEL: CPT

## 2024-06-10 PROCEDURE — G2211 COMPLEX E/M VISIT ADD ON: HCPCS | Performed by: NURSE PRACTITIONER

## 2024-06-10 PROCEDURE — 1123F ACP DISCUSS/DSCN MKR DOCD: CPT | Performed by: NURSE PRACTITIONER

## 2024-06-10 PROCEDURE — 3077F SYST BP >= 140 MM HG: CPT | Performed by: NURSE PRACTITIONER

## 2024-06-10 PROCEDURE — 77386: CPT | Performed by: RADIOLOGY

## 2024-06-10 PROCEDURE — 99212 OFFICE O/P EST SF 10 MIN: CPT

## 2024-06-10 PROCEDURE — 99214 OFFICE O/P EST MOD 30 MIN: CPT | Performed by: NURSE PRACTITIONER

## 2024-06-10 PROCEDURE — 85025 COMPLETE CBC W/AUTO DIFF WBC: CPT

## 2024-06-10 PROCEDURE — 3078F DIAST BP <80 MM HG: CPT | Performed by: NURSE PRACTITIONER

## 2024-06-10 PROCEDURE — 36415 COLL VENOUS BLD VENIPUNCTURE: CPT

## 2024-06-10 NOTE — PROGRESS NOTES
Progress Note      Pt Name: Junaid Aguero  YOB: 1955  MRN: [id number]    Date of evaluation: 06/10/2024  History Obtained From:  patient, electronic medical record    CHIEF COMPLAINT:    Chief Complaint   Patient presents with    Follow-up     Polycythemia vera       HISTORY OF PRESENT ILLNESS:    Junaid Aguero is a 68 y.o.  male who is followed for diagnosis of polycythemia vera, JAK2 positive with mild (1+/3) fibrosis and splenomegaly.  Current recommendation is for cytoreductive therapy with Hydrea, aspirin and intermittent phlebotomy for hematocrit >45. Currently taking Hydrea 500 mg p.o. daily Monday through Friday and Hydrea 500 mg p.o. twice daily on Saturdays and Sundays along with aspirin 81 mg p.o. daily.  Junaid last required a phlebotomy on 1/18/2023.  Junaid returns today in scheduled follow-up for evaluation, lab monitoring and treatment recommendations.      Today's clinic visit to include physical assessment, review of systems, any lab or radiographic findings that were available and plan of care are documented below.    HEMATOLOGY HISTORY:     Diagnosis   JESUS-2 polycythemia vera   High risk   Splenomegaly     TREATMENT SUMMARY:  Phlebotomies only initiated July 2008 until March 2012.   Hydrea.   Periodic phlebotomies as indicated despite Hydrea. Last 6/17/2020  ASA 81mg daily  Phlebotomize for hematocrit > 45 or symptomatic    HEMATOLOGICAL HISTORY: JESUS-2 positive polycythemia vera diagnosed 07/2008  Mr. Aguero was seen by Dr. Quiroga on 07/25/08 where he was found to have an elevated hemoglobin and hematocrit of 20.2 and 58.5 respectively.  Dr. Zendejas had a conference with Dr. Quiroga prior to Mr. Aguero’s initial visit. Mr. Aguero had had two phlebotomies prior to his initial visit on 08/26/08. Workup included an erythropoietin level, which was normal a JESUS-2 which was positive consistent with a myeloproliferative disorder and a relatively unremarkable chemistry profile. A bone marrow

## 2024-06-11 ENCOUNTER — HOSPITAL ENCOUNTER (OUTPATIENT)
Dept: RADIATION ONCOLOGY | Facility: HOSPITAL | Age: 69
Setting detail: RADIATION/ONCOLOGY SERIES
Discharge: HOME | End: 2024-06-11
Payer: MEDICARE

## 2024-06-11 LAB
RAD ONC ARIA COURSE ID: NORMAL
RAD ONC ARIA COURSE LAST TREATMENT DATE: NORMAL
RAD ONC ARIA COURSE START DATE: NORMAL
RAD ONC ARIA COURSE TREATMENT ELAPSED DAYS: 5
RAD ONC ARIA FIRST TREATMENT DATE: NORMAL
RAD ONC ARIA PLAN FRACTIONS TREATED TO DATE: 4
RAD ONC ARIA PLAN ID: NORMAL
RAD ONC ARIA PLAN PRESCRIBED DOSE PER FRACTION: 1.8 GY
RAD ONC ARIA PLAN PRIMARY REFERENCE POINT: NORMAL
RAD ONC ARIA PLAN TOTAL FRACTIONS PRESCRIBED: 28
RAD ONC ARIA PLAN TOTAL PRESCRIBED DOSE: 5040 CGY
RAD ONC ARIA REFERENCE POINT DOSAGE GIVEN TO DATE: 3.18 GY
RAD ONC ARIA REFERENCE POINT DOSAGE GIVEN TO DATE: 7.2 GY
RAD ONC ARIA REFERENCE POINT ID: NORMAL
RAD ONC ARIA REFERENCE POINT ID: NORMAL
RAD ONC ARIA REFERENCE POINT SESSION DOSAGE GIVEN: 0.8 GY
RAD ONC ARIA REFERENCE POINT SESSION DOSAGE GIVEN: 1.8 GY

## 2024-06-11 PROCEDURE — 77386: CPT | Performed by: RADIOLOGY

## 2024-06-12 ENCOUNTER — HOSPITAL ENCOUNTER (OUTPATIENT)
Dept: RADIATION ONCOLOGY | Facility: HOSPITAL | Age: 69
Setting detail: RADIATION/ONCOLOGY SERIES
Discharge: HOME | End: 2024-06-12
Payer: MEDICARE

## 2024-06-12 LAB
RAD ONC ARIA COURSE ID: NORMAL
RAD ONC ARIA COURSE LAST TREATMENT DATE: NORMAL
RAD ONC ARIA COURSE START DATE: NORMAL
RAD ONC ARIA COURSE TREATMENT ELAPSED DAYS: 6
RAD ONC ARIA FIRST TREATMENT DATE: NORMAL
RAD ONC ARIA PLAN FRACTIONS TREATED TO DATE: 5
RAD ONC ARIA PLAN ID: NORMAL
RAD ONC ARIA PLAN PRESCRIBED DOSE PER FRACTION: 1.8 GY
RAD ONC ARIA PLAN PRIMARY REFERENCE POINT: NORMAL
RAD ONC ARIA PLAN TOTAL FRACTIONS PRESCRIBED: 28
RAD ONC ARIA PLAN TOTAL PRESCRIBED DOSE: 5040 CGY
RAD ONC ARIA REFERENCE POINT DOSAGE GIVEN TO DATE: 3.98 GY
RAD ONC ARIA REFERENCE POINT DOSAGE GIVEN TO DATE: 9 GY
RAD ONC ARIA REFERENCE POINT ID: NORMAL
RAD ONC ARIA REFERENCE POINT ID: NORMAL
RAD ONC ARIA REFERENCE POINT SESSION DOSAGE GIVEN: 0.8 GY
RAD ONC ARIA REFERENCE POINT SESSION DOSAGE GIVEN: 1.8 GY

## 2024-06-12 PROCEDURE — 77386: CPT | Performed by: RADIOLOGY

## 2024-06-12 ASSESSMENT — ENCOUNTER SYMPTOMS
EYE DISCHARGE: 0
CONSTIPATION: 0
VOMITING: 0
EYE REDNESS: 0
SHORTNESS OF BREATH: 0
NAUSEA: 0
GASTROINTESTINAL NEGATIVE: 1
BACK PAIN: 0
COUGH: 0
EYES NEGATIVE: 1
WHEEZING: 0
ABDOMINAL PAIN: 0
DIARRHEA: 0
RESPIRATORY NEGATIVE: 1
SORE THROAT: 0
EYE PAIN: 0
BLOOD IN STOOL: 0

## 2024-06-13 ENCOUNTER — HOSPITAL ENCOUNTER (OUTPATIENT)
Dept: RADIATION ONCOLOGY | Facility: HOSPITAL | Age: 69
Setting detail: RADIATION/ONCOLOGY SERIES
Discharge: HOME | End: 2024-06-13
Payer: MEDICARE

## 2024-06-13 LAB
RAD ONC ARIA COURSE ID: NORMAL
RAD ONC ARIA COURSE LAST TREATMENT DATE: NORMAL
RAD ONC ARIA COURSE START DATE: NORMAL
RAD ONC ARIA COURSE TREATMENT ELAPSED DAYS: 7
RAD ONC ARIA FIRST TREATMENT DATE: NORMAL
RAD ONC ARIA PLAN FRACTIONS TREATED TO DATE: 6
RAD ONC ARIA PLAN ID: NORMAL
RAD ONC ARIA PLAN PRESCRIBED DOSE PER FRACTION: 1.8 GY
RAD ONC ARIA PLAN PRIMARY REFERENCE POINT: NORMAL
RAD ONC ARIA PLAN TOTAL FRACTIONS PRESCRIBED: 28
RAD ONC ARIA PLAN TOTAL PRESCRIBED DOSE: 5040 CGY
RAD ONC ARIA REFERENCE POINT DOSAGE GIVEN TO DATE: 10.8 GY
RAD ONC ARIA REFERENCE POINT DOSAGE GIVEN TO DATE: 4.78 GY
RAD ONC ARIA REFERENCE POINT ID: NORMAL
RAD ONC ARIA REFERENCE POINT ID: NORMAL
RAD ONC ARIA REFERENCE POINT SESSION DOSAGE GIVEN: 0.8 GY
RAD ONC ARIA REFERENCE POINT SESSION DOSAGE GIVEN: 1.8 GY

## 2024-06-13 PROCEDURE — 77336 RADIATION PHYSICS CONSULT: CPT | Performed by: RADIOLOGY

## 2024-06-13 PROCEDURE — 77386: CPT | Performed by: RADIOLOGY

## 2024-06-14 ENCOUNTER — HOSPITAL ENCOUNTER (OUTPATIENT)
Dept: RADIATION ONCOLOGY | Facility: HOSPITAL | Age: 69
Setting detail: RADIATION/ONCOLOGY SERIES
Discharge: HOME | End: 2024-06-14
Payer: MEDICARE

## 2024-06-14 LAB
RAD ONC ARIA COURSE ID: NORMAL
RAD ONC ARIA COURSE LAST TREATMENT DATE: NORMAL
RAD ONC ARIA COURSE START DATE: NORMAL
RAD ONC ARIA COURSE TREATMENT ELAPSED DAYS: 8
RAD ONC ARIA FIRST TREATMENT DATE: NORMAL
RAD ONC ARIA PLAN FRACTIONS TREATED TO DATE: 7
RAD ONC ARIA PLAN ID: NORMAL
RAD ONC ARIA PLAN PRESCRIBED DOSE PER FRACTION: 1.8 GY
RAD ONC ARIA PLAN PRIMARY REFERENCE POINT: NORMAL
RAD ONC ARIA PLAN TOTAL FRACTIONS PRESCRIBED: 28
RAD ONC ARIA PLAN TOTAL PRESCRIBED DOSE: 5040 CGY
RAD ONC ARIA REFERENCE POINT DOSAGE GIVEN TO DATE: 12.6 GY
RAD ONC ARIA REFERENCE POINT DOSAGE GIVEN TO DATE: 5.57 GY
RAD ONC ARIA REFERENCE POINT ID: NORMAL
RAD ONC ARIA REFERENCE POINT ID: NORMAL
RAD ONC ARIA REFERENCE POINT SESSION DOSAGE GIVEN: 0.8 GY
RAD ONC ARIA REFERENCE POINT SESSION DOSAGE GIVEN: 1.8 GY

## 2024-06-14 PROCEDURE — 77386: CPT | Performed by: RADIOLOGY

## 2024-06-17 ENCOUNTER — HOSPITAL ENCOUNTER (OUTPATIENT)
Dept: RADIATION ONCOLOGY | Facility: HOSPITAL | Age: 69
Setting detail: RADIATION/ONCOLOGY SERIES
Discharge: HOME | End: 2024-06-17
Payer: MEDICARE

## 2024-06-17 LAB
RAD ONC ARIA COURSE ID: NORMAL
RAD ONC ARIA COURSE LAST TREATMENT DATE: NORMAL
RAD ONC ARIA COURSE START DATE: NORMAL
RAD ONC ARIA COURSE TREATMENT ELAPSED DAYS: 11
RAD ONC ARIA FIRST TREATMENT DATE: NORMAL
RAD ONC ARIA PLAN FRACTIONS TREATED TO DATE: 8
RAD ONC ARIA PLAN ID: NORMAL
RAD ONC ARIA PLAN PRESCRIBED DOSE PER FRACTION: 1.8 GY
RAD ONC ARIA PLAN PRIMARY REFERENCE POINT: NORMAL
RAD ONC ARIA PLAN TOTAL FRACTIONS PRESCRIBED: 28
RAD ONC ARIA PLAN TOTAL PRESCRIBED DOSE: 5040 CGY
RAD ONC ARIA REFERENCE POINT DOSAGE GIVEN TO DATE: 14.4 GY
RAD ONC ARIA REFERENCE POINT DOSAGE GIVEN TO DATE: 6.37 GY
RAD ONC ARIA REFERENCE POINT ID: NORMAL
RAD ONC ARIA REFERENCE POINT ID: NORMAL
RAD ONC ARIA REFERENCE POINT SESSION DOSAGE GIVEN: 0.8 GY
RAD ONC ARIA REFERENCE POINT SESSION DOSAGE GIVEN: 1.8 GY

## 2024-06-17 PROCEDURE — 77386: CPT | Performed by: RADIOLOGY

## 2024-06-18 ENCOUNTER — HOSPITAL ENCOUNTER (OUTPATIENT)
Dept: RADIATION ONCOLOGY | Facility: HOSPITAL | Age: 69
Setting detail: RADIATION/ONCOLOGY SERIES
Discharge: HOME | End: 2024-06-18
Payer: MEDICARE

## 2024-06-18 LAB
RAD ONC ARIA COURSE ID: NORMAL
RAD ONC ARIA COURSE LAST TREATMENT DATE: NORMAL
RAD ONC ARIA COURSE START DATE: NORMAL
RAD ONC ARIA COURSE TREATMENT ELAPSED DAYS: 12
RAD ONC ARIA FIRST TREATMENT DATE: NORMAL
RAD ONC ARIA PLAN FRACTIONS TREATED TO DATE: 9
RAD ONC ARIA PLAN ID: NORMAL
RAD ONC ARIA PLAN PRESCRIBED DOSE PER FRACTION: 1.8 GY
RAD ONC ARIA PLAN PRIMARY REFERENCE POINT: NORMAL
RAD ONC ARIA PLAN TOTAL FRACTIONS PRESCRIBED: 28
RAD ONC ARIA PLAN TOTAL PRESCRIBED DOSE: 5040 CGY
RAD ONC ARIA REFERENCE POINT DOSAGE GIVEN TO DATE: 16.2 GY
RAD ONC ARIA REFERENCE POINT DOSAGE GIVEN TO DATE: 7.16 GY
RAD ONC ARIA REFERENCE POINT ID: NORMAL
RAD ONC ARIA REFERENCE POINT ID: NORMAL
RAD ONC ARIA REFERENCE POINT SESSION DOSAGE GIVEN: 0.8 GY
RAD ONC ARIA REFERENCE POINT SESSION DOSAGE GIVEN: 1.8 GY

## 2024-06-18 PROCEDURE — 77386: CPT | Performed by: RADIOLOGY

## 2024-06-19 ENCOUNTER — HOSPITAL ENCOUNTER (OUTPATIENT)
Dept: RADIATION ONCOLOGY | Facility: HOSPITAL | Age: 69
Setting detail: RADIATION/ONCOLOGY SERIES
Discharge: HOME | End: 2024-06-19
Payer: MEDICARE

## 2024-06-19 LAB
RAD ONC ARIA COURSE ID: NORMAL
RAD ONC ARIA COURSE LAST TREATMENT DATE: NORMAL
RAD ONC ARIA COURSE START DATE: NORMAL
RAD ONC ARIA COURSE TREATMENT ELAPSED DAYS: 13
RAD ONC ARIA FIRST TREATMENT DATE: NORMAL
RAD ONC ARIA PLAN FRACTIONS TREATED TO DATE: 10
RAD ONC ARIA PLAN ID: NORMAL
RAD ONC ARIA PLAN PRESCRIBED DOSE PER FRACTION: 1.8 GY
RAD ONC ARIA PLAN PRIMARY REFERENCE POINT: NORMAL
RAD ONC ARIA PLAN TOTAL FRACTIONS PRESCRIBED: 28
RAD ONC ARIA PLAN TOTAL PRESCRIBED DOSE: 5040 CGY
RAD ONC ARIA REFERENCE POINT DOSAGE GIVEN TO DATE: 18 GY
RAD ONC ARIA REFERENCE POINT DOSAGE GIVEN TO DATE: 7.96 GY
RAD ONC ARIA REFERENCE POINT ID: NORMAL
RAD ONC ARIA REFERENCE POINT ID: NORMAL
RAD ONC ARIA REFERENCE POINT SESSION DOSAGE GIVEN: 0.8 GY
RAD ONC ARIA REFERENCE POINT SESSION DOSAGE GIVEN: 1.8 GY

## 2024-06-19 PROCEDURE — 77386: CPT | Performed by: RADIOLOGY

## 2024-06-20 ENCOUNTER — HOSPITAL ENCOUNTER (OUTPATIENT)
Dept: RADIATION ONCOLOGY | Facility: HOSPITAL | Age: 69
Setting detail: RADIATION/ONCOLOGY SERIES
Discharge: HOME | End: 2024-06-20
Payer: MEDICARE

## 2024-06-20 LAB
RAD ONC ARIA COURSE ID: NORMAL
RAD ONC ARIA COURSE LAST TREATMENT DATE: NORMAL
RAD ONC ARIA COURSE START DATE: NORMAL
RAD ONC ARIA COURSE TREATMENT ELAPSED DAYS: 14
RAD ONC ARIA FIRST TREATMENT DATE: NORMAL
RAD ONC ARIA PLAN FRACTIONS TREATED TO DATE: 11
RAD ONC ARIA PLAN ID: NORMAL
RAD ONC ARIA PLAN PRESCRIBED DOSE PER FRACTION: 1.8 GY
RAD ONC ARIA PLAN PRIMARY REFERENCE POINT: NORMAL
RAD ONC ARIA PLAN TOTAL FRACTIONS PRESCRIBED: 28
RAD ONC ARIA PLAN TOTAL PRESCRIBED DOSE: 5040 CGY
RAD ONC ARIA REFERENCE POINT DOSAGE GIVEN TO DATE: 19.8 GY
RAD ONC ARIA REFERENCE POINT DOSAGE GIVEN TO DATE: 8.76 GY
RAD ONC ARIA REFERENCE POINT ID: NORMAL
RAD ONC ARIA REFERENCE POINT ID: NORMAL
RAD ONC ARIA REFERENCE POINT SESSION DOSAGE GIVEN: 0.8 GY
RAD ONC ARIA REFERENCE POINT SESSION DOSAGE GIVEN: 1.8 GY

## 2024-06-20 PROCEDURE — 77386: CPT | Performed by: RADIOLOGY

## 2024-06-21 ENCOUNTER — HOSPITAL ENCOUNTER (OUTPATIENT)
Dept: RADIATION ONCOLOGY | Facility: HOSPITAL | Age: 69
Setting detail: RADIATION/ONCOLOGY SERIES
Discharge: HOME | End: 2024-06-21
Payer: MEDICARE

## 2024-06-21 LAB
RAD ONC ARIA COURSE ID: NORMAL
RAD ONC ARIA COURSE LAST TREATMENT DATE: NORMAL
RAD ONC ARIA COURSE START DATE: NORMAL
RAD ONC ARIA COURSE TREATMENT ELAPSED DAYS: 15
RAD ONC ARIA FIRST TREATMENT DATE: NORMAL
RAD ONC ARIA PLAN FRACTIONS TREATED TO DATE: 12
RAD ONC ARIA PLAN ID: NORMAL
RAD ONC ARIA PLAN PRESCRIBED DOSE PER FRACTION: 1.8 GY
RAD ONC ARIA PLAN PRIMARY REFERENCE POINT: NORMAL
RAD ONC ARIA PLAN TOTAL FRACTIONS PRESCRIBED: 28
RAD ONC ARIA PLAN TOTAL PRESCRIBED DOSE: 5040 CGY
RAD ONC ARIA REFERENCE POINT DOSAGE GIVEN TO DATE: 21.6 GY
RAD ONC ARIA REFERENCE POINT DOSAGE GIVEN TO DATE: 9.55 GY
RAD ONC ARIA REFERENCE POINT ID: NORMAL
RAD ONC ARIA REFERENCE POINT ID: NORMAL
RAD ONC ARIA REFERENCE POINT SESSION DOSAGE GIVEN: 0.8 GY
RAD ONC ARIA REFERENCE POINT SESSION DOSAGE GIVEN: 1.8 GY

## 2024-06-21 PROCEDURE — 77336 RADIATION PHYSICS CONSULT: CPT | Performed by: RADIOLOGY

## 2024-06-21 PROCEDURE — 77386: CPT | Performed by: RADIOLOGY

## 2024-06-24 ENCOUNTER — HOSPITAL ENCOUNTER (OUTPATIENT)
Dept: RADIATION ONCOLOGY | Facility: HOSPITAL | Age: 69
Setting detail: RADIATION/ONCOLOGY SERIES
Discharge: HOME | End: 2024-06-24
Payer: MEDICARE

## 2024-06-24 LAB
RAD ONC ARIA COURSE ID: NORMAL
RAD ONC ARIA COURSE LAST TREATMENT DATE: NORMAL
RAD ONC ARIA COURSE START DATE: NORMAL
RAD ONC ARIA COURSE TREATMENT ELAPSED DAYS: 18
RAD ONC ARIA FIRST TREATMENT DATE: NORMAL
RAD ONC ARIA PLAN FRACTIONS TREATED TO DATE: 13
RAD ONC ARIA PLAN ID: NORMAL
RAD ONC ARIA PLAN PRESCRIBED DOSE PER FRACTION: 1.8 GY
RAD ONC ARIA PLAN PRIMARY REFERENCE POINT: NORMAL
RAD ONC ARIA PLAN TOTAL FRACTIONS PRESCRIBED: 28
RAD ONC ARIA PLAN TOTAL PRESCRIBED DOSE: 5040 CGY
RAD ONC ARIA REFERENCE POINT DOSAGE GIVEN TO DATE: 10.35 GY
RAD ONC ARIA REFERENCE POINT DOSAGE GIVEN TO DATE: 23.4 GY
RAD ONC ARIA REFERENCE POINT ID: NORMAL
RAD ONC ARIA REFERENCE POINT ID: NORMAL
RAD ONC ARIA REFERENCE POINT SESSION DOSAGE GIVEN: 0.8 GY
RAD ONC ARIA REFERENCE POINT SESSION DOSAGE GIVEN: 1.8 GY

## 2024-06-24 PROCEDURE — 77386: CPT | Performed by: RADIOLOGY

## 2024-06-25 ENCOUNTER — HOSPITAL ENCOUNTER (OUTPATIENT)
Dept: RADIATION ONCOLOGY | Facility: HOSPITAL | Age: 69
Discharge: HOME | End: 2024-06-25

## 2024-06-25 ENCOUNTER — OFFICE VISIT (OUTPATIENT)
Age: 69
End: 2024-06-25
Payer: MEDICARE

## 2024-06-25 ENCOUNTER — APPOINTMENT (OUTPATIENT)
Dept: RADIATION ONCOLOGY | Facility: HOSPITAL | Age: 69
End: 2024-06-25
Payer: MEDICARE

## 2024-06-25 VITALS — HEART RATE: 90 BPM | DIASTOLIC BLOOD PRESSURE: 73 MMHG | SYSTOLIC BLOOD PRESSURE: 133 MMHG | TEMPERATURE: 97.4 F

## 2024-06-25 DIAGNOSIS — C44.329 SQUAMOUS CELL CANCER OF SKIN OF JAWLINE: ICD-10-CM

## 2024-06-25 DIAGNOSIS — C44.222 SQUAMOUS CELL CARCINOMA, EAR, RIGHT: Primary | ICD-10-CM

## 2024-06-25 LAB
RAD ONC ARIA COURSE ID: NORMAL
RAD ONC ARIA COURSE LAST TREATMENT DATE: NORMAL
RAD ONC ARIA COURSE START DATE: NORMAL
RAD ONC ARIA COURSE TREATMENT ELAPSED DAYS: 19
RAD ONC ARIA FIRST TREATMENT DATE: NORMAL
RAD ONC ARIA PLAN FRACTIONS TREATED TO DATE: 14
RAD ONC ARIA PLAN ID: NORMAL
RAD ONC ARIA PLAN PRESCRIBED DOSE PER FRACTION: 1.8 GY
RAD ONC ARIA PLAN PRIMARY REFERENCE POINT: NORMAL
RAD ONC ARIA PLAN TOTAL FRACTIONS PRESCRIBED: 28
RAD ONC ARIA PLAN TOTAL PRESCRIBED DOSE: 5040 CGY
RAD ONC ARIA REFERENCE POINT DOSAGE GIVEN TO DATE: 11.14 GY
RAD ONC ARIA REFERENCE POINT DOSAGE GIVEN TO DATE: 25.2 GY
RAD ONC ARIA REFERENCE POINT ID: NORMAL
RAD ONC ARIA REFERENCE POINT ID: NORMAL
RAD ONC ARIA REFERENCE POINT SESSION DOSAGE GIVEN: 0.8 GY
RAD ONC ARIA REFERENCE POINT SESSION DOSAGE GIVEN: 1.8 GY

## 2024-06-25 PROCEDURE — 3075F SYST BP GE 130 - 139MM HG: CPT | Performed by: NURSE PRACTITIONER

## 2024-06-25 PROCEDURE — 77386: CPT | Performed by: RADIOLOGY

## 2024-06-25 PROCEDURE — 1160F RVW MEDS BY RX/DR IN RCRD: CPT | Performed by: NURSE PRACTITIONER

## 2024-06-25 PROCEDURE — 99024 POSTOP FOLLOW-UP VISIT: CPT | Performed by: NURSE PRACTITIONER

## 2024-06-25 PROCEDURE — 1159F MED LIST DOCD IN RCRD: CPT | Performed by: NURSE PRACTITIONER

## 2024-06-25 PROCEDURE — 3078F DIAST BP <80 MM HG: CPT | Performed by: NURSE PRACTITIONER

## 2024-06-25 NOTE — PROGRESS NOTES
KEMAR Reyna  [facility]  [address], [address]  Phone: [phone]  Fax: [phone]     PRIMARY CARE PROVIDER: Jeremy Correa MD  REFERRING PROVIDER: No ref. provider found    Chief Complaint   Patient presents with    Follow-up     Follow up on exc of Scc to right ear. Surgery on 24       Subjective   History of Present Illness:  Nikita Bhardwaj is a  68 y.o. 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 Postoperatively, he developed a left cheek and neck hematoma that was drained on 3/20/2024. Most recently, he is status post pedicle division and flap inset of right ear with unilateral advancement flap of the right postauricular defect on 2024.     Pathology of the right ear demonstrated moderately differentiated squamous cell carcinoma with clear but close margins.  Pathology of the left cheek demonstrated poorly differentiated squamous cell carcinoma with perineural invasion.  Margins were clear.  Left neck lymph node was negative for metastatic carcinoma.  He has been evaluated by Dr. Sandoval for radiation therapy.  He has completed 13 of 33 treatments.      Review of Systems:  Review of Systems   Constitutional:  Negative for chills, fatigue, fever and unexpected weight change.   HENT:  Negative for facial swelling.    Respiratory:  Negative for cough, chest tightness and shortness of breath.    Cardiovascular:  Negative for chest pain.   Musculoskeletal:  Negative for neck pain.   Skin:  Negative for color change.   Neurological:  Negative for facial asymmetry.   Hematological:  Negative for adenopathy. Does not bruise/bleed easily.       Past  History:  Past Medical History:   Diagnosis Date    Diabetes mellitus     History of transfusion     w/o Adverse reaction    Hypertension     Polycythemia vera     SCC (squamous cell carcinoma), ear, right 03/2024    & Right jawline    Sleep apnea     Does Not have CPAP    Stomach ulcer     SVT (supraventricular tachycardia)      Past Surgical History:   Procedure Laterality Date    CERVICAL FUSION      COLONOSCOPY      ENDOSCOPY      HEMATOMA EVACUATION HEAD/NECK Left 3/20/2024    Procedure: HEMATOMA EVACUATION HEAD NECK;  Surgeon: Noam Larry MD;  Location:  [facility] OR;  Service: ENT;  Laterality: Left;    KIDNEY STONE SURGERY      MOHS CLOSURE Right 4/9/2024    Procedure: Pedicle division and flap closure of the right postauricular defect and full-thickness skin graft;  Surgeon: Noam Larry MD;  Location:  [facility] OR;  Service: ENT;  Laterality: Right;    SKIN LESION EXCISION Bilateral 3/18/2024    Procedure: 1) Radical excision of skin cancer of the left jawline with flap reconstruction 2) Excision of skin cancer of the right ear with flap reconstruction;  Surgeon: Noam Larry MD;  Location:  [facility] OR;  Service: ENT;  Laterality: Bilateral;     History reviewed. No pertinent family history.  Social History     Tobacco Use    Smoking status: Never    Smokeless tobacco: Never   Vaping Use    Vaping status: Never Used   Substance Use Topics    Alcohol use: No    Drug use: Never     Allergies:  Patient has no known allergies.    Current Outpatient Medications:     allopurinol (ZYLOPRIM) 300 MG tablet, Take 1 tablet by mouth Daily., Disp: , Rfl:     hydroxyurea (HYDREA) 500 MG capsule, Take 2 capsules by mouth Every Other Day., Disp: , Rfl:     hydroxyurea (HYDREA) 500 MG capsule, Take 1 capsule by mouth Daily (Monday-Friday)., Disp: , Rfl:     metFORMIN (GLUCOPHAGE) 500 MG tablet, Take 2 tablets by mouth 2 (Two) Times a Day With Meals., Disp: , Rfl:     metoprolol succinate XL (TOPROL-XL) 25 MG 24 hr  tablet, Take 1 tablet by mouth Every Morning., Disp: , Rfl:       Objective     Vital Signs:  Temp:  [97.4 °F (36.3 °C)] 97.4 °F (36.3 °C)  Heart Rate:  [90] 90  BP: (133)/(73) 133/73    Physical Exam:  Physical Exam  Vitals reviewed.   Constitutional:       General: He is not in acute distress.     Appearance: He is well-developed.   HENT:      Head: Normocephalic and atraumatic.        Right Ear: External ear normal.      Left Ear: External ear normal.      Ears:        Mouth/Throat:      Lips: Pink.   Eyes:      General: Lids are normal.   Pulmonary:      Effort: Pulmonary effort is normal. No respiratory distress.      Breath sounds: No stridor.   Musculoskeletal:      Cervical back: Neck supple.   Neurological:      Mental Status: He is alert and oriented to person, place, and time.   Psychiatric:         Mood and Affect: Mood normal.         Speech: Speech normal.         Behavior: Behavior normal. Behavior is cooperative.         Results Review:         Assessment   Assessment:  1. Squamous cell carcinoma, ear, right    2. Squamous cell cancer of skin of jawline        Plan   Plan:  We will continue to closely monitor.    In order to optimize wound healing, it is beneficial to protect the incisions from sunlight for the first year after the procedure.  Sunlight exposure may cause a brown-red discoloration to the incisions, making them more obvious.  Use a sunscreen with titanium dioxide and/or zinc oxide as the active ingredient(s).  Utilize an SPF factor of at least 15.    Use a silicone-based wound cream to the incision daily to optimize wound healing.    It is important to follow-up with your primary care provider or dermatologist every 6 to 12 months for routine skin cancer surveillance.    No orders of the defined types were placed in this encounter.    There are no Patient Instructions on file for this visit.  Return in about 6 weeks (around 8/6/2024), or if symptoms worsen or fail to improve, for  Recheck.    My findings and recommendations were discussed and questions were answered.     Lucy Pritchard, KEMAR  06/25/24  17:47 CDT

## 2024-06-26 ENCOUNTER — HOSPITAL ENCOUNTER (OUTPATIENT)
Dept: RADIATION ONCOLOGY | Facility: HOSPITAL | Age: 69
Setting detail: RADIATION/ONCOLOGY SERIES
Discharge: HOME | End: 2024-06-26
Payer: MEDICARE

## 2024-06-26 LAB
RAD ONC ARIA COURSE ID: NORMAL
RAD ONC ARIA COURSE LAST TREATMENT DATE: NORMAL
RAD ONC ARIA COURSE START DATE: NORMAL
RAD ONC ARIA COURSE TREATMENT ELAPSED DAYS: 20
RAD ONC ARIA FIRST TREATMENT DATE: NORMAL
RAD ONC ARIA PLAN FRACTIONS TREATED TO DATE: 15
RAD ONC ARIA PLAN ID: NORMAL
RAD ONC ARIA PLAN PRESCRIBED DOSE PER FRACTION: 1.8 GY
RAD ONC ARIA PLAN PRIMARY REFERENCE POINT: NORMAL
RAD ONC ARIA PLAN TOTAL FRACTIONS PRESCRIBED: 28
RAD ONC ARIA PLAN TOTAL PRESCRIBED DOSE: 5040 CGY
RAD ONC ARIA REFERENCE POINT DOSAGE GIVEN TO DATE: 11.94 GY
RAD ONC ARIA REFERENCE POINT DOSAGE GIVEN TO DATE: 27 GY
RAD ONC ARIA REFERENCE POINT ID: NORMAL
RAD ONC ARIA REFERENCE POINT ID: NORMAL
RAD ONC ARIA REFERENCE POINT SESSION DOSAGE GIVEN: 0.8 GY
RAD ONC ARIA REFERENCE POINT SESSION DOSAGE GIVEN: 1.8 GY

## 2024-06-26 PROCEDURE — 77386: CPT | Performed by: RADIOLOGY

## 2024-06-27 ENCOUNTER — APPOINTMENT (OUTPATIENT)
Dept: RADIATION ONCOLOGY | Facility: HOSPITAL | Age: 69
End: 2024-06-27
Payer: MEDICARE

## 2024-06-27 PROCEDURE — 77336 RADIATION PHYSICS CONSULT: CPT | Performed by: RADIOLOGY

## 2024-06-28 ENCOUNTER — HOSPITAL ENCOUNTER (OUTPATIENT)
Dept: RADIATION ONCOLOGY | Facility: HOSPITAL | Age: 69
Setting detail: RADIATION/ONCOLOGY SERIES
Discharge: HOME | End: 2024-06-28
Payer: MEDICARE

## 2024-06-28 LAB
RAD ONC ARIA COURSE ID: NORMAL
RAD ONC ARIA COURSE LAST TREATMENT DATE: NORMAL
RAD ONC ARIA COURSE START DATE: NORMAL
RAD ONC ARIA COURSE TREATMENT ELAPSED DAYS: 22
RAD ONC ARIA FIRST TREATMENT DATE: NORMAL
RAD ONC ARIA PLAN FRACTIONS TREATED TO DATE: 16
RAD ONC ARIA PLAN ID: NORMAL
RAD ONC ARIA PLAN PRESCRIBED DOSE PER FRACTION: 1.8 GY
RAD ONC ARIA PLAN PRIMARY REFERENCE POINT: NORMAL
RAD ONC ARIA PLAN TOTAL FRACTIONS PRESCRIBED: 28
RAD ONC ARIA PLAN TOTAL PRESCRIBED DOSE: 5040 CGY
RAD ONC ARIA REFERENCE POINT DOSAGE GIVEN TO DATE: 12.74 GY
RAD ONC ARIA REFERENCE POINT DOSAGE GIVEN TO DATE: 28.8 GY
RAD ONC ARIA REFERENCE POINT ID: NORMAL
RAD ONC ARIA REFERENCE POINT ID: NORMAL
RAD ONC ARIA REFERENCE POINT SESSION DOSAGE GIVEN: 0.8 GY
RAD ONC ARIA REFERENCE POINT SESSION DOSAGE GIVEN: 1.8 GY

## 2024-06-28 PROCEDURE — 77386: CPT | Performed by: RADIOLOGY

## 2024-07-01 ENCOUNTER — HOSPITAL ENCOUNTER (OUTPATIENT)
Dept: RADIATION ONCOLOGY | Facility: HOSPITAL | Age: 69
Setting detail: RADIATION/ONCOLOGY SERIES
End: 2024-07-01
Payer: MEDICARE

## 2024-07-01 ENCOUNTER — HOSPITAL ENCOUNTER (OUTPATIENT)
Dept: INFUSION THERAPY | Age: 69
Discharge: HOME | End: 2024-07-01
Payer: MEDICARE

## 2024-07-01 ENCOUNTER — HOSPITAL ENCOUNTER (OUTPATIENT)
Dept: RADIATION ONCOLOGY | Facility: HOSPITAL | Age: 69
Setting detail: RADIATION/ONCOLOGY SERIES
Discharge: HOME | End: 2024-07-01
Payer: MEDICARE

## 2024-07-01 DIAGNOSIS — D45 POLYCYTHEMIA VERA (HCC): ICD-10-CM

## 2024-07-01 LAB
BASOPHILS # BLD: 0.6 K/UL (ref 0.01–0.08)
BASOPHILS NFR BLD: 1.5 % (ref 0.1–1.2)
EOSINOPHIL # BLD: 0.68 K/UL (ref 0.04–0.54)
EOSINOPHIL NFR BLD: 1.7 % (ref 0.7–7)
ERYTHROCYTE [DISTWIDTH] IN BLOOD BY AUTOMATED COUNT: 20.7 % (ref 11.6–14.4)
HCT VFR BLD AUTO: 37.8 % (ref 40.1–51)
HGB BLD-MCNC: 11.1 G/DL (ref 13.7–17.5)
LYMPHOCYTES # BLD: 1.33 K/UL (ref 1.18–3.74)
LYMPHOCYTES NFR BLD: 3.3 % (ref 19.3–53.1)
MCH RBC QN AUTO: 23.1 PG (ref 25.7–32.2)
MCHC RBC AUTO-ENTMCNC: 29.4 G/DL (ref 32.3–36.5)
MCV RBC AUTO: 78.6 FL (ref 79–92.2)
MONOCYTES # BLD: 0.57 K/UL (ref 0.24–0.82)
MONOCYTES NFR BLD: 1.4 % (ref 4.7–12.5)
NEUTROPHILS # BLD: 35.53 K/UL (ref 1.56–6.13)
NEUTS SEG NFR BLD: 87.9 % (ref 34–71.1)
PLATELET # BLD AUTO: 340 K/UL (ref 163–337)
RAD ONC ARIA COURSE ID: NORMAL
RAD ONC ARIA COURSE LAST TREATMENT DATE: NORMAL
RAD ONC ARIA COURSE START DATE: NORMAL
RAD ONC ARIA COURSE TREATMENT ELAPSED DAYS: 25
RAD ONC ARIA FIRST TREATMENT DATE: NORMAL
RAD ONC ARIA PLAN FRACTIONS TREATED TO DATE: 17
RAD ONC ARIA PLAN ID: NORMAL
RAD ONC ARIA PLAN PRESCRIBED DOSE PER FRACTION: 1.8 GY
RAD ONC ARIA PLAN PRIMARY REFERENCE POINT: NORMAL
RAD ONC ARIA PLAN TOTAL FRACTIONS PRESCRIBED: 28
RAD ONC ARIA PLAN TOTAL PRESCRIBED DOSE: 5040 CGY
RAD ONC ARIA REFERENCE POINT DOSAGE GIVEN TO DATE: 13.53 GY
RAD ONC ARIA REFERENCE POINT DOSAGE GIVEN TO DATE: 30.6 GY
RAD ONC ARIA REFERENCE POINT ID: NORMAL
RAD ONC ARIA REFERENCE POINT ID: NORMAL
RAD ONC ARIA REFERENCE POINT SESSION DOSAGE GIVEN: 0.8 GY
RAD ONC ARIA REFERENCE POINT SESSION DOSAGE GIVEN: 1.8 GY
RBC # BLD AUTO: 4.81 M/UL (ref 4.63–6.08)
WBC # BLD AUTO: 40.4 K/UL (ref 4.23–9.07)

## 2024-07-01 PROCEDURE — 36415 COLL VENOUS BLD VENIPUNCTURE: CPT

## 2024-07-01 PROCEDURE — 77386: CPT | Performed by: RADIOLOGY

## 2024-07-01 PROCEDURE — 85025 COMPLETE CBC W/AUTO DIFF WBC: CPT

## 2024-07-02 ENCOUNTER — HOSPITAL ENCOUNTER (OUTPATIENT)
Dept: RADIATION ONCOLOGY | Facility: HOSPITAL | Age: 69
Setting detail: RADIATION/ONCOLOGY SERIES
Discharge: HOME | End: 2024-07-02
Payer: MEDICARE

## 2024-07-02 LAB
RAD ONC ARIA COURSE ID: NORMAL
RAD ONC ARIA COURSE LAST TREATMENT DATE: NORMAL
RAD ONC ARIA COURSE START DATE: NORMAL
RAD ONC ARIA COURSE TREATMENT ELAPSED DAYS: 26
RAD ONC ARIA FIRST TREATMENT DATE: NORMAL
RAD ONC ARIA PLAN FRACTIONS TREATED TO DATE: 18
RAD ONC ARIA PLAN ID: NORMAL
RAD ONC ARIA PLAN PRESCRIBED DOSE PER FRACTION: 1.8 GY
RAD ONC ARIA PLAN PRIMARY REFERENCE POINT: NORMAL
RAD ONC ARIA PLAN TOTAL FRACTIONS PRESCRIBED: 28
RAD ONC ARIA PLAN TOTAL PRESCRIBED DOSE: 5040 CGY
RAD ONC ARIA REFERENCE POINT DOSAGE GIVEN TO DATE: 14.33 GY
RAD ONC ARIA REFERENCE POINT DOSAGE GIVEN TO DATE: 32.4 GY
RAD ONC ARIA REFERENCE POINT ID: NORMAL
RAD ONC ARIA REFERENCE POINT ID: NORMAL
RAD ONC ARIA REFERENCE POINT SESSION DOSAGE GIVEN: 0.8 GY
RAD ONC ARIA REFERENCE POINT SESSION DOSAGE GIVEN: 1.8 GY

## 2024-07-02 PROCEDURE — 77386: CPT | Performed by: RADIOLOGY

## 2024-07-03 ENCOUNTER — HOSPITAL ENCOUNTER (OUTPATIENT)
Dept: RADIATION ONCOLOGY | Facility: HOSPITAL | Age: 69
Setting detail: RADIATION/ONCOLOGY SERIES
Discharge: HOME | End: 2024-07-03

## 2024-07-03 LAB
RAD ONC ARIA COURSE ID: NORMAL
RAD ONC ARIA COURSE LAST TREATMENT DATE: NORMAL
RAD ONC ARIA COURSE START DATE: NORMAL
RAD ONC ARIA COURSE TREATMENT ELAPSED DAYS: 27
RAD ONC ARIA FIRST TREATMENT DATE: NORMAL
RAD ONC ARIA PLAN FRACTIONS TREATED TO DATE: 19
RAD ONC ARIA PLAN ID: NORMAL
RAD ONC ARIA PLAN PRESCRIBED DOSE PER FRACTION: 1.8 GY
RAD ONC ARIA PLAN PRIMARY REFERENCE POINT: NORMAL
RAD ONC ARIA PLAN TOTAL FRACTIONS PRESCRIBED: 28
RAD ONC ARIA PLAN TOTAL PRESCRIBED DOSE: 5040 CGY
RAD ONC ARIA REFERENCE POINT DOSAGE GIVEN TO DATE: 15.12 GY
RAD ONC ARIA REFERENCE POINT DOSAGE GIVEN TO DATE: 34.2 GY
RAD ONC ARIA REFERENCE POINT ID: NORMAL
RAD ONC ARIA REFERENCE POINT ID: NORMAL
RAD ONC ARIA REFERENCE POINT SESSION DOSAGE GIVEN: 0.8 GY
RAD ONC ARIA REFERENCE POINT SESSION DOSAGE GIVEN: 1.8 GY

## 2024-07-03 PROCEDURE — 77386: CPT | Performed by: RADIOLOGY

## 2024-07-05 ENCOUNTER — HOSPITAL ENCOUNTER (OUTPATIENT)
Dept: RADIATION ONCOLOGY | Facility: HOSPITAL | Age: 69
Setting detail: RADIATION/ONCOLOGY SERIES
Discharge: HOME | End: 2024-07-05

## 2024-07-05 LAB
RAD ONC ARIA COURSE ID: NORMAL
RAD ONC ARIA COURSE LAST TREATMENT DATE: NORMAL
RAD ONC ARIA COURSE START DATE: NORMAL
RAD ONC ARIA COURSE TREATMENT ELAPSED DAYS: 29
RAD ONC ARIA FIRST TREATMENT DATE: NORMAL
RAD ONC ARIA PLAN FRACTIONS TREATED TO DATE: 20
RAD ONC ARIA PLAN ID: NORMAL
RAD ONC ARIA PLAN PRESCRIBED DOSE PER FRACTION: 1.8 GY
RAD ONC ARIA PLAN PRIMARY REFERENCE POINT: NORMAL
RAD ONC ARIA PLAN TOTAL FRACTIONS PRESCRIBED: 28
RAD ONC ARIA PLAN TOTAL PRESCRIBED DOSE: 5040 CGY
RAD ONC ARIA REFERENCE POINT DOSAGE GIVEN TO DATE: 15.92 GY
RAD ONC ARIA REFERENCE POINT DOSAGE GIVEN TO DATE: 36 GY
RAD ONC ARIA REFERENCE POINT ID: NORMAL
RAD ONC ARIA REFERENCE POINT ID: NORMAL
RAD ONC ARIA REFERENCE POINT SESSION DOSAGE GIVEN: 0.8 GY
RAD ONC ARIA REFERENCE POINT SESSION DOSAGE GIVEN: 1.8 GY

## 2024-07-05 PROCEDURE — 77386: CPT | Performed by: RADIOLOGY

## 2024-07-05 PROCEDURE — 77336 RADIATION PHYSICS CONSULT: CPT | Performed by: RADIOLOGY

## 2024-07-08 ENCOUNTER — HOSPITAL ENCOUNTER (OUTPATIENT)
Dept: RADIATION ONCOLOGY | Facility: HOSPITAL | Age: 69
Discharge: HOME | End: 2024-07-08
Payer: MEDICARE

## 2024-07-08 LAB
RAD ONC ARIA COURSE ID: NORMAL
RAD ONC ARIA COURSE LAST TREATMENT DATE: NORMAL
RAD ONC ARIA COURSE START DATE: NORMAL
RAD ONC ARIA COURSE TREATMENT ELAPSED DAYS: 32
RAD ONC ARIA FIRST TREATMENT DATE: NORMAL
RAD ONC ARIA PLAN FRACTIONS TREATED TO DATE: 21
RAD ONC ARIA PLAN ID: NORMAL
RAD ONC ARIA PLAN PRESCRIBED DOSE PER FRACTION: 1.8 GY
RAD ONC ARIA PLAN PRIMARY REFERENCE POINT: NORMAL
RAD ONC ARIA PLAN TOTAL FRACTIONS PRESCRIBED: 28
RAD ONC ARIA PLAN TOTAL PRESCRIBED DOSE: 5040 CGY
RAD ONC ARIA REFERENCE POINT DOSAGE GIVEN TO DATE: 16.72 GY
RAD ONC ARIA REFERENCE POINT DOSAGE GIVEN TO DATE: 37.8 GY
RAD ONC ARIA REFERENCE POINT ID: NORMAL
RAD ONC ARIA REFERENCE POINT ID: NORMAL
RAD ONC ARIA REFERENCE POINT SESSION DOSAGE GIVEN: 0.8 GY
RAD ONC ARIA REFERENCE POINT SESSION DOSAGE GIVEN: 1.8 GY

## 2024-07-08 PROCEDURE — 77386: CPT | Performed by: RADIOLOGY

## 2024-07-10 ENCOUNTER — HOSPITAL ENCOUNTER (OUTPATIENT)
Dept: RADIATION ONCOLOGY | Facility: HOSPITAL | Age: 69
Discharge: HOME | End: 2024-07-10

## 2024-07-10 LAB
RAD ONC ARIA COURSE ID: NORMAL
RAD ONC ARIA COURSE LAST TREATMENT DATE: NORMAL
RAD ONC ARIA COURSE START DATE: NORMAL
RAD ONC ARIA COURSE TREATMENT ELAPSED DAYS: 34
RAD ONC ARIA FIRST TREATMENT DATE: NORMAL
RAD ONC ARIA PLAN FRACTIONS TREATED TO DATE: 22
RAD ONC ARIA PLAN ID: NORMAL
RAD ONC ARIA PLAN PRESCRIBED DOSE PER FRACTION: 1.8 GY
RAD ONC ARIA PLAN PRIMARY REFERENCE POINT: NORMAL
RAD ONC ARIA PLAN TOTAL FRACTIONS PRESCRIBED: 28
RAD ONC ARIA PLAN TOTAL PRESCRIBED DOSE: 5040 CGY
RAD ONC ARIA REFERENCE POINT DOSAGE GIVEN TO DATE: 17.51 GY
RAD ONC ARIA REFERENCE POINT DOSAGE GIVEN TO DATE: 39.6 GY
RAD ONC ARIA REFERENCE POINT ID: NORMAL
RAD ONC ARIA REFERENCE POINT ID: NORMAL
RAD ONC ARIA REFERENCE POINT SESSION DOSAGE GIVEN: 0.8 GY
RAD ONC ARIA REFERENCE POINT SESSION DOSAGE GIVEN: 1.8 GY

## 2024-07-10 PROCEDURE — 77386: CPT | Performed by: RADIOLOGY

## 2024-07-11 ENCOUNTER — HOSPITAL ENCOUNTER (OUTPATIENT)
Dept: RADIATION ONCOLOGY | Facility: HOSPITAL | Age: 69
Setting detail: RADIATION/ONCOLOGY SERIES
Discharge: HOME | End: 2024-07-11
Payer: MEDICARE

## 2024-07-11 LAB
RAD ONC ARIA COURSE ID: NORMAL
RAD ONC ARIA COURSE LAST TREATMENT DATE: NORMAL
RAD ONC ARIA COURSE START DATE: NORMAL
RAD ONC ARIA COURSE TREATMENT ELAPSED DAYS: 35
RAD ONC ARIA FIRST TREATMENT DATE: NORMAL
RAD ONC ARIA PLAN FRACTIONS TREATED TO DATE: 23
RAD ONC ARIA PLAN ID: NORMAL
RAD ONC ARIA PLAN PRESCRIBED DOSE PER FRACTION: 1.8 GY
RAD ONC ARIA PLAN PRIMARY REFERENCE POINT: NORMAL
RAD ONC ARIA PLAN TOTAL FRACTIONS PRESCRIBED: 28
RAD ONC ARIA PLAN TOTAL PRESCRIBED DOSE: 5040 CGY
RAD ONC ARIA REFERENCE POINT DOSAGE GIVEN TO DATE: 18.31 GY
RAD ONC ARIA REFERENCE POINT DOSAGE GIVEN TO DATE: 41.4 GY
RAD ONC ARIA REFERENCE POINT ID: NORMAL
RAD ONC ARIA REFERENCE POINT ID: NORMAL
RAD ONC ARIA REFERENCE POINT SESSION DOSAGE GIVEN: 0.8 GY
RAD ONC ARIA REFERENCE POINT SESSION DOSAGE GIVEN: 1.8 GY

## 2024-07-11 PROCEDURE — 77386: CPT | Performed by: RADIOLOGY

## 2024-07-11 PROCEDURE — 77336 RADIATION PHYSICS CONSULT: CPT | Performed by: RADIOLOGY

## 2024-07-12 ENCOUNTER — HOSPITAL ENCOUNTER (OUTPATIENT)
Dept: RADIATION ONCOLOGY | Facility: HOSPITAL | Age: 69
Discharge: HOME | End: 2024-07-12

## 2024-07-12 LAB
RAD ONC ARIA COURSE ID: NORMAL
RAD ONC ARIA COURSE LAST TREATMENT DATE: NORMAL
RAD ONC ARIA COURSE START DATE: NORMAL
RAD ONC ARIA COURSE TREATMENT ELAPSED DAYS: 36
RAD ONC ARIA FIRST TREATMENT DATE: NORMAL
RAD ONC ARIA PLAN FRACTIONS TREATED TO DATE: 24
RAD ONC ARIA PLAN ID: NORMAL
RAD ONC ARIA PLAN PRESCRIBED DOSE PER FRACTION: 1.8 GY
RAD ONC ARIA PLAN PRIMARY REFERENCE POINT: NORMAL
RAD ONC ARIA PLAN TOTAL FRACTIONS PRESCRIBED: 28
RAD ONC ARIA PLAN TOTAL PRESCRIBED DOSE: 5040 CGY
RAD ONC ARIA REFERENCE POINT DOSAGE GIVEN TO DATE: 19.1 GY
RAD ONC ARIA REFERENCE POINT DOSAGE GIVEN TO DATE: 43.2 GY
RAD ONC ARIA REFERENCE POINT ID: NORMAL
RAD ONC ARIA REFERENCE POINT ID: NORMAL
RAD ONC ARIA REFERENCE POINT SESSION DOSAGE GIVEN: 0.8 GY
RAD ONC ARIA REFERENCE POINT SESSION DOSAGE GIVEN: 1.8 GY

## 2024-07-12 PROCEDURE — 77386: CPT | Performed by: RADIOLOGY

## 2024-07-15 ENCOUNTER — HOSPITAL ENCOUNTER (OUTPATIENT)
Dept: RADIATION ONCOLOGY | Facility: HOSPITAL | Age: 69
Setting detail: RADIATION/ONCOLOGY SERIES
Discharge: HOME | End: 2024-07-15
Payer: MEDICARE

## 2024-07-22 ENCOUNTER — HOSPITAL ENCOUNTER (OUTPATIENT)
Dept: RADIATION ONCOLOGY | Facility: HOSPITAL | Age: 69
Setting detail: RADIATION/ONCOLOGY SERIES
Discharge: HOME | End: 2024-07-22
Payer: MEDICARE

## 2024-07-22 LAB
RAD ONC ARIA COURSE ID: NORMAL
RAD ONC ARIA COURSE LAST TREATMENT DATE: NORMAL
RAD ONC ARIA COURSE START DATE: NORMAL
RAD ONC ARIA COURSE TREATMENT ELAPSED DAYS: 46
RAD ONC ARIA FIRST TREATMENT DATE: NORMAL
RAD ONC ARIA PLAN FRACTIONS TREATED TO DATE: 25
RAD ONC ARIA PLAN ID: NORMAL
RAD ONC ARIA PLAN PRESCRIBED DOSE PER FRACTION: 1.8 GY
RAD ONC ARIA PLAN PRIMARY REFERENCE POINT: NORMAL
RAD ONC ARIA PLAN TOTAL FRACTIONS PRESCRIBED: 28
RAD ONC ARIA PLAN TOTAL PRESCRIBED DOSE: 5040 CGY
RAD ONC ARIA REFERENCE POINT DOSAGE GIVEN TO DATE: 19.9 GY
RAD ONC ARIA REFERENCE POINT DOSAGE GIVEN TO DATE: 45 GY
RAD ONC ARIA REFERENCE POINT ID: NORMAL
RAD ONC ARIA REFERENCE POINT ID: NORMAL
RAD ONC ARIA REFERENCE POINT SESSION DOSAGE GIVEN: 0.8 GY
RAD ONC ARIA REFERENCE POINT SESSION DOSAGE GIVEN: 1.8 GY

## 2024-07-22 PROCEDURE — 77386: CPT | Performed by: RADIOLOGY

## 2024-07-23 ENCOUNTER — HOSPITAL ENCOUNTER (OUTPATIENT)
Dept: RADIATION ONCOLOGY | Facility: HOSPITAL | Age: 69
Setting detail: RADIATION/ONCOLOGY SERIES
Discharge: HOME | End: 2024-07-23
Payer: MEDICARE

## 2024-07-23 LAB
RAD ONC ARIA COURSE ID: NORMAL
RAD ONC ARIA COURSE LAST TREATMENT DATE: NORMAL
RAD ONC ARIA COURSE START DATE: NORMAL
RAD ONC ARIA COURSE TREATMENT ELAPSED DAYS: 47
RAD ONC ARIA FIRST TREATMENT DATE: NORMAL
RAD ONC ARIA PLAN FRACTIONS TREATED TO DATE: 26
RAD ONC ARIA PLAN ID: NORMAL
RAD ONC ARIA PLAN PRESCRIBED DOSE PER FRACTION: 1.8 GY
RAD ONC ARIA PLAN PRIMARY REFERENCE POINT: NORMAL
RAD ONC ARIA PLAN TOTAL FRACTIONS PRESCRIBED: 28
RAD ONC ARIA PLAN TOTAL PRESCRIBED DOSE: 5040 CGY
RAD ONC ARIA REFERENCE POINT DOSAGE GIVEN TO DATE: 20.7 GY
RAD ONC ARIA REFERENCE POINT DOSAGE GIVEN TO DATE: 46.8 GY
RAD ONC ARIA REFERENCE POINT ID: NORMAL
RAD ONC ARIA REFERENCE POINT ID: NORMAL
RAD ONC ARIA REFERENCE POINT SESSION DOSAGE GIVEN: 0.8 GY
RAD ONC ARIA REFERENCE POINT SESSION DOSAGE GIVEN: 1.8 GY

## 2024-07-23 PROCEDURE — 77386: CPT | Performed by: RADIOLOGY

## 2024-07-24 ENCOUNTER — HOSPITAL ENCOUNTER (OUTPATIENT)
Dept: RADIATION ONCOLOGY | Facility: HOSPITAL | Age: 69
Setting detail: RADIATION/ONCOLOGY SERIES
Discharge: HOME | End: 2024-07-24
Payer: MEDICARE

## 2024-07-24 LAB
RAD ONC ARIA COURSE ID: NORMAL
RAD ONC ARIA COURSE LAST TREATMENT DATE: NORMAL
RAD ONC ARIA COURSE START DATE: NORMAL
RAD ONC ARIA COURSE TREATMENT ELAPSED DAYS: 48
RAD ONC ARIA FIRST TREATMENT DATE: NORMAL
RAD ONC ARIA PLAN FRACTIONS TREATED TO DATE: 27
RAD ONC ARIA PLAN ID: NORMAL
RAD ONC ARIA PLAN PRESCRIBED DOSE PER FRACTION: 1.8 GY
RAD ONC ARIA PLAN PRIMARY REFERENCE POINT: NORMAL
RAD ONC ARIA PLAN TOTAL FRACTIONS PRESCRIBED: 28
RAD ONC ARIA PLAN TOTAL PRESCRIBED DOSE: 5040 CGY
RAD ONC ARIA REFERENCE POINT DOSAGE GIVEN TO DATE: 21.49 GY
RAD ONC ARIA REFERENCE POINT DOSAGE GIVEN TO DATE: 48.6 GY
RAD ONC ARIA REFERENCE POINT ID: NORMAL
RAD ONC ARIA REFERENCE POINT ID: NORMAL
RAD ONC ARIA REFERENCE POINT SESSION DOSAGE GIVEN: 0.8 GY
RAD ONC ARIA REFERENCE POINT SESSION DOSAGE GIVEN: 1.8 GY

## 2024-07-24 PROCEDURE — 77386: CPT | Performed by: RADIOLOGY

## 2024-07-25 ENCOUNTER — HOSPITAL ENCOUNTER (OUTPATIENT)
Dept: RADIATION ONCOLOGY | Facility: HOSPITAL | Age: 69
Setting detail: RADIATION/ONCOLOGY SERIES
Discharge: HOME | End: 2024-07-25
Payer: MEDICARE

## 2024-07-25 LAB
RAD ONC ARIA COURSE ID: NORMAL
RAD ONC ARIA COURSE LAST TREATMENT DATE: NORMAL
RAD ONC ARIA COURSE START DATE: NORMAL
RAD ONC ARIA COURSE TREATMENT ELAPSED DAYS: 49
RAD ONC ARIA FIRST TREATMENT DATE: NORMAL
RAD ONC ARIA PLAN FRACTIONS TREATED TO DATE: 28
RAD ONC ARIA PLAN ID: NORMAL
RAD ONC ARIA PLAN PRESCRIBED DOSE PER FRACTION: 1.8 GY
RAD ONC ARIA PLAN PRIMARY REFERENCE POINT: NORMAL
RAD ONC ARIA PLAN TOTAL FRACTIONS PRESCRIBED: 28
RAD ONC ARIA PLAN TOTAL PRESCRIBED DOSE: 5040 CGY
RAD ONC ARIA REFERENCE POINT DOSAGE GIVEN TO DATE: 22.29 GY
RAD ONC ARIA REFERENCE POINT DOSAGE GIVEN TO DATE: 50.4 GY
RAD ONC ARIA REFERENCE POINT ID: NORMAL
RAD ONC ARIA REFERENCE POINT ID: NORMAL
RAD ONC ARIA REFERENCE POINT SESSION DOSAGE GIVEN: 0.8 GY
RAD ONC ARIA REFERENCE POINT SESSION DOSAGE GIVEN: 1.8 GY

## 2024-07-25 PROCEDURE — 77386: CPT | Performed by: RADIOLOGY

## 2024-07-25 PROCEDURE — 77336 RADIATION PHYSICS CONSULT: CPT | Performed by: RADIOLOGY

## 2024-07-26 ENCOUNTER — HOSPITAL ENCOUNTER (OUTPATIENT)
Dept: RADIATION ONCOLOGY | Facility: HOSPITAL | Age: 69
Discharge: HOME | End: 2024-07-26

## 2024-07-26 LAB
RAD ONC ARIA COURSE ID: NORMAL
RAD ONC ARIA COURSE LAST TREATMENT DATE: NORMAL
RAD ONC ARIA COURSE START DATE: NORMAL
RAD ONC ARIA COURSE TREATMENT ELAPSED DAYS: 50
RAD ONC ARIA FIRST TREATMENT DATE: NORMAL
RAD ONC ARIA PLAN FRACTIONS TREATED TO DATE: 1
RAD ONC ARIA PLAN ID: NORMAL
RAD ONC ARIA PLAN PRESCRIBED DOSE PER FRACTION: 2 GY
RAD ONC ARIA PLAN PRIMARY REFERENCE POINT: NORMAL
RAD ONC ARIA PLAN TOTAL FRACTIONS PRESCRIBED: 5
RAD ONC ARIA PLAN TOTAL PRESCRIBED DOSE: 1000 CGY
RAD ONC ARIA REFERENCE POINT DOSAGE GIVEN TO DATE: 23.22 GY
RAD ONC ARIA REFERENCE POINT DOSAGE GIVEN TO DATE: 52.4 GY
RAD ONC ARIA REFERENCE POINT ID: NORMAL
RAD ONC ARIA REFERENCE POINT ID: NORMAL
RAD ONC ARIA REFERENCE POINT SESSION DOSAGE GIVEN: 0.93 GY
RAD ONC ARIA REFERENCE POINT SESSION DOSAGE GIVEN: 2 GY

## 2024-07-26 PROCEDURE — 77386: CPT | Performed by: RADIOLOGY

## 2024-07-29 ENCOUNTER — HOSPITAL ENCOUNTER (OUTPATIENT)
Dept: RADIATION ONCOLOGY | Facility: HOSPITAL | Age: 69
Discharge: HOME | End: 2024-07-29
Payer: MEDICARE

## 2024-07-29 LAB
RAD ONC ARIA COURSE ID: NORMAL
RAD ONC ARIA COURSE LAST TREATMENT DATE: NORMAL
RAD ONC ARIA COURSE START DATE: NORMAL
RAD ONC ARIA COURSE TREATMENT ELAPSED DAYS: 53
RAD ONC ARIA FIRST TREATMENT DATE: NORMAL
RAD ONC ARIA PLAN FRACTIONS TREATED TO DATE: 2
RAD ONC ARIA PLAN ID: NORMAL
RAD ONC ARIA PLAN PRESCRIBED DOSE PER FRACTION: 2 GY
RAD ONC ARIA PLAN PRIMARY REFERENCE POINT: NORMAL
RAD ONC ARIA PLAN TOTAL FRACTIONS PRESCRIBED: 5
RAD ONC ARIA PLAN TOTAL PRESCRIBED DOSE: 1000 CGY
RAD ONC ARIA REFERENCE POINT DOSAGE GIVEN TO DATE: 24.15 GY
RAD ONC ARIA REFERENCE POINT DOSAGE GIVEN TO DATE: 54.4 GY
RAD ONC ARIA REFERENCE POINT ID: NORMAL
RAD ONC ARIA REFERENCE POINT ID: NORMAL
RAD ONC ARIA REFERENCE POINT SESSION DOSAGE GIVEN: 0.93 GY
RAD ONC ARIA REFERENCE POINT SESSION DOSAGE GIVEN: 2 GY

## 2024-07-29 PROCEDURE — 77386: CPT | Performed by: RADIOLOGY

## 2024-07-30 ENCOUNTER — HOSPITAL ENCOUNTER (OUTPATIENT)
Dept: RADIATION ONCOLOGY | Facility: HOSPITAL | Age: 69
Setting detail: RADIATION/ONCOLOGY SERIES
Discharge: HOME | End: 2024-07-30
Payer: MEDICARE

## 2024-07-30 LAB
RAD ONC ARIA COURSE ID: NORMAL
RAD ONC ARIA COURSE LAST TREATMENT DATE: NORMAL
RAD ONC ARIA COURSE START DATE: NORMAL
RAD ONC ARIA COURSE TREATMENT ELAPSED DAYS: 54
RAD ONC ARIA FIRST TREATMENT DATE: NORMAL
RAD ONC ARIA PLAN FRACTIONS TREATED TO DATE: 3
RAD ONC ARIA PLAN ID: NORMAL
RAD ONC ARIA PLAN PRESCRIBED DOSE PER FRACTION: 2 GY
RAD ONC ARIA PLAN PRIMARY REFERENCE POINT: NORMAL
RAD ONC ARIA PLAN TOTAL FRACTIONS PRESCRIBED: 5
RAD ONC ARIA PLAN TOTAL PRESCRIBED DOSE: 1000 CGY
RAD ONC ARIA REFERENCE POINT DOSAGE GIVEN TO DATE: 25.09 GY
RAD ONC ARIA REFERENCE POINT DOSAGE GIVEN TO DATE: 56.4 GY
RAD ONC ARIA REFERENCE POINT ID: NORMAL
RAD ONC ARIA REFERENCE POINT ID: NORMAL
RAD ONC ARIA REFERENCE POINT SESSION DOSAGE GIVEN: 0.93 GY
RAD ONC ARIA REFERENCE POINT SESSION DOSAGE GIVEN: 2 GY

## 2024-07-30 PROCEDURE — 77386: CPT | Performed by: RADIOLOGY

## 2024-07-31 ENCOUNTER — HOSPITAL ENCOUNTER (OUTPATIENT)
Dept: RADIATION ONCOLOGY | Facility: HOSPITAL | Age: 69
Discharge: HOME | End: 2024-07-31

## 2024-07-31 LAB
RAD ONC ARIA COURSE ID: NORMAL
RAD ONC ARIA COURSE LAST TREATMENT DATE: NORMAL
RAD ONC ARIA COURSE START DATE: NORMAL
RAD ONC ARIA COURSE TREATMENT ELAPSED DAYS: 55
RAD ONC ARIA FIRST TREATMENT DATE: NORMAL
RAD ONC ARIA PLAN FRACTIONS TREATED TO DATE: 4
RAD ONC ARIA PLAN ID: NORMAL
RAD ONC ARIA PLAN PRESCRIBED DOSE PER FRACTION: 2 GY
RAD ONC ARIA PLAN PRIMARY REFERENCE POINT: NORMAL
RAD ONC ARIA PLAN TOTAL FRACTIONS PRESCRIBED: 5
RAD ONC ARIA PLAN TOTAL PRESCRIBED DOSE: 1000 CGY
RAD ONC ARIA REFERENCE POINT DOSAGE GIVEN TO DATE: 26.02 GY
RAD ONC ARIA REFERENCE POINT DOSAGE GIVEN TO DATE: 58.4 GY
RAD ONC ARIA REFERENCE POINT ID: NORMAL
RAD ONC ARIA REFERENCE POINT ID: NORMAL
RAD ONC ARIA REFERENCE POINT SESSION DOSAGE GIVEN: 0.93 GY
RAD ONC ARIA REFERENCE POINT SESSION DOSAGE GIVEN: 2 GY

## 2024-07-31 PROCEDURE — 77386: CPT | Performed by: RADIOLOGY

## 2024-08-01 ENCOUNTER — HOSPITAL ENCOUNTER (OUTPATIENT)
Dept: RADIATION ONCOLOGY | Facility: HOSPITAL | Age: 69
Setting detail: RADIATION/ONCOLOGY SERIES
End: 2024-08-01
Payer: MEDICARE

## 2024-08-01 ENCOUNTER — HOSPITAL ENCOUNTER (OUTPATIENT)
Dept: RADIATION ONCOLOGY | Facility: HOSPITAL | Age: 69
Setting detail: RADIATION/ONCOLOGY SERIES
Discharge: HOME | End: 2024-08-01
Payer: MEDICARE

## 2024-08-01 LAB
RAD ONC ARIA COURSE ID: NORMAL
RAD ONC ARIA COURSE LAST TREATMENT DATE: NORMAL
RAD ONC ARIA COURSE START DATE: NORMAL
RAD ONC ARIA COURSE TREATMENT ELAPSED DAYS: 56
RAD ONC ARIA FIRST TREATMENT DATE: NORMAL
RAD ONC ARIA PLAN FRACTIONS TREATED TO DATE: 5
RAD ONC ARIA PLAN ID: NORMAL
RAD ONC ARIA PLAN PRESCRIBED DOSE PER FRACTION: 2 GY
RAD ONC ARIA PLAN PRIMARY REFERENCE POINT: NORMAL
RAD ONC ARIA PLAN TOTAL FRACTIONS PRESCRIBED: 5
RAD ONC ARIA PLAN TOTAL PRESCRIBED DOSE: 1000 CGY
RAD ONC ARIA REFERENCE POINT DOSAGE GIVEN TO DATE: 26.95 GY
RAD ONC ARIA REFERENCE POINT DOSAGE GIVEN TO DATE: 60.4 GY
RAD ONC ARIA REFERENCE POINT ID: NORMAL
RAD ONC ARIA REFERENCE POINT ID: NORMAL
RAD ONC ARIA REFERENCE POINT SESSION DOSAGE GIVEN: 0.93 GY
RAD ONC ARIA REFERENCE POINT SESSION DOSAGE GIVEN: 2 GY

## 2024-08-01 PROCEDURE — 77336 RADIATION PHYSICS CONSULT: CPT | Performed by: RADIOLOGY

## 2024-08-01 PROCEDURE — 77386: CPT | Performed by: RADIOLOGY

## 2024-08-12 ENCOUNTER — OFFICE VISIT (OUTPATIENT)
Dept: CARDIOLOGY CLINIC | Age: 69
End: 2024-08-12
Payer: MEDICARE

## 2024-08-12 VITALS
HEIGHT: 70 IN | DIASTOLIC BLOOD PRESSURE: 66 MMHG | BODY MASS INDEX: 22.9 KG/M2 | HEART RATE: 87 BPM | SYSTOLIC BLOOD PRESSURE: 128 MMHG | WEIGHT: 160 LBS

## 2024-08-12 DIAGNOSIS — I10 ESSENTIAL HYPERTENSION: Primary | ICD-10-CM

## 2024-08-12 DIAGNOSIS — I47.10 SVT (SUPRAVENTRICULAR TACHYCARDIA) (HCC): ICD-10-CM

## 2024-08-12 PROCEDURE — 3074F SYST BP LT 130 MM HG: CPT | Performed by: INTERNAL MEDICINE

## 2024-08-12 PROCEDURE — 99213 OFFICE O/P EST LOW 20 MIN: CPT | Performed by: INTERNAL MEDICINE

## 2024-08-12 PROCEDURE — 1123F ACP DISCUSS/DSCN MKR DOCD: CPT | Performed by: INTERNAL MEDICINE

## 2024-08-12 PROCEDURE — 3078F DIAST BP <80 MM HG: CPT | Performed by: INTERNAL MEDICINE

## 2024-08-12 ASSESSMENT — ENCOUNTER SYMPTOMS
EYES NEGATIVE: 1
DIARRHEA: 0
SHORTNESS OF BREATH: 0
NAUSEA: 0
GASTROINTESTINAL NEGATIVE: 1
RESPIRATORY NEGATIVE: 1
VOMITING: 0

## 2024-08-12 NOTE — PROGRESS NOTES
[facility]                            Date:  8/12/2024  Patient: Junaid Aguero  Age:  69 y.o., 1955      Reason for evaluation:         SUBJECTIVE:    Returns today follow-up assessment for SVT and hypertension overall doing well.  Denies chest pain palpitations dyspnea or other complaints tolerating all medications well.  Blood pressure 128/66 heart 87.    Review of Systems   Constitutional: Negative.  Negative for chills, fever and unexpected weight change.   HENT: Negative.     Eyes: Negative.    Respiratory: Negative.  Negative for shortness of breath.    Cardiovascular: Negative.  Negative for chest pain.   Gastrointestinal: Negative.  Negative for diarrhea, nausea and vomiting.   Endocrine: Negative.    Genitourinary: Negative.    Musculoskeletal: Negative.    Skin: Negative.    Neurological: Negative.    All other systems reviewed and are negative.        OBJECTIVE:    /66   Pulse 87   Ht 1.778 m (5' 10\")   Wt 72.6 kg (160 lb)   BMI 22.96 kg/m²     Labs:   CBC: No results for input(s): \"WBC\", \"HGB\", \"HCT\", \"PLT\" in the last 72 hours.  BMP:No results for input(s): \"NA\", \"K\", \"CO2\", \"BUN\", \"CREATININE\", \"LABGLOM\", \"GLUCOSE\" in the last 72 hours.  BNP: No results for input(s): \"BNP\" in the last 72 hours.  PT/INR: No results for input(s): \"PROTIME\", \"INR\" in the last 72 hours.  APTT:No results for input(s): \"APTT\" in the last 72 hours.  CARDIAC ENZYMES:No results for input(s): \"CKTOTAL\", \"CKMB\", \"CKMBINDEX\", \"TROPONINI\" in the last 72 hours.  FASTING LIPID PANEL:No results found for: \"HDL\", \"LDLDIRECT\", \"TRIG\"  LIVER PROFILE:No results for input(s): \"AST\", \"ALT\", \"LABALBU\" in the last 72 hours.        Past Medical History:   Diagnosis Date    Abnormal results of liver function studies     Atrial fibrillation with RVR (HCC) 03/15/2023    Body mass index (BMI) 22.0-22.9, adult     Chronic gastric ulcer without hemorrhage or perforation     Degenerative cervical disc

## 2024-08-21 ENCOUNTER — OFFICE VISIT (OUTPATIENT)
Age: 69
End: 2024-08-21
Payer: MEDICARE

## 2024-08-21 VITALS — HEART RATE: 94 BPM | SYSTOLIC BLOOD PRESSURE: 135 MMHG | DIASTOLIC BLOOD PRESSURE: 79 MMHG

## 2024-08-21 DIAGNOSIS — E11.69 TYPE 2 DIABETES MELLITUS WITH OTHER SPECIFIED COMPLICATION, UNSPECIFIED WHETHER LONG TERM INSULIN USE: ICD-10-CM

## 2024-08-21 DIAGNOSIS — Z08 ENCOUNTER FOR FOLLOW-UP SURVEILLANCE OF SKIN CANCER: ICD-10-CM

## 2024-08-21 DIAGNOSIS — I10 ESSENTIAL HYPERTENSION: ICD-10-CM

## 2024-08-21 DIAGNOSIS — Z85.828 ENCOUNTER FOR FOLLOW-UP SURVEILLANCE OF SKIN CANCER: ICD-10-CM

## 2024-08-21 DIAGNOSIS — C44.222 SQUAMOUS CELL CARCINOMA, EAR, RIGHT: Primary | ICD-10-CM

## 2024-08-21 DIAGNOSIS — C44.329 SQUAMOUS CELL CANCER OF SKIN OF JAWLINE: ICD-10-CM

## 2024-08-21 PROCEDURE — 1160F RVW MEDS BY RX/DR IN RCRD: CPT | Performed by: NURSE PRACTITIONER

## 2024-08-21 PROCEDURE — 99213 OFFICE O/P EST LOW 20 MIN: CPT | Performed by: NURSE PRACTITIONER

## 2024-08-21 PROCEDURE — 3075F SYST BP GE 130 - 139MM HG: CPT | Performed by: NURSE PRACTITIONER

## 2024-08-21 PROCEDURE — 1159F MED LIST DOCD IN RCRD: CPT | Performed by: NURSE PRACTITIONER

## 2024-08-21 PROCEDURE — 3078F DIAST BP <80 MM HG: CPT | Performed by: NURSE PRACTITIONER

## 2024-08-21 NOTE — PROGRESS NOTES
KEMAR Reyna  [facility]  [address], [address]  Phone: [phone]  Fax: [phone]     PRIMARY CARE PROVIDER: Jeremy Correa MD  REFERRING PROVIDER: No ref. provider found    Chief Complaint   Patient presents with    Follow-up     Follow up on Scc to right ear  Surgery was on 24  Pt did radiation and there is a spot on left cheek that Dr. Sandoval is aware of       Subjective   History of Present Illness:  Nikita Bhardwaj is a  69 y.o. 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 Postoperatively, he developed a left cheek and neck hematoma that was drained on 3/20/2024. Most recently, he is status post pedicle division and flap inset of right ear with unilateral advancement flap of the right postauricular defect on 2024.     Pathology of the right ear demonstrated moderately differentiated squamous cell carcinoma with clear but close margins.  Pathology of the left cheek demonstrated poorly differentiated squamous cell carcinoma with perineural invasion.  Margins were clear.  Left neck lymph node was negative for metastatic carcinoma.  He has been evaluated by Dr. Sandoval for radiation therapy.  He completed radiation on 2024.  He reports he has a small healing ulceration on the left jawline due to the radiation therapy.  He states this lesion has improved since completing radiation.    Review of Systems:  Review of Systems   Constitutional:  Negative for chills, fatigue, fever and unexpected weight change.   HENT:  Negative for facial swelling.    Respiratory:  Negative for cough, chest tightness and shortness of breath.    Cardiovascular:   Negative for chest pain.   Musculoskeletal:  Negative for neck pain.   Skin:  Negative for color change.   Neurological:  Negative for facial asymmetry.   Hematological:  Negative for adenopathy. Does not bruise/bleed easily.       Past History:  Past Medical History:   Diagnosis Date    Diabetes mellitus     History of transfusion     w/o Adverse reaction    Hypertension     Polycythemia vera     SCC (squamous cell carcinoma), ear, right 03/2024    & Right jawline    Sleep apnea     Does Not have CPAP    Stomach ulcer     SVT (supraventricular tachycardia)      Past Surgical History:   Procedure Laterality Date    CERVICAL FUSION      COLONOSCOPY      ENDOSCOPY      HEMATOMA EVACUATION HEAD/NECK Left 3/20/2024    Procedure: HEMATOMA EVACUATION HEAD NECK;  Surgeon: Noam Larry MD;  Location:  [facility] OR;  Service: ENT;  Laterality: Left;    KIDNEY STONE SURGERY      MOHS CLOSURE Right 4/9/2024    Procedure: Pedicle division and flap closure of the right postauricular defect and full-thickness skin graft;  Surgeon: Noam Larry MD;  Location:  [facility];  Service: ENT;  Laterality: Right;    SKIN LESION EXCISION Bilateral 3/18/2024    Procedure: 1) Radical excision of skin cancer of the left jawline with flap reconstruction 2) Excision of skin cancer of the right ear with flap reconstruction;  Surgeon: Noam Larry MD;  Location:  [facility];  Service: ENT;  Laterality: Bilateral;     History reviewed. No pertinent family history.  Social History     Tobacco Use    Smoking status: Never    Smokeless tobacco: Never   Vaping Use    Vaping status: Never Used   Substance Use Topics    Alcohol use: No    Drug use: Never     Allergies:  Patient has no known allergies.    Current Outpatient Medications:     allopurinol (ZYLOPRIM) 300 MG tablet, Take 1 tablet by mouth Daily., Disp: , Rfl:     hydroxyurea (HYDREA) 500 MG capsule, Take 2 capsules by mouth Every Other Day., Disp: , Rfl:     hydroxyurea (HYDREA) 500 MG  capsule, Take 1 capsule by mouth Daily (Monday-Friday)., Disp: , Rfl:     metFORMIN (GLUCOPHAGE) 500 MG tablet, Take 2 tablets by mouth 2 (Two) Times a Day With Meals., Disp: , Rfl:     metoprolol succinate XL (TOPROL-XL) 25 MG 24 hr tablet, Take 1 tablet by mouth Every Morning., Disp: , Rfl:       Objective     Vital Signs:   /79   Pulse 94     Physical Exam:  Physical Exam  Vitals reviewed.   Constitutional:       General: He is not in acute distress.     Appearance: He is well-developed.   HENT:      Head: Normocephalic and atraumatic.        Right Ear: External ear normal.      Left Ear: External ear normal.      Ears:        Mouth/Throat:      Lips: Pink.   Eyes:      General: Lids are normal.   Pulmonary:      Effort: Pulmonary effort is normal. No respiratory distress.      Breath sounds: No stridor.   Musculoskeletal:      Cervical back: Neck supple.   Neurological:      Mental Status: He is alert and oriented to person, place, and time.   Psychiatric:         Mood and Affect: Mood normal.         Speech: Speech normal.         Behavior: Behavior normal. Behavior is cooperative.         Results Review:         Assessment   Assessment:  1. Squamous cell carcinoma, ear, right    2. Squamous cell cancer of skin of jawline    3. Encounter for follow-up surveillance of skin cancer    4. Type 2 diabetes mellitus with other specified complication, unspecified whether long term insulin use    5. Essential hypertension          Plan   Plan:  We will continue to closely monitor.    In order to optimize wound healing, it is beneficial to protect the incisions from sunlight for the first year after the procedure.  Sunlight exposure may cause a brown-red discoloration to the incisions, making them more obvious.  Use a sunscreen with titanium dioxide and/or zinc oxide as the active ingredient(s).  Utilize an SPF factor of at least 15.    Use an OTC silicone-based wound cream to the incision daily to optimize wound  healing.    It is important to follow-up with your primary care provider or dermatologist every 6 to 12 months for routine skin cancer surveillance.    No orders of the defined types were placed in this encounter.    There are no Patient Instructions on file for this visit.  Return in about 3 months (around 11/21/2024), or if symptoms worsen or fail to improve, for Recheck.    My findings and recommendations were discussed and questions were answered.     Lucy Pritchard, APRN

## 2024-09-10 ENCOUNTER — TELEPHONE (OUTPATIENT)
Dept: HEMATOLOGY | Age: 69
End: 2024-09-10

## 2024-09-11 DIAGNOSIS — R16.1 SPLENOMEGALY: ICD-10-CM

## 2024-09-11 DIAGNOSIS — D45 POLYCYTHEMIA VERA (HCC): Primary | ICD-10-CM

## 2024-09-11 NOTE — PROGRESS NOTES
[facility]  Radiation Oncology Clinic   Mango Yadav MD, FACR  Lico REINOSO  _______________________________________________  [facility]  Department of Radiation Oncology  [address] [place] [id number]  Office: [phone]  Fax: [phone]    DATE: 09/13/2024  PATIENT: Nikita Bhardwaj  1955                         MEDICAL RECORD #: [id number]    1. 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 Squamous cell carcinoma, ear, right    3. Non-smoker                                              REASON FOR VISIT:    Chief Complaint   Patient presents with    Skin Cancer     Reason for Visit: Nikita Bhardwaj is a very pleasant 69 y.o. male that has completed radiation therapy and returns to the clinic today for inital follow up exam.     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 He completed 6040 cGy in 33 fractions to the left face on 08/01/2024.     02/20/2024 - Appointment with KEMAR Ibarra:  Presents for consultation regarding skin lesions of the right auricle and left inferior cheek at jawline. The lesions have the following characteristics:   Location: right auricle  Quality: enlarging, bleeding  Severity: severe  Duration: 4-5 months  Timing: constant  Modifying Factors: none  Associated Signs & Symptoms: It is not painful.  He denies lymphadenopathy.  Location: left inferior cheek at jawline  Quality: won't heal, bleeding  Severity: mild  Duration: 5-6 months  Timing: constant  Modifying Factors: none  Associated Signs & Symptoms: It is not painful.  Plan:  Perform punch biopsies.    02/20/2024 - Biopsies per Lucy Pritchard APRN:  Skin, left inferior cheek at jawline, punch biopsy:   Poorly differentiated squamous cell carcinoma, invasive, with ulceration.  Skin, right ear lesion, punch biopsy:   Moderately differentiated squamous cell carcinoma, invasive, with  ulceration.    03/06/2024 - Appointment with :  I discussed that I am concerned of the left jawline lesion due to its poor differentiation.  I discussed options including surgical excision, radiation, chemotherapy.  We would like to proceed with a contrasted CT scan of the neck to assess for involvement of the underlying parotid gland and any lymphadenopathy, and I recommended radical excision with likely rhombic flap reconstruction.  As far as the right ear goes, I also discussed that I recommended surgical resection, but other options would include radiation therapy.  Reconstruction will be based upon the defect, either a skin graft or possible island flap reconstruction.  We will go ahead and set him up a consultation with radiation oncology  Discussion of skin lesion. Discussed risks, benefits, alternatives, and possible complications of excision of the skin lesion with reconstruction utilizing local tissue rearrangement, full-thickness skin grafting, or local interpolated flaps. Risks include, but are not limited too: bleeding, infection, hematoma, recurrence, need for additional procedures, flap failure, cosmetic deformity. Patient understands risks and would like to proceed with surgery.  Alternatives include doing nothing.  My findings and recommendations were discussed and questions were answered.     03/12/2024 - Chest x-ray:  No acute findings.     03/12/2024 - CT Soft tissue neck with contrast:  Known squamous cell carcinoma of the left face over the mandibular angle, depth of invasion measuring 8.2 mm by CT, extending down to the platysma (series 3-image 56, series 4-image 62). I do not see any deeper extension, deep to the platysma.  No cervical adenopathy by size criteria.    03/18/2024 - Radical excision of skin cancer of the left jawline with flap reconstruction and Excision of skin cancer of the right ear with flap reconstruction per :  Skin, right ear lesion, excision (frozen  section control):  Moderately differentiated, keratinizing, squamous cell carcinoma.  The tumor invades into the reticular dermis.  No perineural invasion identified.  Marked actinic elastosis of dermal collagen.  Comment: The inked surgical margins are free of tumor although, particularly in the original frozen section slides, the peripheral inked and deep margins are close at approximately 5-6 o'clock.  Skin, left cheek lesion, excision (frozen section control):  Poorly differentiated squamous cell carcinoma.  The tumor invades through the reticular dermis into subcutaneous adipose tissue.  Perineural invasion (maximum nerve diameter 0.21 mm).  Prior biopsy site changes including dermal cicatrix.  Marked actinic elastosis of dermal collagen.  The inked surgical margins are free of tumor.  Skin, left cheek, extended deep margin:  Fibroadipose tissue, skeletal muscle and cartilage.  No histologic evidence of malignancy.  Lymph node, left neck:   Lymph node (1), negative for metastatic carcinoma.  Skin, right ear, extended 3-5 o'clock margins:   No histologic evidence of malignancy.  Marked actinic elastosis of dermal collagen.    03/20/2024 - Hematoma of left cheek and neck, evacuation:  Fragments of organizing clot.    03/25/2024 - Appointment with :  PLAN:   Continue current wound care.    Follow-up next week for dressing change and 1 week later for surgical takedown.    04/01/2024 - Appointment with Lucy REINOSO:  PLAN:   Continue current wound care.    Follow-up next week for surgical takedown.    Call for any problems    04/09/2024 - Pedicle division and flap closure of the right postauricular defect and full-thickness skin graft per .    04/17/2024 - Appointment with Lucy REINOSO:  Pathology was reviewed.  Pathology of the right ear demonstrates moderately differentiated squamous cell carcinoma with clear but close margins.    Pathology of the left cheek demonstrates a poorly  differentiated squamous cell carcinoma with clear margins, positive for perineural invasion.    Pathology of the left neck lymph node was negative for metastatic carcinoma.    Due to perineural invasion, we will refer to radiation oncology for consideration of radiation therapy.   In order to optimize wound healing, it is beneficial to protect the incisions from sunlight for the first year after the procedure.  Sunlight exposure may cause a brown-red discoloration to the incisions, making them more obvious.  Use a sunscreen with titanium dioxide and/or zinc oxide as the active ingredient(s).  Utilize an SPF factor of at least 15.  Use an OTC silicone-based wound cream to the incision daily to optimize wound healing.  It is important to follow-up with your primary care provider or dermatologist every 3-6 months for routine skin cancer surveillance.   He will follow-up in 6 weeks.    05/06/2024 - Consult with :  After consideration of the diagnostic data and evaluation of the patient, I have recommended to treat the left face and neck with adjuvant radiation therapy, I anticipate a dose of 6000 cGy over 30 fractions, final course pending completion of planning.  Potential complication or side effects of and reviewed can include but not limited to skin erythema, loss of taste, xerostomia, induration and fibrosis of the neck and other potential side effects.  The patient and his family verbalize understanding of this discussion, voice no further questions and wish to proceed with recommendations. We will simulate treatment fields today to begin the treatment planning. Continue ongoing management per primary care physician and other specialists.  Plan:  Plan on 6 weeks of daily radiation treatments Monday through Friday to the left face and cheek.  Side effects will be local with skin erythema, possible xerostomia and loss of taste.  Return in about 2 weeks (around 5/20/2024) for the first Radiation  Treatment.    06/06/2024 - 08/01/2024 - Completed radiation course:  Received 6040 cGy in 33 fractions to the left face.     06/10/2024 - Appointment with KEMAR Ovalle - Oncology:  ASSESSMENT/PLAN:   Squamous cell skin cancer of left jaw line and right ear  02/20/2024- Excision left cheek and right ear- 1. Skin, left inferior cheek at jawline, punch biopsy: Poorly differentiated squamous cell carcinoma, invasive, with ulceration.   Skin, right ear lesion, punch biopsy: Moderately differentiated squamous cell carcinoma, invasive, with ulceration.  03/12/2024 CT soft tissue neck ([facility])- reported known squamous cell carcinoma of the left face over the mandibular angle, depth of invasion measuring 8.2 mm by CT, extending down to the platysma (series 3-image 56, series 4-image 62). I do not see any deeper extension, deep to the platysma. No cervical adenopathy by size criteria.   03/18/2024- Excision right ear and left jaw:  1) - Ear, Right, SQUAMOUS CELL CARCINOMA OF RIGHT EAR, STITCH AT 12 O;CLOCK   2) - Cheek, Left, SQUAMOUS CELL CARCINOMA OF LEFT CHEEK, STITCH AT 12 O'CLOCK   3) - Cheek, Left, ADDITIONAL MARGINS, BLUE AT NEW MARGIN   4) - Neck, POSSIBLE LYMPH NODE OF LEFT NECK   5) - Ear, Right, ADDITIONAL MARGINS 3 O'CLOCCK TO 5 O'CLOCK, BLUE AT NEW MARGIN   -Saw Dr Sandoval in initial consultation on 05/06/2024.   - Radiation initiated on 06/04/2024 and Plan for 6000 cGy over 30 fractions: Today's dose 3 of 30  -Follow-up with dermatology and Dr. Sandoval as recommended  I discussed all of the above findings included in the assessment and plan with the patient and the patient is in agreement to move forward with current recommendations/treatment. I have addressed all of their questions and concerns that were verbalized.  FOLLOW UP:  Follow-up appointment in 3 months, sooner if needed polycythemia vera, high risk, splenomegaly, leukocytosis and mild anemia   CBC in 3 weeks  Continue to follow with other medical  providers as recommended  Labs at next visit: CMP and CBC     08/21/2024 - Appointment with KEMAR Ibarra:  Plan:  We will continue to closely monitor.  In order to optimize wound healing, it is beneficial to protect the incisions from sunlight for the first year after the procedure.  Sunlight exposure may cause a brown-red discoloration to the incisions, making them more obvious.  Use a sunscreen with titanium dioxide and/or zinc oxide as the active ingredient(s).  Utilize an SPF factor of at least 15.  Use an OTC silicone-based wound cream to the incision daily to optimize wound healing.  It is important to follow-up with your primary care provider or dermatologist every 6 to 12 months for routine skin cancer surveillance.  No orders of the defined types were placed in this encounter.  There are no Patient Instructions on file for this visit.  Return in about 3 months (around 11/21/2024), or if symptoms worsen or fail to improve, for Recheck.  My findings and recommendations were discussed and questions were answered.       History obtained from  PATIENT, FAMILY, and CHART    PAST MEDICAL HISTORY  Past Medical History:   Diagnosis Date    Diabetes mellitus     History of transfusion     w/o Adverse reaction    Hypertension     Polycythemia vera     SCC (squamous cell carcinoma), ear, right 03/2024    & Right jawline    Sleep apnea     Does Not have CPAP    Stomach ulcer     SVT (supraventricular tachycardia)       PAST SURGICAL HISTORY  Past Surgical History:   Procedure Laterality Date    CERVICAL FUSION      COLONOSCOPY      ENDOSCOPY      HEMATOMA EVACUATION HEAD/NECK Left 3/20/2024    Procedure: HEMATOMA EVACUATION HEAD NECK;  Surgeon: Noam Larry MD;  Location: [facility];  Service: ENT;  Laterality: Left;    KIDNEY STONE SURGERY      MOHS CLOSURE Right 4/9/2024    Procedure: Pedicle division and flap closure of the right postauricular defect and full-thickness skin graft;  Surgeon: Noam Larry  MD;  Location:  [facility] OR;  Service: ENT;  Laterality: Right;    SKIN LESION EXCISION Bilateral 3/18/2024    Procedure: 1) Radical excision of skin cancer of the left jawline with flap reconstruction 2) Excision of skin cancer of the right ear with flap reconstruction;  Surgeon: Noam Larry MD;  Location:  [facility] OR;  Service: ENT;  Laterality: Bilateral;      FAMILY HISTORY  family history is not on file.    SOCIAL HISTORY  Social History     Tobacco Use    Smoking status: Never    Smokeless tobacco: Never   Vaping Use    Vaping status: Never Used   Substance Use Topics    Alcohol use: No    Drug use: Never     ALLERGIES  Patient has no known allergies.     MEDICATIONS    Current Outpatient Medications:     allopurinol (ZYLOPRIM) 300 MG tablet, Take 1 tablet by mouth Daily., Disp: , Rfl:     hydroxyurea (HYDREA) 500 MG capsule, Take 2 capsules by mouth Every Other Day., Disp: , Rfl:     hydroxyurea (HYDREA) 500 MG capsule, Take 1 capsule by mouth Daily (Monday-Friday)., Disp: , Rfl:     metFORMIN (GLUCOPHAGE) 500 MG tablet, Take 2 tablets by mouth 2 (Two) Times a Day With Meals., Disp: , Rfl:     metoprolol succinate XL (TOPROL-XL) 25 MG 24 hr tablet, Take 1 tablet by mouth Every Morning., Disp: , Rfl:     The following portions of the patient's history were reviewed and updated as appropriate: allergies, current medications, past family history, past medical history, past social history, past surgical history and problem list.    Current outpatient and discharge medications have been reconciled for the patient.  Reviewed by: KEMAR Vo    REVIEW OF SYSTEMS  Review of Systems   Constitutional:  Negative for activity change, appetite change and unexpected weight change.   HENT: Negative.     Eyes: Negative.    Respiratory: Negative.     Cardiovascular: Negative.    Gastrointestinal: Negative.    Endocrine: Negative.    Genitourinary: Negative.    Musculoskeletal: Negative.    Skin:  Positive for wound  "(healing wound left jawline).   Allergic/Immunologic: Negative.    Neurological: Negative.    Hematological: Negative.    Psychiatric/Behavioral: Negative.         PHYSICAL EXAM  VITAL SIGNS:   Vitals:    09/13/24 1309   BP: 145/81   Weight: 74.8 kg (165 lb)   Height: 177.8 cm (70\")   PainSc: 0-No pain       Physical Exam  Vitals reviewed.   Constitutional:       Appearance: Normal appearance.   HENT:      Head: Normocephalic.        Comments: Left face/check s/p excision and radiation therapy. Moderate hyperpigmentation noted. Small, 1 cm area of rounded scaring noted on inferior left cheek, healing well without evidence of recurrent tumor.      Nose: Nose normal.   Eyes:      Pupils: Pupils are equal, round, and reactive to light.   Cardiovascular:      Rate and Rhythm: Normal rate and regular rhythm.      Pulses: Normal pulses.      Heart sounds: Normal heart sounds.   Pulmonary:      Effort: Pulmonary effort is normal. No respiratory distress.      Breath sounds: Normal breath sounds. No wheezing.   Abdominal:      General: Bowel sounds are normal.      Palpations: There is no mass.   Musculoskeletal:         General: Normal range of motion.      Cervical back: Normal range of motion and neck supple. No tenderness.   Lymphadenopathy:      Cervical: No cervical adenopathy.   Skin:     General: Skin is warm and dry.      Capillary Refill: Capillary refill takes less than 2 seconds.   Neurological:      General: No focal deficit present.      Mental Status: He is alert and oriented to person, place, and time.      Motor: No weakness.   Psychiatric:         Mood and Affect: Mood normal.         Behavior: Behavior normal.         Performance Status: ECOG (1) Restricted in physically strenuous activity, ambulatory and able to do work of light nature    Clinical Quality Measures  - Pain Documented by Standardized Tool, FPS Nikita Bhardwaj reports a pain score of 0.  Given his pain assessment as noted, treatment options " were discussed and the following options were decided upon as a follow-up plan to address the patient's pain:  no pain, no plan given .    - Body Mass Index Screening and Follow-Up Plan  BMI is within normal parameters. No other follow-up for BMI required.    - Tobacco Use: Screening and Cessation Intervention  Social History    Tobacco Use      Smoking status: Never      Smokeless tobacco: Never    - Advanced Care Planning Advance Care Planning   ACP discussion was held with the patient during this visit. Patient does not have an advance directive, information provided.    - Depression screening - PHQ-9 Total Score: 0    ASSESSMENT AND PLAN  1. 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 Squamous cell carcinoma, ear, right    3. Non-smoker      No orders of the defined types were placed in this encounter.    RECOMMENDATIONS:    Nikita Bhardwaj is status post completion of radiation therapy and presents to our clinic today for routine follow up exam. 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 He completed 6040 cGy in 33 fractions to the left face on 08/01/2024.     I have reviewed the chart and other physicians records. he denies untoward side effects from treatment and without evidence for recurrent or metastatic disease on exam today. He continues to follow closely with plastic surgery. He will follow up as needed. I did recommend he establish care with a dermatologist for frequent skin checks. Continue ongoing management per primary care physician and other specialists.    There are no Patient Instructions on file for this visit.    Return if symptoms worsen or fail to improve.    Time Spent: I spent 44 minutes caring for Nikita on this date of service. This time includes time spent by me in the following activities: preparing for the visit, reviewing tests, obtaining and/or reviewing a separately obtained history, performing a medically appropriate examination and/or evaluation,  counseling and educating the patient/family/caregiver, ordering medications, tests, or procedures, referring and communicating with other health care professionals, documenting information in the medical record, independently interpreting results and communicating that information with the patient/family/caregiver, and care coordination.   Lico Florentino, APRN  09/13/2024

## 2024-09-12 ENCOUNTER — HOSPITAL ENCOUNTER (OUTPATIENT)
Dept: INFUSION THERAPY | Age: 69
Discharge: HOME | End: 2024-09-12
Payer: MEDICARE

## 2024-09-12 ENCOUNTER — OFFICE VISIT (OUTPATIENT)
Dept: HEMATOLOGY | Age: 69
End: 2024-09-12
Payer: MEDICARE

## 2024-09-12 ENCOUNTER — HOSPITAL ENCOUNTER (OUTPATIENT)
Dept: RADIATION ONCOLOGY | Facility: HOSPITAL | Age: 69
Setting detail: RADIATION/ONCOLOGY SERIES
End: 2024-09-12
Payer: MEDICARE

## 2024-09-12 VITALS
HEIGHT: 70 IN | OXYGEN SATURATION: 99 % | BODY MASS INDEX: 23.65 KG/M2 | DIASTOLIC BLOOD PRESSURE: 76 MMHG | TEMPERATURE: 97.9 F | SYSTOLIC BLOOD PRESSURE: 142 MMHG | WEIGHT: 165.2 LBS | HEART RATE: 99 BPM

## 2024-09-12 DIAGNOSIS — D72.829 LEUKOCYTOSIS, UNSPECIFIED TYPE: ICD-10-CM

## 2024-09-12 DIAGNOSIS — R16.1 SPLENOMEGALY: ICD-10-CM

## 2024-09-12 DIAGNOSIS — D45 POLYCYTHEMIA VERA (HCC): ICD-10-CM

## 2024-09-12 DIAGNOSIS — R16.1 SPLENOMEGALY: Primary | ICD-10-CM

## 2024-09-12 DIAGNOSIS — Z79.899 MEDICATION MANAGEMENT: ICD-10-CM

## 2024-09-12 LAB
ALBUMIN SERPL-MCNC: 4.3 G/DL (ref 3.5–5.2)
ALP SERPL-CCNC: 195 U/L (ref 40–129)
ALT SERPL-CCNC: 7 U/L (ref 5–41)
ANION GAP SERPL CALCULATED.3IONS-SCNC: 9 MMOL/L (ref 7–19)
AST SERPL-CCNC: 17 U/L (ref 5–40)
BASOPHILS # BLD: 0.58 K/UL (ref 0.01–0.08)
BASOPHILS NFR BLD: 1.8 % (ref 0.1–1.2)
BILIRUB SERPL-MCNC: 1.7 MG/DL (ref 0–1.2)
BUN SERPL-MCNC: 22 MG/DL (ref 8–23)
CALCIUM SERPL-MCNC: 9.2 MG/DL (ref 8.8–10.2)
CHLORIDE SERPL-SCNC: 100 MMOL/L (ref 98–107)
CO2 SERPL-SCNC: 26 MMOL/L (ref 22–29)
CREAT SERPL-MCNC: 0.9 MG/DL (ref 0.7–1.2)
EOSINOPHIL # BLD: 0.4 K/UL (ref 0.04–0.54)
EOSINOPHIL NFR BLD: 1.2 % (ref 0.7–7)
ERYTHROCYTE [DISTWIDTH] IN BLOOD BY AUTOMATED COUNT: 20.3 % (ref 11.6–14.4)
GLUCOSE SERPL-MCNC: 148 MG/DL (ref 70–99)
HCT VFR BLD AUTO: 41 % (ref 40.1–51)
HGB BLD-MCNC: 11.9 G/DL (ref 13.7–17.5)
LYMPHOCYTES # BLD: 1.28 K/UL (ref 1.18–3.74)
LYMPHOCYTES NFR BLD: 3.9 % (ref 19.3–53.1)
MCH RBC QN AUTO: 22.7 PG (ref 25.7–32.2)
MCHC RBC AUTO-ENTMCNC: 29 G/DL (ref 32.3–36.5)
MCV RBC AUTO: 78.1 FL (ref 79–92.2)
MONOCYTES # BLD: 0.47 K/UL (ref 0.24–0.82)
MONOCYTES NFR BLD: 1.4 % (ref 4.7–12.5)
NEUTROPHILS # BLD: 28.07 K/UL (ref 1.56–6.13)
NEUTS SEG NFR BLD: 86.7 % (ref 34–71.1)
PLATELET # BLD AUTO: 250 K/UL (ref 163–337)
POTASSIUM SERPL-SCNC: 4.6 MMOL/L (ref 3.5–5.1)
PROT SERPL-MCNC: 7.1 G/DL (ref 6.4–8.3)
RBC # BLD AUTO: 5.25 M/UL (ref 4.63–6.08)
SODIUM SERPL-SCNC: 135 MMOL/L (ref 136–145)
WBC # BLD AUTO: 32.43 K/UL (ref 4.23–9.07)

## 2024-09-12 PROCEDURE — 80053 COMPREHEN METABOLIC PANEL: CPT

## 2024-09-12 PROCEDURE — 3078F DIAST BP <80 MM HG: CPT | Performed by: NURSE PRACTITIONER

## 2024-09-12 PROCEDURE — 99213 OFFICE O/P EST LOW 20 MIN: CPT

## 2024-09-12 PROCEDURE — 99214 OFFICE O/P EST MOD 30 MIN: CPT | Performed by: NURSE PRACTITIONER

## 2024-09-12 PROCEDURE — 85025 COMPLETE CBC W/AUTO DIFF WBC: CPT

## 2024-09-12 PROCEDURE — 3077F SYST BP >= 140 MM HG: CPT | Performed by: NURSE PRACTITIONER

## 2024-09-12 PROCEDURE — 1123F ACP DISCUSS/DSCN MKR DOCD: CPT | Performed by: NURSE PRACTITIONER

## 2024-09-12 PROCEDURE — 36415 COLL VENOUS BLD VENIPUNCTURE: CPT

## 2024-09-12 PROCEDURE — G2211 COMPLEX E/M VISIT ADD ON: HCPCS | Performed by: NURSE PRACTITIONER

## 2024-09-13 ENCOUNTER — OFFICE VISIT (OUTPATIENT)
Age: 69
End: 2024-09-13
Payer: MEDICARE

## 2024-09-13 ENCOUNTER — HOSPITAL ENCOUNTER (OUTPATIENT)
Dept: ULTRASOUND IMAGING | Age: 69
Discharge: HOME | End: 2024-09-13
Payer: MEDICARE

## 2024-09-13 VITALS
DIASTOLIC BLOOD PRESSURE: 81 MMHG | BODY MASS INDEX: 23.62 KG/M2 | SYSTOLIC BLOOD PRESSURE: 145 MMHG | HEIGHT: 70 IN | WEIGHT: 165 LBS

## 2024-09-13 DIAGNOSIS — Z78.9 NON-SMOKER: ICD-10-CM

## 2024-09-13 DIAGNOSIS — C44.222 SQUAMOUS CELL CARCINOMA, EAR, RIGHT: ICD-10-CM

## 2024-09-13 DIAGNOSIS — C44.329 SQUAMOUS CELL CANCER OF SKIN OF JAWLINE: Primary | ICD-10-CM

## 2024-09-13 DIAGNOSIS — D45 POLYCYTHEMIA VERA (HCC): ICD-10-CM

## 2024-09-13 DIAGNOSIS — R16.1 SPLENOMEGALY: ICD-10-CM

## 2024-09-13 PROCEDURE — G0463 HOSPITAL OUTPT CLINIC VISIT: HCPCS | Performed by: RADIOLOGY

## 2024-09-13 PROCEDURE — 76705 ECHO EXAM OF ABDOMEN: CPT

## 2024-09-23 RX ORDER — HYDROXYUREA 500 MG/1
500 CAPSULE ORAL 2 TIMES DAILY
Qty: 270 CAPSULE | Refills: 1 | Status: SHIPPED | OUTPATIENT
Start: 2024-09-23

## 2024-09-23 ASSESSMENT — ENCOUNTER SYMPTOMS
SORE THROAT: 0
RESPIRATORY NEGATIVE: 1
EYE REDNESS: 0
VOMITING: 0
BACK PAIN: 0
NAUSEA: 0
BLOOD IN STOOL: 0
EYES NEGATIVE: 1
GASTROINTESTINAL NEGATIVE: 1
EYE DISCHARGE: 0
COUGH: 0
DIARRHEA: 0
ABDOMINAL PAIN: 0
EYE PAIN: 0
WHEEZING: 0
SHORTNESS OF BREATH: 0
CONSTIPATION: 0

## 2024-09-25 DIAGNOSIS — I47.10 SVT (SUPRAVENTRICULAR TACHYCARDIA) (HCC): ICD-10-CM

## 2024-09-25 RX ORDER — VERAPAMIL HYDROCHLORIDE 120 MG/1
120 TABLET, FILM COATED, EXTENDED RELEASE ORAL DAILY
Qty: 90 TABLET | Refills: 3 | Status: SHIPPED | OUTPATIENT
Start: 2024-09-25

## 2024-10-15 ENCOUNTER — HOSPITAL ENCOUNTER (OUTPATIENT)
Dept: INFUSION THERAPY | Age: 69
Discharge: HOME | End: 2024-10-15
Payer: MEDICARE

## 2024-10-15 DIAGNOSIS — D45 POLYCYTHEMIA VERA (HCC): ICD-10-CM

## 2024-10-15 LAB
BASOPHILS # BLD: 0.65 K/UL (ref 0.01–0.08)
BASOPHILS NFR BLD: 1.5 % (ref 0.1–1.2)
EOSINOPHIL # BLD: 0.48 K/UL (ref 0.04–0.54)
EOSINOPHIL NFR BLD: 1.1 % (ref 0.7–7)
ERYTHROCYTE [DISTWIDTH] IN BLOOD BY AUTOMATED COUNT: 20.6 % (ref 11.6–14.4)
HCT VFR BLD AUTO: 41.5 % (ref 40.1–51)
HGB BLD-MCNC: 12 G/DL (ref 13.7–17.5)
LYMPHOCYTES # BLD: 1.53 K/UL (ref 1.18–3.74)
LYMPHOCYTES NFR BLD: 3.5 % (ref 19.3–53.1)
MCH RBC QN AUTO: 22.4 PG (ref 25.7–32.2)
MCHC RBC AUTO-ENTMCNC: 28.9 G/DL (ref 32.3–36.5)
MCV RBC AUTO: 77.4 FL (ref 79–92.2)
MONOCYTES # BLD: 0.61 K/UL (ref 0.24–0.82)
MONOCYTES NFR BLD: 1.4 % (ref 4.7–12.5)
NEUTROPHILS # BLD: 38.01 K/UL (ref 1.56–6.13)
NEUTS SEG NFR BLD: 88 % (ref 34–71.1)
PLATELET # BLD AUTO: 248 K/UL (ref 163–337)
RBC # BLD AUTO: 5.36 M/UL (ref 4.63–6.08)
WBC # BLD AUTO: 43.22 K/UL (ref 4.23–9.07)

## 2024-10-15 PROCEDURE — 85025 COMPLETE CBC W/AUTO DIFF WBC: CPT

## 2024-10-15 PROCEDURE — 36415 COLL VENOUS BLD VENIPUNCTURE: CPT

## 2024-11-06 ENCOUNTER — HOSPITAL ENCOUNTER (OUTPATIENT)
Dept: INFUSION THERAPY | Age: 69
Discharge: HOME | End: 2024-11-06
Payer: MEDICARE

## 2024-11-06 DIAGNOSIS — D45 POLYCYTHEMIA VERA (HCC): ICD-10-CM

## 2024-11-06 LAB
BASOPHILS # BLD: 0.69 K/UL (ref 0.01–0.08)
BASOPHILS NFR BLD: 2 % (ref 0.1–1.2)
EOSINOPHIL # BLD: 0.58 K/UL (ref 0.04–0.54)
EOSINOPHIL NFR BLD: 1.6 % (ref 0.7–7)
ERYTHROCYTE [DISTWIDTH] IN BLOOD BY AUTOMATED COUNT: 21 % (ref 11.6–14.4)
HCT VFR BLD AUTO: 41.1 % (ref 40.1–51)
HGB BLD-MCNC: 11.8 G/DL (ref 13.7–17.5)
LYMPHOCYTES # BLD: 1.23 K/UL (ref 1.18–3.74)
LYMPHOCYTES NFR BLD: 3.5 % (ref 19.3–53.1)
MCH RBC QN AUTO: 22.3 PG (ref 25.7–32.2)
MCHC RBC AUTO-ENTMCNC: 28.7 G/DL (ref 32.3–36.5)
MCV RBC AUTO: 77.5 FL (ref 79–92.2)
MONOCYTES # BLD: 0.62 K/UL (ref 0.24–0.82)
MONOCYTES NFR BLD: 1.8 % (ref 4.7–12.5)
NEUTROPHILS # BLD: 30.72 K/UL (ref 1.56–6.13)
NEUTS SEG NFR BLD: 87.1 % (ref 34–71.1)
PLATELET # BLD AUTO: 252 K/UL (ref 163–337)
RBC # BLD AUTO: 5.3 M/UL (ref 4.63–6.08)
WBC # BLD AUTO: 35.24 K/UL (ref 4.23–9.07)

## 2024-11-06 PROCEDURE — 36415 COLL VENOUS BLD VENIPUNCTURE: CPT

## 2024-11-06 PROCEDURE — 85025 COMPLETE CBC W/AUTO DIFF WBC: CPT

## 2024-11-18 ENCOUNTER — OFFICE VISIT (OUTPATIENT)
Age: 69
End: 2024-11-18
Payer: MEDICARE

## 2024-11-18 VITALS — SYSTOLIC BLOOD PRESSURE: 133 MMHG | HEART RATE: 88 BPM | TEMPERATURE: 97 F | DIASTOLIC BLOOD PRESSURE: 76 MMHG

## 2024-11-18 DIAGNOSIS — C44.329 SQUAMOUS CELL CANCER OF SKIN OF JAWLINE: ICD-10-CM

## 2024-11-18 DIAGNOSIS — C44.222 SQUAMOUS CELL CARCINOMA, EAR, RIGHT: Primary | ICD-10-CM

## 2024-11-18 DIAGNOSIS — Z85.828 ENCOUNTER FOR FOLLOW-UP SURVEILLANCE OF SKIN CANCER: ICD-10-CM

## 2024-11-18 DIAGNOSIS — Z08 ENCOUNTER FOR FOLLOW-UP SURVEILLANCE OF SKIN CANCER: ICD-10-CM

## 2024-11-18 PROCEDURE — 3078F DIAST BP <80 MM HG: CPT | Performed by: NURSE PRACTITIONER

## 2024-11-18 PROCEDURE — 3075F SYST BP GE 130 - 139MM HG: CPT | Performed by: NURSE PRACTITIONER

## 2024-11-18 PROCEDURE — 1159F MED LIST DOCD IN RCRD: CPT | Performed by: NURSE PRACTITIONER

## 2024-11-18 PROCEDURE — 1160F RVW MEDS BY RX/DR IN RCRD: CPT | Performed by: NURSE PRACTITIONER

## 2024-11-18 PROCEDURE — 99213 OFFICE O/P EST LOW 20 MIN: CPT | Performed by: NURSE PRACTITIONER

## 2024-11-18 NOTE — PROGRESS NOTES
KEMAR Reyna  [facility]  [address], [address]  Phone: [phone]  Fax: [phone]     PRIMARY CARE PROVIDER: Jeremy Correa MD  REFERRING PROVIDER: No ref. provider found    Chief Complaint   Patient presents with    Follow-up     Follow up on left ear   Surgery 24       Subjective   History of Present Illness:  Nikita Bhardwaj is a  69 y.o. male  who presents for follow up and head/neck skin cancer surveillance. 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 Postoperatively, he developed a left cheek and neck hematoma that was drained on 3/20/2024. Most recently, he is status post pedicle division and flap inset of right ear with unilateral advancement flap of the right postauricular defect on 2024.     Pathology of the right ear demonstrated moderately differentiated squamous cell carcinoma with clear but close margins.  Pathology of the left cheek demonstrated poorly differentiated squamous cell carcinoma with perineural invasion.  Margins were clear.  Left neck lymph node was negative for metastatic carcinoma.  He was evaluated by Dr. Sandoval for radiation therapy.  He completed radiation on 2024.  He denies lymphadenopathy.  He denies any new or worrisome skin lesion.    Review of Systems:  Review of Systems   Constitutional:  Negative for chills, fatigue, fever and unexpected weight change.   HENT:  Negative for facial swelling.    Respiratory:  Negative for cough, chest tightness and shortness of breath.    Cardiovascular:  Negative for chest pain.   Musculoskeletal:  Negative for neck pain.   Skin:  Negative for color change.   Neurological:  Negative for facial  asymmetry.   Hematological:  Negative for adenopathy. Does not bruise/bleed easily.       Past History:  Past Medical History:   Diagnosis Date    Diabetes mellitus     History of transfusion     w/o Adverse reaction    Hypertension     Polycythemia vera     SCC (squamous cell carcinoma), ear, right 03/2024    & Right jawline    Sleep apnea     Does Not have CPAP    Stomach ulcer     SVT (supraventricular tachycardia)      Past Surgical History:   Procedure Laterality Date    CERVICAL FUSION      COLONOSCOPY      ENDOSCOPY      HEMATOMA EVACUATION HEAD/NECK Left 3/20/2024    Procedure: HEMATOMA EVACUATION HEAD NECK;  Surgeon: Noam Larry MD;  Location: [facility] OR;  Service: ENT;  Laterality: Left;    KIDNEY STONE SURGERY      MOHS CLOSURE Right 4/9/2024    Procedure: Pedicle division and flap closure of the right postauricular defect and full-thickness skin graft;  Surgeon: Noam Larry MD;  Location: [facility] OR;  Service: ENT;  Laterality: Right;    SKIN LESION EXCISION Bilateral 3/18/2024    Procedure: 1) Radical excision of skin cancer of the left jawline with flap reconstruction 2) Excision of skin cancer of the right ear with flap reconstruction;  Surgeon: Noam Larry MD;  Location: [facility] OR;  Service: ENT;  Laterality: Bilateral;     History reviewed. No pertinent family history.  Social History     Tobacco Use    Smoking status: Never    Smokeless tobacco: Never   Vaping Use    Vaping status: Never Used   Substance Use Topics    Alcohol use: No    Drug use: Never     Allergies:  Patient has no known allergies.    Current Outpatient Medications:     allopurinol (ZYLOPRIM) 300 MG tablet, Take 1 tablet by mouth Daily., Disp: , Rfl:     hydroxyurea (HYDREA) 500 MG capsule, Take 2 capsules by mouth Every Other Day., Disp: , Rfl:     hydroxyurea (HYDREA) 500 MG capsule, Take 1 capsule by mouth Daily (Monday-Friday)., Disp: , Rfl:     metFORMIN (GLUCOPHAGE) 500 MG tablet, Take 2 tablets by mouth 2 (Two)  Times a Day With Meals., Disp: , Rfl:     metoprolol succinate XL (TOPROL-XL) 25 MG 24 hr tablet, Take 1 tablet by mouth Every Morning., Disp: , Rfl:       Objective     Vital Signs:  Temp:  [97 °F (36.1 °C)] 97 °F (36.1 °C)  Heart Rate:  [88] 88  BP: (133)/(76) 133/76BP 133/76   Pulse 88   Temp 97 °F (36.1 °C) (Temporal)     Physical Exam:  Physical Exam  Vitals reviewed.   Constitutional:       General: He is not in acute distress.     Appearance: He is well-developed.   HENT:      Head: Normocephalic and atraumatic.        Right Ear: External ear normal.      Left Ear: External ear normal.      Ears:        Mouth/Throat:      Lips: Pink.   Eyes:      General: Lids are normal.   Pulmonary:      Effort: Pulmonary effort is normal. No respiratory distress.      Breath sounds: No stridor.   Musculoskeletal:      Cervical back: Neck supple.   Lymphadenopathy:      Head:      Right side of head: No submental, submandibular, tonsillar, preauricular, posterior auricular or occipital adenopathy.      Left side of head: No submental, submandibular, tonsillar, preauricular, posterior auricular or occipital adenopathy.   Neurological:      Mental Status: He is alert and oriented to person, place, and time.   Psychiatric:         Mood and Affect: Mood normal.         Speech: Speech normal.         Behavior: Behavior normal. Behavior is cooperative.         Results Review:         Assessment   Assessment:  1. Squamous cell carcinoma, ear, right    2. Squamous cell cancer of skin of jawline    3. Encounter for follow-up surveillance of skin cancer            Plan   Plan:  No evidence of recurrence.  We will continue to closely monitor.    In order to optimize wound healing, it is beneficial to protect the incisions from sunlight for the first year after the procedure.  Sunlight exposure may cause a brown-red discoloration to the incisions, making them more obvious.  Use a sunscreen with titanium dioxide and/or zinc oxide as  the active ingredient(s).  Utilize an SPF factor of at least 15.    Use an OTC silicone-based wound cream to the incision daily to optimize wound healing.    It is important to follow-up with your primary care provider or dermatologist every 6 to 12 months for routine skin cancer surveillance.  He does not have a dermatologist.  We will refer him to [facility]-patient preference.    No orders of the defined types were placed in this encounter.    There are no Patient Instructions on file for this visit.  Return in about 3 months (around 2/18/2025), or if symptoms worsen or fail to improve, for Recheck.    My findings and recommendations were discussed and questions were answered.     KEMAR Ibarra

## 2024-12-06 ENCOUNTER — TELEPHONE (OUTPATIENT)
Dept: HEMATOLOGY | Age: 69
End: 2024-12-06

## 2024-12-06 NOTE — TELEPHONE ENCOUNTER
Called Patient and reminded patient of their appointment on 12/10/2024 and patient confirmed they would be here. Reminded patient to just come at appointment time, and to not come at the lab appointment time. Reminded patient that we will not check them in any more than 30 minutes before appointment time.  We have now moved to the [facility] cancer [place] that is located between our old office and the ER at the [facility]. Letting the Pt know that our front entrance faces the  [employer] [place]. Reminded pt to eat well and be well hydrated for their labs.

## 2024-12-09 DIAGNOSIS — R16.1 SPLENOMEGALY: ICD-10-CM

## 2024-12-09 DIAGNOSIS — D45 POLYCYTHEMIA VERA (HCC): Primary | ICD-10-CM

## 2024-12-09 NOTE — PROGRESS NOTES
daily (Patient taking differently: Take 1 capsule by mouth 2 times daily Twice a day on weekends and through the week days it is just once a day) 270 capsule 1    metoprolol succinate (TOPROL XL) 25 MG extended release tablet Take 1 tablet by mouth daily 90 tablet 3    Multiple Vitamin (MULTI-VITAMIN DAILY) TABS Take 1 tablet by mouth daily      allopurinol (ZYLOPRIM) 300 MG tablet TAKE 1 TABLET BY MOUTH EVERY DAY  5    metFORMIN (GLUCOPHAGE) 1000 MG tablet Take 1 tablet by mouth 2 times daily (with meals)  5     No current facility-administered medications for this visit.        Allergies: No Known Allergies    Social History:    Social History     Tobacco Use    Smoking status: Never    Smokeless tobacco: Never   Vaping Use    Vaping status: Never Used   Substance Use Topics    Alcohol use: Not Currently     Comment: rarely     Drug use: No       Family History:   Family History   Problem Relation Age of Onset    Heart Disease Mother     Diabetes Father     High Blood Pressure Sister     Diabetes Brother     No Known Problems Brother     No Known Problems Maternal Grandmother     No Known Problems Maternal Grandfather     No Known Problems Paternal Grandmother     No Known Problems Paternal Grandfather     No Known Problems Child        Vitals:  Vitals:    12/10/24 1304   BP: 122/70   Site: Left Upper Arm   Position: Sitting   Cuff Size: Large Adult   Pulse: 98   Temp: 97.9 °F (36.6 °C)   TempSrc: Temporal   SpO2: 98%   Weight: 79.3 kg (174 lb 12.8 oz)   Height: 1.778 m (5' 10\")              Subjective   REVIEW OF SYSTEMS:   Review of Systems   Constitutional:  Positive for fatigue. Negative for chills, diaphoresis and fever.   HENT: Negative.  Negative for congestion, ear pain, hearing loss, nosebleeds, sore throat and tinnitus.    Eyes: Negative.  Negative for pain, discharge and redness.   Respiratory: Negative.  Negative for cough, shortness of breath and wheezing.    Cardiovascular: Negative.  Negative for

## 2024-12-10 ENCOUNTER — OFFICE VISIT (OUTPATIENT)
Dept: HEMATOLOGY | Age: 69
End: 2024-12-10
Payer: COMMERCIAL

## 2024-12-10 ENCOUNTER — HOSPITAL ENCOUNTER (OUTPATIENT)
Dept: INFUSION THERAPY | Age: 69
Discharge: HOME | End: 2024-12-10
Payer: COMMERCIAL

## 2024-12-10 VITALS
DIASTOLIC BLOOD PRESSURE: 70 MMHG | HEIGHT: 70 IN | BODY MASS INDEX: 25.03 KG/M2 | OXYGEN SATURATION: 98 % | TEMPERATURE: 97.9 F | SYSTOLIC BLOOD PRESSURE: 122 MMHG | WEIGHT: 174.8 LBS | HEART RATE: 98 BPM

## 2024-12-10 DIAGNOSIS — D45 POLYCYTHEMIA VERA (HCC): ICD-10-CM

## 2024-12-10 DIAGNOSIS — Z79.899 MEDICATION MANAGEMENT: ICD-10-CM

## 2024-12-10 DIAGNOSIS — R16.1 SPLENOMEGALY: Primary | ICD-10-CM

## 2024-12-10 DIAGNOSIS — D72.829 LEUKOCYTOSIS, UNSPECIFIED TYPE: ICD-10-CM

## 2024-12-10 DIAGNOSIS — R74.8 ELEVATED LIVER ENZYMES: ICD-10-CM

## 2024-12-10 DIAGNOSIS — R16.1 SPLENOMEGALY: ICD-10-CM

## 2024-12-10 LAB
ALBUMIN SERPL-MCNC: 4.1 G/DL (ref 3.5–5.2)
ALP SERPL-CCNC: 195 U/L (ref 40–129)
ALT SERPL-CCNC: 5 U/L (ref 5–41)
ANION GAP SERPL CALCULATED.3IONS-SCNC: 9 MMOL/L (ref 7–19)
AST SERPL-CCNC: 14 U/L (ref 5–40)
BASOPHILS # BLD: 0.41 K/UL (ref 0.01–0.08)
BASOPHILS NFR BLD: 1.1 % (ref 0.1–1.2)
BILIRUB SERPL-MCNC: 2 MG/DL (ref 0–1.2)
BUN SERPL-MCNC: 18 MG/DL (ref 8–23)
CALCIUM SERPL-MCNC: 8.8 MG/DL (ref 8.8–10.2)
CHLORIDE SERPL-SCNC: 102 MMOL/L (ref 98–107)
CO2 SERPL-SCNC: 26 MMOL/L (ref 22–29)
CREAT SERPL-MCNC: 1.1 MG/DL (ref 0.7–1.2)
EOSINOPHIL # BLD: 0.37 K/UL (ref 0.04–0.54)
EOSINOPHIL NFR BLD: 1 % (ref 0.7–7)
ERYTHROCYTE [DISTWIDTH] IN BLOOD BY AUTOMATED COUNT: 21.1 % (ref 11.6–14.4)
GLUCOSE SERPL-MCNC: 126 MG/DL (ref 70–99)
HCT VFR BLD AUTO: 38.2 % (ref 40.1–51)
HGB BLD-MCNC: 11.1 G/DL (ref 13.7–17.5)
LYMPHOCYTES # BLD: 1.32 K/UL (ref 1.18–3.74)
LYMPHOCYTES NFR BLD: 3.6 % (ref 19.3–53.1)
MCH RBC QN AUTO: 22.7 PG (ref 25.7–32.2)
MCHC RBC AUTO-ENTMCNC: 29.1 G/DL (ref 32.3–36.5)
MCV RBC AUTO: 78 FL (ref 79–92.2)
MONOCYTES # BLD: 0.59 K/UL (ref 0.24–0.82)
MONOCYTES NFR BLD: 1.6 % (ref 4.7–12.5)
NEUTROPHILS # BLD: 33.26 K/UL (ref 1.56–6.13)
NEUTS SEG NFR BLD: 89.8 % (ref 34–71.1)
PLATELET # BLD AUTO: 359 K/UL (ref 163–337)
POTASSIUM SERPL-SCNC: 4.9 MMOL/L (ref 3.5–5.1)
PROT SERPL-MCNC: 6.9 G/DL (ref 6.4–8.3)
RBC # BLD AUTO: 4.9 M/UL (ref 4.63–6.08)
SODIUM SERPL-SCNC: 137 MMOL/L (ref 136–145)
WBC # BLD AUTO: 37.01 K/UL (ref 4.23–9.07)

## 2024-12-10 PROCEDURE — G2211 COMPLEX E/M VISIT ADD ON: HCPCS | Performed by: NURSE PRACTITIONER

## 2024-12-10 PROCEDURE — 1123F ACP DISCUSS/DSCN MKR DOCD: CPT | Performed by: NURSE PRACTITIONER

## 2024-12-10 PROCEDURE — 99214 OFFICE O/P EST MOD 30 MIN: CPT | Performed by: NURSE PRACTITIONER

## 2024-12-10 PROCEDURE — 36415 COLL VENOUS BLD VENIPUNCTURE: CPT

## 2024-12-10 PROCEDURE — 85025 COMPLETE CBC W/AUTO DIFF WBC: CPT

## 2024-12-10 PROCEDURE — 3078F DIAST BP <80 MM HG: CPT | Performed by: NURSE PRACTITIONER

## 2024-12-10 PROCEDURE — 80053 COMPREHEN METABOLIC PANEL: CPT

## 2024-12-10 PROCEDURE — 99212 OFFICE O/P EST SF 10 MIN: CPT

## 2024-12-10 PROCEDURE — 3074F SYST BP LT 130 MM HG: CPT | Performed by: NURSE PRACTITIONER

## 2024-12-14 DIAGNOSIS — I47.10 SVT (SUPRAVENTRICULAR TACHYCARDIA) (HCC): ICD-10-CM

## 2024-12-16 RX ORDER — METOPROLOL SUCCINATE 25 MG/1
25 TABLET, EXTENDED RELEASE ORAL DAILY
Qty: 90 TABLET | Refills: 1 | Status: SHIPPED | OUTPATIENT
Start: 2024-12-16

## 2024-12-17 ASSESSMENT — ENCOUNTER SYMPTOMS
ABDOMINAL PAIN: 0
SHORTNESS OF BREATH: 0
EYES NEGATIVE: 1
RESPIRATORY NEGATIVE: 1
CONSTIPATION: 0
NAUSEA: 0
BACK PAIN: 0
DIARRHEA: 0
WHEEZING: 0
GASTROINTESTINAL NEGATIVE: 1
BLOOD IN STOOL: 0
VOMITING: 0
EYE PAIN: 0
SORE THROAT: 0
EYE REDNESS: 0
COUGH: 0
EYE DISCHARGE: 0

## 2024-12-30 DIAGNOSIS — D45 POLYCYTHEMIA VERA (HCC): Primary | ICD-10-CM

## 2024-12-30 DIAGNOSIS — D45 POLYCYTHEMIA VERA (HCC): ICD-10-CM

## 2024-12-30 LAB
BASOPHILS # BLD: 0.4 K/UL (ref 0–0.2)
BASOPHILS NFR BLD: 1 % (ref 0–1)
DACRYOCYTES BLD QL SMEAR: ABNORMAL
EOSINOPHIL # BLD: 0.82 K/UL (ref 0–0.6)
EOSINOPHIL NFR BLD: 2 % (ref 0–5)
ERYTHROCYTE [DISTWIDTH] IN BLOOD BY AUTOMATED COUNT: 21.2 % (ref 11.5–14.5)
HCT VFR BLD AUTO: 44.7 % (ref 42–52)
HGB BLD-MCNC: 12.3 G/DL (ref 14–18)
HYPOCHROMIA BLD QL SMEAR: ABNORMAL
IMM GRANULOCYTES # BLD: 1.2 K/UL
LYMPHOCYTES # BLD: 2.1 K/UL (ref 1.1–4.5)
LYMPHOCYTES NFR BLD: 5 % (ref 20–40)
MCH RBC QN AUTO: 22.1 PG (ref 27–31)
MCHC RBC AUTO-ENTMCNC: 27.5 G/DL (ref 33–37)
MCV RBC AUTO: 80.3 FL (ref 80–94)
MONOCYTES # BLD: 0.8 K/UL (ref 0–0.9)
MONOCYTES NFR BLD: 2 % (ref 0–10)
NEUTROPHILS # BLD: 36.9 K/UL (ref 1.5–7.5)
NEUTS BAND NFR BLD MANUAL: 2 % (ref 0–5)
NEUTS SEG NFR BLD: 88 % (ref 50–65)
OVALOCYTES BLD QL SMEAR: ABNORMAL
PLATELET # BLD AUTO: 637 K/UL (ref 130–400)
PLATELET SLIDE REVIEW: ABNORMAL
PMV BLD AUTO: 12.1 FL (ref 9.4–12.4)
POLYCHROMASIA BLD QL SMEAR: ABNORMAL
RBC # BLD AUTO: 5.57 M/UL (ref 4.7–6.1)
WBC # BLD AUTO: 41 K/UL (ref 4.8–10.8)

## 2025-01-02 ENCOUNTER — TELEPHONE (OUTPATIENT)
Dept: HEMATOLOGY | Age: 70
End: 2025-01-02

## 2025-01-02 ENCOUNTER — TELEPHONE (OUTPATIENT)
Age: 70
End: 2025-01-02

## 2025-01-02 NOTE — TELEPHONE ENCOUNTER
Called patient and reviewed lab results. WBC 41 and platelets 637,000.  Informed that MAURILIO Zuniga would like for him to increase his Hydrea to 500 mg twice a day and have his lab work repeated in two weeks.  Patient states that his wife is having a knee replacement and will need a little longer than 2 weeks.  Appointment set up for 01/21 at 1300.  Instructed to call with any problems.  Patient v/u and is agreeable to plan.

## 2025-01-02 NOTE — TELEPHONE ENCOUNTER
The [facility] received a fax that requires your attention. The document has been indexed to the patient’s chart for your review.      Reason for sending: FOR PROVIDER REVIEW    Documents Description: PROG NOTES 12/30/2024    Name of Sender: TONI CRAWFORD PA-C /[facility]    Date Indexed: 12/30/2024    Notes (if needed):

## 2025-01-02 NOTE — TELEPHONE ENCOUNTER
----- Message from Destiny COLÓN sent at 12/31/2024  7:56 AM CST -----  With please call and instruct to increase his Hydrea to 500 mg p.o. twice daily with a CBC in 2 weeks

## 2025-01-20 ENCOUNTER — TELEPHONE (OUTPATIENT)
Dept: HEMATOLOGY | Age: 70
End: 2025-01-20

## 2025-01-20 NOTE — TELEPHONE ENCOUNTER
Called pt back to let them know per Perry LIGHT that they need to keep lab appt tomorrow due to increase in meds. Pt voiced understanding.   Patient unable to complete

## 2025-01-21 ENCOUNTER — HOSPITAL ENCOUNTER (OUTPATIENT)
Dept: INFUSION THERAPY | Age: 70
Discharge: HOME | End: 2025-01-21
Payer: MEDICARE

## 2025-01-21 ENCOUNTER — TELEPHONE (OUTPATIENT)
Dept: HEMATOLOGY | Age: 70
End: 2025-01-21

## 2025-01-21 DIAGNOSIS — D45 POLYCYTHEMIA VERA (HCC): ICD-10-CM

## 2025-01-21 LAB
BASOPHILS # BLD: 0.35 K/UL (ref 0–0.2)
BASOPHILS NFR BLD: 1 % (ref 0–1)
EOSINOPHIL # BLD: 0.36 K/UL (ref 0–0.6)
EOSINOPHIL NFR BLD: 1 % (ref 0–5)
ERYTHROCYTE [DISTWIDTH] IN BLOOD BY AUTOMATED COUNT: 23.2 % (ref 11.5–14.5)
HCT VFR BLD AUTO: 46.1 % (ref 42–52)
HGB BLD-MCNC: 13.4 G/DL (ref 14–18)
LYMPHOCYTES # BLD: 1.4 K/UL (ref 1.1–4.5)
LYMPHOCYTES NFR BLD: 4.1 % (ref 20–40)
MCH RBC QN AUTO: 23.3 PG (ref 27–31)
MCHC RBC AUTO-ENTMCNC: 29.1 G/DL (ref 33–37)
MCV RBC AUTO: 80.3 FL (ref 80–94)
MONOCYTES # BLD: 0.73 K/UL (ref 0–0.9)
MONOCYTES NFR BLD: 2.1 % (ref 1–10)
NEUTROPHILS # BLD: 29.99 K/UL (ref 1.5–7.5)
NEUTS SEG NFR BLD: 86.9 % (ref 50–65)
PLATELET # BLD AUTO: 448 K/UL (ref 130–400)
PMV BLD AUTO: 11.5 FL (ref 9.4–12.4)
RBC # BLD AUTO: 5.74 M/UL (ref 4.7–6.1)
WBC # BLD AUTO: 34.53 K/UL (ref 4.8–10.8)

## 2025-01-21 PROCEDURE — 85025 COMPLETE CBC W/AUTO DIFF WBC: CPT

## 2025-01-21 PROCEDURE — 36415 COLL VENOUS BLD VENIPUNCTURE: CPT

## 2025-01-21 NOTE — TELEPHONE ENCOUNTER
Called patient and reviewed results, WBC 34.53, hgb 13.4, Hct 48.1, platelets 448,000.  He is currently taking Hydrea 500 mg twice a day. Instructed that MAURILIO Zuniga reviewed labs and wants patient to decrease Hydrea to 500 mg alternating with 100 mg daily.  States that his wife's surgery got rescheduled and she is having it next week.  Appointment set up for 02/13 to recheck CBC.  Instructed to call with any problems.  Patient v/u and is agreeable to plan.

## 2025-02-13 ENCOUNTER — HOSPITAL ENCOUNTER (OUTPATIENT)
Dept: INFUSION THERAPY | Age: 70
Discharge: HOME | End: 2025-02-13
Payer: MEDICARE

## 2025-02-13 DIAGNOSIS — D45 POLYCYTHEMIA VERA (HCC): ICD-10-CM

## 2025-02-13 LAB
BASOPHILS # BLD: 0.43 K/UL (ref 0–0.2)
BASOPHILS NFR BLD: 1.2 % (ref 0–1)
EOSINOPHIL # BLD: 0.27 K/UL (ref 0–0.6)
EOSINOPHIL NFR BLD: 0.7 % (ref 0–5)
ERYTHROCYTE [DISTWIDTH] IN BLOOD BY AUTOMATED COUNT: 22.5 % (ref 11.5–14.5)
HCT VFR BLD AUTO: 44.5 % (ref 42–52)
HGB BLD-MCNC: 13.2 G/DL (ref 14–18)
LYMPHOCYTES # BLD: 1.65 K/UL (ref 1.1–4.5)
LYMPHOCYTES NFR BLD: 4.5 % (ref 20–40)
MCH RBC QN AUTO: 24 PG (ref 27–31)
MCHC RBC AUTO-ENTMCNC: 29.7 G/DL (ref 33–37)
MCV RBC AUTO: 80.8 FL (ref 80–94)
MONOCYTES # BLD: 0.48 K/UL (ref 0–0.9)
MONOCYTES NFR BLD: 1.3 % (ref 1–10)
NEUTROPHILS # BLD: 32.87 K/UL (ref 1.5–7.5)
NEUTS SEG NFR BLD: 89.5 % (ref 50–65)
PLATELET # BLD AUTO: 102 K/UL (ref 130–400)
RBC # BLD AUTO: 5.51 M/UL (ref 4.7–6.1)
WBC # BLD AUTO: 36.71 K/UL (ref 4.8–10.8)

## 2025-02-13 PROCEDURE — 85025 COMPLETE CBC W/AUTO DIFF WBC: CPT

## 2025-02-13 PROCEDURE — 36415 COLL VENOUS BLD VENIPUNCTURE: CPT

## 2025-02-17 ENCOUNTER — TELEPHONE (OUTPATIENT)
Dept: HEMATOLOGY | Age: 70
End: 2025-02-17

## 2025-02-17 NOTE — TELEPHONE ENCOUNTER
Called patient and reviewed lab results, platelets 102,000. Informed him that MAURILIO Zuniga wants him to hold his Hydrea for now and have labs repeated in one week.  Appointment set up for 02/24 at 1100.  Instructed to call with any problems.  Patient v/u and is agreeable to plan.

## 2025-02-17 NOTE — TELEPHONE ENCOUNTER
----- Message from Destiny COLÓN sent at 2/16/2025  6:26 PM CST -----  Please call and instruct to hold Hydrea and repeat CBC in 10 days. Platelets have declined to 102,000

## 2025-02-24 ENCOUNTER — TELEPHONE (OUTPATIENT)
Dept: HEMATOLOGY | Age: 70
End: 2025-02-24

## 2025-02-24 ENCOUNTER — HOSPITAL ENCOUNTER (OUTPATIENT)
Dept: INFUSION THERAPY | Age: 70
Discharge: HOME | End: 2025-02-24
Payer: MEDICARE

## 2025-02-24 DIAGNOSIS — D45 POLYCYTHEMIA VERA (HCC): ICD-10-CM

## 2025-02-24 DIAGNOSIS — D45 POLYCYTHEMIA VERA (HCC): Primary | ICD-10-CM

## 2025-02-24 LAB
BASOPHILS # BLD: 0.59 K/UL (ref 0–0.2)
BASOPHILS NFR BLD: 1.1 % (ref 0–1)
EOSINOPHIL # BLD: 0.79 K/UL (ref 0–0.6)
EOSINOPHIL NFR BLD: 1.5 % (ref 0–5)
ERYTHROCYTE [DISTWIDTH] IN BLOOD BY AUTOMATED COUNT: 23.1 % (ref 11.5–14.5)
HCT VFR BLD AUTO: 42.2 % (ref 42–52)
HGB BLD-MCNC: 12.4 G/DL (ref 14–18)
LYMPHOCYTES # BLD: 1.42 K/UL (ref 1.1–4.5)
LYMPHOCYTES NFR BLD: 2.6 % (ref 20–40)
MCH RBC QN AUTO: 24.6 PG (ref 27–31)
MCHC RBC AUTO-ENTMCNC: 29.4 G/DL (ref 33–37)
MCV RBC AUTO: 83.7 FL (ref 80–94)
MONOCYTES # BLD: 1.05 K/UL (ref 0–0.9)
MONOCYTES NFR BLD: 2 % (ref 1–10)
NEUTROPHILS # BLD: 48.62 K/UL (ref 1.5–7.5)
NEUTS SEG NFR BLD: 90.5 % (ref 50–65)
PLATELET # BLD AUTO: 744 K/UL (ref 130–400)
PMV BLD AUTO: 11.8 FL (ref 9.4–12.4)
RBC # BLD AUTO: 5.04 M/UL (ref 4.7–6.1)
WBC # BLD AUTO: 53.69 K/UL (ref 4.8–10.8)

## 2025-02-24 PROCEDURE — 85025 COMPLETE CBC W/AUTO DIFF WBC: CPT

## 2025-02-24 PROCEDURE — 36415 COLL VENOUS BLD VENIPUNCTURE: CPT

## 2025-02-24 RX ORDER — HYDROXYUREA 500 MG/1
CAPSULE ORAL
Qty: 120 CAPSULE | Refills: 3 | Status: SHIPPED | OUTPATIENT
Start: 2025-02-24

## 2025-02-24 NOTE — TELEPHONE ENCOUNTER
Called patient and reviewed lab results, platelet count now 744,000.  Informed that MAURILIO Zuniga would like for him to restart his Hydrea at 1000 mg on Wed, Sat, and Sun and 500 mg rest of the week.  Instructed that she would like for him to recheck his lab in 2 weeks.  Patient states that his wife has an appointment on 03/06 and will call when he gets home to set up a time for labs that day.  Instructed to call with any problems.  Patient v/u and is agreeable to plan.

## 2025-03-06 ENCOUNTER — HOSPITAL ENCOUNTER (OUTPATIENT)
Dept: INFUSION THERAPY | Age: 70
Discharge: HOME | End: 2025-03-06
Payer: MEDICARE

## 2025-03-06 DIAGNOSIS — D45 POLYCYTHEMIA VERA (HCC): ICD-10-CM

## 2025-03-06 LAB
BASOPHILS # BLD: 0.37 K/UL (ref 0–0.2)
BASOPHILS NFR BLD: 1.8 % (ref 0–1)
EOSINOPHIL # BLD: 0.57 K/UL (ref 0–0.6)
EOSINOPHIL NFR BLD: 2.8 % (ref 0–5)
ERYTHROCYTE [DISTWIDTH] IN BLOOD BY AUTOMATED COUNT: 22.8 % (ref 11.5–14.5)
HCT VFR BLD AUTO: 43.3 % (ref 42–52)
HGB BLD-MCNC: 12.6 G/DL (ref 14–18)
LYMPHOCYTES # BLD: 1.17 K/UL (ref 1.1–4.5)
LYMPHOCYTES NFR BLD: 5.7 % (ref 20–40)
MCH RBC QN AUTO: 24 PG (ref 27–31)
MCHC RBC AUTO-ENTMCNC: 29.1 G/DL (ref 33–37)
MCV RBC AUTO: 82.6 FL (ref 80–94)
MONOCYTES # BLD: 0.36 K/UL (ref 0–0.9)
MONOCYTES NFR BLD: 1.7 % (ref 1–10)
NEUTROPHILS # BLD: 17.31 K/UL (ref 1.5–7.5)
NEUTS SEG NFR BLD: 83.9 % (ref 50–65)
PLATELET # BLD AUTO: 582 K/UL (ref 130–400)
PMV BLD AUTO: 10.8 FL (ref 9.4–12.4)
RBC # BLD AUTO: 5.24 M/UL (ref 4.7–6.1)
WBC # BLD AUTO: 20.63 K/UL (ref 4.8–10.8)

## 2025-03-06 PROCEDURE — 36415 COLL VENOUS BLD VENIPUNCTURE: CPT

## 2025-03-06 PROCEDURE — 85025 COMPLETE CBC W/AUTO DIFF WBC: CPT

## 2025-03-12 ENCOUNTER — TELEPHONE (OUTPATIENT)
Dept: HEMATOLOGY | Age: 70
End: 2025-03-12

## 2025-03-12 NOTE — TELEPHONE ENCOUNTER
Called Patient and reminded patient of their appointment on 03/17/2025 and patient confirmed they would be here. Reminded patient to just come at appointment time, and to not come at the lab appointment time. Reminded patient that we will not check them in any more than 30 minutes before appointment time.  We have now moved to the [facility] cancer [place] that is located between our old office and the ER [facility] the [facility]. Letting the Pt know that our front entrance faces the  [employer] [place]. Reminded pt to eat well and be well hydrated for their labs.

## 2025-03-16 DIAGNOSIS — D45 POLYCYTHEMIA VERA (HCC): Primary | ICD-10-CM

## 2025-03-16 NOTE — PROGRESS NOTES
Progress Note      Pt Name: Junaid Aguero  YOB: 1955  MRN: [id number]    Date of evaluation: 03/17/2025  History Obtained From:  patient, electronic medical record    CHIEF COMPLAINT:    Chief Complaint   Patient presents with    Follow-up     Splenomegaly       HISTORY OF PRESENT ILLNESS:    Junaid Aguero is a 69 y.o.  male who is followed for diagnosis of polycythemia vera, JAK2 positive with mild (1+/3) fibrosis and splenomegaly.  Current recommendation is for cytoreductive therapy with Hydrea, aspirin 81 mg daily and intermittent phlebotomy for hematocrit >45. Currently taking Hydrea 1000 mg p.o. daily on Saturdays, Sundays, and Wednesdays and Hydrea 500 mg p.o the rest of the week along with aspirin 81 mg p.o. daily.  Junaid last required a phlebotomy on 1/18/2023.  Junaid returns today in scheduled follow-up for evaluation, lab monitoring and treatment recommendations.      History of Present Illness  He has not experienced any new dermatological issues or expressed any concerns. He continues to take aspirin and has a sufficient supply of Hydrea.    He is not currently on a potassium supplement and does not consume bananas or baked potatoes. His diet includes mashed potatoes, milk, and an increased intake of cereal, specifically Rice Krispies and Cheerios.    MEDICATIONS  Current: aspirin, Hydrea  Today's clinic visit to include physical assessment, review of systems, any lab or radiographic findings that were available and plan of care are documented below.    HEMATOLOGY HISTORY:     Diagnosis   JESUS-2 polycythemia vera   High risk   Splenomegaly  Squamous cell carcinoma of the face, March 2024     TREATMENT SUMMARY:  Phlebotomies only initiated July 2008 until March 2012.   Hydrea.   Periodic phlebotomies as indicated despite Hydrea. Last 6/17/2020  ASA 81mg daily  Phlebotomize for hematocrit > 45 or symptomatic  Wide excision removal and radiation therapy to the face for squamous

## 2025-03-17 ENCOUNTER — HOSPITAL ENCOUNTER (OUTPATIENT)
Dept: INFUSION THERAPY | Age: 70
Discharge: HOME | End: 2025-03-17
Payer: MEDICARE

## 2025-03-17 ENCOUNTER — OFFICE VISIT (OUTPATIENT)
Dept: HEMATOLOGY | Age: 70
End: 2025-03-17
Payer: MEDICARE

## 2025-03-17 VITALS
HEIGHT: 70 IN | SYSTOLIC BLOOD PRESSURE: 132 MMHG | BODY MASS INDEX: 24.73 KG/M2 | WEIGHT: 172.7 LBS | HEART RATE: 88 BPM | TEMPERATURE: 98.2 F | OXYGEN SATURATION: 99 % | DIASTOLIC BLOOD PRESSURE: 78 MMHG

## 2025-03-17 DIAGNOSIS — D45 POLYCYTHEMIA VERA: Primary | ICD-10-CM

## 2025-03-17 DIAGNOSIS — D45 POLYCYTHEMIA VERA: ICD-10-CM

## 2025-03-17 DIAGNOSIS — R71.8 MICROCYTOSIS: ICD-10-CM

## 2025-03-17 DIAGNOSIS — Z79.899 MEDICATION MANAGEMENT: ICD-10-CM

## 2025-03-17 DIAGNOSIS — R74.8 ELEVATED LIVER ENZYMES: ICD-10-CM

## 2025-03-17 DIAGNOSIS — R16.1 SPLENOMEGALY: ICD-10-CM

## 2025-03-17 LAB
ALBUMIN SERPL-MCNC: 4.1 G/DL (ref 3.5–5.2)
ALP SERPL-CCNC: 137 U/L (ref 40–129)
ALT SERPL-CCNC: 13 U/L (ref 5–41)
ANION GAP SERPL CALCULATED.3IONS-SCNC: 12 MMOL/L (ref 7–19)
AST SERPL-CCNC: 14 U/L (ref 5–40)
BASOPHILS # BLD: 0.22 K/UL (ref 0–0.2)
BASOPHILS NFR BLD: 1.1 % (ref 0–1)
BILIRUB SERPL-MCNC: 1.5 MG/DL (ref 0–1.2)
BUN SERPL-MCNC: 17 MG/DL (ref 8–23)
CALCIUM SERPL-MCNC: 8.7 MG/DL (ref 8.8–10.2)
CHLORIDE SERPL-SCNC: 101 MMOL/L (ref 98–107)
CO2 SERPL-SCNC: 25 MMOL/L (ref 22–29)
CREAT SERPL-MCNC: 0.9 MG/DL (ref 0.7–1.2)
EOSINOPHIL # BLD: 0.34 K/UL (ref 0–0.6)
EOSINOPHIL NFR BLD: 1.8 % (ref 0–5)
ERYTHROCYTE [DISTWIDTH] IN BLOOD BY AUTOMATED COUNT: 22.2 % (ref 11.5–14.5)
GLUCOSE SERPL-MCNC: 169 MG/DL (ref 70–99)
HCT VFR BLD AUTO: 42.1 % (ref 42–52)
HGB BLD-MCNC: 12.4 G/DL (ref 14–18)
LYMPHOCYTES # BLD: 1.21 K/UL (ref 1.1–4.5)
LYMPHOCYTES NFR BLD: 6.2 % (ref 20–40)
MCH RBC QN AUTO: 24.2 PG (ref 27–31)
MCHC RBC AUTO-ENTMCNC: 29.5 G/DL (ref 33–37)
MCV RBC AUTO: 82.1 FL (ref 80–94)
MONOCYTES # BLD: 0.19 K/UL (ref 0–0.9)
MONOCYTES NFR BLD: 1 % (ref 1–10)
NEUTROPHILS # BLD: 17.21 K/UL (ref 1.5–7.5)
NEUTS SEG NFR BLD: 88.9 % (ref 50–65)
PLATELET # BLD AUTO: 174 K/UL (ref 130–400)
POTASSIUM SERPL-SCNC: 5.2 MMOL/L (ref 3.5–5.1)
PROT SERPL-MCNC: 6.7 G/DL (ref 6.4–8.3)
RBC # BLD AUTO: 5.13 M/UL (ref 4.7–6.1)
SODIUM SERPL-SCNC: 138 MMOL/L (ref 136–145)
WBC # BLD AUTO: 19.37 K/UL (ref 4.8–10.8)

## 2025-03-17 PROCEDURE — 36415 COLL VENOUS BLD VENIPUNCTURE: CPT

## 2025-03-17 PROCEDURE — 85025 COMPLETE CBC W/AUTO DIFF WBC: CPT

## 2025-03-17 PROCEDURE — 80053 COMPREHEN METABOLIC PANEL: CPT

## 2025-03-17 PROCEDURE — 99212 OFFICE O/P EST SF 10 MIN: CPT

## 2025-03-21 ENCOUNTER — RESULTS FOLLOW-UP (OUTPATIENT)
Dept: INFUSION THERAPY | Age: 70
End: 2025-03-21

## 2025-03-21 DIAGNOSIS — E87.5 HYPERKALEMIA: Primary | ICD-10-CM

## 2025-03-22 ASSESSMENT — ENCOUNTER SYMPTOMS
NAUSEA: 0
SHORTNESS OF BREATH: 0
DIARRHEA: 0
COUGH: 0
GASTROINTESTINAL NEGATIVE: 1
CONSTIPATION: 0
RESPIRATORY NEGATIVE: 1
BACK PAIN: 0
EYE PAIN: 0
ABDOMINAL PAIN: 0
VOMITING: 0
BLOOD IN STOOL: 0
SORE THROAT: 0
EYE REDNESS: 0
EYES NEGATIVE: 1
EYE DISCHARGE: 0
WHEEZING: 0

## 2025-04-04 ENCOUNTER — TELEPHONE (OUTPATIENT)
Dept: PHARMACY | Facility: CLINIC | Age: 70
End: 2025-04-04

## 2025-04-04 NOTE — TELEPHONE ENCOUNTER
[facility] CLINICAL PHARMACY: STATIN THERAPY REVIEW  Identified Statin Use In Diabetes care gap per [place] Records dated:04/04/25     Statin Use in Persons with Diabetes Definition:  Eligible:Members with diabetes ages 40-75 during the measurement year    Definition: Medicare members with diabetes ages 40-75 who receive at least 1 fill of a statin medication in the measurement year   Members with diabetes are defined as those who have at least 2 fills of diabetes medications during the measurement year     Compliance: To comply with this measure, a member with diabetes must have a fill for at least 1 statin or statin combination medication in any strength or dose using their Part D benefit during the measurement year.     Last Visit:  8/12/2024 (cardiology); PCP -    NA- Non [facility] PCP  Next Visit: None (for cardiology, LOV noted \"Return in about 6 months (around 2/12/2025) for return to Dr. Fatima only\")        STATIN GAP IDENTIFIED-SUPD    Patient prescribed a statin:no  Per chart review:   Patient uses outside provider and unable to attribute to [facility] or contact Provider  Patient uses outside provider plan to fax/contact for recommendation  Pending patient response      No Known Allergies    LIPID EVALUATION AND GUIDELINE ASSESSMENT    Lab Results   Component Value Date/Time    ALT 13 03/17/2025 10:08 AM    AST 14 03/17/2025 10:08 AM     The ASCVD Risk score (Katja DK, et al., 2019) failed to calculate for the following reasons:    Cannot find a previous HDL lab    Cannot find a previous total cholesterol lab         STATIN GAP PLAN  The following are interventions that have been identified:  SUPD Gap Identified from [place] Records dated: 04/04/25   Will follow up with patient to obtain history first, and if appropriate/agreeable, will recommend to outside PCP or cardiology.    Attempting to reach patient to review.  Left message asking for return call.      Cathryn Segal, PharmD., BCACP  Population

## 2025-04-04 NOTE — TELEPHONE ENCOUNTER
Patient returned call.    Reports PCP is outside of [facility].    Discussed background of outreach/recommendation and why statins are often recommended in patients with history of diabetes for cholesterol lowering and CV risk reduction.    Patient reports he has never tried/failed statin previously and doesn't recall if PCP has discussed.    Encouraged patient to ask PCP about this at next OV to see if appropriate for him.    Patient verbalized understanding and will return call if any additional questions.      Cathryn Segal, PharmD., BCACP  Population Health Pharmacist  [facility] Clinical Pharmacy  Department, toll free: [phone], option 1   =======================================================  For Pharmacy Admin Tracking Only    Program: [facility] [employer]  CPA in place:  No  Recommendation Provided To: Patient/Caregiver: 1 via Telephone  Intervention Detail: Adherence Monitorin  Intervention Accepted By: Patient/Caregiver: 0  Gap Closed?: No   Time Spent (min): 10

## 2025-04-07 ENCOUNTER — HOSPITAL ENCOUNTER (OUTPATIENT)
Dept: INFUSION THERAPY | Age: 70
Discharge: HOME | End: 2025-04-07
Payer: MEDICARE

## 2025-04-07 DIAGNOSIS — E87.5 HYPERKALEMIA: ICD-10-CM

## 2025-04-07 DIAGNOSIS — D45 POLYCYTHEMIA VERA: ICD-10-CM

## 2025-04-07 LAB
ALBUMIN SERPL-MCNC: 4.3 G/DL (ref 3.5–5.2)
ALP SERPL-CCNC: 193 U/L (ref 40–129)
ALT SERPL-CCNC: 8 U/L (ref 5–41)
ANION GAP SERPL CALCULATED.3IONS-SCNC: 12 MMOL/L (ref 7–19)
AST SERPL-CCNC: 15 U/L (ref 5–40)
BASOPHILS # BLD: 0.45 K/UL (ref 0–0.2)
BASOPHILS NFR BLD: 1.5 % (ref 0–1)
BILIRUB SERPL-MCNC: 1.5 MG/DL (ref 0–1.2)
BUN SERPL-MCNC: 25 MG/DL (ref 8–23)
CALCIUM SERPL-MCNC: 8.8 MG/DL (ref 8.8–10.2)
CHLORIDE SERPL-SCNC: 101 MMOL/L (ref 98–107)
CO2 SERPL-SCNC: 25 MMOL/L (ref 22–29)
CREAT SERPL-MCNC: 0.9 MG/DL (ref 0.7–1.2)
EOSINOPHIL # BLD: 0.38 K/UL (ref 0–0.6)
EOSINOPHIL NFR BLD: 1.3 % (ref 0–5)
ERYTHROCYTE [DISTWIDTH] IN BLOOD BY AUTOMATED COUNT: 21.9 % (ref 11.5–14.5)
GLUCOSE SERPL-MCNC: 162 MG/DL (ref 70–99)
HCT VFR BLD AUTO: 43.1 % (ref 42–52)
HGB BLD-MCNC: 12.8 G/DL (ref 14–18)
LYMPHOCYTES # BLD: 1.43 K/UL (ref 1.1–4.5)
LYMPHOCYTES NFR BLD: 4.9 % (ref 20–40)
MCH RBC QN AUTO: 24.9 PG (ref 27–31)
MCHC RBC AUTO-ENTMCNC: 29.7 G/DL (ref 33–37)
MCV RBC AUTO: 83.9 FL (ref 80–94)
MONOCYTES # BLD: 0.33 K/UL (ref 0–0.9)
MONOCYTES NFR BLD: 1.1 % (ref 1–10)
NEUTROPHILS # BLD: 25.59 K/UL (ref 1.5–7.5)
NEUTS SEG NFR BLD: 88.2 % (ref 50–65)
PLATELET # BLD AUTO: 413 K/UL (ref 130–400)
PMV BLD AUTO: 12.3 FL (ref 9.4–12.4)
POTASSIUM SERPL-SCNC: 5.2 MMOL/L (ref 3.5–5.1)
PROT SERPL-MCNC: 7 G/DL (ref 6.4–8.3)
RBC # BLD AUTO: 5.14 M/UL (ref 4.7–6.1)
SODIUM SERPL-SCNC: 138 MMOL/L (ref 136–145)
WBC # BLD AUTO: 29.04 K/UL (ref 4.8–10.8)

## 2025-04-07 PROCEDURE — 36415 COLL VENOUS BLD VENIPUNCTURE: CPT

## 2025-04-07 PROCEDURE — 85025 COMPLETE CBC W/AUTO DIFF WBC: CPT

## 2025-04-07 PROCEDURE — 80053 COMPREHEN METABOLIC PANEL: CPT

## 2025-04-08 ENCOUNTER — RESULTS FOLLOW-UP (OUTPATIENT)
Dept: HEMATOLOGY | Age: 70
End: 2025-04-08

## 2025-04-09 NOTE — TELEPHONE ENCOUNTER
Called patient and reviewed lab results, WBC 29.04, Hgb 12.8, Hct 43.1, platelets 413,000. He is currently taking Hydrea 100 mg on Wed, Sat, Sun and 500 mg rest of the week. Instructed to continue same dose per Destiny Quintero's orders and to keep lab appointment on 04/28 to repeat CBC.  Denies any recent infections, illness, or steroids.  Instructed to call with any problems.  Patient v/u and is agreeable to plan.

## 2025-04-09 NOTE — TELEPHONE ENCOUNTER
----- Message from Destiny COLÓN sent at 4/8/2025  5:45 PM CDT -----  Please call and see if Mr. Aguero is having an acute infectious process or on steroids?  His current Hydrea regimen is:  Hydrea 1000 mg on Wednesday, Saturday and Sunday and 500 mg rest of the week.  Okay to continue with current regimen just need to know if he is having an acute infectious process, he is scheduled for repeat labs on 4/28/2025

## 2025-04-28 ENCOUNTER — HOSPITAL ENCOUNTER (OUTPATIENT)
Dept: INFUSION THERAPY | Age: 70
Discharge: HOME | End: 2025-04-28
Payer: MEDICARE

## 2025-04-28 DIAGNOSIS — D45 POLYCYTHEMIA VERA (HCC): ICD-10-CM

## 2025-04-28 LAB
BASOPHILS # BLD: 0.58 K/UL (ref 0–0.2)
BASOPHILS NFR BLD: 1.4 % (ref 0–1)
EOSINOPHIL # BLD: 0.56 K/UL (ref 0–0.6)
EOSINOPHIL NFR BLD: 1.3 % (ref 0–5)
ERYTHROCYTE [DISTWIDTH] IN BLOOD BY AUTOMATED COUNT: 22 % (ref 11.5–14.5)
HCT VFR BLD AUTO: 42 % (ref 42–52)
HGB BLD-MCNC: 12.7 G/DL (ref 14–18)
LYMPHOCYTES # BLD: 1.39 K/UL (ref 1.1–4.5)
LYMPHOCYTES NFR BLD: 3.3 % (ref 20–40)
MCH RBC QN AUTO: 25.2 PG (ref 27–31)
MCHC RBC AUTO-ENTMCNC: 30.2 G/DL (ref 33–37)
MCV RBC AUTO: 83.5 FL (ref 80–94)
MONOCYTES # BLD: 0.65 K/UL (ref 0–0.9)
MONOCYTES NFR BLD: 1.5 % (ref 1–10)
NEUTROPHILS # BLD: 37.03 K/UL (ref 1.5–7.5)
NEUTS SEG NFR BLD: 87.1 % (ref 50–65)
PLATELET # BLD AUTO: 534 K/UL (ref 130–400)
PMV BLD AUTO: 11.8 FL (ref 9.4–12.4)
RBC # BLD AUTO: 5.03 M/UL (ref 4.7–6.1)
WBC # BLD AUTO: 42.51 K/UL (ref 4.8–10.8)

## 2025-04-28 PROCEDURE — 85025 COMPLETE CBC W/AUTO DIFF WBC: CPT

## 2025-04-28 PROCEDURE — 36415 COLL VENOUS BLD VENIPUNCTURE: CPT

## 2025-04-29 ENCOUNTER — RESULTS FOLLOW-UP (OUTPATIENT)
Dept: HEMATOLOGY | Age: 70
End: 2025-04-29

## 2025-05-01 NOTE — TELEPHONE ENCOUNTER
Called and reviewed lab results with patient, WBC 42.51 and platelets are up to 534,000.  Instructed patient that MAURILIO Zuniga would like for him to increase his Hydrea to 1000 mg daily for 2 days then 500 mg on third day and repeat.  Instructed to keep lab appointment for 05/19.  Instructed to call with any problems.  Patient v/u and is agreeable to plan.

## 2025-05-01 NOTE — TELEPHONE ENCOUNTER
----- Message from Destiny COLÓN sent at 4/29/2025  5:37 PM CDT -----  Please call Junaid he needs to adjust his Hydrea dosing, WBC and platelet count is up.  Increase Hydrea to 1000 mg daily x 2 days with 500 mg dosing on the third day and then repeat.  Hematocrit is 42, does not need a phlebotomy.  Needs to keep his scheduled appointment 5/19/2025 for repeat CBC

## 2025-05-20 ENCOUNTER — HOSPITAL ENCOUNTER (OUTPATIENT)
Dept: INFUSION THERAPY | Age: 70
Discharge: HOME | End: 2025-05-20
Payer: MEDICARE

## 2025-05-20 DIAGNOSIS — D45 POLYCYTHEMIA VERA (HCC): ICD-10-CM

## 2025-05-20 LAB
BASOPHILS # BLD: 0.26 K/UL (ref 0–0.2)
BASOPHILS NFR BLD: 1.1 % (ref 0–1)
EOSINOPHIL # BLD: 0.18 K/UL (ref 0–0.6)
EOSINOPHIL NFR BLD: 0.8 % (ref 0–5)
ERYTHROCYTE [DISTWIDTH] IN BLOOD BY AUTOMATED COUNT: 21.5 % (ref 11.5–14.5)
HCT VFR BLD AUTO: 43.4 % (ref 42–52)
HGB BLD-MCNC: 12.9 G/DL (ref 14–18)
LYMPHOCYTES # BLD: 1.26 K/UL (ref 1.1–4.5)
LYMPHOCYTES NFR BLD: 5.3 % (ref 20–40)
MCH RBC QN AUTO: 25.3 PG (ref 27–31)
MCHC RBC AUTO-ENTMCNC: 29.7 G/DL (ref 33–37)
MCV RBC AUTO: 85.3 FL (ref 80–94)
MONOCYTES # BLD: 0.37 K/UL (ref 0–0.9)
MONOCYTES NFR BLD: 1.6 % (ref 1–10)
NEUTROPHILS # BLD: 20.95 K/UL (ref 1.5–7.5)
NEUTS SEG NFR BLD: 87.7 % (ref 50–65)
PLATELET # BLD AUTO: 245 K/UL (ref 130–400)
RBC # BLD AUTO: 5.09 M/UL (ref 4.7–6.1)
WBC # BLD AUTO: 23.85 K/UL (ref 4.8–10.8)

## 2025-05-20 PROCEDURE — 36415 COLL VENOUS BLD VENIPUNCTURE: CPT

## 2025-05-20 PROCEDURE — 85025 COMPLETE CBC W/AUTO DIFF WBC: CPT

## 2025-06-09 ENCOUNTER — HOSPITAL ENCOUNTER (OUTPATIENT)
Dept: INFUSION THERAPY | Age: 70
Discharge: HOME | End: 2025-06-09
Payer: MEDICARE

## 2025-06-09 DIAGNOSIS — D45 POLYCYTHEMIA VERA (HCC): ICD-10-CM

## 2025-06-09 LAB
BASOPHILS # BLD: 0.26 K/UL (ref 0–0.2)
BASOPHILS NFR BLD: 1.4 % (ref 0–1)
EOSINOPHIL # BLD: 0.14 K/UL (ref 0–0.6)
EOSINOPHIL NFR BLD: 0.8 % (ref 0–5)
ERYTHROCYTE [DISTWIDTH] IN BLOOD BY AUTOMATED COUNT: 20.8 % (ref 11.5–14.5)
HCT VFR BLD AUTO: 42.1 % (ref 42–52)
HGB BLD-MCNC: 12.5 G/DL (ref 14–18)
LYMPHOCYTES # BLD: 1.15 K/UL (ref 1.1–4.5)
LYMPHOCYTES NFR BLD: 6.4 % (ref 20–40)
MCH RBC QN AUTO: 25.3 PG (ref 27–31)
MCHC RBC AUTO-ENTMCNC: 29.7 G/DL (ref 33–37)
MCV RBC AUTO: 85.1 FL (ref 80–94)
MONOCYTES # BLD: 0.22 K/UL (ref 0–0.9)
MONOCYTES NFR BLD: 1.2 % (ref 1–10)
NEUTROPHILS # BLD: 15.96 K/UL (ref 1.5–7.5)
NEUTS SEG NFR BLD: 88.8 % (ref 50–65)
PLATELET # BLD AUTO: 109 K/UL (ref 130–400)
RBC # BLD AUTO: 4.95 M/UL (ref 4.7–6.1)
WBC # BLD AUTO: 17.98 K/UL (ref 4.8–10.8)

## 2025-06-09 PROCEDURE — 85025 COMPLETE CBC W/AUTO DIFF WBC: CPT

## 2025-06-09 PROCEDURE — 36415 COLL VENOUS BLD VENIPUNCTURE: CPT

## 2025-06-25 ENCOUNTER — HOSPITAL ENCOUNTER (OUTPATIENT)
Dept: INFUSION THERAPY | Age: 70
Discharge: HOME | End: 2025-06-25
Payer: MEDICARE

## 2025-06-25 DIAGNOSIS — D45 POLYCYTHEMIA VERA (HCC): ICD-10-CM

## 2025-06-25 LAB
BASOPHILS # BLD: 0.37 K/UL (ref 0–0.2)
BASOPHILS NFR BLD: 1.1 % (ref 0–1)
EOSINOPHIL # BLD: 0.44 K/UL (ref 0–0.6)
EOSINOPHIL NFR BLD: 1.2 % (ref 0–5)
ERYTHROCYTE [DISTWIDTH] IN BLOOD BY AUTOMATED COUNT: 20.6 % (ref 11.5–14.5)
HCT VFR BLD AUTO: 42.7 % (ref 42–52)
HGB BLD-MCNC: 12.7 G/DL (ref 14–18)
LYMPHOCYTES # BLD: 1.34 K/UL (ref 1.1–4.5)
LYMPHOCYTES NFR BLD: 3.8 % (ref 20–40)
MCH RBC QN AUTO: 25.6 PG (ref 27–31)
MCHC RBC AUTO-ENTMCNC: 29.7 G/DL (ref 33–37)
MCV RBC AUTO: 86.1 FL (ref 80–94)
MONOCYTES # BLD: 0.39 K/UL (ref 0–0.9)
MONOCYTES NFR BLD: 1.1 % (ref 1–10)
NEUTROPHILS # BLD: 31.84 K/UL (ref 1.5–7.5)
NEUTS SEG NFR BLD: 90.4 % (ref 50–65)
PLATELET # BLD AUTO: 569 K/UL (ref 130–400)
PMV BLD AUTO: 10.5 FL (ref 9.4–12.4)
RBC # BLD AUTO: 4.96 M/UL (ref 4.7–6.1)
WBC # BLD AUTO: 35.22 K/UL (ref 4.8–10.8)

## 2025-06-25 PROCEDURE — 36415 COLL VENOUS BLD VENIPUNCTURE: CPT

## 2025-06-25 PROCEDURE — 85025 COMPLETE CBC W/AUTO DIFF WBC: CPT

## 2025-06-26 ENCOUNTER — RESULTS FOLLOW-UP (OUTPATIENT)
Dept: HEMATOLOGY | Age: 70
End: 2025-06-26

## 2025-06-26 NOTE — TELEPHONE ENCOUNTER
Called patient and reviewed lab results, platelet count up to 569,000.  States that he has been taking Hydrea 1000 mg on Wed, Sat, and Sun and 500 mg rest of the week.  Instructed that MAURILIO Zuniga would like to increase his Hydrea to 500 mg twice a day. Patient states that when he was on that dose in the past, that his platelet count dropped too low.  Instructed to take Hydrea 1000 mg Monday through Friday and 500 mg on Sat and Sun.  Is scheduled for repeat CBC on 07/09.  Instructed to call with any problems.  Patient v/u and is agreeable to plan.

## 2025-06-26 NOTE — TELEPHONE ENCOUNTER
----- Message from Destiny COLÓN sent at 6/26/2025 11:31 AM CDT -----  Please call Mr. Aguero and instruct him to change his Hydrea dosing to 500 mg twice daily.  Please ask him if he is even been forgetting his dosing or has something else changed due to the frequent inconsistency of his platelet count.  FYI his wife has been sick are either in the hospital.

## 2025-07-08 ENCOUNTER — HOSPITAL ENCOUNTER (OUTPATIENT)
Dept: INFUSION THERAPY | Age: 70
Discharge: HOME | End: 2025-07-08
Payer: MEDICARE

## 2025-07-08 DIAGNOSIS — D45 POLYCYTHEMIA VERA (HCC): ICD-10-CM

## 2025-07-08 LAB
BASOPHILS # BLD: 0.26 K/UL (ref 0–0.2)
BASOPHILS NFR BLD: 1.8 % (ref 0–1)
EOSINOPHIL # BLD: 0.11 K/UL (ref 0–0.6)
EOSINOPHIL NFR BLD: 0.8 % (ref 0–5)
ERYTHROCYTE [DISTWIDTH] IN BLOOD BY AUTOMATED COUNT: 19.3 % (ref 11.5–14.5)
HCT VFR BLD AUTO: 44.3 % (ref 42–52)
HGB BLD-MCNC: 13 G/DL (ref 14–18)
LYMPHOCYTES # BLD: 1.19 K/UL (ref 1.1–4.5)
LYMPHOCYTES NFR BLD: 8.1 % (ref 20–40)
MCH RBC QN AUTO: 25.2 PG (ref 27–31)
MCHC RBC AUTO-ENTMCNC: 29.3 G/DL (ref 33–37)
MCV RBC AUTO: 85.9 FL (ref 80–94)
MONOCYTES # BLD: 0.22 K/UL (ref 0–0.9)
MONOCYTES NFR BLD: 1.5 % (ref 1–10)
NEUTROPHILS # BLD: 12.62 K/UL (ref 1.5–7.5)
NEUTS SEG NFR BLD: 86.3 % (ref 50–65)
PLATELET # BLD AUTO: 158 K/UL (ref 130–400)
RBC # BLD AUTO: 5.16 M/UL (ref 4.7–6.1)
WBC # BLD AUTO: 14.62 K/UL (ref 4.8–10.8)

## 2025-07-08 PROCEDURE — 36415 COLL VENOUS BLD VENIPUNCTURE: CPT

## 2025-07-08 PROCEDURE — 85025 COMPLETE CBC W/AUTO DIFF WBC: CPT

## 2025-07-09 ENCOUNTER — TELEPHONE (OUTPATIENT)
Dept: HEMATOLOGY | Age: 70
End: 2025-07-09

## 2025-07-09 NOTE — TELEPHONE ENCOUNTER
Spoke with Mr. Gillespie related to platelet count of 158,000.  Will make dose adjustment for Hydrea currently taking Hydrea 1000 mg Monday through Friday and 500 mg on Sat and Sun.     Recommendation is to change dosing to Hydrea 1000 mg Mondays, Tuesdays and Wednesdays and 500 mg on Thursdays, Fridays, Saturdays and Sundays.      Mr. Gillespie verbalized understanding is agreement with current plan of care and will repeat his labs as scheduled on 7/30/2025.

## 2025-07-30 ENCOUNTER — HOSPITAL ENCOUNTER (OUTPATIENT)
Dept: INFUSION THERAPY | Age: 70
Discharge: HOME | End: 2025-07-30
Payer: MEDICARE

## 2025-07-30 DIAGNOSIS — D45 POLYCYTHEMIA VERA (HCC): ICD-10-CM

## 2025-07-30 LAB
BASOPHILS # BLD: 0.4 K/UL (ref 0–0.2)
BASOPHILS NFR BLD: 1.5 % (ref 0–1)
EOSINOPHIL # BLD: 0.24 K/UL (ref 0–0.6)
EOSINOPHIL NFR BLD: 0.9 % (ref 0–5)
ERYTHROCYTE [DISTWIDTH] IN BLOOD BY AUTOMATED COUNT: 18.7 % (ref 11.5–14.5)
HCT VFR BLD AUTO: 43.4 % (ref 42–52)
HGB BLD-MCNC: 13.2 G/DL (ref 14–18)
LYMPHOCYTES # BLD: 1.29 K/UL (ref 1.1–4.5)
LYMPHOCYTES NFR BLD: 4.7 % (ref 20–40)
MCH RBC QN AUTO: 25.4 PG (ref 27–31)
MCHC RBC AUTO-ENTMCNC: 30.4 G/DL (ref 33–37)
MCV RBC AUTO: 83.6 FL (ref 80–94)
MONOCYTES # BLD: 0.34 K/UL (ref 0–0.9)
MONOCYTES NFR BLD: 1.2 % (ref 1–10)
NEUTROPHILS # BLD: 24.69 K/UL (ref 1.5–7.5)
NEUTS SEG NFR BLD: 90.4 % (ref 50–65)
PLATELET # BLD AUTO: 241 K/UL (ref 130–400)
RBC # BLD AUTO: 5.19 M/UL (ref 4.7–6.1)
WBC # BLD AUTO: 27.31 K/UL (ref 4.8–10.8)

## 2025-07-30 PROCEDURE — 36415 COLL VENOUS BLD VENIPUNCTURE: CPT

## 2025-07-30 PROCEDURE — 85025 COMPLETE CBC W/AUTO DIFF WBC: CPT

## 2025-07-31 ENCOUNTER — RESULTS FOLLOW-UP (OUTPATIENT)
Dept: HEMATOLOGY | Age: 70
End: 2025-07-31

## 2025-07-31 NOTE — TELEPHONE ENCOUNTER
----- Message from Destiny COLÓN sent at 7/31/2025  1:22 PM CDT -----  Please call and discuss lab results, continue Hydrea 1000 mg Mondays, Tuesdays and Wednesdays and 500 mg on Thursdays, Fridays, Saturdays and Sundays

## 2025-07-31 NOTE — TELEPHONE ENCOUNTER
Called patient and reviewed lab results, WBC 27.31, Hgb 13.2, Hct 43.4, platelets 241,000.  Instructed that MAURILIO Zuniga wants her to stay on the same does of Hydrea- 1000 mg on Mon, Tues, Wed and 500 mg rest of the week.  Will recheck labs at follow up on 08/20. Instructed to call with any problems.  Patient v/u and is agreeable to plan.

## 2025-08-08 ENCOUNTER — TELEPHONE (OUTPATIENT)
Dept: CARDIOLOGY CLINIC | Age: 70
End: 2025-08-08

## 2025-08-08 DIAGNOSIS — I47.10 SVT (SUPRAVENTRICULAR TACHYCARDIA): ICD-10-CM

## 2025-08-08 RX ORDER — METOPROLOL SUCCINATE 25 MG/1
25 TABLET, EXTENDED RELEASE ORAL DAILY
Qty: 30 TABLET | Refills: 0 | Status: SHIPPED | OUTPATIENT
Start: 2025-08-08

## 2025-08-15 ENCOUNTER — TELEPHONE (OUTPATIENT)
Dept: HEMATOLOGY | Age: 70
End: 2025-08-15

## 2025-08-19 DIAGNOSIS — R74.8 ELEVATED LIVER ENZYMES: ICD-10-CM

## 2025-08-19 DIAGNOSIS — D45 POLYCYTHEMIA VERA (HCC): Primary | ICD-10-CM

## 2025-08-20 ENCOUNTER — OFFICE VISIT (OUTPATIENT)
Dept: HEMATOLOGY | Age: 70
End: 2025-08-20
Payer: MEDICARE

## 2025-08-20 ENCOUNTER — HOSPITAL ENCOUNTER (OUTPATIENT)
Dept: INFUSION THERAPY | Age: 70
Discharge: HOME | End: 2025-08-20
Payer: MEDICARE

## 2025-08-20 VITALS
OXYGEN SATURATION: 94 % | TEMPERATURE: 98.5 F | SYSTOLIC BLOOD PRESSURE: 132 MMHG | HEIGHT: 70 IN | HEART RATE: 100 BPM | BODY MASS INDEX: 24.34 KG/M2 | WEIGHT: 170 LBS | DIASTOLIC BLOOD PRESSURE: 80 MMHG

## 2025-08-20 DIAGNOSIS — Z79.899 MEDICATION MANAGEMENT: ICD-10-CM

## 2025-08-20 DIAGNOSIS — R16.1 SPLENOMEGALY: ICD-10-CM

## 2025-08-20 DIAGNOSIS — D72.829 LEUKOCYTOSIS, UNSPECIFIED TYPE: ICD-10-CM

## 2025-08-20 DIAGNOSIS — D45 POLYCYTHEMIA VERA (HCC): Primary | ICD-10-CM

## 2025-08-20 DIAGNOSIS — R74.8 ELEVATED LIVER ENZYMES: ICD-10-CM

## 2025-08-20 DIAGNOSIS — Z85.828 HISTORY OF SKIN CANCER: ICD-10-CM

## 2025-08-20 LAB
ALBUMIN SERPL-MCNC: 4.2 G/DL (ref 3.5–5.2)
ALP SERPL-CCNC: 174 U/L (ref 40–129)
ALT SERPL-CCNC: 14 U/L (ref 5–41)
ANION GAP SERPL CALCULATED.3IONS-SCNC: 12 MMOL/L (ref 7–19)
AST SERPL-CCNC: 21 U/L (ref 5–40)
BASOPHILS # BLD: 0.42 K/UL (ref 0–0.2)
BASOPHILS NFR BLD: 1.2 % (ref 0–1)
BILIRUB SERPL-MCNC: 1.3 MG/DL (ref 0–1.2)
BUN SERPL-MCNC: 20 MG/DL (ref 8–23)
CALCIUM SERPL-MCNC: 9.2 MG/DL (ref 8.8–10.2)
CHLORIDE SERPL-SCNC: 103 MMOL/L (ref 98–107)
CO2 SERPL-SCNC: 23 MMOL/L (ref 22–29)
CREAT SERPL-MCNC: 0.9 MG/DL (ref 0.7–1.2)
EOSINOPHIL # BLD: 0.44 K/UL (ref 0–0.6)
EOSINOPHIL NFR BLD: 1.3 % (ref 0–5)
ERYTHROCYTE [DISTWIDTH] IN BLOOD BY AUTOMATED COUNT: 18.9 % (ref 11.5–14.5)
GLUCOSE SERPL-MCNC: 165 MG/DL (ref 70–99)
HCT VFR BLD AUTO: 43.8 % (ref 42–52)
HGB BLD-MCNC: 12.9 G/DL (ref 14–18)
LYMPHOCYTES # BLD: 1.28 K/UL (ref 1.1–4.5)
LYMPHOCYTES NFR BLD: 3.7 % (ref 20–40)
MCH RBC QN AUTO: 25.5 PG (ref 27–31)
MCHC RBC AUTO-ENTMCNC: 29.5 G/DL (ref 33–37)
MCV RBC AUTO: 86.7 FL (ref 80–94)
MONOCYTES # BLD: 0.35 K/UL (ref 0–0.9)
MONOCYTES NFR BLD: 1 % (ref 1–10)
NEUTROPHILS # BLD: 31.13 K/UL (ref 1.5–7.5)
NEUTS SEG NFR BLD: 90.5 % (ref 50–65)
PLATELET # BLD AUTO: 535 K/UL (ref 130–400)
PMV BLD AUTO: 11.3 FL (ref 9.4–12.4)
POTASSIUM SERPL-SCNC: 4.9 MMOL/L (ref 3.5–5.1)
PROT SERPL-MCNC: 6.7 G/DL (ref 6.4–8.3)
RBC # BLD AUTO: 5.05 M/UL (ref 4.7–6.1)
SODIUM SERPL-SCNC: 138 MMOL/L (ref 136–145)
WBC # BLD AUTO: 34.4 K/UL (ref 4.8–10.8)

## 2025-08-20 PROCEDURE — 99214 OFFICE O/P EST MOD 30 MIN: CPT | Performed by: NURSE PRACTITIONER

## 2025-08-20 PROCEDURE — G2211 COMPLEX E/M VISIT ADD ON: HCPCS | Performed by: NURSE PRACTITIONER

## 2025-08-20 PROCEDURE — 36415 COLL VENOUS BLD VENIPUNCTURE: CPT | Performed by: NURSE PRACTITIONER

## 2025-08-20 PROCEDURE — 85025 COMPLETE CBC W/AUTO DIFF WBC: CPT

## 2025-08-20 PROCEDURE — 99212 OFFICE O/P EST SF 10 MIN: CPT

## 2025-08-20 PROCEDURE — 1126F AMNT PAIN NOTED NONE PRSNT: CPT | Performed by: NURSE PRACTITIONER

## 2025-08-20 PROCEDURE — 3075F SYST BP GE 130 - 139MM HG: CPT | Performed by: NURSE PRACTITIONER

## 2025-08-20 PROCEDURE — 1123F ACP DISCUSS/DSCN MKR DOCD: CPT | Performed by: NURSE PRACTITIONER

## 2025-08-20 PROCEDURE — 80053 COMPREHEN METABOLIC PANEL: CPT

## 2025-08-20 PROCEDURE — 1159F MED LIST DOCD IN RCRD: CPT | Performed by: NURSE PRACTITIONER

## 2025-08-20 PROCEDURE — 36415 COLL VENOUS BLD VENIPUNCTURE: CPT

## 2025-08-20 PROCEDURE — 3079F DIAST BP 80-89 MM HG: CPT | Performed by: NURSE PRACTITIONER

## (undated) DEVICE — C-ARMOR C-ARM EQUIPMENT COVERS CLEAR STERILE UNIVERSAL FIT 12 PER CASE: Brand: C-ARMOR

## (undated) DEVICE — ELECTRD NDL EZ CLN MOD 2.75IN

## (undated) DEVICE — DISCONTINUED USE 408103 COLLAR CERVICLE TRAC UNIVERSAL

## (undated) DEVICE — SUTURE PERMAHAND SZ 2-0 L30IN NONABSORBABLE BLK SILK W/O A305H

## (undated) DEVICE — PK ENT HD AND NK 30

## (undated) DEVICE — PEN: MARKING STD 100/CS: Brand: MEDICAL ACTION INDUSTRIES

## (undated) DEVICE — KIT URIN CATHETER 16 FR 5 CC M INDWL SIL

## (undated) DEVICE — GAUZE,SPONGE,2"X2",8PLY,STERILE,LF,2'S: Brand: MEDLINE

## (undated) DEVICE — PROXIMATE RH ROTATING HEAD SKIN STAPLERS (35 WIDE) CONTAINS 35 STAINLESS STEEL STAPLES: Brand: PROXIMATE

## (undated) DEVICE — DISCONTINUED NO SUB IDED TG GLOVE SURG SENSICARE ALOE LT LF PF ST GRN SZ 8

## (undated) DEVICE — GLV SURG BIOGEL LTX PF 8

## (undated) DEVICE — SUT PROLN 4/0 P3 18IN 8699G

## (undated) DEVICE — PREP SOL POVIDONE/IODINE BT 4OZ

## (undated) DEVICE — FORCEP BPLR IRIS

## (undated) DEVICE — TOOL T12MH25L LGD 12CM 2.5MM MATCH LG: Brand: MIDAS REX®

## (undated) DEVICE — SUT SILK 2/0 SH 30IN K833H

## (undated) DEVICE — INSTRUMENT 7080902 PLATE HOLDING PIN

## (undated) DEVICE — NEURO CDS

## (undated) DEVICE — SUTURE VCRL SZ 3-0 L18IN ABSRB UD L26MM SH 1/2 CIR J864D

## (undated) DEVICE — UTILITY MARKER W/MED LABELS: Brand: MEDLINE

## (undated) DEVICE — TOWEL,OR,DSP,ST,BLUE,DLX,4/PK,20PK/CS: Brand: MEDLINE

## (undated) DEVICE — CABL BIPOL MEGADYNE 12FT DISP

## (undated) DEVICE — DISCONTINUED USE 127221 SUTURE SILK PRMHND ETHLN BR BLK REV 2-0 18 685H

## (undated) DEVICE — JACKSON-PRATT 100CC BULB RESERVOIR: Brand: CARDINAL HEALTH

## (undated) DEVICE — GLOVE SURG SZ 75 L12IN FNGR THK94MIL TRNSLUC YEL LTX

## (undated) DEVICE — SPNG GZ WOVN 4X4IN 12PLY 10/BX STRL

## (undated) DEVICE — 3M™ STERI-STRIP™ REINFORCED ADHESIVE SKIN CLOSURES, R1547, 1/2 IN X 4 IN (12 MM X 100 MM), 6 STRIPS/ENVELOPE: Brand: 3M™ STERI-STRIP™

## (undated) DEVICE — MASTISOL ADHESIVE LIQ 2/3ML

## (undated) DEVICE — STRIP,CLOSURE,WOUND,MEDI-STRIP,1/2X4: Brand: MEDLINE

## (undated) DEVICE — INSTRUMENT 876-443 ATL13MM DRLBIT TRIFLT

## (undated) DEVICE — DRAIN SURG 10FR L1/8IN DIA3.2MM SIL CHN RND HUBLESS FULL

## (undated) DEVICE — Device

## (undated) DEVICE — MARKER,SKIN,WI/RULER AND LABELS: Brand: MEDLINE

## (undated) DEVICE — RESERVOIR,SUCTION,100CC,SILICONE: Brand: MEDLINE

## (undated) DEVICE — DRESSING,GAUZE,XEROFORM,CURAD,5"X9",ST: Brand: CURAD

## (undated) DEVICE — E-Z CLEAN, NON-STICK, PTFE COATED, ELECTROSURGICAL BLADE ELECTRODE, MODIFIED EXTENDED INSULATION, 2.5 INCH (6.35 CM): Brand: MEGADYNE

## (undated) DEVICE — SHEET,T,THYROID,STERILE: Brand: MEDLINE

## (undated) DEVICE — STANDARD SURGICAL GOWN, L: Brand: CONVERTORS

## (undated) DEVICE — MICRO KOVER: Brand: UNBRANDED

## (undated) DEVICE — SPONGE SURG WHT KTNR DISECT RADPQ ST

## (undated) DEVICE — BAG BND W36XL36IN TRNSPAR POLY GEN PURP W E BND CLSR TIDI